# Patient Record
Sex: MALE | Race: WHITE | NOT HISPANIC OR LATINO | Employment: UNEMPLOYED | URBAN - METROPOLITAN AREA
[De-identification: names, ages, dates, MRNs, and addresses within clinical notes are randomized per-mention and may not be internally consistent; named-entity substitution may affect disease eponyms.]

---

## 2017-07-17 ENCOUNTER — HOME CARE VISIT (OUTPATIENT)
Dept: HOME HEALTH SERVICES | Facility: HOME HEALTHCARE | Age: 52
End: 2017-07-17

## 2017-12-19 ENCOUNTER — APPOINTMENT (OUTPATIENT)
Dept: RADIOLOGY | Facility: MEDICAL CENTER | Age: 52
DRG: 064 | End: 2017-12-19
Attending: EMERGENCY MEDICINE
Payer: MEDICAID

## 2017-12-19 ENCOUNTER — HOSPITAL ENCOUNTER (INPATIENT)
Facility: MEDICAL CENTER | Age: 52
LOS: 29 days | DRG: 064 | End: 2018-01-17
Attending: EMERGENCY MEDICINE | Admitting: HOSPITALIST
Payer: MEDICAID

## 2017-12-19 ENCOUNTER — APPOINTMENT (OUTPATIENT)
Dept: RADIOLOGY | Facility: MEDICAL CENTER | Age: 52
DRG: 064 | End: 2017-12-19
Attending: HOSPITALIST
Payer: MEDICAID

## 2017-12-19 ENCOUNTER — RESOLUTE PROFESSIONAL BILLING HOSPITAL PROF FEE (OUTPATIENT)
Dept: HOSPITALIST | Facility: MEDICAL CENTER | Age: 52
End: 2017-12-19
Payer: COMMERCIAL

## 2017-12-19 DIAGNOSIS — I63.9 CEREBROVASCULAR ACCIDENT (CVA), UNSPECIFIED MECHANISM (HCC): ICD-10-CM

## 2017-12-19 DIAGNOSIS — G45.9 TRANSIENT CEREBRAL ISCHEMIA, UNSPECIFIED TYPE: ICD-10-CM

## 2017-12-19 DIAGNOSIS — I63.50 CEREBROVASCULAR ACCIDENT (CVA) DUE TO OCCLUSION OF CEREBRAL ARTERY (HCC): ICD-10-CM

## 2017-12-19 DIAGNOSIS — R41.0 CONFUSION: ICD-10-CM

## 2017-12-19 LAB
ALBUMIN SERPL BCP-MCNC: 4.4 G/DL (ref 3.2–4.9)
ALBUMIN/GLOB SERPL: 1.4 G/DL
ALP SERPL-CCNC: 51 U/L (ref 30–99)
ALT SERPL-CCNC: 44 U/L (ref 2–50)
AMPHET UR QL SCN: NEGATIVE
ANION GAP SERPL CALC-SCNC: 15 MMOL/L (ref 0–11.9)
AST SERPL-CCNC: 25 U/L (ref 12–45)
BARBITURATES UR QL SCN: NEGATIVE
BASOPHILS # BLD AUTO: 0.7 % (ref 0–1.8)
BASOPHILS # BLD: 0.09 K/UL (ref 0–0.12)
BENZODIAZ UR QL SCN: NEGATIVE
BILIRUB SERPL-MCNC: 0.8 MG/DL (ref 0.1–1.5)
BUN SERPL-MCNC: 11 MG/DL (ref 8–22)
BZE UR QL SCN: NEGATIVE
CALCIUM SERPL-MCNC: 9.5 MG/DL (ref 8.5–10.5)
CANNABINOIDS UR QL SCN: NEGATIVE
CHLORIDE SERPL-SCNC: 95 MMOL/L (ref 96–112)
CO2 SERPL-SCNC: 20 MMOL/L (ref 20–33)
CREAT SERPL-MCNC: 0.82 MG/DL (ref 0.5–1.4)
EOSINOPHIL # BLD AUTO: 0.11 K/UL (ref 0–0.51)
EOSINOPHIL NFR BLD: 0.9 % (ref 0–6.9)
ERYTHROCYTE [DISTWIDTH] IN BLOOD BY AUTOMATED COUNT: 39.9 FL (ref 35.9–50)
EST. AVERAGE GLUCOSE BLD GHB EST-MCNC: 123 MG/DL
GFR SERPL CREATININE-BSD FRML MDRD: >60 ML/MIN/1.73 M 2
GLOBULIN SER CALC-MCNC: 3.2 G/DL (ref 1.9–3.5)
GLUCOSE BLD-MCNC: 112 MG/DL (ref 65–99)
GLUCOSE SERPL-MCNC: 115 MG/DL (ref 65–99)
HBA1C MFR BLD: 5.9 % (ref 0–5.6)
HCT VFR BLD AUTO: 48.1 % (ref 42–52)
HGB BLD-MCNC: 17.4 G/DL (ref 14–18)
IMM GRANULOCYTES # BLD AUTO: 0.06 K/UL (ref 0–0.11)
IMM GRANULOCYTES NFR BLD AUTO: 0.5 % (ref 0–0.9)
LYMPHOCYTES # BLD AUTO: 1.96 K/UL (ref 1–4.8)
LYMPHOCYTES NFR BLD: 16 % (ref 22–41)
MCH RBC QN AUTO: 34 PG (ref 27–33)
MCHC RBC AUTO-ENTMCNC: 36.2 G/DL (ref 33.7–35.3)
MCV RBC AUTO: 93.9 FL (ref 81.4–97.8)
METHADONE UR QL SCN: NEGATIVE
MONOCYTES # BLD AUTO: 1.76 K/UL (ref 0–0.85)
MONOCYTES NFR BLD AUTO: 14.3 % (ref 0–13.4)
NEUTROPHILS # BLD AUTO: 8.3 K/UL (ref 1.82–7.42)
NEUTROPHILS NFR BLD: 67.6 % (ref 44–72)
NRBC # BLD AUTO: 0 K/UL
NRBC BLD-RTO: 0 /100 WBC
OPIATES UR QL SCN: NEGATIVE
OXYCODONE UR QL SCN: NEGATIVE
PCP UR QL SCN: NEGATIVE
PLATELET # BLD AUTO: 284 K/UL (ref 164–446)
PMV BLD AUTO: 11.9 FL (ref 9–12.9)
POTASSIUM SERPL-SCNC: 3.7 MMOL/L (ref 3.6–5.5)
PROPOXYPH UR QL SCN: NEGATIVE
PROT SERPL-MCNC: 7.6 G/DL (ref 6–8.2)
RBC # BLD AUTO: 5.12 M/UL (ref 4.7–6.1)
SODIUM SERPL-SCNC: 130 MMOL/L (ref 135–145)
WBC # BLD AUTO: 12.3 K/UL (ref 4.8–10.8)

## 2017-12-19 PROCEDURE — 96374 THER/PROPH/DIAG INJ IV PUSH: CPT

## 2017-12-19 PROCEDURE — 99285 EMERGENCY DEPT VISIT HI MDM: CPT

## 2017-12-19 PROCEDURE — 87040 BLOOD CULTURE FOR BACTERIA: CPT

## 2017-12-19 PROCEDURE — 770020 HCHG ROOM/CARE - TELE (206)

## 2017-12-19 PROCEDURE — 93005 ELECTROCARDIOGRAM TRACING: CPT | Performed by: EMERGENCY MEDICINE

## 2017-12-19 PROCEDURE — 70551 MRI BRAIN STEM W/O DYE: CPT

## 2017-12-19 PROCEDURE — 71010 DX-CHEST-LIMITED (1 VIEW): CPT

## 2017-12-19 PROCEDURE — 99223 1ST HOSP IP/OBS HIGH 75: CPT | Performed by: HOSPITALIST

## 2017-12-19 PROCEDURE — 83036 HEMOGLOBIN GLYCOSYLATED A1C: CPT

## 2017-12-19 PROCEDURE — 82962 GLUCOSE BLOOD TEST: CPT

## 2017-12-19 PROCEDURE — 304561 HCHG STAT O2

## 2017-12-19 PROCEDURE — 80307 DRUG TEST PRSMV CHEM ANLYZR: CPT

## 2017-12-19 PROCEDURE — 700101 HCHG RX REV CODE 250: Performed by: EMERGENCY MEDICINE

## 2017-12-19 PROCEDURE — 80053 COMPREHEN METABOLIC PANEL: CPT

## 2017-12-19 PROCEDURE — 85025 COMPLETE CBC W/AUTO DIFF WBC: CPT

## 2017-12-19 PROCEDURE — 70450 CT HEAD/BRAIN W/O DYE: CPT

## 2017-12-19 RX ORDER — HEPARIN SODIUM 5000 [USP'U]/ML
5000 INJECTION, SOLUTION INTRAVENOUS; SUBCUTANEOUS EVERY 8 HOURS
Status: DISCONTINUED | OUTPATIENT
Start: 2017-12-19 | End: 2017-12-25

## 2017-12-19 RX ORDER — ONDANSETRON 4 MG/1
4 TABLET, ORALLY DISINTEGRATING ORAL EVERY 4 HOURS PRN
Status: DISCONTINUED | OUTPATIENT
Start: 2017-12-19 | End: 2018-01-17 | Stop reason: HOSPADM

## 2017-12-19 RX ORDER — PROMETHAZINE HYDROCHLORIDE 25 MG/1
12.5-25 SUPPOSITORY RECTAL EVERY 4 HOURS PRN
Status: DISCONTINUED | OUTPATIENT
Start: 2017-12-19 | End: 2018-01-17 | Stop reason: HOSPADM

## 2017-12-19 RX ORDER — ONDANSETRON 2 MG/ML
4 INJECTION INTRAMUSCULAR; INTRAVENOUS EVERY 4 HOURS PRN
Status: DISCONTINUED | OUTPATIENT
Start: 2017-12-19 | End: 2018-01-17 | Stop reason: HOSPADM

## 2017-12-19 RX ORDER — ASPIRIN 300 MG/1
300 SUPPOSITORY RECTAL DAILY
Status: DISCONTINUED | OUTPATIENT
Start: 2017-12-20 | End: 2017-12-23

## 2017-12-19 RX ORDER — PROMETHAZINE HYDROCHLORIDE 25 MG/1
12.5-25 TABLET ORAL EVERY 4 HOURS PRN
Status: DISCONTINUED | OUTPATIENT
Start: 2017-12-19 | End: 2018-01-17 | Stop reason: HOSPADM

## 2017-12-19 RX ORDER — SODIUM CHLORIDE 9 MG/ML
INJECTION, SOLUTION INTRAVENOUS CONTINUOUS
Status: DISCONTINUED | OUTPATIENT
Start: 2017-12-19 | End: 2017-12-20

## 2017-12-19 RX ORDER — LABETALOL HYDROCHLORIDE 5 MG/ML
10 INJECTION, SOLUTION INTRAVENOUS ONCE
Status: COMPLETED | OUTPATIENT
Start: 2017-12-19 | End: 2017-12-19

## 2017-12-19 RX ORDER — NICOTINE 21 MG/24HR
14 PATCH, TRANSDERMAL 24 HOURS TRANSDERMAL
Status: DISCONTINUED | OUTPATIENT
Start: 2017-12-20 | End: 2018-01-17 | Stop reason: HOSPADM

## 2017-12-19 RX ORDER — ATORVASTATIN CALCIUM 80 MG/1
80 TABLET, FILM COATED ORAL EVERY EVENING
Status: DISCONTINUED | OUTPATIENT
Start: 2017-12-19 | End: 2017-12-23

## 2017-12-19 RX ORDER — ACETAMINOPHEN 325 MG/1
650 TABLET ORAL EVERY 6 HOURS PRN
Status: DISCONTINUED | OUTPATIENT
Start: 2017-12-19 | End: 2018-01-17 | Stop reason: HOSPADM

## 2017-12-19 RX ORDER — ASPIRIN 325 MG
325 TABLET ORAL DAILY
Status: DISCONTINUED | OUTPATIENT
Start: 2017-12-20 | End: 2017-12-23

## 2017-12-19 RX ORDER — ASPIRIN 81 MG/1
324 TABLET, CHEWABLE ORAL DAILY
Status: DISCONTINUED | OUTPATIENT
Start: 2017-12-20 | End: 2017-12-23

## 2017-12-19 RX ADMIN — LABETALOL HYDROCHLORIDE 10 MG: 5 INJECTION, SOLUTION INTRAVENOUS at 17:41

## 2017-12-19 ASSESSMENT — COGNITIVE AND FUNCTIONAL STATUS - GENERAL
MOBILITY SCORE: 24
SUGGESTED CMS G CODE MODIFIER DAILY ACTIVITY: CH
DAILY ACTIVITIY SCORE: 24
SUGGESTED CMS G CODE MODIFIER MOBILITY: CH

## 2017-12-19 ASSESSMENT — PATIENT HEALTH QUESTIONNAIRE - PHQ9
SUM OF ALL RESPONSES TO PHQ QUESTIONS 1-9: 0
2. FEELING DOWN, DEPRESSED, IRRITABLE, OR HOPELESS: NOT AT ALL
1. LITTLE INTEREST OR PLEASURE IN DOING THINGS: NOT AT ALL
SUM OF ALL RESPONSES TO PHQ9 QUESTIONS 1 AND 2: 0

## 2017-12-19 ASSESSMENT — LIFESTYLE VARIABLES
EVER HAD A DRINK FIRST THING IN THE MORNING TO STEADY YOUR NERVES TO GET RID OF A HANGOVER: YES
CONSUMPTION TOTAL: POSITIVE
TOTAL SCORE: 2
HAVE YOU EVER FELT YOU SHOULD CUT DOWN ON YOUR DRINKING: YES
HOW MANY TIMES IN THE PAST YEAR HAVE YOU HAD 5 OR MORE DRINKS IN A DAY: 5
ON A TYPICAL DAY WHEN YOU DRINK ALCOHOL HOW MANY DRINKS DO YOU HAVE: 3
TOTAL SCORE: 2
TOTAL SCORE: 2
ALCOHOL_USE: YES
EVER FELT BAD OR GUILTY ABOUT YOUR DRINKING: NO
AVERAGE NUMBER OF DAYS PER WEEK YOU HAVE A DRINK CONTAINING ALCOHOL: 21
HAVE PEOPLE ANNOYED YOU BY CRITICIZING YOUR DRINKING: NO
EVER_SMOKED: YES
DOES PATIENT WANT TO STOP DRINKING: NO

## 2017-12-19 ASSESSMENT — PAIN SCALES - GENERAL
PAINLEVEL_OUTOF10: 5
PAINLEVEL_OUTOF10: 0

## 2017-12-19 ASSESSMENT — PAIN SCALES - WONG BAKER: WONGBAKER_NUMERICALRESPONSE: DOESN'T HURT AT ALL

## 2017-12-20 PROBLEM — I10 HTN (HYPERTENSION): Status: ACTIVE | Noted: 2017-12-20

## 2017-12-20 LAB
ANION GAP SERPL CALC-SCNC: 12 MMOL/L (ref 0–11.9)
BUN SERPL-MCNC: 13 MG/DL (ref 8–22)
CALCIUM SERPL-MCNC: 8.8 MG/DL (ref 8.5–10.5)
CHLORIDE SERPL-SCNC: 99 MMOL/L (ref 96–112)
CHOLEST SERPL-MCNC: 188 MG/DL (ref 100–199)
CO2 SERPL-SCNC: 20 MMOL/L (ref 20–33)
CREAT SERPL-MCNC: 0.82 MG/DL (ref 0.5–1.4)
ERYTHROCYTE [DISTWIDTH] IN BLOOD BY AUTOMATED COUNT: 39.8 FL (ref 35.9–50)
GFR SERPL CREATININE-BSD FRML MDRD: >60 ML/MIN/1.73 M 2
GLUCOSE SERPL-MCNC: 100 MG/DL (ref 65–99)
HCT VFR BLD AUTO: 46.7 % (ref 42–52)
HDLC SERPL-MCNC: 53 MG/DL
HGB BLD-MCNC: 16.3 G/DL (ref 14–18)
LDLC SERPL CALC-MCNC: 116 MG/DL
MCH RBC QN AUTO: 32.5 PG (ref 27–33)
MCHC RBC AUTO-ENTMCNC: 34.9 G/DL (ref 33.7–35.3)
MCV RBC AUTO: 93.2 FL (ref 81.4–97.8)
PLATELET # BLD AUTO: 269 K/UL (ref 164–446)
PMV BLD AUTO: 12.2 FL (ref 9–12.9)
POTASSIUM SERPL-SCNC: 3.7 MMOL/L (ref 3.6–5.5)
RBC # BLD AUTO: 5.01 M/UL (ref 4.7–6.1)
SODIUM SERPL-SCNC: 131 MMOL/L (ref 135–145)
TRIGL SERPL-MCNC: 95 MG/DL (ref 0–149)
WBC # BLD AUTO: 14.1 K/UL (ref 4.8–10.8)

## 2017-12-20 PROCEDURE — 90471 IMMUNIZATION ADMIN: CPT

## 2017-12-20 PROCEDURE — 97163 PT EVAL HIGH COMPLEX 45 MIN: CPT

## 2017-12-20 PROCEDURE — 3E0234Z INTRODUCTION OF SERUM, TOXOID AND VACCINE INTO MUSCLE, PERCUTANEOUS APPROACH: ICD-10-PCS | Performed by: HOSPITALIST

## 2017-12-20 PROCEDURE — 97167 OT EVAL HIGH COMPLEX 60 MIN: CPT

## 2017-12-20 PROCEDURE — 700111 HCHG RX REV CODE 636 W/ 250 OVERRIDE (IP): Performed by: HOSPITALIST

## 2017-12-20 PROCEDURE — 80048 BASIC METABOLIC PNL TOTAL CA: CPT

## 2017-12-20 PROCEDURE — 80061 LIPID PANEL: CPT

## 2017-12-20 PROCEDURE — 36415 COLL VENOUS BLD VENIPUNCTURE: CPT

## 2017-12-20 PROCEDURE — 770020 HCHG ROOM/CARE - TELE (206)

## 2017-12-20 PROCEDURE — 99233 SBSQ HOSP IP/OBS HIGH 50: CPT | Performed by: INTERNAL MEDICINE

## 2017-12-20 PROCEDURE — G8987 SELF CARE CURRENT STATUS: HCPCS | Mod: CL

## 2017-12-20 PROCEDURE — A9270 NON-COVERED ITEM OR SERVICE: HCPCS | Performed by: HOSPITALIST

## 2017-12-20 PROCEDURE — G8979 MOBILITY GOAL STATUS: HCPCS | Mod: CJ

## 2017-12-20 PROCEDURE — G8978 MOBILITY CURRENT STATUS: HCPCS | Mod: CL

## 2017-12-20 PROCEDURE — 90732 PPSV23 VACC 2 YRS+ SUBQ/IM: CPT | Performed by: INTERNAL MEDICINE

## 2017-12-20 PROCEDURE — 92610 EVALUATE SWALLOWING FUNCTION: CPT

## 2017-12-20 PROCEDURE — G8988 SELF CARE GOAL STATUS: HCPCS | Mod: CJ

## 2017-12-20 PROCEDURE — G8996 SWALLOW CURRENT STATUS: HCPCS | Mod: CJ

## 2017-12-20 PROCEDURE — G8997 SWALLOW GOAL STATUS: HCPCS | Mod: CH

## 2017-12-20 PROCEDURE — 700102 HCHG RX REV CODE 250 W/ 637 OVERRIDE(OP): Performed by: HOSPITALIST

## 2017-12-20 PROCEDURE — 700111 HCHG RX REV CODE 636 W/ 250 OVERRIDE (IP): Performed by: INTERNAL MEDICINE

## 2017-12-20 PROCEDURE — 700105 HCHG RX REV CODE 258: Performed by: HOSPITALIST

## 2017-12-20 PROCEDURE — 85027 COMPLETE CBC AUTOMATED: CPT

## 2017-12-20 RX ORDER — AMLODIPINE BESYLATE 10 MG/1
10 TABLET ORAL
Status: DISCONTINUED | OUTPATIENT
Start: 2017-12-21 | End: 2017-12-23

## 2017-12-20 RX ADMIN — ATORVASTATIN CALCIUM 80 MG: 80 TABLET, FILM COATED ORAL at 20:49

## 2017-12-20 RX ADMIN — HEPARIN SODIUM 5000 UNITS: 5000 INJECTION, SOLUTION INTRAVENOUS; SUBCUTANEOUS at 00:59

## 2017-12-20 RX ADMIN — ASPIRIN 325 MG: 325 TABLET, COATED ORAL at 12:11

## 2017-12-20 RX ADMIN — ACETAMINOPHEN 650 MG: 325 TABLET, FILM COATED ORAL at 20:49

## 2017-12-20 RX ADMIN — HEPARIN SODIUM 5000 UNITS: 5000 INJECTION, SOLUTION INTRAVENOUS; SUBCUTANEOUS at 06:24

## 2017-12-20 RX ADMIN — SODIUM CHLORIDE: 9 INJECTION, SOLUTION INTRAVENOUS at 01:00

## 2017-12-20 RX ADMIN — HEPARIN SODIUM 5000 UNITS: 5000 INJECTION, SOLUTION INTRAVENOUS; SUBCUTANEOUS at 14:31

## 2017-12-20 RX ADMIN — PNEUMOCOCCAL VACCINE POLYVALENT 25 MCG
25; 25; 25; 25; 25; 25; 25; 25; 25; 25; 25; 25; 25; 25; 25; 25; 25; 25; 25; 25; 25; 25; 25 INJECTION, SOLUTION INTRAMUSCULAR; SUBCUTANEOUS at 17:16

## 2017-12-20 RX ADMIN — SODIUM CHLORIDE: 9 INJECTION, SOLUTION INTRAVENOUS at 17:52

## 2017-12-20 RX ADMIN — HEPARIN SODIUM 5000 UNITS: 5000 INJECTION, SOLUTION INTRAVENOUS; SUBCUTANEOUS at 20:49

## 2017-12-20 ASSESSMENT — COGNITIVE AND FUNCTIONAL STATUS - GENERAL
SUGGESTED CMS G CODE MODIFIER MOBILITY: CM
STANDING UP FROM CHAIR USING ARMS: A LOT
SUGGESTED CMS G CODE MODIFIER DAILY ACTIVITY: CH
TURNING FROM BACK TO SIDE WHILE IN FLAT BAD: UNABLE
MOVING FROM LYING ON BACK TO SITTING ON SIDE OF FLAT BED: UNABLE
WALKING IN HOSPITAL ROOM: TOTAL
MOBILITY SCORE: 7
DAILY ACTIVITIY SCORE: 24
MOVING TO AND FROM BED TO CHAIR: UNABLE
CLIMB 3 TO 5 STEPS WITH RAILING: TOTAL

## 2017-12-20 ASSESSMENT — PAIN SCALES - GENERAL
PAINLEVEL_OUTOF10: 0
PAINLEVEL_OUTOF10: 7
PAINLEVEL_OUTOF10: 2

## 2017-12-20 ASSESSMENT — ENCOUNTER SYMPTOMS
FOCAL WEAKNESS: 1
FEVER: 0
ABDOMINAL PAIN: 0
BACK PAIN: 0
DOUBLE VISION: 0
CHILLS: 0
BLURRED VISION: 0
SPEECH CHANGE: 1
SHORTNESS OF BREATH: 0
HEADACHES: 0

## 2017-12-20 ASSESSMENT — GAIT ASSESSMENTS: GAIT LEVEL OF ASSIST: UNABLE TO PARTICIPATE

## 2017-12-20 ASSESSMENT — ACTIVITIES OF DAILY LIVING (ADL): TOILETING: INDEPENDENT

## 2017-12-20 NOTE — ED NOTES
PT HAS A BED, REPORT WAS CALLED TO Rn. RN WAS TOLD MRI WILL BRING THE PT TO THE FLOOR. PT IS AAOX4 WITH TIME OF CONFUSION. PT HAS FAMILY AT BEDSIDE.

## 2017-12-20 NOTE — ED PROVIDER NOTES
"ED Provider Note    CHIEF COMPLAINT  Chief Complaint   Patient presents with   • ALOC   • Head Ache     x 1 month        HPI  Gilles Case is a 52 y.o. male who presents with bring altered and having a headache. Wife and mother are at bedside giving history. The mother says that apparently 3-4 days ago he had an episode where he became acutely confused. She was driving he was in the car and became very quiet he was unable to put his seat belt on his right hand seemed to be contracted and not really working properly. This lasted for maybe an hour or slightly more. He got home his wife brought him to the emergency department but by the time he got emergency department his symptoms had resolved and he would not come into the hospital to be evaluated. Last night he was not really acting right. His wife reports that his right foot was turned in and he was having some issues walking on it. Yesterday the patient and his mother were at Health system and he fell a couple of times he seemed to have a problem with his right foot not quite working properly. She brought him home and he was caught in the seatbelts. He seemed to be okay per his wife when he went to sleep this morning he has just been \"not right\". His wife says he is just not acting like himself. He typically watches jeopardy and knows all the answers and has been unable to provide any answers for Jeopardy recently. He has been complaining of headache for the last 3 months but it has been worse over the last 3 days. He does have a history of hypertension but is not taking any medications. He does have a history of smoking but has not been smoking for the last 3 days.    History is provided by the wife and mother. History is limited from the patient.    REVIEW OF SYSTEMS  positive for confusion and some personality changes, headaches, negative for vomiting. All other systems are negative.     PAST MEDICAL HISTORY       SOCIAL HISTORY  Social History     Social History " Main Topics   • Smoking status: Current Every Day Smoker     Types: Cigarettes   • Smokeless tobacco: Never Used   • Alcohol use Yes   • Drug use: No   • Sexual activity: Not on file       SURGICAL HISTORY  patient denies any surgical history    CURRENT MEDICATIONS  Home Medications     Reviewed by Elisha Oneill (Pharmacy Tech) on 12/19/17 at 2130  Med List Status: Complete   Medication Last Dose Status        Patient Tarun Taking any Medications                       ALLERGIES  Allergies   Allergen Reactions   • Poison Oak Extract [Extract Of Poison [place]]        PHYSICAL EXAM  VITAL SIGNS: /79   Pulse 87   Temp 37 °C (98.6 °F)   Resp 20   Ht 1.829 m (6')   Wt 108.9 kg (240 lb)   SpO2 99%   BMI 32.55 kg/m² .  Constitutional: Alert in no apparent distress.  HENT: No signs of trauma, Bilateral external ears normal, Nose normal.   Eyes: Pupils are equal and reactive, Conjunctiva normal, Non-icteric.   Neck: Normal range of motion, No tenderness, Supple, No stridor.   Cardiovascular: Regular rate and rhythm, no murmurs.   Thorax & Lungs: Normal breath sounds, No respiratory distress, No wheezing, No chest tenderness.   Abdomen: Bowel sounds normal, Soft, No tenderness, No masses, No peritoneal signs.  Skin: Warm, Dry, No erythema, No rash.   Musculoskeletal:  no major deformities noted.   Neurologic: Alert, CN intact. Patient knows his wife's name and his mother's name. He cannot relate the date properly. He has a hard time following commands he will not cooperate with finger-to-nose test as he says he does not understand what I am asking him. He has some slight drift of his right foot no drift of his right hand  Psychiatric: Affect normal, Judgment normal, Mood normal.       DIAGNOSTIC STUDIES / PROCEDURES      EKG  12 Lead EKG interpreted by me to show:  Normal sinus rhythm  Rate 69  Axis: Normal  Intervals: Normal  TWI III  Normal ST segments  My impression of this EKG: non specific TWI, does not  indicate ischemia or arrythmia at this time.      LABS  Labs Reviewed   CBC WITH DIFFERENTIAL - Abnormal; Notable for the following:        Result Value    WBC 12.3 (*)     MCH 34.0 (*)     MCHC 36.2 (*)     Lymphocytes 16.00 (*)     Monocytes 14.30 (*)     Neutrophils (Absolute) 8.30 (*)     Monos (Absolute) 1.76 (*)     All other components within normal limits   COMP METABOLIC PANEL - Abnormal; Notable for the following:     Sodium 130 (*)     Chloride 95 (*)     Anion Gap 15.0 (*)     Glucose 115 (*)     All other components within normal limits   ACCU-CHEK GLUCOSE - Abnormal; Notable for the following:     Glucose - Accu-Ck 112 (*)     All other components within normal limits   URINE DRUG SCREEN   BLOOD CULTURE    Narrative:     1 of 2 for Blood Culture x 2 sites order   BLOOD CULTURE    Narrative:     2 of 2 blood culture x2  Sites order   ESTIMATED GFR   HEMOGLOBIN A1C   URINE DRUG SCREEN   URINALYSIS,CULTURE IF INDICATED       RADIOLOGY  CT-HEAD W/O   Final Result      No acute intracranial abnormality.      DX-CHEST-LIMITED (1 VIEW)   Final Result      1.  Hypoinflation, moderate cardiac enlargement, and pulmonary vascular congestion.   2.  No pneumonia or overt pulmonary edema.      Echocardiogram Comp W/O cont    (Results Pending)   MR-BRAIN-W/O    (Results Pending)   Carotid Duplex ([facility] Only) - Do not order if CTA - Neck done in ER    (Results Pending)           COURSE & MEDICAL DECISION MAKING  Pertinent Labs & Imaging studies reviewed. (See chart for details)  This is a 52-year-old gentleman who presents with confusion. He is alert however he does have some difficulty understanding instructions and he seems to be off. He has some slight weakness of his right lower extremity. Differential includes but is not limited to TIA, CVA, partial seizure.    He was made nothing by mouth. He does not meet criteria for alteplase given his Prolonged symptoms that been present since last  night.    Results show slightly elevated white blood cell count CMP shows slightly low sodium. CT of the head does not show any evidence of mass or bleed. His blood pressure was significantly elevated and he required some labetalol for this.    Patient will be admitted to the hospitalist service for CVA workup.    727PM: I spoke with Dr. Castelan, hospitalist who will admit the patient.    He will be admitted to the hospitalist service in guarded condition.      FINAL IMPRESSION  1. Confusion    2. Transient cerebral ischemia, unspecified type        2.   3.    This dictation has been creating using voice recognition software. The accuracy of the dictation is limited the abilities of the software.  I expect there may be some errors of grammar and possibly content. I made every attempt to manually correct the errors within my dictation. However errors related to this voice recognition software may still exist and should be interpreted within the appropriate context.      The note accurately reflects work and decisions made by me.  Gely Alvarado  12/19/2017  10:02 PM

## 2017-12-20 NOTE — H&P
DATE OF ADMISSION:  12/19/2017    PRIMARY CARE:  None.    CHIEF COMPLAINT:  Altered level of consciousness.    HISTORY OF PRESENT ILLNESS:  The patient is a 52-year-old gentleman with   history of obesity, hypertension and insomnia.  The patient now presents with   3-4 day history of altered level of consciousness that was transient.    Apparently 3 days ago, he was driving and he had difficulty communicating and   his right upper extremity went into contracture and he was not able to utilize   it.  This lasted for approximately 1 to 1-1/2 hours, but resolved without   residual effect.  Then, yesterday he had an episode where he fell twice where   his right lower extremity turned inwards.  Patient slept significant amount of   time thereafter.  Patient has significant somnolence.  Patient was not able   to follow directions thereafter.  Patient's only complaint at that time was   headache.  Patient is not able to provide much information and the history is   obtained through patient's family members.  Per the family, no history of   prior episodes like this.  Patient is not able to communicate or follow   commands.    PAST MEDICAL HISTORY:  Obesity, hypertension, insomnia.    PAST SURGICAL HISTORY:  Herniorrhaphy.    FAMILY HISTORY:  Significant for ALS in father, acute MI in brother at the age   of 54 and stroke in grandmother.    SOCIAL HISTORY:  Has approximately 2-3 cigarettes per day and drinks 1-2   alcoholic beverages per evening.  No IV drug use or polysubstance abuse.    ALLERGIES:  NKDA.    MEDICATIONS:  None.    REVIEW OF SYSTEMS:  Not obtainable.    PHYSICAL EXAMINATION:  GENERAL:  Medically overweight white male in moderate distress.  VITAL SIGNS:  Temp 37.2 C, heart rate 89, respiration 18, blood pressure   130/67, saturating 99% on room air.  HEENT:  Normocephalic, atraumatic, pupils are equal, round and reactive to   light, anicteric sclerae.  Extraocular muscles are intact.  Oropharynx is   small  with Mallampati score of 4.  Mucosa is moist, clear, without ulcerations   or exudates.  NECK:  No cervical lymphadenopathy appreciated.  PULMONARY:  Clear to auscultation bilaterally.  CARDIOVASCULAR:  Regular rate and rhythm without murmurs, rubs or gallops.  ABDOMEN:  Positive bowel sounds, soft, nontender, nondistended.  EXTREMITIES:  No clubbing, cyanosis or edema.  NEUROLOGIC:  Cranial nerves II-XII intact with the evidence of aphasia,   receptive and expressive.  There is 3/5 right lower extremity weakness and   also slight right lower extremity drop with a pronator drift.  Remainder of   the evaluation is not doable due to patient's not understanding commands.  SKIN:  No erythema or ulcerations.    LABORATORY:  WBC of 12.3, hemoglobin 17.4, platelets 284.  Sodium 130,   potassium 3.7, chloride 95, bicarbonate 20, BUN 11, creatinine 0.82, glucose   of 115, calcium 9.5, alkaline phos 51, AST 25, ALT 44, total bili 0.8.  HbA1c   is 5.9.  Urine tox screen is negative.    EKG shows sinus rhythm.  Chest x-ray shows hypoinflation with cardiomegaly.    Head CT shows no acute process.    ASSESSMENT AND PLAN:  A 52-year-old gentleman with history of obesity and   insomnia, now with findings concerning for CVA.  1.  Acute cerebrovascular accident:  We will allow permissive hypertension.    We will initiate aspirin and atorvastatin.  We will monitor patient on   telemetry and obtain echocardiogram, head MRI and carotid ultrasounds.  2.  Aphasia:  We will obtain speech therapy for evaluation and management.  3.  Right lower extremity weakness.  We will obtain PT, OT evaluation.  4.  Hypertension:  As above, we will allow permissive hypertension.  5.  Hyponatremia:  Follow with normal saline infusion.  6.  Obesity:  Encourage dietary kilocalorie restrictions.  7.  Insomnia:  Recommend outpatient followup.  8.  Preventives:  Provide incentive spirometry, Pneumovax and Fluvax as   appropriate, stool softener and DVT  prophylaxis.    DISPOSITION:  Complex and guarded.    This will require more than 2 nights of hospitalization for evaluation and   management.       ____________________________________     MD SARA DOUGLASS / KATYA    DD:  12/20/2017 01:43:16  DT:  12/20/2017 02:33:07    D#:  [id number]  Job#:  [id number]

## 2017-12-20 NOTE — PROGRESS NOTES
Received report from previous nurse regarding previous 12 hours. POC reviewed with patient, white board updated, patient verbalized understanding, call light within reach. Patient encouraged to call before getting up. Bed in lowest position, treaded socks on. Patient currently in sinus rhythm at a rate of 72.

## 2017-12-20 NOTE — PROGRESS NOTES
Saumya Rios Fall Risk Assessment:     Last Known Fall: Within the last month  Mobility: Use of assistive device/requires assist of two people  Medications: Cardiovascular or central nervous system meds  Mental Status/LOC/Awareness: Oriented to person and place  Toileting Needs: Use of assistive device (Bedside commode, bedpan, urinal)  Volume/Electrolyte Status: No problems  Communication/Sensory: Visual (Glasses)/hearing deficit  Behavior: Appropriate behavior  Saumya Rios Fall Risk Total: 13  Fall Risk Level: MODERATE RISK    Universal Fall Precautions:  call light/belongings in reach, bed in low position and locked, wheelchairs and assistive devices out of sight, siderails up x 2, use non-slip footwear, adequate lighting, educate on level of risk, clutter free and spill free environment, educate to call for assistance    Fall Risk Level Interventions:    TRIAL (TELE 8, NEURO, MED MICHAEL 5) Moderate Fall Risk Interventions  Provide patient/family education based on risk assessment : completed  Educate patient/family to call staff for assistance when getting out of bed: completed  Place fall precaution signage outside patient door: verified  Utilize bed/chair fall alarm: verified     Patient Specific Interventions:     Medication: review medications with patient and family, assess for medications that can be discontinued or dosage decreased and limit combination of prn medications  Mental Status/LOC/Awareness: reorient patient, reinforce falls education, check on patient hourly, utilize bed/chair fall alarm and reinforce the use of call light  Toileting: provide frquent toileting, instruct patient/family on the use of grab bars, instruct patient/family on the need to call for assistance when toileting and do not leave patient unattended in bathroom/refer to toileting scripting  Volume/Electrolyte Status: ensure patient remains hydrated, advance diet as tolerated and monitor abnormal lab  values  Communication/Sensory: update plan of care on whiteboard, ensure proper positioning when transferrng/ambulating and ensure patient has glasses/contacts and hearing aids/dentures  Behavioral: encourage patient to voice feelings, administer medication as ordered and instruct/reinforce fall program rationale  Mobility: utilize bed/chair fall alarm, ensure bed is locked and in lowest position and provide appropriate assistive device

## 2017-12-20 NOTE — THERAPY
"Speech Language Therapy Clinical Swallow Evaluation completed.  Functional Status: Clinical swallow evaluation completed on this date.  Patient with expressive and receptive-type deficits.  He demonstrated inconsistent accuracy with personally relevant yes/no responses. Patient did not follow any directives to the oral [facility] exam (?oral apraxia).  Presentation of PO included ice chips, nectars, purees, soft solids, and thin liquids.  The patient presented with prolonged mastication of fibrous soft solids (pineapple) but functional with peaches and pears, initiation of swallow trigger was timely and laryngeal elevation was palpated as weak.  The patient demonstrated wet vocal quality and reflexive throat clearing with 25% of ice chips and ~50% of thin liquid trials.  He had no overt s/sx of aspiration with any other consistency consumed.  Patient appeared easily frustrated and made multiple unsuccessful attempts at feeding himself independently.  At this time, recommend Dysphagia 2/nectars diet with DIRECT supervision and feeding assistance as needed. The patient would benefit from an aphasia evaluation to further assess expressive and receptive deficits.  SLP following   Recommendations - Diet: Diet / Liquid Recommendation: Nectar Thick Liquid, Dysphagia II                          Strategies: Direct supervision during meals, Assistance needed for meal tray set-up, No Straws and Head of Bed at 90 Degrees                          Medication Administration: Medication Administration : Float Whole with Puree  Plan of Care: Will benefit from Speech Therapy 5 times per week  Post-Acute Therapy: Discharge to a transitional care facility for continued skilled therapy services.    See \"Rehab Therapy-Acute\" Patient Summary Report for complete documentation.   "

## 2017-12-20 NOTE — DISCHARGE PLANNING
Transitional Care Navigator:    Referred to  for TORRI request. Pt is uninsured, per patient's spouse; pt requires skilled therapies for stroke recovery.

## 2017-12-20 NOTE — THERAPY
"Physical Therapy Evaluation completed.   Bed Mobility:  Supine to Sit: Maximal Assist  Transfers: Sit to Stand: Maximal Assist  Gait: Level Of Assist: Unable to Participate with No Equipment Needed       Plan of Care: Will benefit from Physical Therapy 5 times per week  Discharge Recommendations: Equipment: Will Continue to Assess for Equipment Needs. Post-acute therapy Discharge to a transitional care facility for continued skilled therapy services.    Pt presents with decreased functional mobility most attributable to impaired R UE/LE proprioception and fine touch, hypotonic R LE, impaired seated trunk control, and symptoms of receptive and expressive aphasia. In sitting, pt required min to mod A to find and sustain upright, neutral posture. R UE and LE were found to be in awkward positions indicating impaired proprioception. In standing at max A, no contraction was noted to R LE and pt tended to have immediate buckle followed by knee hyperextension for compensation. Pt displayed minor hypertonicity to R hamstrings with a catch followed by gradual release at end range. He will benefit from further acute skilled PT services to improve functional mobility and will most likely benefit from intensive multidisciplinary skilled rehab upon DC.     See \"Rehab Therapy-Acute\" Patient Summary Report for complete documentation.     "

## 2017-12-20 NOTE — ED NOTES
Pt states his blood pressure is always high, and doesn't take meds.  Pt states he hasn't seen the doctor in 15 years

## 2017-12-20 NOTE — PROGRESS NOTES
[facility] Hospitalist Progress Note    Date of Service: 2017    Chief Complaint  52 y.o. male admitted 2017 with HTN and obesity who presented with several days of AMS with brief RUE and RLE contracture. Admitted for possible stroke.    Interval Problem Update  CVA - aspirin, lipitor, initial CTH normal  NPO, NS 75ml/h. PT/OT/ST  TTE, carotid duplex  Leukocytosis - afebrile, UA ordered, CXR without PNA  Mild hyponatremia - on NS  MRI brain pending  Wife updated at bedside.    Consultants/Specialty  None    Disposition  SNF - order placed        Review of Systems   Reason unable to perform ROS: espressive aphasia.   Constitutional: Negative for chills and fever.   Eyes: Negative for blurred vision and double vision.   Respiratory: Negative for shortness of breath.    Cardiovascular: Negative for chest pain.   Gastrointestinal: Negative for abdominal pain.   Musculoskeletal: Negative for back pain.   Neurological: Positive for speech change and focal weakness. Negative for headaches.      Physical Exam  Laboratory/Imaging   Hemodynamics  Temp (24hrs), Av.7 °C (98 °F), Min:36.2 °C (97.1 °F), Max:37 °C (98.6 °F)   Temperature: 36.6 °C (97.9 °F)  Pulse  Av.9  Min: 67  Max: 94    Blood Pressure: (!) 179/95 (rn notified)      Respiratory      Respiration: 18, Pulse Oximetry: 94 %        RUL Breath Sounds: Clear, RML Breath Sounds: Clear, RLL Breath Sounds: Diminished, MIRTA Breath Sounds: Clear, LLL Breath Sounds: Diminished    Fluids    Intake/Output Summary (Last 24 hours) at 17  Last data filed at 17 1754   Gross per 24 hour   Intake              250 ml   Output                0 ml   Net              250 ml       Nutrition  Orders Placed This Encounter   Procedures   • DIET ORDER     Standing Status:   Standing     Number of Occurrences:   1     Order Specific Question:   Diet:     Answer:   Regular [1]     Order Specific Question:   Texture/Fiber modifications:     Answer:   Dysphagia  2(Pureed/Chopped)specify fluid consistency(question 6) [2]     Order Specific Question:   Consistency/Fluid modifications:     Answer:   Nectar Thick [2]     Order Specific Question:   Miscellaneous modifications:     Answer:   SLP - 1:1 Supervision by Nursing [21]     Order Specific Question:   Miscellaneous modifications:     Answer:   SLP - Deliver to Nursing Station [22]     Physical Exam   Constitutional: He appears well-developed and well-nourished.   Follows some commands   HENT:   Head: Normocephalic and atraumatic.   Eyes: Conjunctivae and EOM are normal. Pupils are equal, round, and reactive to light.   Cardiovascular: Normal rate and regular rhythm.  Exam reveals no gallop and no friction rub.    No murmur heard.  Pulmonary/Chest: Effort normal and breath sounds normal. He has no wheezes. He has no rales.   Abdominal: Soft. There is no tenderness. There is no rebound and no guarding.   Musculoskeletal: He exhibits no edema.   Neurological: He is alert.   No facial droop, able to answer yes/no questions, says some words and some mumbling, 3/5 strength with RUE and RLE with positive drift, hyperreflexia to RLE   Skin: Skin is warm and dry.   Nursing note and vitals reviewed.      Recent Labs      12/19/17   1643  12/20/17   0300   WBC  12.3*  14.1*   RBC  5.12  5.01   HEMOGLOBIN  17.4  16.3   HEMATOCRIT  48.1  46.7   MCV  93.9  93.2   MCH  34.0*  32.5   MCHC  36.2*  34.9   RDW  39.9  39.8   PLATELETCT  284  269   MPV  11.9  12.2     Recent Labs      12/19/17   1643  12/20/17   0300   SODIUM  130*  131*   POTASSIUM  3.7  3.7   CHLORIDE  95*  99   CO2  20  20   GLUCOSE  115*  100*   BUN  11  13   CREATININE  0.82  0.82   CALCIUM  9.5  8.8             Recent Labs      12/20/17   0300   TRIGLYCERIDE  95   HDL  53   LDL  116*          Assessment/Plan     * CVA (cerebral vascular accident) (CMS-HCC)   Assessment & Plan    Expressive aphasia with right sided weakness. MRI showed left frontal/parietal/temporal  infarct.  and A1c 5.9%.  - ST/PT/OT  - continue permissive HTN until tonight  - continue monitoring on telemetry  - TTE and CTA head/neck ordered  - on asa and statin  - heparin dvt ppx  - dicussed tobacco cessation with wife        HTN (hypertension)   Assessment & Plan    Patient not on any medications as outpatient  - permissive HTN until tonight  - start on norvasc tomorrow            Reviewed items::  EKG reviewed, Labs reviewed, Medications reviewed and Radiology images reviewed  Alexander catheter::  No Alexander  DVT prophylaxis pharmacological::  Heparin

## 2017-12-20 NOTE — ED NOTES
RECEIVED REPORT FROM Rn, PT IS AAOX3, PT IS HERE FOR CONFUSION AND IS BEING ADMITTED, PT WAS TOLD OF POC.

## 2017-12-20 NOTE — ED NOTES
53 yo male BIB [facility] from his apt  Chief Complaint   Patient presents with   • ALOC   • Head Ache     x 1 month      Pt is A&Ox3, states 2012 for year

## 2017-12-20 NOTE — CARE PLAN
Problem: Knowledge Deficit  Goal: Knowledge of disease process/condition, treatment plan, diagnostic tests, and medications will improve  Outcome: PROGRESSING AS EXPECTED  Patient educated about disease process and plan of care for the day. Patient expressed understanding.

## 2017-12-20 NOTE — ED NOTES
Med rec updated and complete.  Allergies reviewed.  Pt denies taking medications at this time.  No preference for pharmacy.

## 2017-12-21 ENCOUNTER — APPOINTMENT (OUTPATIENT)
Dept: RADIOLOGY | Facility: MEDICAL CENTER | Age: 52
DRG: 064 | End: 2017-12-21
Attending: INTERNAL MEDICINE
Payer: MEDICAID

## 2017-12-21 PROBLEM — E87.1 HYPONATREMIA: Status: ACTIVE | Noted: 2017-12-21

## 2017-12-21 PROBLEM — Z71.89 ADVANCE CARE PLANNING: Status: ACTIVE | Noted: 2017-12-21

## 2017-12-21 LAB
ANION GAP SERPL CALC-SCNC: 10 MMOL/L (ref 0–11.9)
BASOPHILS # BLD AUTO: 0.6 % (ref 0–1.8)
BASOPHILS # BLD: 0.07 K/UL (ref 0–0.12)
BUN SERPL-MCNC: 16 MG/DL (ref 8–22)
CALCIUM SERPL-MCNC: 9.3 MG/DL (ref 8.5–10.5)
CHLORIDE SERPL-SCNC: 100 MMOL/L (ref 96–112)
CO2 SERPL-SCNC: 21 MMOL/L (ref 20–33)
CREAT SERPL-MCNC: 0.73 MG/DL (ref 0.5–1.4)
EOSINOPHIL # BLD AUTO: 0.1 K/UL (ref 0–0.51)
EOSINOPHIL NFR BLD: 0.9 % (ref 0–6.9)
ERYTHROCYTE [DISTWIDTH] IN BLOOD BY AUTOMATED COUNT: 40.1 FL (ref 35.9–50)
GFR SERPL CREATININE-BSD FRML MDRD: >60 ML/MIN/1.73 M 2
GLUCOSE SERPL-MCNC: 96 MG/DL (ref 65–99)
HCT VFR BLD AUTO: 46.9 % (ref 42–52)
HGB BLD-MCNC: 16.4 G/DL (ref 14–18)
IMM GRANULOCYTES # BLD AUTO: 0.08 K/UL (ref 0–0.11)
IMM GRANULOCYTES NFR BLD AUTO: 0.7 % (ref 0–0.9)
LV EJECT FRACT  99904: 70
LV EJECT FRACT MOD 2C 99903: 58.67
LV EJECT FRACT MOD 4C 99902: 66.89
LV EJECT FRACT MOD BP 99901: 62.1
LYMPHOCYTES # BLD AUTO: 1.67 K/UL (ref 1–4.8)
LYMPHOCYTES NFR BLD: 14.3 % (ref 22–41)
MCH RBC QN AUTO: 32.7 PG (ref 27–33)
MCHC RBC AUTO-ENTMCNC: 35 G/DL (ref 33.7–35.3)
MCV RBC AUTO: 93.4 FL (ref 81.4–97.8)
MONOCYTES # BLD AUTO: 1.81 K/UL (ref 0–0.85)
MONOCYTES NFR BLD AUTO: 15.5 % (ref 0–13.4)
NEUTROPHILS # BLD AUTO: 7.96 K/UL (ref 1.82–7.42)
NEUTROPHILS NFR BLD: 68 % (ref 44–72)
NRBC # BLD AUTO: 0 K/UL
NRBC BLD-RTO: 0 /100 WBC
PLATELET # BLD AUTO: 261 K/UL (ref 164–446)
PMV BLD AUTO: 12 FL (ref 9–12.9)
POTASSIUM SERPL-SCNC: 3.6 MMOL/L (ref 3.6–5.5)
RBC # BLD AUTO: 5.02 M/UL (ref 4.7–6.1)
SODIUM SERPL-SCNC: 131 MMOL/L (ref 135–145)
WBC # BLD AUTO: 11.7 K/UL (ref 4.8–10.8)

## 2017-12-21 PROCEDURE — 97112 NEUROMUSCULAR REEDUCATION: CPT

## 2017-12-21 PROCEDURE — 770020 HCHG ROOM/CARE - TELE (206)

## 2017-12-21 PROCEDURE — 700102 HCHG RX REV CODE 250 W/ 637 OVERRIDE(OP): Performed by: INTERNAL MEDICINE

## 2017-12-21 PROCEDURE — 700111 HCHG RX REV CODE 636 W/ 250 OVERRIDE (IP): Performed by: INTERNAL MEDICINE

## 2017-12-21 PROCEDURE — 700117 HCHG RX CONTRAST REV CODE 255: Performed by: INTERNAL MEDICINE

## 2017-12-21 PROCEDURE — 36415 COLL VENOUS BLD VENIPUNCTURE: CPT

## 2017-12-21 PROCEDURE — 92526 ORAL FUNCTION THERAPY: CPT

## 2017-12-21 PROCEDURE — 700105 HCHG RX REV CODE 258: Performed by: INTERNAL MEDICINE

## 2017-12-21 PROCEDURE — A9270 NON-COVERED ITEM OR SERVICE: HCPCS | Performed by: INTERNAL MEDICINE

## 2017-12-21 PROCEDURE — 85025 COMPLETE CBC W/AUTO DIFF WBC: CPT

## 2017-12-21 PROCEDURE — A9270 NON-COVERED ITEM OR SERVICE: HCPCS | Performed by: HOSPITALIST

## 2017-12-21 PROCEDURE — 70496 CT ANGIOGRAPHY HEAD: CPT

## 2017-12-21 PROCEDURE — 93306 TTE W/DOPPLER COMPLETE: CPT

## 2017-12-21 PROCEDURE — 70547 MR ANGIOGRAPHY NECK W/O DYE: CPT

## 2017-12-21 PROCEDURE — 99233 SBSQ HOSP IP/OBS HIGH 50: CPT | Performed by: INTERNAL MEDICINE

## 2017-12-21 PROCEDURE — 93306 TTE W/DOPPLER COMPLETE: CPT | Mod: 26 | Performed by: INTERNAL MEDICINE

## 2017-12-21 PROCEDURE — 80048 BASIC METABOLIC PNL TOTAL CA: CPT

## 2017-12-21 PROCEDURE — 70498 CT ANGIOGRAPHY NECK: CPT

## 2017-12-21 PROCEDURE — 700102 HCHG RX REV CODE 250 W/ 637 OVERRIDE(OP): Performed by: HOSPITALIST

## 2017-12-21 PROCEDURE — 70544 MR ANGIOGRAPHY HEAD W/O DYE: CPT

## 2017-12-21 PROCEDURE — 700111 HCHG RX REV CODE 636 W/ 250 OVERRIDE (IP): Performed by: HOSPITALIST

## 2017-12-21 RX ORDER — LABETALOL HYDROCHLORIDE 5 MG/ML
10 INJECTION, SOLUTION INTRAVENOUS EVERY 4 HOURS PRN
Status: CANCELLED | OUTPATIENT
Start: 2017-12-21

## 2017-12-21 RX ORDER — ENALAPRILAT 1.25 MG/ML
1.25 INJECTION INTRAVENOUS EVERY 6 HOURS PRN
Status: DISCONTINUED | OUTPATIENT
Start: 2017-12-21 | End: 2017-12-23

## 2017-12-21 RX ORDER — HYDRALAZINE HYDROCHLORIDE 20 MG/ML
10 INJECTION INTRAMUSCULAR; INTRAVENOUS
Status: CANCELLED | OUTPATIENT
Start: 2017-12-21

## 2017-12-21 RX ORDER — LABETALOL HYDROCHLORIDE 5 MG/ML
10 INJECTION, SOLUTION INTRAVENOUS EVERY 4 HOURS PRN
Status: DISCONTINUED | OUTPATIENT
Start: 2017-12-21 | End: 2017-12-23

## 2017-12-21 RX ORDER — SODIUM CHLORIDE 9 MG/ML
INJECTION, SOLUTION INTRAVENOUS CONTINUOUS
Status: DISCONTINUED | OUTPATIENT
Start: 2017-12-21 | End: 2017-12-22

## 2017-12-21 RX ORDER — HYDRALAZINE HYDROCHLORIDE 20 MG/ML
10 INJECTION INTRAMUSCULAR; INTRAVENOUS EVERY 6 HOURS PRN
Status: DISCONTINUED | OUTPATIENT
Start: 2017-12-21 | End: 2017-12-23

## 2017-12-21 RX ADMIN — IOHEXOL 80 ML: 350 INJECTION, SOLUTION INTRAVENOUS at 13:46

## 2017-12-21 RX ADMIN — ASPIRIN 324 MG: 81 TABLET, CHEWABLE ORAL at 09:45

## 2017-12-21 RX ADMIN — SODIUM CHLORIDE: 9 INJECTION, SOLUTION INTRAVENOUS at 18:10

## 2017-12-21 RX ADMIN — AMLODIPINE BESYLATE 10 MG: 10 TABLET ORAL at 09:44

## 2017-12-21 RX ADMIN — HYDRALAZINE HYDROCHLORIDE 10 MG: 20 INJECTION INTRAMUSCULAR; INTRAVENOUS at 14:05

## 2017-12-21 RX ADMIN — HEPARIN SODIUM 5000 UNITS: 5000 INJECTION, SOLUTION INTRAVENOUS; SUBCUTANEOUS at 20:53

## 2017-12-21 RX ADMIN — HEPARIN SODIUM 5000 UNITS: 5000 INJECTION, SOLUTION INTRAVENOUS; SUBCUTANEOUS at 14:05

## 2017-12-21 RX ADMIN — NICOTINE 14 MG: 14 PATCH, EXTENDED RELEASE TRANSDERMAL at 05:57

## 2017-12-21 RX ADMIN — HEPARIN SODIUM 5000 UNITS: 5000 INJECTION, SOLUTION INTRAVENOUS; SUBCUTANEOUS at 05:57

## 2017-12-21 RX ADMIN — ENALAPRILAT 1.25 MG: 1.25 INJECTION INTRAVENOUS at 15:41

## 2017-12-21 ASSESSMENT — COGNITIVE AND FUNCTIONAL STATUS - GENERAL
CLIMB 3 TO 5 STEPS WITH RAILING: TOTAL
WALKING IN HOSPITAL ROOM: TOTAL
MOVING TO AND FROM BED TO CHAIR: UNABLE
STANDING UP FROM CHAIR USING ARMS: TOTAL
MOVING FROM LYING ON BACK TO SITTING ON SIDE OF FLAT BED: UNABLE
SUGGESTED CMS G CODE MODIFIER MOBILITY: CN
TURNING FROM BACK TO SIDE WHILE IN FLAT BAD: UNABLE
MOBILITY SCORE: 6

## 2017-12-21 ASSESSMENT — GAIT ASSESSMENTS: GAIT LEVEL OF ASSIST: UNABLE TO PARTICIPATE

## 2017-12-21 ASSESSMENT — PAIN SCALES - GENERAL: PAINLEVEL_OUTOF10: 0

## 2017-12-21 NOTE — DISCHARGE PLANNING
Transitional Care Navigator:    Spoke with the patient's son, Tobi, regarding updates and plan of care post acute. Tobi is aware that information is needed in order to proceed with a medicaid application. He requested that he be the primary contact for his father, and his phone number has been added to his contact list. Tobi will contact financial regarding medicaid aleena.  Message left for patient's wife, Kamille, with the contact information for medicaid application as well.  SARIKA is aware.

## 2017-12-21 NOTE — THERAPY
"Physical Therapy Treatment completed.   Bed Mobility:  Supine to Sit: Maximal Assist  Transfers: Sit to Stand:  (deferred today to work on seated stability)  Gait: Level Of Assist: Unable to Participate with No Equipment Needed       Plan of Care: Will benefit from Physical Therapy 5 times per week  Discharge Recommendations: Equipment: Will Continue to Assess for Equipment Needs. Post-acute therapy Discharge to a transitional care facility for continued skilled therapy services.    Pt appears to have in increase in stroke-like symptoms today as compared to yesterday's eval. He was nonverbal throughout session with a large contraversive push to his R. With manual and verbal facilitation, pt was able to achieve neutral sitting posture but could not sustain for more than 1-2mins before requiring repeated cueing. Again, these symptoms are worse than yesterday and the pushing behavior is much worse. RN notified/is aware. Pt will benefit from further acute skilled PT services to improve functional mobiltiy and will benefit from inpatient rehab upon DC.      See \"Rehab Therapy-Acute\" Patient Summary Report for complete documentation.       "

## 2017-12-21 NOTE — PROGRESS NOTES
[facility] Hospitalist Progress Note    Date of Service: 2017    Chief Complaint  52 y.o. male admitted 2017 with HTN and obesity who presented with expressive aphasia and right sided weakness due to CVA.    Interval Problem Update  : Sinus on tele  Worsening aphasia and right hemiparesis today. Repeat CTH and CTA showed left ICA occlusion and possible dissection with worsening infarct.  Spoke with radiology, ordered MRA for further assessment and possible IR thrombolysis  Consulted Dr. Cooper with [facility] surgery who will see patient  Spoke with Dr. Simeon, says to continue aspirin and heparin, agrees with consults, did not recommend specific BP parameters  Spoke with son, wife, mother at bedside. They understand the severe situation. We discussed code status and they want to keep him full code, they are ok with tube feeding.    Consultants/Specialty  None    Disposition  SNF - order placed, needs TORRI        Review of Systems   Unable to perform ROS: Acuity of condition (espressive aphasia)      Physical Exam  Laboratory/Imaging   Hemodynamics  Temp (24hrs), Av.7 °C (98 °F), Min:36.3 °C (97.3 °F), Max:36.9 °C (98.4 °F)   Temperature: 36.9 °C (98.4 °F)  Pulse  Av.9  Min: 67  Max: 94    Blood Pressure: (!) 169/91, NIBP: (!) 188/85      Respiratory      Respiration: 18, Pulse Oximetry: 95 %        RUL Breath Sounds: Clear, RML Breath Sounds: Diminished, RLL Breath Sounds: Diminished, MIRTA Breath Sounds: Clear, LLL Breath Sounds: Diminished    Fluids  No intake or output data in the 24 hours ending 17 1802    Nutrition  Orders Placed This Encounter   Procedures   • DIET NPO     Standing Status:   Standing     Number of Occurrences:   1     Order Specific Question:   Restrict to:     Answer:   Strict [1]     Physical Exam   Constitutional: He appears well-developed and well-nourished.   Appears comfortable   HENT:   Head: Normocephalic and atraumatic.   Eyes: Conjunctivae and EOM are normal.  Pupils are equal, round, and reactive to light.   Cardiovascular: Normal rate and regular rhythm.  Exam reveals no gallop and no friction rub.    No murmur heard.  Pulmonary/Chest: Effort normal and breath sounds normal. He has no wheezes. He has no rales.   Abdominal: Soft. There is no tenderness. There is no rebound and no guarding.   Musculoskeletal: He exhibits no edema.   Neurological: He is alert.   Increased facial droop, minimal movement of RUE and RLE, unable to speak or follow commands   Skin: Skin is warm and dry.   Nursing note and vitals reviewed.      Recent Labs      12/19/17   1643  12/20/17   0300  12/21/17   0314   WBC  12.3*  14.1*  11.7*   RBC  5.12  5.01  5.02   HEMOGLOBIN  17.4  16.3  16.4   HEMATOCRIT  48.1  46.7  46.9   MCV  93.9  93.2  93.4   MCH  34.0*  32.5  32.7   MCHC  36.2*  34.9  35.0   RDW  39.9  39.8  40.1   PLATELETCT  284  269  261   MPV  11.9  12.2  12.0     Recent Labs      12/19/17   1643  12/20/17   0300  12/21/17   0314   SODIUM  130*  131*  131*   POTASSIUM  3.7  3.7  3.6   CHLORIDE  95*  99  100   CO2  20  20  21   GLUCOSE  115*  100*  96   BUN  11  13  16   CREATININE  0.82  0.82  0.73   CALCIUM  9.5  8.8  9.3             Recent Labs      12/20/17   0300   TRIGLYCERIDE  95   HDL  53   LDL  116*          Assessment/Plan     * CVA (cerebral vascular accident) (CMS-[facility])   Assessment & Plan    Expressive aphasia with right sided weakness. MRI showed left frontal/parietal/temporal infarct.  and A1c 5.9%.  Had progression of aphasia and right hemiparesis. CTA showed L ICA occlusion with possible dissection  - ST/PT/OT following  - continue monitoring on telemetry  - on asa and statin  - heparin dvt ppx  - discussed with Dr. Simeon, says to continue aspirin and heparin, agrees with consults, did not recommend specific BP parameters  - Spoke with radiology, ordered MRA for further assessment and possible IR thrombolysis  - Consulted Dr. Cooper with [facility] surgery who will see  patient          HTN (hypertension)   Assessment & Plan    Patient not on any medications as outpatient  SBP >180, PRNs ordered  Will clarify BP goals with vascular surgery        Hyponatremia   Assessment & Plan    Started on IVF        Advance care planning   Assessment & Plan    Spoke with son, wife, mother at bedside. They understand the severe situation. They want son to be primary contact. We discussed code status and they want to keep him full code, they are ok with tube feeding. I discussed advance care planning with the patient's family for at least 10 minutes.              Reviewed items::  EKG reviewed, Labs reviewed, Medications reviewed and Radiology images reviewed  Alexander catheter::  No Alexander  DVT prophylaxis pharmacological::  Heparin

## 2017-12-21 NOTE — PROGRESS NOTES
Saumya Rios Fall Risk Assessment:     Last Known Fall: Within the last month  Mobility: Use of assistive device/requires assist of two people  Medications: Cardiovascular or central nervous system meds  Mental Status/LOC/Awareness: Lethargic/oriented to person only  Toileting Needs: Use of assistive device (Bedside commode, bedpan, urinal)  Volume/Electrolyte Status: No problems  Communication/Sensory: Visual (Glasses)/hearing deficit  Behavior: Appropriate behavior  Saumya Rios Fall Risk Total: 14  Fall Risk Level: MODERATE RISK    Universal Fall Precautions:  call light/belongings in reach, bed in low position and locked, wheelchairs and assistive devices out of sight, siderails up x 2, use non-slip footwear, adequate lighting, clutter free and spill free environment, educate on level of risk, educate to call for assistance    Fall Risk Level Interventions:    TRIAL (TELE 8, NEURO, MED MICHAEL 5) Moderate Fall Risk Interventions  Place yellow fall risk ID band on patient: verified  Provide patient/family education based on risk assessment : completed  Educate patient/family to call staff for assistance when getting out of bed: completed  Place fall precaution signage outside patient door: verified  Utilize bed/chair fall alarm: verified     Patient Specific Interventions:     Medication: review medications with patient and family, assess for medications that can be discontinued or dosage decreased and limit combination of prn medications  Mental Status/LOC/Awareness: reorient patient, reinforce falls education, check on patient hourly, utilize bed/chair fall alarm and reinforce the use of call light  Toileting: provide frquent toileting and instruct patient/family on the need to call for assistance when toileting  Volume/Electrolyte Status: ensure patient remains hydrated, advance diet as tolerated and monitor abnormal lab values  Communication/Sensory: update plan of care on whiteboard, ensure proper positioning  when transferrng/ambulating and ensure patient has glasses/contacts and hearing aids/dentures  Behavioral: encourage patient to voice feelings, administer medication as ordered and instruct/reinforce fall program rationale  Mobility: utilize bed/chair fall alarm, ensure bed is locked and in lowest position and provide appropriate assistive device

## 2017-12-21 NOTE — THERAPY
"Speech Language Therapy dysphagia treatment completed.   Functional Status:  Attempted to see the patient for an aphasia evaluation.  However, RN asked for swallow reassessment as the patient has had a change in status and pocketing was noted to the right buccal cavity during dinner last night.  Upon entering the room, worsening right-sided facial droop was noted. Patient not responding to yes/no questions or following simple one step directives, given MAX cues.  Inspection of the oral cavity revealed dried orange colored substance (?curshed pill, food) on the posterior pharyngeal wall.  Presentation of PO included 1/2 teaspoons of nectars and 1/4 teaspoons of pudding.  The patient presented with prolonged oral phase and 4-6 second delayed initiation of swallow trigger.  Unable to assess vocal quality as the patient did not follow directives to phonation tasks or make spontaneous vocalizations but respirations were noted to become wet sounding which may be concerning for penetration/aspiration.    Recommendations: Given change in status, recommend the patient become NPO with an alternative source of nutrition via Cortrak.  SLP following and will complete the aphasia evaluation as able.    Plan of Care: Will benefit from Speech Therapy 5 times per week  Post-Acute Therapy: Discharge to a transitional care facility for continued skilled therapy services.    See \"Rehab Therapy-Acute\" Patient Summary Report for complete documentation.     "

## 2017-12-21 NOTE — PROGRESS NOTES
Received report from previous nurse regarding previous 12 hours. POC reviewed with patient, white board updated, patient verbalized understanding, call light within reach. Patient encouraged to call before getting up. Bed in lowest position, treaded socks on. Patient currently in sinus rhythm at a rate of 70.

## 2017-12-21 NOTE — CARE PLAN
Problem: Knowledge Deficit  Goal: Knowledge of disease process/condition, treatment plan, diagnostic tests, and medications will improve  Outcome: PROGRESSING SLOWER THAN EXPECTED  Patient educated about disease process and plan of care for the day.

## 2017-12-21 NOTE — ASSESSMENT & PLAN NOTE
Expressive/receptive aphasia/dysphagia/ right sided weakness. MRI showed left frontal/parietal/temporal infarct.   Had progression of aphasia and right hemiparesis 12/21. Stat CTA showed L ICA occlusion with possible dissection, which was confirmed on MRA.  Neurology and vascular surgery and recommended against anticoagulation or surgery.    - tolerating tube feeds, s/p PEG 12/26  - ST/PT/OT following, son wants long term placement near him in [place], [place]  - continue asa and statin  - heparin dvt ppx  Stable, no change of neuro deficits; await snf transfer

## 2017-12-22 ENCOUNTER — APPOINTMENT (OUTPATIENT)
Dept: RADIOLOGY | Facility: MEDICAL CENTER | Age: 52
DRG: 064 | End: 2017-12-22
Attending: INTERNAL MEDICINE
Payer: MEDICAID

## 2017-12-22 ENCOUNTER — APPOINTMENT (OUTPATIENT)
Dept: RADIOLOGY | Facility: MEDICAL CENTER | Age: 52
DRG: 064 | End: 2017-12-22
Attending: HOSPITALIST
Payer: MEDICAID

## 2017-12-22 LAB
ANION GAP SERPL CALC-SCNC: 15 MMOL/L (ref 0–11.9)
BASOPHILS # BLD AUTO: 0.8 % (ref 0–1.8)
BASOPHILS # BLD: 0.1 K/UL (ref 0–0.12)
BUN SERPL-MCNC: 18 MG/DL (ref 8–22)
CALCIUM SERPL-MCNC: 8.6 MG/DL (ref 8.5–10.5)
CHLORIDE SERPL-SCNC: 105 MMOL/L (ref 96–112)
CO2 SERPL-SCNC: 15 MMOL/L (ref 20–33)
CREAT SERPL-MCNC: 0.62 MG/DL (ref 0.5–1.4)
EOSINOPHIL # BLD AUTO: 0.11 K/UL (ref 0–0.51)
EOSINOPHIL NFR BLD: 0.9 % (ref 0–6.9)
ERYTHROCYTE [DISTWIDTH] IN BLOOD BY AUTOMATED COUNT: 41.7 FL (ref 35.9–50)
GFR SERPL CREATININE-BSD FRML MDRD: >60 ML/MIN/1.73 M 2
GLUCOSE SERPL-MCNC: 119 MG/DL (ref 65–99)
HCT VFR BLD AUTO: 47.9 % (ref 42–52)
HGB BLD-MCNC: 16.9 G/DL (ref 14–18)
IMM GRANULOCYTES # BLD AUTO: 0.11 K/UL (ref 0–0.11)
IMM GRANULOCYTES NFR BLD AUTO: 0.9 % (ref 0–0.9)
LACTATE BLD-SCNC: 1.4 MMOL/L (ref 0.5–2)
LYMPHOCYTES # BLD AUTO: 1.63 K/UL (ref 1–4.8)
LYMPHOCYTES NFR BLD: 12.9 % (ref 22–41)
MCH RBC QN AUTO: 33.7 PG (ref 27–33)
MCHC RBC AUTO-ENTMCNC: 35.3 G/DL (ref 33.7–35.3)
MCV RBC AUTO: 95.4 FL (ref 81.4–97.8)
MONOCYTES # BLD AUTO: 1.82 K/UL (ref 0–0.85)
MONOCYTES NFR BLD AUTO: 14.4 % (ref 0–13.4)
NEUTROPHILS # BLD AUTO: 8.84 K/UL (ref 1.82–7.42)
NEUTROPHILS NFR BLD: 70.1 % (ref 44–72)
NRBC # BLD AUTO: 0 K/UL
NRBC BLD-RTO: 0 /100 WBC
PLATELET # BLD AUTO: 252 K/UL (ref 164–446)
PMV BLD AUTO: 11.9 FL (ref 9–12.9)
POTASSIUM SERPL-SCNC: 3.6 MMOL/L (ref 3.6–5.5)
RBC # BLD AUTO: 5.02 M/UL (ref 4.7–6.1)
SODIUM SERPL-SCNC: 135 MMOL/L (ref 135–145)
WBC # BLD AUTO: 12.6 K/UL (ref 4.8–10.8)

## 2017-12-22 PROCEDURE — 96105 ASSESSMENT OF APHASIA: CPT

## 2017-12-22 PROCEDURE — G8997 SWALLOW GOAL STATUS: HCPCS | Mod: CH

## 2017-12-22 PROCEDURE — 80048 BASIC METABOLIC PNL TOTAL CA: CPT

## 2017-12-22 PROCEDURE — A9270 NON-COVERED ITEM OR SERVICE: HCPCS | Performed by: HOSPITALIST

## 2017-12-22 PROCEDURE — 36415 COLL VENOUS BLD VENIPUNCTURE: CPT

## 2017-12-22 PROCEDURE — 70450 CT HEAD/BRAIN W/O DYE: CPT

## 2017-12-22 PROCEDURE — 700111 HCHG RX REV CODE 636 W/ 250 OVERRIDE (IP): Performed by: INTERNAL MEDICINE

## 2017-12-22 PROCEDURE — 81003 URINALYSIS AUTO W/O SCOPE: CPT

## 2017-12-22 PROCEDURE — 85025 COMPLETE CBC W/AUTO DIFF WBC: CPT

## 2017-12-22 PROCEDURE — 700101 HCHG RX REV CODE 250: Performed by: INTERNAL MEDICINE

## 2017-12-22 PROCEDURE — 97112 NEUROMUSCULAR REEDUCATION: CPT

## 2017-12-22 PROCEDURE — 700117 HCHG RX CONTRAST REV CODE 255: Performed by: INTERNAL MEDICINE

## 2017-12-22 PROCEDURE — 700102 HCHG RX REV CODE 250 W/ 637 OVERRIDE(OP): Performed by: HOSPITALIST

## 2017-12-22 PROCEDURE — G8996 SWALLOW CURRENT STATUS: HCPCS | Mod: CJ

## 2017-12-22 PROCEDURE — 70498 CT ANGIOGRAPHY NECK: CPT

## 2017-12-22 PROCEDURE — 0042T CT-CEREBRAL PERFUSION ANALYSIS: CPT

## 2017-12-22 PROCEDURE — 700105 HCHG RX REV CODE 258: Performed by: INTERNAL MEDICINE

## 2017-12-22 PROCEDURE — 302136 NUTRITION PUMP: Performed by: INTERNAL MEDICINE

## 2017-12-22 PROCEDURE — 83605 ASSAY OF LACTIC ACID: CPT

## 2017-12-22 PROCEDURE — 770020 HCHG ROOM/CARE - TELE (206)

## 2017-12-22 PROCEDURE — 99233 SBSQ HOSP IP/OBS HIGH 50: CPT | Performed by: INTERNAL MEDICINE

## 2017-12-22 PROCEDURE — 700111 HCHG RX REV CODE 636 W/ 250 OVERRIDE (IP): Performed by: HOSPITALIST

## 2017-12-22 PROCEDURE — 80307 DRUG TEST PRSMV CHEM ANLYZR: CPT

## 2017-12-22 PROCEDURE — 70496 CT ANGIOGRAPHY HEAD: CPT

## 2017-12-22 PROCEDURE — B4081 ENTERAL NG TUBING W/ STYLET: HCPCS | Performed by: INTERNAL MEDICINE

## 2017-12-22 RX ORDER — DEXTROSE AND SODIUM CHLORIDE 5; .9 G/100ML; G/100ML
INJECTION, SOLUTION INTRAVENOUS CONTINUOUS
Status: DISCONTINUED | OUTPATIENT
Start: 2017-12-22 | End: 2017-12-25

## 2017-12-22 RX ADMIN — IOHEXOL: 350 INJECTION, SOLUTION INTRAVENOUS at 15:15

## 2017-12-22 RX ADMIN — HEPARIN SODIUM 5000 UNITS: 5000 INJECTION, SOLUTION INTRAVENOUS; SUBCUTANEOUS at 23:16

## 2017-12-22 RX ADMIN — HEPARIN SODIUM 5000 UNITS: 5000 INJECTION, SOLUTION INTRAVENOUS; SUBCUTANEOUS at 05:57

## 2017-12-22 RX ADMIN — HYDRALAZINE HYDROCHLORIDE 10 MG: 20 INJECTION INTRAMUSCULAR; INTRAVENOUS at 20:36

## 2017-12-22 RX ADMIN — SODIUM CHLORIDE: 9 INJECTION, SOLUTION INTRAVENOUS at 09:29

## 2017-12-22 RX ADMIN — ASPIRIN 300 MG: 300 SUPPOSITORY RECTAL at 09:47

## 2017-12-22 RX ADMIN — HYDRALAZINE HYDROCHLORIDE 10 MG: 20 INJECTION INTRAMUSCULAR; INTRAVENOUS at 13:02

## 2017-12-22 RX ADMIN — IOHEXOL 60 ML: 350 INJECTION, SOLUTION INTRAVENOUS at 15:19

## 2017-12-22 RX ADMIN — DEXTROSE AND SODIUM CHLORIDE: 5; 900 INJECTION, SOLUTION INTRAVENOUS at 20:36

## 2017-12-22 RX ADMIN — SODIUM CHLORIDE: 9 INJECTION, SOLUTION INTRAVENOUS at 01:19

## 2017-12-22 RX ADMIN — HYDRALAZINE HYDROCHLORIDE 10 MG: 20 INJECTION INTRAMUSCULAR; INTRAVENOUS at 01:16

## 2017-12-22 RX ADMIN — HEPARIN SODIUM 5000 UNITS: 5000 INJECTION, SOLUTION INTRAVENOUS; SUBCUTANEOUS at 15:44

## 2017-12-22 RX ADMIN — ACETAMINOPHEN 650 MG: 325 TABLET, FILM COATED ORAL at 23:38

## 2017-12-22 RX ADMIN — ATORVASTATIN CALCIUM 80 MG: 80 TABLET, FILM COATED ORAL at 23:16

## 2017-12-22 RX ADMIN — NICOTINE 14 MG: 14 PATCH, EXTENDED RELEASE TRANSDERMAL at 05:57

## 2017-12-22 RX ADMIN — LABETALOL HYDROCHLORIDE 10 MG: 5 INJECTION, SOLUTION INTRAVENOUS at 23:21

## 2017-12-22 ASSESSMENT — COGNITIVE AND FUNCTIONAL STATUS - GENERAL
SUGGESTED CMS G CODE MODIFIER MOBILITY: CN
WALKING IN HOSPITAL ROOM: TOTAL
MOBILITY SCORE: 6
MOVING FROM LYING ON BACK TO SITTING ON SIDE OF FLAT BED: UNABLE
MOVING TO AND FROM BED TO CHAIR: UNABLE
CLIMB 3 TO 5 STEPS WITH RAILING: TOTAL
TURNING FROM BACK TO SIDE WHILE IN FLAT BAD: UNABLE
STANDING UP FROM CHAIR USING ARMS: TOTAL

## 2017-12-22 ASSESSMENT — GAIT ASSESSMENTS: GAIT LEVEL OF ASSIST: UNABLE TO PARTICIPATE

## 2017-12-22 NOTE — THERAPY
"Speech Language Therapy Evaluation completed to address speech  Functional Status:  Patient seen this date for aphasia evaluation in the setting of CVA of the left frontal, parietal, and posterior temporal lobe. He was in bed for the evaluation. Attempted to administer the Bedside Western Aphasia Battery, but patient was unable to complete any portion of the assessment, resulting in a score of 0 for receptive and expressive language, indicating a Global Aphasia. Informal measures indicated that patient was able to follow simple 1-step instructions with a visual and tactile cue with about 50% accuracy. Able to identify written \"yes\" and \"no\" from an array of 2 and 3 with 100% accuracy. Also able to identify his own written name from an array of 2, but not 3. However, when asked a yes or no question, he was unable to point to written words \"yes\" or \"no\" he was also unable to indicate gesturally. When presented with an array of 2 line drawing, accuracy for ID was about 75%. In an array of 3, accuracy dropped to between 25% and 50%. Patient was handed a pen and paper. He independently began to try and form letters \"C\" \"H\" and \"S\". Letter forms were shaky and patient showed visible frustration. Patient appears to be understanding single words occasionally but not longer simple directives. His expressive output is profoundly impaired and he is unable to produce a reliable yes/no or use line drawing picture support at this time. He may benefit from further assessment in the areas of receptive language with real pictures/objects and treatment to develop function communication strategies.     Recommendations:  Currently, patient benefits from visual and tactile cues to follow simple directives. He was receptive to reassuring pats on his arm when he appeared to be frustrated by tasks during evaluation. Use simple single word instructions and visual cues to help him with ADLs. SLP will continue to follow.    Plan of Care: Will " "benefit from Speech Therapy 5 times per week    Post-Acute Therapy: Discharge to a transitional care facility for continued skilled therapy services.    See \"Rehab Therapy-Acute\" Patient Summary Report for complete documentation.     "

## 2017-12-22 NOTE — CONSULTS
DATE OF CONSULTATION: 12/21/2017     REFERRING PHYSICIAN: hospitalist, MD.     CONSULTING PHYSICIAN: Paulie Cooper M.D.      REASON FOR CONSULTATION: carotid occlusion and CVA.     HISTORY OF PRESENT ILLNESS: The patient is a 52 year old man admitted with left hemispheric stroke. Complete right sided paralysis and expressive aphasia. CTA demonstrates complete occlusion of the distal left ICA, suspected dissection.     PAST MEDICAL HISTORY:       PAST SURGICAL HISTORY: patient denies any surgical history     ALLERGIES:   Allergies   Allergen Reactions   • Poison Oak Extract [Extract Of Poison [place]]         CURRENT MEDICATIONS:   Home Medications     Reviewed by Elisha Oneill (Pharmacy Tech) on 12/19/17 at 2130  Med List Status: Complete   Medication Last Dose Status        Patient Tarun Taking any Medications                       FAMILY HISTORY: History reviewed. No pertinent family history.     SOCIAL HISTORY:   Social History     Social History Main Topics   • Smoking status: Current Every Day Smoker     Types: Cigarettes   • Smokeless tobacco: Never Used   • Alcohol use Yes   • Drug use: No   • Sexual activity: Not on file       Review of Systems:      Expressive aphasia     PHYSICAL EXAMINATION:       Constitutional:   Well developed, Well nourished, No acute distress  HENMT:  Normocephalic, Atraumatic, Oropharynx moist mucous membranes, No oral exudates, Nose normal.  No thyromegaly.  Eyes:  EOMI, Conjunctiva normal, No discharge.  Neck:  Normal range of motion, No cervical tenderness,  no JVD.  Cardiovascular:  Normal heart rate, Normal rhythm, No murmurs, No rubs, No gallops.   Extremitites with intact distal pulses, no cyanosis, or edema.  Lungs:  Normal breath sounds, breath sounds clear to auscultation bilaterally,  no crackles, no wheezing.   Abdomen: Bowel sounds normal, Soft, No tenderness, No guarding, No rebound, No masses, No hepatosplenomegaly.  Skin: Warm, Dry, No erythema, No rash, no  induration.  Neurologic: complete left sided paralysis, expressive aphasia            LABORATORY VALUES:   Recent Labs      12/19/17   1643  12/20/17   0300  12/21/17   0314   WBC  12.3*  14.1*  11.7*   RBC  5.12  5.01  5.02   HEMOGLOBIN  17.4  16.3  16.4   HEMATOCRIT  48.1  46.7  46.9   MCV  93.9  93.2  93.4   MCH  34.0*  32.5  32.7   MCHC  36.2*  34.9  35.0   RDW  39.9  39.8  40.1   PLATELETCT  284  269  261   MPV  11.9  12.2  12.0     Recent Labs      12/19/17   1643  12/20/17   0300  12/21/17   0314   SODIUM  130*  131*  131*   POTASSIUM  3.7  3.7  3.6   CHLORIDE  95*  99  100   CO2  20  20  21   GLUCOSE  115*  100*  96   BUN  11  13  16   CREATININE  0.82  0.82  0.73   CALCIUM  9.5  8.8  9.3     Recent Labs      12/19/17   1643   ASTSGOT  25   ALTSGPT  44   TBILIRUBIN  0.8   ALKPHOSPHAT  51   GLOBULIN  3.2            IMAGING:   CT-CTA HEAD WITH & W/O-POST PROCESS   Final Result         1. There is complete occlusion of the distal cervical and proximal petrous portions of left internal carotid artery. There is reconstitution of flow more cranially. This is likely secondary to dissection.      2. Extensive low attenuation area in the temporal parietal and occipital aspect of the left brain, more extensive than seen on MRI, concerning for worsening ischemia/ infarct.      CRITICAL RESULT READ BACK: Preliminary findings discussed with and critical read back performed by Dr. ZARA HENSLEY via telephone on 12/21/2017 1:54 PM      CT-CTA NECK WITH & W/O-POST PROCESSING   Final Result         1. Likely dissection starting in the left cervical internal carotid artery with mild diffuse narrowing and complete occlusion at the distal cervical and proximal petrous portions.         Echocardiogram Comp W/O cont   Final Result      MR-BRAIN-W/O   Final Result      1.  Multifocal areas of small infarcts in the left frontal, parietal and posterior temporal lobe in the watershed distribution. MR or CT angiogram of the head and neck  is recommended for further evaluation.   2.  Mild cerebral atrophy.      CT-HEAD W/O   Final Result      No acute intracranial abnormality.      DX-CHEST-LIMITED (1 VIEW)   Final Result      1.  Hypoinflation, moderate cardiac enlargement, and pulmonary vascular congestion.   2.  No pneumonia or overt pulmonary edema.      MR-MRA HEAD-W/O    (Results Pending)   MR-MRA NECK-W/O    (Results Pending)       IMPRESSION AND PLAN:     Active Hospital Problems    Diagnosis   • HTN (hypertension) [I10]     Priority: High   • CVA (cerebral vascular accident) (CMS-HCC) [I63.9]     Priority: High   • Advance care planning [Z71.89]   • Hyponatremia [E87.1]     Distal ICA complete occlusion, possibly from focal carotid dissection. No evidence of aortic dissection on CTA.   No indication for surgical intervention.  Discussed with family    ____________________________________   Paulie Cooper M.D.          DD: 12/21/2017   DT: 6:33 PM

## 2017-12-22 NOTE — ASSESSMENT & PLAN NOTE
son to be primary contact per family  Full code wanted per family  S/p Peg placement on 12/26

## 2017-12-22 NOTE — THERAPY
"Physical Therapy Treatment completed.   Bed Mobility:  Supine to Sit: Maximal Assist  Transfers: Sit to Stand: Total Assist  Gait: Level Of Assist: Unable to Participate with No Equipment Needed       Plan of Care: Will benefit from Physical Therapy 5 times per week  Discharge Recommendations: Equipment: Will Continue to Assess for Equipment Needs. Post-acute therapy Discharge to a transitional care facility for continued skilled therapy services.     Today, pt able to locate neutral seated posture through reaching anterolaterally with L UE and was also able to sustain at SBA to min A. After x5 repetitions to achieve neutral, pt was able to progress to neutral, unsupported sitting with hands in lap. He appeared to fatigue resulting in R lateral lean, but was then able to correct with visual and verbal cues for L lateral lean with combined reach. To facilitate R LE approximation, pt cued for anterior trunk lean which he was able to perform at CGA and a target, but he was unable to return to upright, neutral posture without max A. Pushing behavior appeared too strong for successful sit to stand today despite total A. Pt is able to initiate bed mobility with L UE/LE, but cannot complete task without max A for trunk and R LE/UE. Pt is certainly also limited by symptoms of global aphasia, but is occasionally receptive to combined simple visual/verbal/manual cues. He will benefit from continued acute skilled PT services to progress mobility and could benefit from physiatry consult for inpatient rehab.         See \"Rehab Therapy-Acute\" Patient Summary Report for complete documentation.       "

## 2017-12-22 NOTE — DIETARY
"Nutrition Support Cortrak Assessment - Male    Gilles Case is a 52 y.o. male with admitting DX of CVA (cerebral vascular accident)    Pertinent History:   Allergies:  Poison oak extract [extract of poison oak]  Height: 182.9 cm (6' 0.01\")  Weight: 107.9 kg (237 lb 14 oz)  Weight to Use in Calculations: 103.6 kg (228 lb 6.3 oz)  Ideal Body Weight: 80.7 kg (178 lb)  Percent Ideal Body Weight: 128.3  Body mass index is 32.25 kg/m².     Pertinent Labs: Glucose 119  Last BM: 17  Pertinent Medications: Aspirin, Heparin  Pertinent Fluids:  mL/hr  Surgery / Procedures: none this admit  Skin:  No skin breakdown     Estimated Needs:  MSJ x 1.1 = 2017 kcals/day  Total Calories / day: 1761 -  2040 (Calories / k - 19)  Total Grams Protein / day: 125 - 145 (Grams Protein / k.2 - 1.4)  Total Fluids ml / day: 2595 mL         Assessment / Evaluation:   · Pt with CVA.  · Swallow evaluation , recommended NPO with nutrition via Cortrak.  · Tube feed consult received.      Plan / Recommendation:   · Once cortrak placed and verified, start Replete with Fiber @ 25 mL/hr and advance per protocol to goal rate of 85 mL/hr which provides 2040 kcals, 131 grams of protein and 1693 mL of free water per day.  · Fluids per MD.   · RD to monitor wt and lab trends while on tube feeds.  · PO diet when safe/appropriate per SLP/MD and pt can adequately consume.    RD following.    "

## 2017-12-22 NOTE — PROGRESS NOTES
[facility] Hospitalist Progress Note    Date of Service: 2017    Chief Complaint  52 y.o. male admitted 2017 with HTN and obesity who presented with expressive aphasia and right sided weakness due to CVA.    Interval Problem Update  Patient continues to have dense right hemiparesis and aphasia.   Cortrack and tube feeds ordered  MRA confirmed left ICA occlusion, spoke with IR and ordered CT cerebral perfusion test. Given high risk of hemorrhage, stenting is not recommended.  I discussed the case with Dr. Simeon again who says the benefits of anticoagulation compared to antiplt are minimal when compared to the risk of hemorrhage. Will continue on aspirin and statin. Can consider anticoagulation if further worsening stroke.  Remains sinus on tele  Consulted PMR for inpt rehab  Updated son    Consultants/Specialty  None    Disposition  SNF - order placed, needs TORRI        Review of Systems   Unable to perform ROS: Acuity of condition (espressive aphasia)      Physical Exam  Laboratory/Imaging   Hemodynamics  Temp (24hrs), Av.7 °C (98.1 °F), Min:36.5 °C (97.7 °F), Max:37.1 °C (98.7 °F)   Temperature: 36.5 °C (97.7 °F)  Pulse  Av.8  Min: 67  Max: 118    Blood Pressure: (!) 166/107 (MD notified)      Respiratory      Respiration: (!) 22, Pulse Oximetry: 92 %        RUL Breath Sounds: Diminished, RML Breath Sounds: Diminished, RLL Breath Sounds: Diminished, MIRTA Breath Sounds: Diminished, LLL Breath Sounds: Diminished    Fluids    Intake/Output Summary (Last 24 hours) at 17 1827  Last data filed at 17 0600   Gross per 24 hour   Intake                0 ml   Output              400 ml   Net             -400 ml       Nutrition  Orders Placed This Encounter   Procedures   • DIET NPO     Standing Status:   Standing     Number of Occurrences:   1     Order Specific Question:   Restrict to:     Answer:   Strict [1]     Physical Exam   Constitutional: He appears well-developed and well-nourished.    Tracking me in the room, not following commands   HENT:   Head: Normocephalic and atraumatic.   Eyes: Conjunctivae and EOM are normal. Pupils are equal, round, and reactive to light.   Cardiovascular: Normal rate and regular rhythm.  Exam reveals no gallop and no friction rub.    No murmur heard.  Pulmonary/Chest: Effort normal and breath sounds normal. He has no wheezes. He has no rales.   Abdominal: Soft. There is no tenderness. There is no rebound and no guarding.   Musculoskeletal: He exhibits no edema.   Neurological: He is alert.   Increased facial droop, minimal movement of RUE and RLE, unable to speak or follow commands   Skin: Skin is warm and dry.   Nursing note and vitals reviewed.      Recent Labs      12/20/17   0300  12/21/17   0314  12/22/17   0302   WBC  14.1*  11.7*  12.6*   RBC  5.01  5.02  5.02   HEMOGLOBIN  16.3  16.4  16.9   HEMATOCRIT  46.7  46.9  47.9   MCV  93.2  93.4  95.4   MCH  32.5  32.7  33.7*   MCHC  34.9  35.0  35.3   RDW  39.8  40.1  41.7   PLATELETCT  269  261  252   MPV  12.2  12.0  11.9     Recent Labs      12/20/17   0300  12/21/17   0314  12/22/17   0302   SODIUM  131*  131*  135   POTASSIUM  3.7  3.6  3.6   CHLORIDE  99  100  105   CO2  20  21  15*   GLUCOSE  100*  96  119*   BUN  13  16  18   CREATININE  0.82  0.73  0.62   CALCIUM  8.8  9.3  8.6             Recent Labs      12/20/17   0300   TRIGLYCERIDE  95   HDL  53   LDL  116*          Assessment/Plan     * CVA (cerebral vascular accident) (CMS-[facility])   Assessment & Plan    Expressive aphasia with right sided weakness. MRI showed left frontal/parietal/temporal infarct.  and A1c 5.9%.  Had progression of aphasia and right hemiparesis. CTA showed L ICA occlusion with possible dissection, which was confirmed on MRA. I discussed with Dr. Simeon 12/21 and 12/22, says the benefits of anticoagulation compared to antiplt are minimal when compared to the risk of hemorrhage. Consulted Dr. Cooper who says no surgical  intervention recommended. Spoke with IR and patient has high risk for hemorrhage with stenting.  - ordered for cortrack and tube feeds  - ST/PT/OT following, PMR consulted  - continue monitoring on telemetry  - continue asa and statin  - heparin dvt ppx  - q4h neuro checks          HTN (hypertension)   Assessment & Plan    Patient not on any medications as outpatient  SBP >180, PRNs ordered  Start oral meds when cortrack in place.        Hyponatremia   Assessment & Plan    Improved on IVF.        Advance care planning   Assessment & Plan    Spoke with son, wife, mother at bedside. They understand the severe situation. They want son to be primary contact. We discussed code status and they want to keep him full code, they are ok with tube feeding. I discussed advance care planning with the patient's family for at least 10 minutes.              Reviewed items::  EKG reviewed, Labs reviewed, Medications reviewed and Radiology images reviewed  Alexander catheter::  No Alexander  DVT prophylaxis pharmacological::  Heparin

## 2017-12-22 NOTE — PROGRESS NOTES
Bedside report and 12-hour chart check completed per policy and procedure. Assumed care of pt at this time.

## 2017-12-23 ENCOUNTER — HOSPITAL ENCOUNTER (INPATIENT)
Facility: REHABILITATION | Age: 52
End: 2017-12-23
Attending: PHYSICAL MEDICINE & REHABILITATION | Admitting: PHYSICAL MEDICINE & REHABILITATION
Payer: MEDICAID

## 2017-12-23 ENCOUNTER — APPOINTMENT (OUTPATIENT)
Dept: RADIOLOGY | Facility: MEDICAL CENTER | Age: 52
DRG: 064 | End: 2017-12-23
Attending: INTERNAL MEDICINE
Payer: MEDICAID

## 2017-12-23 PROBLEM — E87.6 HYPOKALEMIA: Status: ACTIVE | Noted: 2017-12-23

## 2017-12-23 LAB
AMPHET UR QL SCN: NEGATIVE
ANION GAP SERPL CALC-SCNC: 11 MMOL/L (ref 0–11.9)
APPEARANCE UR: CLEAR
BARBITURATES UR QL SCN: NEGATIVE
BASOPHILS # BLD AUTO: 0.8 % (ref 0–1.8)
BASOPHILS # BLD: 0.09 K/UL (ref 0–0.12)
BENZODIAZ UR QL SCN: NEGATIVE
BILIRUB UR QL STRIP.AUTO: NEGATIVE
BUN SERPL-MCNC: 18 MG/DL (ref 8–22)
BZE UR QL SCN: NEGATIVE
CALCIUM SERPL-MCNC: 8.9 MG/DL (ref 8.5–10.5)
CANNABINOIDS UR QL SCN: NEGATIVE
CHLORIDE SERPL-SCNC: 108 MMOL/L (ref 96–112)
CO2 SERPL-SCNC: 17 MMOL/L (ref 20–33)
COLOR UR: YELLOW
CREAT SERPL-MCNC: 0.58 MG/DL (ref 0.5–1.4)
CULTURE IF INDICATED INDCX: NO UA CULTURE
EOSINOPHIL # BLD AUTO: 0.18 K/UL (ref 0–0.51)
EOSINOPHIL NFR BLD: 1.5 % (ref 0–6.9)
ERYTHROCYTE [DISTWIDTH] IN BLOOD BY AUTOMATED COUNT: 43.2 FL (ref 35.9–50)
GFR SERPL CREATININE-BSD FRML MDRD: >60 ML/MIN/1.73 M 2
GLUCOSE SERPL-MCNC: 115 MG/DL (ref 65–99)
GLUCOSE UR STRIP.AUTO-MCNC: NEGATIVE MG/DL
HCT VFR BLD AUTO: 46.1 % (ref 42–52)
HGB BLD-MCNC: 16 G/DL (ref 14–18)
IMM GRANULOCYTES # BLD AUTO: 0.08 K/UL (ref 0–0.11)
IMM GRANULOCYTES NFR BLD AUTO: 0.7 % (ref 0–0.9)
KETONES UR STRIP.AUTO-MCNC: 80 MG/DL
LEUKOCYTE ESTERASE UR QL STRIP.AUTO: NEGATIVE
LYMPHOCYTES # BLD AUTO: 1.52 K/UL (ref 1–4.8)
LYMPHOCYTES NFR BLD: 13 % (ref 22–41)
MCH RBC QN AUTO: 33.3 PG (ref 27–33)
MCHC RBC AUTO-ENTMCNC: 34.7 G/DL (ref 33.7–35.3)
MCV RBC AUTO: 96 FL (ref 81.4–97.8)
METHADONE UR QL SCN: NEGATIVE
MICRO URNS: ABNORMAL
MONOCYTES # BLD AUTO: 1.79 K/UL (ref 0–0.85)
MONOCYTES NFR BLD AUTO: 15.4 % (ref 0–13.4)
NEUTROPHILS # BLD AUTO: 7.99 K/UL (ref 1.82–7.42)
NEUTROPHILS NFR BLD: 68.6 % (ref 44–72)
NITRITE UR QL STRIP.AUTO: NEGATIVE
NRBC # BLD AUTO: 0 K/UL
NRBC BLD-RTO: 0 /100 WBC
OPIATES UR QL SCN: NEGATIVE
OXYCODONE UR QL SCN: NEGATIVE
PCP UR QL SCN: NEGATIVE
PH UR STRIP.AUTO: 5.5 [PH]
PLATELET # BLD AUTO: 251 K/UL (ref 164–446)
PMV BLD AUTO: 11.9 FL (ref 9–12.9)
POTASSIUM SERPL-SCNC: 3.5 MMOL/L (ref 3.6–5.5)
PROPOXYPH UR QL SCN: NEGATIVE
PROT UR QL STRIP: NEGATIVE MG/DL
RBC # BLD AUTO: 4.8 M/UL (ref 4.7–6.1)
RBC UR QL AUTO: NEGATIVE
SODIUM SERPL-SCNC: 136 MMOL/L (ref 135–145)
SP GR UR REFRACTOMETRY: >1.045
UROBILINOGEN UR STRIP.AUTO-MCNC: 1 MG/DL
WBC # BLD AUTO: 11.7 K/UL (ref 4.8–10.8)

## 2017-12-23 PROCEDURE — 700101 HCHG RX REV CODE 250: Performed by: INTERNAL MEDICINE

## 2017-12-23 PROCEDURE — 770020 HCHG ROOM/CARE - TELE (206)

## 2017-12-23 PROCEDURE — 85025 COMPLETE CBC W/AUTO DIFF WBC: CPT

## 2017-12-23 PROCEDURE — A9270 NON-COVERED ITEM OR SERVICE: HCPCS | Performed by: HOSPITALIST

## 2017-12-23 PROCEDURE — 700111 HCHG RX REV CODE 636 W/ 250 OVERRIDE (IP): Performed by: HOSPITALIST

## 2017-12-23 PROCEDURE — 700102 HCHG RX REV CODE 250 W/ 637 OVERRIDE(OP): Performed by: HOSPITALIST

## 2017-12-23 PROCEDURE — 700105 HCHG RX REV CODE 258: Performed by: INTERNAL MEDICINE

## 2017-12-23 PROCEDURE — 80048 BASIC METABOLIC PNL TOTAL CA: CPT

## 2017-12-23 PROCEDURE — 71010 DX-CHEST-PORTABLE (1 VIEW): CPT

## 2017-12-23 PROCEDURE — 700111 HCHG RX REV CODE 636 W/ 250 OVERRIDE (IP): Performed by: INTERNAL MEDICINE

## 2017-12-23 PROCEDURE — 36415 COLL VENOUS BLD VENIPUNCTURE: CPT

## 2017-12-23 PROCEDURE — 99233 SBSQ HOSP IP/OBS HIGH 50: CPT | Performed by: INTERNAL MEDICINE

## 2017-12-23 RX ORDER — ASPIRIN 81 MG/1
81 TABLET, CHEWABLE ORAL DAILY
Status: DISCONTINUED | OUTPATIENT
Start: 2017-12-23 | End: 2018-01-17 | Stop reason: HOSPADM

## 2017-12-23 RX ORDER — HYDRALAZINE HYDROCHLORIDE 20 MG/ML
10 INJECTION INTRAMUSCULAR; INTRAVENOUS EVERY 6 HOURS PRN
Status: DISCONTINUED | OUTPATIENT
Start: 2017-12-23 | End: 2018-01-17 | Stop reason: HOSPADM

## 2017-12-23 RX ORDER — ATORVASTATIN CALCIUM 80 MG/1
80 TABLET, FILM COATED ORAL EVERY EVENING
Status: DISCONTINUED | OUTPATIENT
Start: 2017-12-23 | End: 2018-01-17 | Stop reason: HOSPADM

## 2017-12-23 RX ORDER — LABETALOL HYDROCHLORIDE 5 MG/ML
10 INJECTION, SOLUTION INTRAVENOUS EVERY 4 HOURS PRN
Status: DISCONTINUED | OUTPATIENT
Start: 2017-12-23 | End: 2018-01-17 | Stop reason: HOSPADM

## 2017-12-23 RX ORDER — POTASSIUM CHLORIDE 20 MEQ/1
60 TABLET, EXTENDED RELEASE ORAL ONCE
Status: DISCONTINUED | OUTPATIENT
Start: 2017-12-23 | End: 2017-12-23

## 2017-12-23 RX ORDER — ENALAPRILAT 1.25 MG/ML
1.25 INJECTION INTRAVENOUS EVERY 6 HOURS PRN
Status: DISCONTINUED | OUTPATIENT
Start: 2017-12-23 | End: 2018-01-17 | Stop reason: HOSPADM

## 2017-12-23 RX ORDER — AMLODIPINE BESYLATE 5 MG/1
10 TABLET ORAL
Status: DISCONTINUED | OUTPATIENT
Start: 2017-12-23 | End: 2018-01-17 | Stop reason: HOSPADM

## 2017-12-23 RX ADMIN — POTASSIUM CHLORIDE 10 MEQ: 2 INJECTION, SOLUTION, CONCENTRATE INTRAVENOUS at 15:24

## 2017-12-23 RX ADMIN — NICOTINE 14 MG: 14 PATCH, EXTENDED RELEASE TRANSDERMAL at 05:15

## 2017-12-23 RX ADMIN — HEPARIN SODIUM 5000 UNITS: 5000 INJECTION, SOLUTION INTRAVENOUS; SUBCUTANEOUS at 05:15

## 2017-12-23 RX ADMIN — ENALAPRILAT 1.25 MG: 1.25 INJECTION INTRAVENOUS at 17:58

## 2017-12-23 RX ADMIN — DEXTROSE AND SODIUM CHLORIDE: 5; 900 INJECTION, SOLUTION INTRAVENOUS at 17:58

## 2017-12-23 RX ADMIN — POTASSIUM CHLORIDE 10 MEQ: 2 INJECTION, SOLUTION, CONCENTRATE INTRAVENOUS at 13:18

## 2017-12-23 RX ADMIN — LABETALOL HYDROCHLORIDE 10 MG: 5 INJECTION, SOLUTION INTRAVENOUS at 20:45

## 2017-12-23 RX ADMIN — HYDRALAZINE HYDROCHLORIDE 10 MG: 20 INJECTION INTRAMUSCULAR; INTRAVENOUS at 16:42

## 2017-12-23 RX ADMIN — POTASSIUM CHLORIDE 10 MEQ: 2 INJECTION, SOLUTION, CONCENTRATE INTRAVENOUS at 14:17

## 2017-12-23 RX ADMIN — LABETALOL HYDROCHLORIDE 10 MG: 5 INJECTION, SOLUTION INTRAVENOUS at 15:35

## 2017-12-23 RX ADMIN — POTASSIUM CHLORIDE 10 MEQ: 2 INJECTION, SOLUTION, CONCENTRATE INTRAVENOUS at 11:49

## 2017-12-23 RX ADMIN — DEXTROSE AND SODIUM CHLORIDE: 5; 900 INJECTION, SOLUTION INTRAVENOUS at 05:18

## 2017-12-23 RX ADMIN — HEPARIN SODIUM 5000 UNITS: 5000 INJECTION, SOLUTION INTRAVENOUS; SUBCUTANEOUS at 20:45

## 2017-12-23 RX ADMIN — HEPARIN SODIUM 5000 UNITS: 5000 INJECTION, SOLUTION INTRAVENOUS; SUBCUTANEOUS at 14:15

## 2017-12-23 NOTE — PROGRESS NOTES
Saumya Rios Fall Risk Assessment:     Last Known Fall: Within the last month  Mobility: Use of assistive device/requires assist of two people  Medications: No meds  Mental Status/LOC/Awareness: Unresponsive/noncompliance with instruction  Toileting Needs: Use of catheters or diversion devices  Volume/Electrolyte Status: Use of IV fluids/tube feeds  Communication/Sensory: Non-English patient/unable to speak/slurred speech  Behavior: Depression/anxiety  Saumya Rios Fall Risk Total: 18  Fall Risk Level: HIGH RISK    Universal Fall Precautions:  call light/belongings in reach, bed in low position and locked, wheelchairs and assistive devices out of sight, siderails up x 2, use non-slip footwear, adequate lighting, clutter free and spill free environment, educate on level of risk, educate to call for assistance    Fall Risk Level Interventions:    TRIAL (BluPanda 8, NEURO, MED MICHAEL 5) Moderate Fall Risk Interventions  Place yellow fall risk ID band on patient: verified  Provide patient/family education based on risk assessment : completed  Educate patient/family to call staff for assistance when getting out of bed: completed  Place fall precaution signage outside patient door: verified  Utilize bed/chair fall alarm: verifiedTRIAL (BluPanda 8, NEURO, [place]illster MICHAEL 5) High Fall Risk Interventions  Place yellow fall risk ID band on patient: verified  Provide patient/family education based on risk assessment: completed  Educate patient/family to call staff for assistance when getting out of bed: completed  Place fall precaution signage outside patient door: verified  Place patient in room close to nursing station: completed  Utilize bed/chair fall alarm: completed  Notify charge of high risk for huddle: completed    Patient Specific Interventions:     Medication: assess for medications that can be discontinued or dosage decreased  Mental Status/LOC/Awareness: reinforce falls education, check on patient hourly, utilize bed/chair  fall alarm and reinforce the use of call light  Toileting: provide frquent toileting and monitor intake and output/use of appropriate interventions  Volume/Electrolyte Status: ensure patient remains hydrated, monitor abnormal lab values and ensure IV fluids are appropriate  Communication/Sensory: update plan of care on whiteboard, provide communication alternatives/, ensure proper positioning when transferrng/ambulating and ensure patient has glasses/contacts and hearing aids/dentures  Behavioral: administer medication as ordered and instruct/reinforce fall program rationale  Mobility: utilize bed/chair fall alarm and ensure bed is locked and in lowest position

## 2017-12-23 NOTE — PROGRESS NOTES
CNA alerted this RN that pt pulled out NG tube. MD notified, wants it to be reinserted with bridle. AM medications held until new NG placed.

## 2017-12-23 NOTE — CARE PLAN
Problem: Safety  Goal: Will remain free from injury  Outcome: PROGRESSING AS EXPECTED  Saumya Rios Fall Risk Assessment:     Last Known Fall: Within the last month  Mobility: Use of assistive device/requires assist of two people  Medications: No meds  Mental Status/LOC/Awareness: Unresponsive/noncompliance with instruction  Toileting Needs: Use of catheters or diversion devices  Volume/Electrolyte Status: NPO greater than 24 hours, Use of IV fluids/tube feeds  Communication/Sensory: Non-English patient/unable to speak/slurred speech  Behavior: Depression/anxiety  Saumya Rios Fall Risk Total: 19  Fall Risk Level: HIGH RISK    Universal Fall Precautions:  call light/belongings in reach, bed in low position and locked, wheelchairs and assistive devices out of sight, siderails up x 2, use non-slip footwear, adequate lighting, clutter free and spill free environment, educate on level of risk, educate to call for assistance    Fall Risk Level Interventions:    TRIAL (TELE 8, NEURO, MED MICHAEL 5) Moderate Fall Risk Interventions  Place yellow fall risk ID band on patient: verified  Provide patient/family education based on risk assessment : completed  Educate patient/family to call staff for assistance when getting out of bed: completed  Place fall precaution signage outside patient door: verified  Utilize bed/chair fall alarm: verifiedTRIAL (TELE 8, NEURO, MED MICHAEL 5) High Fall Risk Interventions  Place yellow fall risk ID band on patient: verified  Provide patient/family education based on risk assessment: completed  Educate patient/family to call staff for assistance when getting out of bed: completed  Place fall precaution signage outside patient door: verified  Place patient in room close to nursing station: completed  Utilize bed/chair fall alarm: completed  Notify charge of high risk for huddle: completed    Patient Specific Interventions:     Medication: review medications with patient and family  Mental  Status/LOC/Awareness: check on patient hourly and utilize bed/chair fall alarm  Toileting: provide frquent toileting and monitor intake and output/use of appropriate interventions  Volume/Electrolyte Status: ensure patient remains hydrated and monitor abnormal lab values  Communication/Sensory: update plan of care on whiteboard  Behavioral: engage patient in daily activities  Mobility: utilize bed/chair fall alarm, ensure bed is locked and in lowest position and provide appropriate assistive device    Problem: Skin Integrity  Goal: Risk for impaired skin integrity will decrease  Outcome: PROGRESSING AS EXPECTED  Frequent turns, waffle mattress in place

## 2017-12-23 NOTE — PROGRESS NOTES
Cortrak Placement    Tube Team verified patient name and medical record number prior to tube placement.  Cortrak tube (55 inches, 10 Turkish) placed at 75 cm in left nare.  Per Cortrak picture, tube appears to be in the stomach.  Nursing Instructions: Awaiting KUB to confirm placement before use for medications or feeding. Once placement confirmed, flush tube with 30 ml of water, and then remove and save stylet, in patient medication drawer.

## 2017-12-23 NOTE — CONSULTS
Medical chart review completed.     Patient is a 52 y.o. year-old male with a past medical history significant for hypertension, obesity, and insomnia admitted to [facility] on 12/19/2017  4:36 PM with a reported three-day history of altered mental status, difficulty communicating, and right upper extremity contractures    MRI revealed left frontal parietal temporal infarct    He has subsequently been diagnosed with a stroke, aphasia, and right hemiparesis. MRA indicated left ICA occlusion. Chart review indicates that he has an NG tube for nutrition and hydration. The internal medicine exam today indicates that he is not following commands, has minimal right-sided movement, and dense aphasia.    He is still in the permissive hypertension. With a blood pressure today of 169/98 mmHg     He has been seen by physical therapy on several occasions, most recently on December 22. During the most recent session, he is unable to participate in gait due to the significant right hemiparesis. He is max assist for bed mobility with high degree of cueing. He is total assist for sit to stand. He was last seen by occupational therapy on December 20 and was total assist for lower body dressing and max assist for bed mobility. He was last evaluated by speech and language pathology on December 21 recommended ongoing nothing by mouth status.    PMH:  Hypertension, insomnia, obesity    FAMILY HISTORY:  ALS in the patient's father, coronary artery disease in the patient's brother, and stroke in the patient's grandmother    MEDICATIONS:  Current Facility-Administered Medications   Medication Dose   • amlodipine (NORVASC) tablet 10 mg  10 mg   • atorvastatin (LIPITOR) tablet 80 mg  80 mg   • aspirin (ASA) chewable tab 81 mg  81 mg   • potassium chloride 10 mEq in D5W 250 mL IVPB  10 mEq   • D5 NS infusion     • labetalol (NORMODYNE,TRANDATE) injection 10 mg  10 mg   • hydrALAZINE (APRESOLINE) injection 10 mg  10 mg   •  enalaprilat (VASOTEC) injection 1.25 mg  1.25 mg   • heparin injection 5,000 Units  5,000 Units   • acetaminophen (TYLENOL) tablet 650 mg  650 mg   • ondansetron (ZOFRAN) syringe/vial injection 4 mg  4 mg   • ondansetron (ZOFRAN ODT) dispertab 4 mg  4 mg   • promethazine (PHENERGAN) tablet 12.5-25 mg  12.5-25 mg   • promethazine (PHENERGAN) suppository 12.5-25 mg  12.5-25 mg   • prochlorperazine (COMPAZINE) injection 5-10 mg  5-10 mg   • nicotine (NICODERM) 14 MG/24HR 14 mg  14 mg    And   • nicotine polacrilex (NICORETTE) 2 MG piece 2 mg  2 mg       ALLERGIES:  Poison oak extract [extract of poison oak]    PSYCHOSOCIAL HISTORY:  Living Site:  Home  Living With:  spouse  Substance use history:Chart review indicates he is a current smoker and drinks one to 2 alcoholic beverages per day.      The patient presents  with cognitive deficits and functional deficits in mobility/self-cares/swallowing/speech, and Severe  de-conditioning. Pre-morbidly, this patient lived in a two level home.  The patient was evaluated by acute care Physical Therapy, Occupational Therapy and Speech Language Pathology; currently requiring maximal to total assistance for mobility and maximal to total assistance for ADLs, also with ongoing cognitive, speech and swallowing deficits. The patient's current diet is NPO     With the level of documented impairment in physical therapy, occupational therapy, and speech and language pathology notes, I would recommend discharging the patient to a skilled nursing facility for a long duration of postacute rehabilitation services. Given his significant functional limitations, at this time, he would not be able to make sufficient progress during a brief stay in an acute rehabilitation setting.    Thank you for the consultation. Please call with any questions regarding this recommendation.    Cristian Esparza M.D.  Spinal Cord Injury Medicine

## 2017-12-23 NOTE — CARE PLAN
Problem: Safety  Goal: Will remain free from injury  Outcome: PROGRESSING AS EXPECTED  Saumya Rios Fall Risk Assessment:     Last Known Fall: Within the last month  Mobility: Use of assistive device/requires assist of two people, Immobilized/requires assist of one person  Medications: Cardiovascular or central nervous system meds  Mental Status/LOC/Awareness: Unresponsive/noncompliance with instruction  Toileting Needs: Use of catheters or diversion devices  Volume/Electrolyte Status: NPO greater than 24 hours, Use of IV fluids/tube feeds  Communication/Sensory: Visual (Glasses)/hearing deficit  Behavior: Appropriate behavior  Saumya Rios Fall Risk Total: 20  Fall Risk Level: HIGH RISK    Universal Fall Precautions:  call light/belongings in reach, bed in low position and locked, wheelchairs and assistive devices out of sight, siderails up x 2, use non-slip footwear, clutter free and spill free environment, adequate lighting, educate on level of risk, educate to call for assistance    Fall Risk Level Interventions:    TRIAL (TELE 8, NEURO, MED MICHAEL 5) Moderate Fall Risk Interventions  Place yellow fall risk ID band on patient: verified  Provide patient/family education based on risk assessment : completed  Educate patient/family to call staff for assistance when getting out of bed: completed  Place fall precaution signage outside patient door: verified  Utilize bed/chair fall alarm: verifiedTRIAL (TELE 8, NEURO, MED MICHAEL 5) High Fall Risk Interventions  Place yellow fall risk ID band on patient: verified  Provide patient/family education based on risk assessment: completed  Educate patient/family to call staff for assistance when getting out of bed: completed  Place fall precaution signage outside patient door: verified  Place patient in room close to nursing station: verified  Utilize bed/chair fall alarm: completed  Notify charge of high risk for huddle: verified    Patient Specific Interventions:      Medication: review medications with patient and family  Mental Status/LOC/Awareness: check on patient hourly, utilize bed/chair fall alarm, reinforce the use of call light and provide activity  Toileting: provide frquent toileting  Volume/Electrolyte Status: ensure patient remains hydrated and monitor abnormal lab values  Communication/Sensory: update plan of care on whiteboard  Behavioral: engage patient in daily activities and administer medication as ordered  Mobility: utilize bed/chair fall alarm, ensure bed is locked and in lowest position and provide appropriate assistive device    Problem: Bowel/Gastric:  Goal: Normal bowel function is maintained or improved  Outcome: PROGRESSING SLOWER THAN EXPECTED  No BM this shift    Problem: Mobility  Goal: Risk for activity intolerance will decrease  Outcome: PROGRESSING SLOWER THAN EXPECTED

## 2017-12-23 NOTE — PROGRESS NOTES
[facility] Hospitalist Progress Note    Date of Service: 2017    Chief Complaint  52 y.o. male admitted 2017 with HTN and obesity who presented with expressive aphasia and right sided weakness due to CVA.    Interval Problem Update  Cortrac placed, on low rate of tube feeds. Patient later pulled cortrac, replaced.  Kdur given  CXR ordered for decreasing O2  Son updated, we discussed PEG and he will discuss with family, he wants pt to be transferred near to him in [place], [place]    Consultants/Specialty  None    Disposition  SNF - order placed, needs TORRI, likely long-term care        Review of Systems   Unable to perform ROS: Acuity of condition (espressive aphasia)      Physical Exam  Laboratory/Imaging   Hemodynamics  Temp (24hrs), Av.8 °C (98.2 °F), Min:36.4 °C (97.6 °F), Max:37.1 °C (98.8 °F)   Temperature: 36.8 °C (98.2 °F)  Pulse  Av.6  Min: 67  Max: 118    Blood Pressure: (!) 162/97      Respiratory      Respiration: 20, Pulse Oximetry: 94 %        RUL Breath Sounds: Diminished, RML Breath Sounds: Diminished, RLL Breath Sounds: Diminished, MIRTA Breath Sounds: Diminished, LLL Breath Sounds: Diminished    Fluids    Intake/Output Summary (Last 24 hours) at 17 1516  Last data filed at 17 0700   Gross per 24 hour   Intake             1490 ml   Output                0 ml   Net             1490 ml       Nutrition  Orders Placed This Encounter   Procedures   • DIET NPO     Standing Status:   Standing     Number of Occurrences:   1     Order Specific Question:   Restrict to:     Answer:   Strict [1]     Physical Exam   Constitutional: He appears well-developed and well-nourished.   Tracking me in the room, only moves left arm and leg on command, does not follow other commands   HENT:   Head: Normocephalic and atraumatic.   Eyes: Conjunctivae and EOM are normal. Pupils are equal, round, and reactive to light.   Cardiovascular: Normal rate and regular rhythm.  Exam reveals no gallop and no  friction rub.    No murmur heard.  Pulmonary/Chest: He has no wheezes. He has no rales.   Poor inspiratory effort, no rales or wheezing appreciated   Abdominal: Soft. There is no tenderness. There is no rebound and no guarding.   Musculoskeletal: He exhibits no edema.   Neurological: He is alert.    right facial droop, minimal movement of RUE and RLE, unable to speak, equal DTRs b/l   Skin: Skin is warm and dry.   Nursing note and vitals reviewed.      Recent Labs      12/21/17   0314  12/22/17   0302  12/23/17   0325   WBC  11.7*  12.6*  11.7*   RBC  5.02  5.02  4.80   HEMOGLOBIN  16.4  16.9  16.0   HEMATOCRIT  46.9  47.9  46.1   MCV  93.4  95.4  96.0   MCH  32.7  33.7*  33.3*   MCHC  35.0  35.3  34.7   RDW  40.1  41.7  43.2   PLATELETCT  261  252  251   MPV  12.0  11.9  11.9     Recent Labs      12/21/17   0314  12/22/17   0302  12/23/17   0325   SODIUM  131*  135  136   POTASSIUM  3.6  3.6  3.5*   CHLORIDE  100  105  108   CO2  21  15*  17*   GLUCOSE  96  119*  115*   BUN  16  18  18   CREATININE  0.73  0.62  0.58   CALCIUM  9.3  8.6  8.9                      Assessment/Plan     * CVA (cerebral vascular accident) (CMS-[facility])   Assessment & Plan    Expressive aphasia with right sided weakness. MRI showed left frontal/parietal/temporal infarct.  and A1c 5.9%.  Had progression of aphasia and right hemiparesis. CTA showed L ICA occlusion with possible dissection, which was confirmed on MRA. I discussed with Dr. Simeon 12/21 and 12/22, says the benefits of anticoagulation compared to antiplt are minimal when compared to the risk of hemorrhage. Consulted Dr. Cooper who says no surgical intervention recommended. Spoke with IR and patient has high risk for hemorrhage with stenting.  - ordered for cortrack and tube feeds  - ST/PT/OT following, son wants long term placement near him in [place], [place]  - continue monitoring on telemetry  - continue asa and statin  - heparin dvt ppx  - q4h neuro checks          HTN  (hypertension)   Assessment & Plan    Patient not on any medications as outpatient  IV PRN If SBP >160  Start norvasc when cortrack in place.        Hypokalemia   Assessment & Plan    Repleted        Hyponatremia   Assessment & Plan    Improved on IVF.        Advance care planning   Assessment & Plan    Spoke with son, wife, mother at bedside. They understand the severe situation. They want son to be primary contact. We discussed code status and they want to keep him full code, they are ok with tube feeding. They will discuss PEG placement and let me know what they decide. I discussed advance care planning with the patient's family for at least 10 minutes.              Reviewed items::  EKG reviewed, Labs reviewed, Medications reviewed and Radiology images reviewed  Alexander catheter::  No Alexander  DVT prophylaxis pharmacological::  Heparin

## 2017-12-23 NOTE — CARE PLAN
Problem: Safety  Goal: Will remain free from injury  Outcome: PROGRESSING AS EXPECTED  Pt educated regarding fall risk and safety, pt unable to verbalize understanding. Bed is locked and in lowest position with bed alarm on, safety maintained throughout shift.     Problem: Venous Thromboembolism (VTW)/Deep Vein Thrombosis (DVT) Prevention:  Goal: Patient will participate in Venous Thrombosis (VTE)/Deep Vein Thrombosis (DVT)Prevention Measures  Outcome: PROGRESSING AS EXPECTED   12/23/17 0419   OTHER   Pharmacologic Prophylaxis Used Unfractionated Heparin       Problem: Bowel/Gastric:  Goal: Will not experience complications related to bowel motility  Outcome: PROGRESSING AS EXPECTED  Cortrak successfully placed and verified, tube feedings initiated at 23:00 on 12/22. Pt is tolerating Replete Fiber started at beginning rate of 25 ml/hr.

## 2017-12-23 NOTE — DISCHARGE PLANNING
Aware of PMR referral from Dr. Sinclair. Current chart documentation indicates medical potential for inpatient rehab. Therapy notes reflect questionable tolerance/pariticipation for IRF level care at this time. Will forward to Physiatry for consult per protocol. Dr. Esparza to review. Will follow for Physiatry recommendation. There is no insurance provider for post acute services at this time.

## 2017-12-24 ENCOUNTER — APPOINTMENT (OUTPATIENT)
Dept: RADIOLOGY | Facility: MEDICAL CENTER | Age: 52
DRG: 064 | End: 2017-12-24
Attending: INTERNAL MEDICINE
Payer: MEDICAID

## 2017-12-24 LAB
ANION GAP SERPL CALC-SCNC: 8 MMOL/L (ref 0–11.9)
BACTERIA BLD CULT: NORMAL
BACTERIA BLD CULT: NORMAL
BUN SERPL-MCNC: 13 MG/DL (ref 8–22)
CALCIUM SERPL-MCNC: 8.9 MG/DL (ref 8.5–10.5)
CHLORIDE SERPL-SCNC: 106 MMOL/L (ref 96–112)
CO2 SERPL-SCNC: 19 MMOL/L (ref 20–33)
CREAT SERPL-MCNC: 0.64 MG/DL (ref 0.5–1.4)
GFR SERPL CREATININE-BSD FRML MDRD: >60 ML/MIN/1.73 M 2
GLUCOSE SERPL-MCNC: 112 MG/DL (ref 65–99)
POTASSIUM SERPL-SCNC: 3.6 MMOL/L (ref 3.6–5.5)
SIGNIFICANT IND 70042: NORMAL
SIGNIFICANT IND 70042: NORMAL
SITE SITE: NORMAL
SITE SITE: NORMAL
SODIUM SERPL-SCNC: 133 MMOL/L (ref 135–145)
SOURCE SOURCE: NORMAL
SOURCE SOURCE: NORMAL

## 2017-12-24 PROCEDURE — 302112 WASHCLOTH,PERINEAL CARE: Performed by: INTERNAL MEDICINE

## 2017-12-24 PROCEDURE — 770020 HCHG ROOM/CARE - TELE (206)

## 2017-12-24 PROCEDURE — 700111 HCHG RX REV CODE 636 W/ 250 OVERRIDE (IP): Performed by: HOSPITALIST

## 2017-12-24 PROCEDURE — A9270 NON-COVERED ITEM OR SERVICE: HCPCS | Performed by: INTERNAL MEDICINE

## 2017-12-24 PROCEDURE — 700102 HCHG RX REV CODE 250 W/ 637 OVERRIDE(OP): Performed by: HOSPITALIST

## 2017-12-24 PROCEDURE — 700105 HCHG RX REV CODE 258: Performed by: INTERNAL MEDICINE

## 2017-12-24 PROCEDURE — 80048 BASIC METABOLIC PNL TOTAL CA: CPT

## 2017-12-24 PROCEDURE — 36415 COLL VENOUS BLD VENIPUNCTURE: CPT

## 2017-12-24 PROCEDURE — A9270 NON-COVERED ITEM OR SERVICE: HCPCS | Performed by: HOSPITALIST

## 2017-12-24 PROCEDURE — 99232 SBSQ HOSP IP/OBS MODERATE 35: CPT | Performed by: INTERNAL MEDICINE

## 2017-12-24 PROCEDURE — 700111 HCHG RX REV CODE 636 W/ 250 OVERRIDE (IP): Performed by: INTERNAL MEDICINE

## 2017-12-24 PROCEDURE — 302255 BARRIER CREAM MOISTURE BAZA PROTECT (ZINC) 5OZ: Performed by: INTERNAL MEDICINE

## 2017-12-24 PROCEDURE — 700102 HCHG RX REV CODE 250 W/ 637 OVERRIDE(OP): Performed by: INTERNAL MEDICINE

## 2017-12-24 RX ORDER — HYDROCHLOROTHIAZIDE 25 MG/1
12.5 TABLET ORAL
Status: DISCONTINUED | OUTPATIENT
Start: 2017-12-25 | End: 2017-12-26

## 2017-12-24 RX ORDER — NYSTATIN 100000 [USP'U]/G
POWDER TOPICAL 2 TIMES DAILY
Status: COMPLETED | OUTPATIENT
Start: 2017-12-24 | End: 2018-01-07

## 2017-12-24 RX ADMIN — DEXTROSE AND SODIUM CHLORIDE: 5; 900 INJECTION, SOLUTION INTRAVENOUS at 01:09

## 2017-12-24 RX ADMIN — HEPARIN SODIUM 5000 UNITS: 5000 INJECTION, SOLUTION INTRAVENOUS; SUBCUTANEOUS at 05:22

## 2017-12-24 RX ADMIN — HEPARIN SODIUM 5000 UNITS: 5000 INJECTION, SOLUTION INTRAVENOUS; SUBCUTANEOUS at 21:22

## 2017-12-24 RX ADMIN — ENALAPRILAT 1.25 MG: 1.25 INJECTION INTRAVENOUS at 13:04

## 2017-12-24 RX ADMIN — HEPARIN SODIUM 5000 UNITS: 5000 INJECTION, SOLUTION INTRAVENOUS; SUBCUTANEOUS at 13:01

## 2017-12-24 RX ADMIN — NYSTATIN 1500000 UNITS: 100000 POWDER TOPICAL at 21:21

## 2017-12-24 RX ADMIN — ATORVASTATIN CALCIUM 80 MG: 80 TABLET, FILM COATED ORAL at 21:21

## 2017-12-24 RX ADMIN — AMLODIPINE BESYLATE 10 MG: 5 TABLET ORAL at 09:49

## 2017-12-24 RX ADMIN — DEXTROSE AND SODIUM CHLORIDE: 5; 900 INJECTION, SOLUTION INTRAVENOUS at 22:49

## 2017-12-24 RX ADMIN — POTASSIUM BICARBONATE 25 MEQ: 25 TABLET, EFFERVESCENT ORAL at 21:21

## 2017-12-24 RX ADMIN — ASPIRIN 81 MG: 81 TABLET, CHEWABLE ORAL at 09:49

## 2017-12-24 RX ADMIN — ATORVASTATIN CALCIUM 80 MG: 80 TABLET, FILM COATED ORAL at 05:20

## 2017-12-24 RX ADMIN — NICOTINE 14 MG: 14 PATCH, EXTENDED RELEASE TRANSDERMAL at 05:21

## 2017-12-24 RX ADMIN — DEXTROSE AND SODIUM CHLORIDE: 5; 900 INJECTION, SOLUTION INTRAVENOUS at 13:01

## 2017-12-24 RX ADMIN — POTASSIUM BICARBONATE 25 MEQ: 25 TABLET, EFFERVESCENT ORAL at 09:49

## 2017-12-24 NOTE — CARE PLAN
Problem: Safety  Goal: Will remain free from falls  Outcome: PROGRESSING AS EXPECTED  Fall precautions in place. Bed in lowest position. Non-skid socks in place. Personal possessions within reach. Mobility sign on door. Bed-alarm on. Call light within reach. Pt educated regarding fall prevention and states understanding.

## 2017-12-24 NOTE — PROGRESS NOTES
Saumya Rios Fall Risk Assessment:     Last Known Fall: Within the last month  Mobility: Hemiplegic, paraplegia, or quadriplegia  Medications: Cardiovascular or central nervous system meds  Mental Status/LOC/Awareness: Unresponsive/noncompliance with instruction  Toileting Needs: Incontinence, Use of catheters or diversion devices  Volume/Electrolyte Status: Use of IV fluids/tube feeds, NPO greater than 24 hours  Communication/Sensory: Non-English patient/unable to speak/slurred speech  Behavior: Behavioral noncompliance with instruction  Saumya Rios Fall Risk Total: 25  Fall Risk Level: HIGH RISK    Universal Fall Precautions:  call light/belongings in reach, bed in low position and locked, wheelchairs and assistive devices out of sight, use non-slip footwear, siderails up x 2, adequate lighting, clutter free and spill free environment, educate on level of risk, educate to call for assistance    Fall Risk Level Interventions:    TRIAL (TELE 8, NEURO, MED MICHAEL 5) Moderate Fall Risk Interventions  Place yellow fall risk ID band on patient: verified  Provide patient/family education based on risk assessment : completed  Educate patient/family to call staff for assistance when getting out of bed: completed  Place fall precaution signage outside patient door: verified  Utilize bed/chair fall alarm: verifiedTRIAL (TELE 8, NEURO, MED MICHAEL 5) High Fall Risk Interventions  Place yellow fall risk ID band on patient: verified  Provide patient/family education based on risk assessment: completed  Educate patient/family to call staff for assistance when getting out of bed: completed  Place fall precaution signage outside patient door: verified  Place patient in room close to nursing station: completed  Utilize bed/chair fall alarm: completed  Notify charge of high risk for huddle: completed    Patient Specific Interventions:     Medication: assess for medications that can be discontinued or dosage decreased  Mental  Status/LOC/Awareness: check on patient hourly and utilize bed/chair fall alarm  Toileting: provide frquent toileting and monitor intake and output/use of appropriate interventions  Volume/Electrolyte Status: ensure patient remains hydrated, monitor abnormal lab values and ensure IV fluids are appropriate  Communication/Sensory: update plan of care on whiteboard  Behavioral: not applicable  Mobility: utilize bed/chair fall alarm, ensure bed is locked and in lowest position, provide appropriate assistive device, instruct patient to exit bed on their strongest side and collaborate with doctor for possible PT/OT consult

## 2017-12-24 NOTE — PROGRESS NOTES
Report received. Care assumed. Patient is alert, unable to follow commands, and non-verbal. Karely pulled out of placement from day shift. Per day RN, patient didn't tolerated bridle placement.

## 2017-12-24 NOTE — CARE PLAN
Problem: Skin Integrity  Goal: Risk for impaired skin integrity will decrease    Intervention: Assess risk factors for impaired skin integrity and/or pressure ulcers   Fabio score completed every shift, pt turned every 2 hours, skin assessment complete, adequate nutrition encouraged with tube feed.

## 2017-12-24 NOTE — PROGRESS NOTES
Assumed care at 0700. Bedside report received from Gely. Patient's chart and MAR reviewed. Pt denies pain at this time. Pt is non-verbal, follows some commands. Patient was updated on plan of care for the day. White board updated. Call light, phone and personal belongings within reach.

## 2017-12-24 NOTE — DISCHARGE PLANNING
Rehab Dr. Esparza Physiatry consult recommending skilled nursing for post acute care due to significant functional limitations. Pt may be appropriate for SNF to IRF transition at future time. Voice message update to d/c planner ext [phone].

## 2017-12-24 NOTE — DISCHARGE PLANNING
SW informed in rounds that this pt will need long term placement. Rehab has denied pt and is currently reporting pt appropriate for SNF. Currently no SNF order in. SW to f/u on order.

## 2017-12-24 NOTE — PROGRESS NOTES
[facility] Hospitalist Progress Note    Date of Service: 2017    Chief Complaint  52 y.o. male admitted 2017 with HTN and obesity who presented with expressive aphasia and right sided weakness due to CVA.    Interval Problem Update  Neuro exam unchanged. K repleted.  Son updated, wants to proceed to PEG.  Discussed with SW about SNF transfer near [place], [place]  Transfer to neuro - tele    Consultants/Specialty  None    Disposition  SNF - order placed, needs TORRI, likely long-term care in [facility]        Review of Systems   Unable to perform ROS: Acuity of condition (espressive aphasia)      Physical Exam  Laboratory/Imaging   Hemodynamics  Temp (24hrs), Av.6 °C (97.9 °F), Min:36.2 °C (97.1 °F), Max:36.8 °C (98.3 °F)   Temperature: 36.2 °C (97.1 °F)  Pulse  Av.9  Min: 67  Max: 118    Blood Pressure: 150/98      Respiratory      Respiration: 17, Pulse Oximetry: 93 %        RUL Breath Sounds: Diminished, RML Breath Sounds: Diminished, RLL Breath Sounds: Diminished, MIRTA Breath Sounds: Diminished, LLL Breath Sounds: Diminished    Fluids    Intake/Output Summary (Last 24 hours) at 17 1722  Last data filed at 17 0600   Gross per 24 hour   Intake             1200 ml   Output              400 ml   Net              800 ml       Nutrition  Orders Placed This Encounter   Procedures   • DIET NPO     Standing Status:   Standing     Number of Occurrences:   1     Order Specific Question:   Restrict to:     Answer:   Strict [1]     Physical Exam   Constitutional: He appears well-developed and well-nourished.   Tracking me in the room, does not follow commands   HENT:   Head: Normocephalic and atraumatic.   Eyes: Conjunctivae and EOM are normal. Pupils are equal, round, and reactive to light.   Cardiovascular: Normal rate and regular rhythm.  Exam reveals no gallop and no friction rub.    No murmur heard.  Pulmonary/Chest: He has no wheezes. He has no rales.   Poor inspiratory effort, no rales or wheezing  appreciated   Abdominal: Soft. There is no tenderness. There is no rebound and no guarding.   Musculoskeletal: He exhibits no edema.   Neurological: He is alert.    right facial droop, no movement of RUE and RLE, does not speak, equal DTRs b/l, strong spontaneous movement of LUE and LLE   Skin: Skin is warm and dry.   Nursing note and vitals reviewed.      Recent Labs      12/22/17   0302  12/23/17   0325   WBC  12.6*  11.7*   RBC  5.02  4.80   HEMOGLOBIN  16.9  16.0   HEMATOCRIT  47.9  46.1   MCV  95.4  96.0   MCH  33.7*  33.3*   MCHC  35.3  34.7   RDW  41.7  43.2   PLATELETCT  252  251   MPV  11.9  11.9     Recent Labs      12/22/17   0302  12/23/17 0325  12/24/17   0409   SODIUM  135  136  133*   POTASSIUM  3.6  3.5*  3.6   CHLORIDE  105  108  106   CO2  15*  17*  19*   GLUCOSE  119*  115*  112*   BUN  18  18  13   CREATININE  0.62  0.58  0.64   CALCIUM  8.6  8.9  8.9                      Assessment/Plan     * CVA (cerebral vascular accident) (CMS-HCC)   Assessment & Plan    Expressive aphasia with right sided weakness. MRI showed left frontal/parietal/temporal infarct.  and A1c 5.9%.  Had progression of aphasia and right hemiparesis 12/21. CTA showed L ICA occlusion with possible dissection, which was confirmed on MRA. I discussed with Dr. Simeon 12/21 and 12/22, says the benefits of anticoagulation compared to antiplt are minimal when compared to the risk of hemorrhage. Consulted Dr. Cooper who says no surgical intervention recommended. Spoke with IR and patient has high risk for hemorrhage with stenting.  - ordered for cortrack and tube feeds, consult surgery for PEG  - ST/PT/OT following, son wants long term placement near him in [place], [place]  - continue monitoring on telemetry  - continue asa and statin  - heparin dvt ppx  - q4h neuro checks          HTN (hypertension)   Assessment & Plan    Patient not on any medications as outpatient  IV PRN If SBP >160  On norvasc, added HCTZ        Hypokalemia    Assessment & Plan    Repleted        Hyponatremia   Assessment & Plan    Improved on IVF.        Advance care planning   Assessment & Plan    Spoke with son, wife, mother at bedside. They understand the severe situation. They want son to be primary contact. We discussed code status and they want to keep him full code, they are ok with tube feeding.               Reviewed items::  EKG reviewed, Labs reviewed, Medications reviewed and Radiology images reviewed  Alexander catheter::  No Alexander  DVT prophylaxis pharmacological::  Heparin

## 2017-12-25 LAB
ANION GAP SERPL CALC-SCNC: 10 MMOL/L (ref 0–11.9)
BASOPHILS # BLD AUTO: 0.9 % (ref 0–1.8)
BASOPHILS # BLD: 0.1 K/UL (ref 0–0.12)
BUN SERPL-MCNC: 12 MG/DL (ref 8–22)
CALCIUM SERPL-MCNC: 8.8 MG/DL (ref 8.5–10.5)
CHLORIDE SERPL-SCNC: 102 MMOL/L (ref 96–112)
CO2 SERPL-SCNC: 18 MMOL/L (ref 20–33)
CREAT SERPL-MCNC: 0.54 MG/DL (ref 0.5–1.4)
CRP SERPL HS-MCNC: 3.78 MG/DL (ref 0–0.75)
EOSINOPHIL # BLD AUTO: 0.19 K/UL (ref 0–0.51)
EOSINOPHIL NFR BLD: 1.7 % (ref 0–6.9)
ERYTHROCYTE [DISTWIDTH] IN BLOOD BY AUTOMATED COUNT: 40.7 FL (ref 35.9–50)
GFR SERPL CREATININE-BSD FRML MDRD: >60 ML/MIN/1.73 M 2
GLUCOSE SERPL-MCNC: 117 MG/DL (ref 65–99)
HCT VFR BLD AUTO: 46.4 % (ref 42–52)
HGB BLD-MCNC: 16.2 G/DL (ref 14–18)
IMM GRANULOCYTES # BLD AUTO: 0.04 K/UL (ref 0–0.11)
IMM GRANULOCYTES NFR BLD AUTO: 0.4 % (ref 0–0.9)
LYMPHOCYTES # BLD AUTO: 1.14 K/UL (ref 1–4.8)
LYMPHOCYTES NFR BLD: 10.2 % (ref 22–41)
MCH RBC QN AUTO: 33.1 PG (ref 27–33)
MCHC RBC AUTO-ENTMCNC: 34.9 G/DL (ref 33.7–35.3)
MCV RBC AUTO: 94.9 FL (ref 81.4–97.8)
MONOCYTES # BLD AUTO: 1.62 K/UL (ref 0–0.85)
MONOCYTES NFR BLD AUTO: 14.5 % (ref 0–13.4)
NEUTROPHILS # BLD AUTO: 8.09 K/UL (ref 1.82–7.42)
NEUTROPHILS NFR BLD: 72.3 % (ref 44–72)
NRBC # BLD AUTO: 0 K/UL
NRBC BLD-RTO: 0 /100 WBC
PLATELET # BLD AUTO: 231 K/UL (ref 164–446)
PMV BLD AUTO: 12 FL (ref 9–12.9)
POTASSIUM SERPL-SCNC: 3.5 MMOL/L (ref 3.6–5.5)
PREALB SERPL-MCNC: 12 MG/DL (ref 18–38)
RBC # BLD AUTO: 4.89 M/UL (ref 4.7–6.1)
SODIUM SERPL-SCNC: 130 MMOL/L (ref 135–145)
WBC # BLD AUTO: 11.2 K/UL (ref 4.8–10.8)

## 2017-12-25 PROCEDURE — 700105 HCHG RX REV CODE 258: Performed by: INTERNAL MEDICINE

## 2017-12-25 PROCEDURE — 84134 ASSAY OF PREALBUMIN: CPT

## 2017-12-25 PROCEDURE — 700102 HCHG RX REV CODE 250 W/ 637 OVERRIDE(OP): Performed by: INTERNAL MEDICINE

## 2017-12-25 PROCEDURE — 99233 SBSQ HOSP IP/OBS HIGH 50: CPT | Performed by: INTERNAL MEDICINE

## 2017-12-25 PROCEDURE — A9270 NON-COVERED ITEM OR SERVICE: HCPCS | Performed by: INTERNAL MEDICINE

## 2017-12-25 PROCEDURE — 86140 C-REACTIVE PROTEIN: CPT

## 2017-12-25 PROCEDURE — 85025 COMPLETE CBC W/AUTO DIFF WBC: CPT

## 2017-12-25 PROCEDURE — 700111 HCHG RX REV CODE 636 W/ 250 OVERRIDE (IP): Performed by: INTERNAL MEDICINE

## 2017-12-25 PROCEDURE — 36415 COLL VENOUS BLD VENIPUNCTURE: CPT

## 2017-12-25 PROCEDURE — A9270 NON-COVERED ITEM OR SERVICE: HCPCS | Performed by: HOSPITALIST

## 2017-12-25 PROCEDURE — 700102 HCHG RX REV CODE 250 W/ 637 OVERRIDE(OP): Performed by: HOSPITALIST

## 2017-12-25 PROCEDURE — 700111 HCHG RX REV CODE 636 W/ 250 OVERRIDE (IP): Performed by: HOSPITALIST

## 2017-12-25 PROCEDURE — 770020 HCHG ROOM/CARE - TELE (206)

## 2017-12-25 PROCEDURE — 80048 BASIC METABOLIC PNL TOTAL CA: CPT

## 2017-12-25 RX ORDER — HEPARIN SODIUM 5000 [USP'U]/ML
5000 INJECTION, SOLUTION INTRAVENOUS; SUBCUTANEOUS EVERY 8 HOURS
Status: DISCONTINUED | OUTPATIENT
Start: 2017-12-26 | End: 2018-01-17 | Stop reason: HOSPADM

## 2017-12-25 RX ADMIN — NICOTINE 14 MG: 14 PATCH, EXTENDED RELEASE TRANSDERMAL at 05:54

## 2017-12-25 RX ADMIN — DEXTROSE AND SODIUM CHLORIDE: 5; 900 INJECTION, SOLUTION INTRAVENOUS at 09:06

## 2017-12-25 RX ADMIN — NYSTATIN 1500000 UNITS: 100000 POWDER TOPICAL at 09:06

## 2017-12-25 RX ADMIN — HEPARIN SODIUM 5000 UNITS: 5000 INJECTION, SOLUTION INTRAVENOUS; SUBCUTANEOUS at 14:44

## 2017-12-25 RX ADMIN — ATORVASTATIN CALCIUM 80 MG: 80 TABLET, FILM COATED ORAL at 20:51

## 2017-12-25 RX ADMIN — HYDRALAZINE HYDROCHLORIDE 10 MG: 20 INJECTION INTRAMUSCULAR; INTRAVENOUS at 00:59

## 2017-12-25 RX ADMIN — ENALAPRILAT 1.25 MG: 1.25 INJECTION INTRAVENOUS at 04:27

## 2017-12-25 RX ADMIN — POTASSIUM BICARBONATE 25 MEQ: 25 TABLET, EFFERVESCENT ORAL at 20:51

## 2017-12-25 RX ADMIN — HEPARIN SODIUM 5000 UNITS: 5000 INJECTION, SOLUTION INTRAVENOUS; SUBCUTANEOUS at 05:54

## 2017-12-25 RX ADMIN — HYDROCHLOROTHIAZIDE 12.5 MG: 25 TABLET ORAL at 09:05

## 2017-12-25 RX ADMIN — AMLODIPINE BESYLATE 10 MG: 5 TABLET ORAL at 09:05

## 2017-12-25 RX ADMIN — NYSTATIN 1500000 UNITS: 100000 POWDER TOPICAL at 20:52

## 2017-12-25 RX ADMIN — ASPIRIN 81 MG: 81 TABLET, CHEWABLE ORAL at 09:05

## 2017-12-25 RX ADMIN — POTASSIUM BICARBONATE 25 MEQ: 25 TABLET, EFFERVESCENT ORAL at 09:04

## 2017-12-25 ASSESSMENT — PATIENT HEALTH QUESTIONNAIRE - PHQ9
SUM OF ALL RESPONSES TO PHQ9 QUESTIONS 1 AND 2: 0
SUM OF ALL RESPONSES TO PHQ QUESTIONS 1-9: 0
2. FEELING DOWN, DEPRESSED, IRRITABLE, OR HOPELESS: NOT AT ALL
1. LITTLE INTEREST OR PLEASURE IN DOING THINGS: NOT AT ALL

## 2017-12-25 ASSESSMENT — PAIN SCALES - GENERAL
PAINLEVEL_OUTOF10: 0
PAINLEVEL_OUTOF10: 0

## 2017-12-25 ASSESSMENT — PAIN SCALES - WONG BAKER
WONGBAKER_NUMERICALRESPONSE: DOESN'T HURT AT ALL
WONGBAKER_NUMERICALRESPONSE: DOESN'T HURT AT ALL

## 2017-12-25 NOTE — PROGRESS NOTES
Pt to be transferred to Neuro floot on [facility] floor.  Report called to Gely Santillan, pt escorted by RN w/ , Pt no longer on [place] [address].

## 2017-12-25 NOTE — CARE PLAN
Problem: Safety  Goal: Will remain free from falls    Intervention: Assess risk factors for falls  Fall precautions in place. Bed in lowest position. Non-skid socks in place. Personal possessions within reach. Mobility sign on door. Bed-alarm on. Call light within reach. Pt educated regarding fall prevention and states understanding.       Problem: Knowledge Deficit  Goal: Knowledge of disease process/condition, treatment plan, diagnostic tests, and medications will improve  Pt educated regarding plan of care and medications.

## 2017-12-25 NOTE — PROGRESS NOTES
Assumed care at 1900, bedside report received from day shift RN. Pt. Is SR on the monitor. Initial assessment completed, orders reviewed, call light within reach, bed alarm in use, and hourly rounding in place. POC addressed with patient, no additional questions at this time.

## 2017-12-25 NOTE — PROGRESS NOTES
Saumya Rios Fall Risk Assessment:     Last Known Fall: Within the last month  Mobility: Hemiplegic, paraplegia, or quadriplegia  Medications: Cardiovascular or central nervous system meds  Mental Status/LOC/Awareness: Unresponsive/noncompliance with instruction  Toileting Needs: Use of catheters or diversion devices, Incontinence  Volume/Electrolyte Status: NPO greater than 24 hours, Use of IV fluids/tube feeds  Communication/Sensory: Visual (Glasses)/hearing deficit, Non-English patient/unable to speak/slurred speech  Behavior: Appropriate behavior  Saumya Rios Fall Risk Total: 24  Fall Risk Level: HIGH RISK    Universal Fall Precautions:  call light/belongings in reach, bed in low position and locked, wheelchairs and assistive devices out of sight, siderails up x 2, use non-slip footwear, adequate lighting, clutter free and spill free environment, educate on level of risk, educate to call for assistance    Fall Risk Level Interventions:    TRIAL (TELE 8, NEURO, MED MICHAEL 5) Moderate Fall Risk Interventions  Place yellow fall risk ID band on patient: verified  Provide patient/family education based on risk assessment : completed  Educate patient/family to call staff for assistance when getting out of bed: completed  Place fall precaution signage outside patient door: verified  Utilize bed/chair fall alarm: verifiedTRIAL (TELE 8, NEURO, MED MICHAEL 5) High Fall Risk Interventions  Place yellow fall risk ID band on patient: verified  Provide patient/family education based on risk assessment: completed  Educate patient/family to call staff for assistance when getting out of bed: completed  Place fall precaution signage outside patient door: verified  Place patient in room close to nursing station: verified  Utilize bed/chair fall alarm: verified  Notify charge of high risk for huddle: completed    Patient Specific Interventions:     Medication: review medications with patient and family and limit combination of prn  medications  Mental Status/LOC/Awareness: reorient patient, reinforce falls education, check on patient hourly, utilize bed/chair fall alarm and reinforce the use of call light  Toileting: monitor intake and output/use of appropriate interventions  Volume/Electrolyte Status: ensure patient remains hydrated, monitor abnormal lab values and ensure IV fluids are appropriate  Communication/Sensory: update plan of care on whiteboard  Behavioral: administer medication as ordered and instruct/reinforce fall program rationale  Mobility: utilize bed/chair fall alarm and ensure bed is locked and in lowest position

## 2017-12-25 NOTE — CARE PLAN
Problem: Bowel/Gastric:  Goal: Normal bowel function is maintained or improved    Intervention: Educate patient and significant other/support system about diet, fluid intake, medications and activity to promote bowel function  Pt edu provided, will continue to monitor.

## 2017-12-25 NOTE — CARE PLAN
Problem: Pain Management  Goal: Pain level will decrease to patient's comfort goal    Intervention: Educate and implement non-pharmacologic comfort measures. Examples: relaxation, distration, play therapy, activity therapy, massage, etc.  Pt edu provided, will continue to monitor.

## 2017-12-25 NOTE — PROGRESS NOTES
[facility] Hospitalist Progress Note    Date of Service: 2017    Chief Complaint  52 y.o. male admitted 2017 with HTN and obesity who presented with expressive aphasia and right sided weakness due to CVA.    Interval Problem Update  Neuro exam unchanged.  Cortrac replaced and patient tolerating tube feeds.  Hypertensive on norvasc, added HCTZ.  Consulted GI for PEG placement.  Son updated, will be here tomorrow.    Consultants/Specialty  None    Disposition  SNF - order placed, son wants long-term care near him in [facility]        Review of Systems   Unable to perform ROS: Acuity of condition (espressive aphasia)      Physical Exam  Laboratory/Imaging   Hemodynamics  Temp (24hrs), Av.6 °C (97.9 °F), Min:36.4 °C (97.5 °F), Max:36.9 °C (98.4 °F)   Temperature: 36.7 °C (98 °F)  Pulse  Av.1  Min: 67  Max: 118    Blood Pressure: 151/103      Respiratory      Respiration: 18, Pulse Oximetry: 93 %        RUL Breath Sounds: Diminished, RML Breath Sounds: Diminished, RLL Breath Sounds: Diminished, MIRTA Breath Sounds: Diminished, LLL Breath Sounds: Diminished    Fluids    Intake/Output Summary (Last 24 hours) at 17 1225  Last data filed at 17 0400   Gross per 24 hour   Intake             1200 ml   Output             2025 ml   Net             -825 ml       Nutrition  Orders Placed This Encounter   Procedures   • DIET NPO     Standing Status:   Standing     Number of Occurrences:   1     Order Specific Question:   Restrict to:     Answer:   Strict [1]     Physical Exam   Constitutional: He appears well-developed and well-nourished.   Tracking me in the room, does not follow commands   HENT:   Head: Normocephalic and atraumatic.   Eyes: Conjunctivae and EOM are normal. Pupils are equal, round, and reactive to light.   Cardiovascular: Normal rate and regular rhythm.  Exam reveals no gallop and no friction rub.    No murmur heard.  Pulmonary/Chest:   Central rhonchi otherwise clear   Abdominal: Soft.  There is no tenderness. There is no rebound and no guarding.   Musculoskeletal: He exhibits no edema.   Neurological: He is alert.   right facial droop, no movement of RUE and RLE, does not speak, equal DTRs b/l, strong spontaneous movement of LUE and LLE   Skin: Skin is warm and dry.   Nursing note and vitals reviewed.      Recent Labs      12/23/17   0325  12/25/17   0240   WBC  11.7*  11.2*   RBC  4.80  4.89   HEMOGLOBIN  16.0  16.2   HEMATOCRIT  46.1  46.4   MCV  96.0  94.9   MCH  33.3*  33.1*   MCHC  34.7  34.9   RDW  43.2  40.7   PLATELETCT  251  231   MPV  11.9  12.0     Recent Labs      12/23/17   0325  12/24/17   0409  12/25/17   0240   SODIUM  136  133*  130*   POTASSIUM  3.5*  3.6  3.5*   CHLORIDE  108  106  102   CO2  17*  19*  18*   GLUCOSE  115*  112*  117*   BUN  18  13  12   CREATININE  0.58  0.64  0.54   CALCIUM  8.9  8.9  8.8                      Assessment/Plan     * CVA (cerebral vascular accident) (CMS-HCC)   Assessment & Plan    Expressive aphasia with right sided weakness. MRI showed left frontal/parietal/temporal infarct.  and A1c 5.9%.  Had progression of aphasia and right hemiparesis 12/21. Stat CTA showed L ICA occlusion with possible dissection, which was confirmed on MRA. I discussed with Dr. Simeon 12/21 and 12/22, says the benefits of anticoagulation compared to antiplt are minimal when compared to the risk of hemorrhage. Consulted Dr. Cooper who says no surgical intervention recommended. Spoke with IR and patient has high risk for hemorrhage with stenting.  - tolerating tube feeds, consulted GI for PEG   - ST/PT/OT following, son wants long term placement near him in [place], [place]  - continue asa and statin  - heparin dvt ppx  - q4h neuro checks          HTN (hypertension)   Assessment & Plan    Patient not on any medications as outpatient  IV PRN If SBP >160  On norvasc, added HCTZ        Hypokalemia   Assessment & Plan    Daily Klyte repletion.        Hyponatremia   Assessment  & Plan    Mild. Hold IVF given tolerating full rate of tube feeds. Monitor daily.        Advance care planning   Assessment & Plan    Spoke with son, wife, mother at bedside. They understand the severe situation. They want son to be primary contact. We discussed code status and they want to keep him full code, they are ok with artificial nutrition and PEG placement.              Reviewed items::  EKG reviewed, Labs reviewed, Medications reviewed and Radiology images reviewed  Alexander catheter::  No Alexander  DVT prophylaxis pharmacological::  Heparin

## 2017-12-25 NOTE — CONSULTS
DATE OF SERVICE:  12/25/2017    REFERRING PHYSICIAN:  Dr. Sinclair.    CHIEF COMPLAINT:  We were asked to evaluate the patient by Dr. Sinclair for further   evaluation of oropharyngeal dysphagia in the setting of CVA and possible PEG   tube placement.    HISTORY OF PRESENT ILLNESS:  The patient is a 52-year-old gentleman with   history of hypertension that presented for evaluation of acute CVA, which is   complicated by right-sided hemiparesis, aphasia, and oropharyngeal dysphagia.    Again, he presented with weakness on the right side.  He was found to have a   large CVA secondary to occlusion and possible dissection of the carotid   artery.  CVA was involving temporal, parietal, and occipital lobes.  He is   felt to be too high risk for anticoagulation and is on aspirin alone.  He does   have significant right hemiparesis, aphasia, and oropharyngeal dysphagia.  He   has been evaluated by speech therapy and unsafe to swallow.  He currently has   Cortrak and is tolerating tube feeds without difficulties.  No signs of   infection.  He is on aspirin alone, but no other anticoagulants.  No prior   abdominal surgeries.    PAST MEDICAL HISTORY:  1.  Hypertension.  2.  Insomnia.    PAST SURGICAL HISTORY:  Hernia repair.    CURRENT MEDICATIONS:  1.  Norvasc.  2.  Aspirin.  3.  Lipitor.  4.  SubQ heparin.  5.  Hydrochlorothiazide.  6.  Nystatin powder.    ALLERGIES:  POISON OAK.    FAMILY HISTORY:  No GI malignancies.    SOCIAL HISTORY:  Per the chart, smokes 2-3 cigarettes per day, occasional   alcohol.  Denies other drugs.  Son is acting as durable power of .    REVIEW OF SYSTEMS:  Unobtainable due to aphasia.    PHYSICAL EXAMINATION:  VITAL SIGNS:  Afebrile, heart rate 90s-100, blood pressure is 160s/90s,   satting 94% on room air.  GENERAL:  He is a well-appearing gentleman, in no acute distress.  HEENT:  Eyes show anicteric sclerae.  Mouth shows normal oropharynx with   slight droop on the right side.  NECK:  Shows no  lymphadenopathy.  Thyroid shows no thyromegaly.  LUNGS:  Clear to auscultation bilaterally.  CARDIOVASCULAR:  Regular rate and rhythm.  ABDOMEN:  Soft, nontender, nondistended with good bowel sounds.  No   appreciable hepatosplenomegaly or other masses.  No abdominal scars.  EXTREMITIES:  Revealed no edema.  NEUROLOGIC:  Shows right-sided hemiparesis and aphasia.  SKIN:  No rashes.    LABORATORY DATA:  Showed a CBC with white blood cell count of 11.2, hemoglobin   16.2, and a platelet count of 231.  Chemistry shows sodium 130, potassium   3.5, BUN 12, creatinine 0.5.    IMPRESSION AND PLAN:  The patient is a 52-year-old gentleman, who has suffered   a large acute ischemic cerebrovascular accident with resultant right   hemiparesis, aphasia, and oropharyngeal dysphagia.  He does seem to be an   appropriate candidate for PEG tube placement for long-term enteral nutrition.    No obvious contraindications at this time.  I did discuss the risks and   benefits of the procedure with his son who is acting as his medical decision   maker at this time, who has agreed for us to proceed with this procedure.  We   will arrange for placement of PEG tube placement tomorrow.    PROBLEMS:  1.  Oropharyngeal dysphagia.  2.  Large acute ischemic CVA with resultant right hemiparesis, aphasia, and   oropharyngeal dysphagia.  3.  Hypertension.  4.  Tobacco use.    PLAN:  1.  Hold tube feeds at midnight.  2.  Hold subQ heparin tomorrow morning.  3.  Plan for PEG tube placement at 10:00 a.m. tomorrow morning.  The risks and   benefits were discussed with the patient's son who has agreed to let us   proceed with this procedure.    Thank you for allowing me to take part in the care of your patient.  Please   feel free to call with any questions.       ____________________________________     MD DENILSON KERR / KATYA    DD:  12/25/2017 14:00:56  DT:  12/25/2017 14:27:53    D#:  [id number]  Job#:  [id number]

## 2017-12-26 LAB
ANION GAP SERPL CALC-SCNC: 10 MMOL/L (ref 0–11.9)
BASOPHILS # BLD AUTO: 0.8 % (ref 0–1.8)
BASOPHILS # BLD: 0.09 K/UL (ref 0–0.12)
BUN SERPL-MCNC: 15 MG/DL (ref 8–22)
CALCIUM SERPL-MCNC: 9.9 MG/DL (ref 8.5–10.5)
CHLORIDE SERPL-SCNC: 99 MMOL/L (ref 96–112)
CO2 SERPL-SCNC: 21 MMOL/L (ref 20–33)
CREAT SERPL-MCNC: 0.69 MG/DL (ref 0.5–1.4)
EOSINOPHIL # BLD AUTO: 0.27 K/UL (ref 0–0.51)
EOSINOPHIL NFR BLD: 2.3 % (ref 0–6.9)
ERYTHROCYTE [DISTWIDTH] IN BLOOD BY AUTOMATED COUNT: 39.3 FL (ref 35.9–50)
GFR SERPL CREATININE-BSD FRML MDRD: >60 ML/MIN/1.73 M 2
GLUCOSE SERPL-MCNC: 108 MG/DL (ref 65–99)
HCT VFR BLD AUTO: 48.3 % (ref 42–52)
HGB BLD-MCNC: 17.5 G/DL (ref 14–18)
IMM GRANULOCYTES # BLD AUTO: 0.05 K/UL (ref 0–0.11)
IMM GRANULOCYTES NFR BLD AUTO: 0.4 % (ref 0–0.9)
INR PPP: 1 (ref 0.87–1.13)
LYMPHOCYTES # BLD AUTO: 1.5 K/UL (ref 1–4.8)
LYMPHOCYTES NFR BLD: 13 % (ref 22–41)
MAGNESIUM SERPL-MCNC: 2.3 MG/DL (ref 1.5–2.5)
MCH RBC QN AUTO: 33.7 PG (ref 27–33)
MCHC RBC AUTO-ENTMCNC: 36.2 G/DL (ref 33.7–35.3)
MCV RBC AUTO: 93.1 FL (ref 81.4–97.8)
MONOCYTES # BLD AUTO: 2.13 K/UL (ref 0–0.85)
MONOCYTES NFR BLD AUTO: 18.5 % (ref 0–13.4)
NEUTROPHILS # BLD AUTO: 7.49 K/UL (ref 1.82–7.42)
NEUTROPHILS NFR BLD: 65 % (ref 44–72)
NRBC # BLD AUTO: 0 K/UL
NRBC BLD-RTO: 0 /100 WBC
PLATELET # BLD AUTO: 251 K/UL (ref 164–446)
PMV BLD AUTO: 12.1 FL (ref 9–12.9)
POTASSIUM SERPL-SCNC: 4 MMOL/L (ref 3.6–5.5)
PROTHROMBIN TIME: 12.9 SEC (ref 12–14.6)
RBC # BLD AUTO: 5.19 M/UL (ref 4.7–6.1)
SODIUM SERPL-SCNC: 130 MMOL/L (ref 135–145)
WBC # BLD AUTO: 11.5 K/UL (ref 4.8–10.8)

## 2017-12-26 PROCEDURE — 99232 SBSQ HOSP IP/OBS MODERATE 35: CPT | Performed by: HOSPITALIST

## 2017-12-26 PROCEDURE — 36415 COLL VENOUS BLD VENIPUNCTURE: CPT

## 2017-12-26 PROCEDURE — 770006 HCHG ROOM/CARE - MED/SURG/GYN SEMI*

## 2017-12-26 PROCEDURE — 700102 HCHG RX REV CODE 250 W/ 637 OVERRIDE(OP): Performed by: HOSPITALIST

## 2017-12-26 PROCEDURE — 88342 IMHCHEM/IMCYTCHM 1ST ANTB: CPT

## 2017-12-26 PROCEDURE — 85025 COMPLETE CBC W/AUTO DIFF WBC: CPT

## 2017-12-26 PROCEDURE — 94760 N-INVAS EAR/PLS OXIMETRY 1: CPT

## 2017-12-26 PROCEDURE — 83735 ASSAY OF MAGNESIUM: CPT

## 2017-12-26 PROCEDURE — A9270 NON-COVERED ITEM OR SERVICE: HCPCS | Performed by: HOSPITALIST

## 2017-12-26 PROCEDURE — 500066 HCHG BITE BLOCK, ECT: Performed by: INTERNAL MEDICINE

## 2017-12-26 PROCEDURE — 700111 HCHG RX REV CODE 636 W/ 250 OVERRIDE (IP)

## 2017-12-26 PROCEDURE — 700111 HCHG RX REV CODE 636 W/ 250 OVERRIDE (IP): Performed by: INTERNAL MEDICINE

## 2017-12-26 PROCEDURE — A9270 NON-COVERED ITEM OR SERVICE: HCPCS | Performed by: INTERNAL MEDICINE

## 2017-12-26 PROCEDURE — 0DB68ZX EXCISION OF STOMACH, VIA NATURAL OR ARTIFICIAL OPENING ENDOSCOPIC, DIAGNOSTIC: ICD-10-PCS | Performed by: INTERNAL MEDICINE

## 2017-12-26 PROCEDURE — 700102 HCHG RX REV CODE 250 W/ 637 OVERRIDE(OP): Performed by: INTERNAL MEDICINE

## 2017-12-26 PROCEDURE — C9113 INJ PANTOPRAZOLE SODIUM, VIA: HCPCS | Performed by: INTERNAL MEDICINE

## 2017-12-26 PROCEDURE — 0DH68UZ INSERTION OF FEEDING DEVICE INTO STOMACH, VIA NATURAL OR ARTIFICIAL OPENING ENDOSCOPIC: ICD-10-PCS | Performed by: INTERNAL MEDICINE

## 2017-12-26 PROCEDURE — 160203 HCHG ENDO MINUTES - 1ST 30 MINS LEVEL 4: Performed by: INTERNAL MEDICINE

## 2017-12-26 PROCEDURE — 88305 TISSUE EXAM BY PATHOLOGIST: CPT

## 2017-12-26 PROCEDURE — 160002 HCHG RECOVERY MINUTES (STAT): Performed by: INTERNAL MEDICINE

## 2017-12-26 PROCEDURE — 88312 SPECIAL STAINS GROUP 1: CPT

## 2017-12-26 PROCEDURE — 80048 BASIC METABOLIC PNL TOTAL CA: CPT

## 2017-12-26 PROCEDURE — 160208 HCHG ENDO MINUTES - EA ADDL 1 MIN LEVEL 4: Performed by: INTERNAL MEDICINE

## 2017-12-26 PROCEDURE — 88341 IMHCHEM/IMCYTCHM EA ADD ANTB: CPT

## 2017-12-26 PROCEDURE — 99152 MOD SED SAME PHYS/QHP 5/>YRS: CPT | Performed by: INTERNAL MEDICINE

## 2017-12-26 PROCEDURE — 160048 HCHG OR STATISTICAL LEVEL 1-5: Performed by: INTERNAL MEDICINE

## 2017-12-26 PROCEDURE — 85610 PROTHROMBIN TIME: CPT

## 2017-12-26 PROCEDURE — 160035 HCHG PACU - 1ST 60 MINS PHASE I: Performed by: INTERNAL MEDICINE

## 2017-12-26 PROCEDURE — 110372 HCHG SHELL REV 278: Performed by: INTERNAL MEDICINE

## 2017-12-26 PROCEDURE — 99153 MOD SED SAME PHYS/QHP EA: CPT | Performed by: INTERNAL MEDICINE

## 2017-12-26 DEVICE — KIT 20 FRENCH PULL SAFETY PEG: Type: IMPLANTABLE DEVICE | Site: STOMACH | Status: FUNCTIONAL

## 2017-12-26 RX ORDER — SODIUM CHLORIDE 9 MG/ML
500 INJECTION, SOLUTION INTRAVENOUS
Status: ACTIVE | OUTPATIENT
Start: 2017-12-26 | End: 2017-12-26

## 2017-12-26 RX ORDER — CEFAZOLIN SODIUM 1 G/3ML
1 INJECTION, POWDER, FOR SOLUTION INTRAMUSCULAR; INTRAVENOUS ONCE
Status: COMPLETED | OUTPATIENT
Start: 2017-12-26 | End: 2017-12-26

## 2017-12-26 RX ORDER — PANTOPRAZOLE SODIUM 40 MG/10ML
40 INJECTION, POWDER, LYOPHILIZED, FOR SOLUTION INTRAVENOUS DAILY
Status: DISCONTINUED | OUTPATIENT
Start: 2017-12-26 | End: 2017-12-27

## 2017-12-26 RX ORDER — MIDAZOLAM HYDROCHLORIDE 1 MG/ML
INJECTION INTRAMUSCULAR; INTRAVENOUS
Status: DISCONTINUED | OUTPATIENT
Start: 2017-12-26 | End: 2017-12-26 | Stop reason: HOSPADM

## 2017-12-26 RX ORDER — MIDAZOLAM HYDROCHLORIDE 1 MG/ML
.5-2 INJECTION INTRAMUSCULAR; INTRAVENOUS PRN
Status: ACTIVE | OUTPATIENT
Start: 2017-12-26 | End: 2017-12-26

## 2017-12-26 RX ADMIN — HEPARIN SODIUM 5000 UNITS: 5000 INJECTION, SOLUTION INTRAVENOUS; SUBCUTANEOUS at 12:14

## 2017-12-26 RX ADMIN — POTASSIUM BICARBONATE 25 MEQ: 25 TABLET, EFFERVESCENT ORAL at 20:02

## 2017-12-26 RX ADMIN — NYSTATIN: 100000 POWDER TOPICAL at 21:00

## 2017-12-26 RX ADMIN — HEPARIN SODIUM 5000 UNITS: 5000 INJECTION, SOLUTION INTRAVENOUS; SUBCUTANEOUS at 20:02

## 2017-12-26 RX ADMIN — ATORVASTATIN CALCIUM 80 MG: 80 TABLET, FILM COATED ORAL at 20:02

## 2017-12-26 RX ADMIN — PANTOPRAZOLE SODIUM 40 MG: 40 INJECTION, POWDER, FOR SOLUTION INTRAVENOUS at 12:49

## 2017-12-26 RX ADMIN — NICOTINE 14 MG: 14 PATCH, EXTENDED RELEASE TRANSDERMAL at 05:39

## 2017-12-26 RX ADMIN — CEFAZOLIN SODIUM 1 G: 1 INJECTION, POWDER, FOR SOLUTION INTRAMUSCULAR; INTRAVENOUS at 10:20

## 2017-12-26 RX ADMIN — NYSTATIN 1500000 UNITS: 100000 POWDER TOPICAL at 08:13

## 2017-12-26 ASSESSMENT — COGNITIVE AND FUNCTIONAL STATUS - GENERAL
HELP NEEDED FOR BATHING: TOTAL
MOBILITY SCORE: 6
EATING MEALS: TOTAL
TOILETING: TOTAL
DRESSING REGULAR LOWER BODY CLOTHING: TOTAL
DAILY ACTIVITIY SCORE: 6
MOVING TO AND FROM BED TO CHAIR: UNABLE
PERSONAL GROOMING: TOTAL
WALKING IN HOSPITAL ROOM: TOTAL
STANDING UP FROM CHAIR USING ARMS: TOTAL
SUGGESTED CMS G CODE MODIFIER DAILY ACTIVITY: CN
CLIMB 3 TO 5 STEPS WITH RAILING: TOTAL
DRESSING REGULAR UPPER BODY CLOTHING: TOTAL
TURNING FROM BACK TO SIDE WHILE IN FLAT BAD: UNABLE
MOVING FROM LYING ON BACK TO SITTING ON SIDE OF FLAT BED: UNABLE
SUGGESTED CMS G CODE MODIFIER MOBILITY: CN

## 2017-12-26 ASSESSMENT — PAIN SCALES - GENERAL: PAINLEVEL_OUTOF10: 0

## 2017-12-26 ASSESSMENT — PAIN SCALES - WONG BAKER: WONGBAKER_NUMERICALRESPONSE: DOESN'T HURT AT ALL

## 2017-12-26 NOTE — DISCHARGE PLANNING
TCN spoke with son regarding the care teams recommendation for SNF. Son is currently driving up from [place] ([place] and is looking to transfers patient to [place] to be closer to family. TCN and son discussed placement challenges related to insurance and location. Following discussion Son would like choice faxed to all local facilities as well as all facilities in [facility] and [place]. Choice faxed to [facility]. TCN to follow as needed.

## 2017-12-26 NOTE — DISCHARGE PLANNING
RN came to  office, apparently there is some conflict about decision-making at this time. There is not an advanced directive on file, if pt is legally  - spouse is next of kin for [place] stat.

## 2017-12-26 NOTE — PROCEDURES
DATE OF SERVICE:  12/26/2017    PREPROCEDURE DIAGNOSIS:  Oropharyngeal dysphagia secondary to stroke.    POSTPROCEDURE DIAGNOSES:  1.  Reflux esophagitis, [place] grade C.  2.  Antral erosive gastritis.  3.  Duodenitis in the duodenal bulb.  4.  Successful placement of 20-Macanese traction removable PEG with skin at 4.5   cm.    PROCEDURE TIMES:  1.  Entered room at 1018 hours.  2.  Started sedation at 1029 hours.  3.  Finished procedure at 1048 hours.  4.  Left room at 1051 hours.    DESCRIPTION OF PROCEDURE:  The risks, benefits, and alternatives were   explained to the patient's son who is the guardian.  This conversation was   held by Dr. Matos, my associate yesterday.  The patient was brought to the   endoscopy suite.  Ancef 1 g IV was administered as prophylactic antibiotics.    A timeout was performed and the patient was sedated with fentanyl and Versed.    During the procedure, a total of fentanyl 100 mcg and Versed 5 mg IV were   utilized.  With the patient supine and head of bed slightly elevated, the   Olympus flexible video endoscope was introduced in the mouth and gently   advanced into the esophagus under direct visualization.  Initially, I examined   the esophagus, stomach and duodenum to the second portion and then obtained   retroflexed views of the proximal stomach.  This was notable for linear   erosions in the esophagus from 30-40 cm which became more confluent, 40% of   the circumference, distally.  The stomach had scattered diminutive erosions   and erythema mostly in the antrum.  Biopsies were taken of this to rule out H.   pylori infection.  The duodenal bulb had edema, erythema and erosions.  The   second portion of duodenum appeared normal.    The stomach was fully insufflated with air and external palpation was   performed to find the best site for G-tube placement.  This was located about   45 cm below the xiphoid in the midline.  This area was prepped and draped in   sterile  fashion.  The skin and subcutaneous tissues were infiltrated with 5 mL   of 1% lidocaine.  An 8 mm transverse skin incision was created with #11   scalpel.  The introducer needle was passed through the wound and visualized to   enter the gastric lumen where it was captured with a polypectomy snare.  The   stylette was removed and a pull wire was passed through the cannula, grasped   with a snare and brought out the patient's mouth by withdrawing the endoscope.    It was attached to a 20-Arabic traction removable PEG, which was then pulled   into position.  The external bolster was applied to gently oppose the gastric   and abdominal wall.  The skin was at the 4.5 cm carlos.  Repeat gastroscopy is   performed to obtain pictures of the internal bolster of the G-tube.  There was   no bleeding internally or externally.  Air was evacuated and scope withdrawn.    The patient tolerated the procedure well.    PLAN:  1.  He needs a proton pump inhibitor therapy.  I will start him on   pantoprazole 40 mg IV daily, but switch him to omeprazole suspension 20 mg   daily after we start using the G-tube:  2.  Gastrostomy tube can be used for medications 6 hours post procedure.  3.  Standard post-PEG orders entered.       ____________________________________     ROMEO RUGGIERO MD CMS / KATYA    DD:  12/26/2017 11:01:49  DT:  12/26/2017 15:22:05    D#:  [id number]  Job#:  [id number]

## 2017-12-26 NOTE — PROGRESS NOTES
Two RN skin check completed.  Patient has rash under bilateral axilla, nystatin powdered ordered.  No other skin issues noted.

## 2017-12-26 NOTE — CARE PLAN
Problem: Venous Thromboembolism (VTW)/Deep Vein Thrombosis (DVT) Prevention:  Goal: Patient will participate in Venous Thrombosis (VTE)/Deep Vein Thrombosis (DVT)Prevention Measures  Outcome: PROGRESSING AS EXPECTED  No sign of DVT, on heparin as scheduled, SCD's on in bed.     Problem: Skin Integrity  Goal: Risk for impaired skin integrity will decrease  Outcome: PROGRESSING AS EXPECTED  Pt skin CDI, repositioned in bed q2h.

## 2017-12-26 NOTE — PROGRESS NOTES
Neuroscience Shift Summary: [id number]  Orientation-  Unable to obtain, patient nonverbal, does not follow commands, however tracks.  Diet- Strict NPO, TF  Incision- NA  Lines/Drains- Cortrac in place, TF at goal rate of 85ml/hour, patient is due to have PEG placement tomorrow, NPO at midnight.  Codom catheter in place.  Telemetry- NSR/ST  Pain- Does not appear to be in pain, is calm.  Discharge Plan- SNF when ready    Concerns-  No acute concerns.  VSS.  Skin assessment complete.

## 2017-12-26 NOTE — CARE PLAN
Problem: Nutritional:  Goal: Nutrition support tolerated and meeting greater than 85% of estimated needs  Outcome: MET Date Met: 12/26/17  TF @ goal.

## 2017-12-26 NOTE — PROGRESS NOTES
Saumya Rios Fall Risk Assessment:     Last Known Fall: Within the last month  Mobility: Hemiplegic, paraplegia, or quadriplegia  Medications: Cardiovascular or central nervous system meds  Mental Status/LOC/Awareness: Oriented to person and place  Toileting Needs: Use of catheters or diversion devices, Incontinence  Volume/Electrolyte Status: Use of IV fluids/tube feeds  Communication/Sensory: Visual (Glasses)/hearing deficit  Behavior: Appropriate behavior  Sauyma Rios Fall Risk Total: 18  Fall Risk Level: HIGH RISK    Universal Fall Precautions:  call light/belongings in reach, bed in low position and locked, wheelchairs and assistive devices out of sight, siderails up x 2, use non-slip footwear, adequate lighting, clutter free and spill free environment, educate on level of risk    Fall Risk Level Interventions:    TRIAL (Markafoni, NEURO, MED MICHAEL 5) Moderate Fall Risk Interventions  Place yellow fall risk ID band on patient: verified  Provide patient/family education based on risk assessment : completed  Educate patient/family to call staff for assistance when getting out of bed: completed  Place fall precaution signage outside patient door: verified  Utilize bed/chair fall alarm: verifiedTRIAL ([employer], NEURO, [id number] MICHAEL 5) High Fall Risk Interventions  Place yellow fall risk ID band on patient: verified  Provide patient/family education based on risk assessment: completed  Educate patient/family to call staff for assistance when getting out of bed: completed  Place fall precaution signage outside patient door: verified  Place patient in room close to nursing station: verified  Utilize bed/chair fall alarm: verified  Notify charge of high risk for huddle: completed    Patient Specific Interventions:     Medication: review medications with patient and family  Mental Status/LOC/Awareness: check on patient hourly, utilize bed/chair fall alarm and reinforce the use of call light  Toileting: not  applicable  Volume/Electrolyte Status: ensure patient remains hydrated and monitor abnormal lab values  Communication/Sensory: ensure proper positioning when transferrng/ambulating  Behavioral: administer medication as ordered  Mobility: utilize bed/chair fall alarm, ensure bed is locked and in lowest position and collaborate with doctor for possible PT/OT consult

## 2017-12-26 NOTE — DISCHARGE PLANNING
TC to pts mother ([phone]), LM for call back regarding pts SS #    TC to pts son Tobi ([phone]), he will call SW back in 30 mins with SS #.

## 2017-12-26 NOTE — CARE PLAN
Problem: Skin Integrity  Goal: Risk for impaired skin integrity will decrease  Outcome: PROGRESSING SLOWER THAN EXPECTED  Reposition patient every 2 hours to help prevent skin break down.

## 2017-12-26 NOTE — PROGRESS NOTES
[facility] Hospitalist Progress Note    Date of Service: 2017    Chief Complaint  52 y.o. male admitted 2017 with HTN and obesity who presented with expressive aphasia and right sided weakness due to CVA.    Interval Problem Update  No new events overnight, getting PEG tube today, is not able to talk, follows me with his eyes. No fever.   No new neurologic changes.     Consultants/Specialty  GI    Disposition  SNF. Long term plan is to be placed near his son.         Review of Systems   Unable to perform ROS: Acuity of condition (espressive aphasia)      Physical Exam  Laboratory/Imaging   Hemodynamics  Temp (24hrs), Av.5 °C (97.7 °F), Min:36.1 °C (97 °F), Max:37 °C (98.6 °F)   Temperature: 36.1 °C (97 °F)  Pulse  Av  Min: 67  Max: 118 Heart Rate (Monitored): 94  Blood Pressure: 107/74, NIBP: (!) 85/66      Respiratory      Respiration: 16, Pulse Oximetry: 93 %, O2 Daily Delivery Respiratory : Nasal Cannula        RUL Breath Sounds: Diminished, RML Breath Sounds: Diminished, RLL Breath Sounds: Diminished, MIRTA Breath Sounds: Diminished, LLL Breath Sounds: Diminished    Fluids    Intake/Output Summary (Last 24 hours) at 17 1440  Last data filed at 17 1000   Gross per 24 hour   Intake             2299 ml   Output             1675 ml   Net              624 ml       Nutrition  Orders Placed This Encounter   Procedures   • DIET NPO     Standing Status:   Standing     Number of Occurrences:   8     Order Specific Question:   Restrict to:     Answer:   Strict [1]     Comments:   Hold tube feeds     Physical Exam   Constitutional: No distress.   HENT:   Head: Normocephalic.   Eyes: Conjunctivae and EOM are normal. No scleral icterus.   Neck: No JVD present.   Cardiovascular: Normal rate and regular rhythm.    No murmur heard.  Pulmonary/Chest: Effort normal and breath sounds normal. No respiratory distress. He has no wheezes.   Abdominal: Soft. Bowel sounds are normal. There is no tenderness.  There is no rebound.   Musculoskeletal: He exhibits no edema.   Lymphadenopathy:     He has no cervical adenopathy.   Neurological: He is alert. He exhibits abnormal muscle tone (no movement of RUE and RLE).   right facial droop, aphasia   Skin: Skin is warm and dry.   Nursing note and vitals reviewed.      Recent Labs      12/25/17   0240  12/26/17   0338   WBC  11.2*  11.5*   RBC  4.89  5.19   HEMOGLOBIN  16.2  17.5   HEMATOCRIT  46.4  48.3   MCV  94.9  93.1   MCH  33.1*  33.7*   MCHC  34.9  36.2*   RDW  40.7  39.3   PLATELETCT  231  251   MPV  12.0  12.1     Recent Labs      12/24/17   0409  12/25/17   0240  12/26/17   0338   SODIUM  133*  130*  130*   POTASSIUM  3.6  3.5*  4.0   CHLORIDE  106  102  99   CO2  19*  18*  21   GLUCOSE  112*  117*  108*   BUN  13  12  15   CREATININE  0.64  0.54  0.69   CALCIUM  8.9  8.8  9.9     Recent Labs      12/26/17   0337   INR  1.00                  Assessment/Plan     * CVA (cerebral vascular accident) (CMS-HCC)   Assessment & Plan    Expressive aphasia with right sided weakness. MRI showed left frontal/parietal/temporal infarct.  and A1c 5.9%.  Had progression of aphasia and right hemiparesis 12/21. Stat CTA showed L ICA occlusion with possible dissection, which was confirmed on MRA. I discussed with Dr. Simeon 12/21 and 12/22, says the benefits of anticoagulation compared to antiplt are minimal when compared to the risk of hemorrhage. Consulted Dr. Cooper who says no surgical intervention recommended. Spoke with IR and patient has high risk for hemorrhage with stenting.  - tolerating tube feeds, consulted GI for PEG   - ST/PT/OT following, son wants long term placement near him in [place], [place]  - continue asa and statin  - heparin dvt ppx  - q4h neuro checks  12/26 will need SNF placement, rehab evaluated and pt is not good candidate due to acute deficit.           HTN (hypertension)   Assessment & Plan    Patient not on any medications as outpatient  IV PRN If SBP  >160  On norvasc, added HCTZ  12/26 d/c hctz due to hyponatremia.   Will keep monitoring if bp still elevated will probably add lisinopril.         Hypokalemia   Assessment & Plan    Daily Klyte repletion.  Resolved.         Hyponatremia   Assessment & Plan    Mild. Hold IVF given tolerating full rate of tube feeds. Monitor daily.  D/c'ed his hctz. Will f/u.         Advance care planning   Assessment & Plan    Spoke with son, wife, mother at bedside. They understand the severe situation. They want son to be primary contact. We discussed code status and they want to keep him full code, they are ok with artificial nutrition and PEG placement.  Peg today.               Reviewed items::  EKG reviewed, Labs reviewed, Medications reviewed and Radiology images reviewed  Alexander catheter::  No Alexander  DVT prophylaxis pharmacological::  Heparin

## 2017-12-26 NOTE — OR SURGEON
Immediate Post OP Note    PreOp Diagnosis: Oropharyngeal dysphagia secondary to stroke    PostOp Diagnosis:   1. Reflux esophagitis, [place] Grade C.  2. Antral erosive gastritis.  3. Duodenitis in bulb.  4. Successful placement of 20 Fr traction removable PEG, skin at 4.5 cm.    Procedure(s):  GASTROSCOPY-ENDO - Wound Class: Clean Contaminated  PEG PLACEMENT - Wound Class: Clean Contaminated    Surgeon(s):  Adam Aguirre M.D.    Anesthesiologist/Type of Anesthesia:  No anesthesia staff entered./Sedation    Surgical Staff:  Circulator: Torrie Rebollar R.N.  Endoscopy Technician: Jacqueline Troy  Sedation/Monitoring Nurse: Chantel Bobo R.N.    Entered room: 10:18  Started sedation: 10:29  Finished procedure: 10:48  Left room: 10:51  Specimens:  Gastric biopsies (rule out H pylori)    Estimated Blood Loss: None    Findings:   Esophagus: Linear erosions 30-40 cm which involves 40% of circumference distally.  Stomach: scattered diminutive erosions and erythema mostly in the antrum (bx'd).  Duodenum: edema, erythema and erosions in bulb; normal second portion.    Complications: None    Recommend:  1. Standard post PEG orders.  2. Pantoprazole 40 mg IV daily; switch to omeprazole 20 mg suspension daily once we start using tube.        12/26/2017 10:49 AM Adam Aguirre

## 2017-12-27 LAB
ANION GAP SERPL CALC-SCNC: 7 MMOL/L (ref 0–11.9)
BUN SERPL-MCNC: 23 MG/DL (ref 8–22)
CALCIUM SERPL-MCNC: 9.5 MG/DL (ref 8.5–10.5)
CHLORIDE SERPL-SCNC: 101 MMOL/L (ref 96–112)
CO2 SERPL-SCNC: 23 MMOL/L (ref 20–33)
CREAT SERPL-MCNC: 0.8 MG/DL (ref 0.5–1.4)
GFR SERPL CREATININE-BSD FRML MDRD: >60 ML/MIN/1.73 M 2
GLUCOSE SERPL-MCNC: 111 MG/DL (ref 65–99)
POTASSIUM SERPL-SCNC: 3.8 MMOL/L (ref 3.6–5.5)
SODIUM SERPL-SCNC: 131 MMOL/L (ref 135–145)

## 2017-12-27 PROCEDURE — 97530 THERAPEUTIC ACTIVITIES: CPT

## 2017-12-27 PROCEDURE — 700102 HCHG RX REV CODE 250 W/ 637 OVERRIDE(OP): Performed by: HOSPITALIST

## 2017-12-27 PROCEDURE — 36415 COLL VENOUS BLD VENIPUNCTURE: CPT

## 2017-12-27 PROCEDURE — 80048 BASIC METABOLIC PNL TOTAL CA: CPT

## 2017-12-27 PROCEDURE — 700102 HCHG RX REV CODE 250 W/ 637 OVERRIDE(OP): Performed by: INTERNAL MEDICINE

## 2017-12-27 PROCEDURE — 97535 SELF CARE MNGMENT TRAINING: CPT

## 2017-12-27 PROCEDURE — 700111 HCHG RX REV CODE 636 W/ 250 OVERRIDE (IP): Performed by: INTERNAL MEDICINE

## 2017-12-27 PROCEDURE — 770006 HCHG ROOM/CARE - MED/SURG/GYN SEMI*

## 2017-12-27 PROCEDURE — A9270 NON-COVERED ITEM OR SERVICE: HCPCS | Performed by: HOSPITALIST

## 2017-12-27 PROCEDURE — 99232 SBSQ HOSP IP/OBS MODERATE 35: CPT | Performed by: HOSPITALIST

## 2017-12-27 PROCEDURE — A9270 NON-COVERED ITEM OR SERVICE: HCPCS | Performed by: INTERNAL MEDICINE

## 2017-12-27 PROCEDURE — C9113 INJ PANTOPRAZOLE SODIUM, VIA: HCPCS | Performed by: INTERNAL MEDICINE

## 2017-12-27 RX ADMIN — ATORVASTATIN CALCIUM 80 MG: 80 TABLET, FILM COATED ORAL at 21:27

## 2017-12-27 RX ADMIN — HEPARIN SODIUM 5000 UNITS: 5000 INJECTION, SOLUTION INTRAVENOUS; SUBCUTANEOUS at 21:27

## 2017-12-27 RX ADMIN — NICOTINE 14 MG: 14 PATCH, EXTENDED RELEASE TRANSDERMAL at 05:14

## 2017-12-27 RX ADMIN — AMLODIPINE BESYLATE 10 MG: 5 TABLET ORAL at 08:03

## 2017-12-27 RX ADMIN — POTASSIUM BICARBONATE 25 MEQ: 25 TABLET, EFFERVESCENT ORAL at 21:27

## 2017-12-27 RX ADMIN — NYSTATIN 1500000 UNITS: 100000 POWDER TOPICAL at 21:27

## 2017-12-27 RX ADMIN — POTASSIUM BICARBONATE 25 MEQ: 25 TABLET, EFFERVESCENT ORAL at 08:03

## 2017-12-27 RX ADMIN — ASPIRIN 81 MG: 81 TABLET, CHEWABLE ORAL at 08:03

## 2017-12-27 RX ADMIN — PANTOPRAZOLE SODIUM 40 MG: 40 INJECTION, POWDER, FOR SOLUTION INTRAVENOUS at 08:04

## 2017-12-27 RX ADMIN — HEPARIN SODIUM 5000 UNITS: 5000 INJECTION, SOLUTION INTRAVENOUS; SUBCUTANEOUS at 05:14

## 2017-12-27 RX ADMIN — HEPARIN SODIUM 5000 UNITS: 5000 INJECTION, SOLUTION INTRAVENOUS; SUBCUTANEOUS at 13:11

## 2017-12-27 RX ADMIN — NYSTATIN 1500000 UNITS: 100000 POWDER TOPICAL at 08:03

## 2017-12-27 ASSESSMENT — GAIT ASSESSMENTS: GAIT LEVEL OF ASSIST: UNABLE TO PARTICIPATE

## 2017-12-27 ASSESSMENT — COGNITIVE AND FUNCTIONAL STATUS - GENERAL
STANDING UP FROM CHAIR USING ARMS: TOTAL
CLIMB 3 TO 5 STEPS WITH RAILING: TOTAL
EATING MEALS: TOTAL
DAILY ACTIVITIY SCORE: 8
TOILETING: TOTAL
PERSONAL GROOMING: A LOT
HELP NEEDED FOR BATHING: TOTAL
SUGGESTED CMS G CODE MODIFIER MOBILITY: CN
DRESSING REGULAR UPPER BODY CLOTHING: A LOT
MOBILITY SCORE: 6
MOVING TO AND FROM BED TO CHAIR: UNABLE
WALKING IN HOSPITAL ROOM: TOTAL
SUGGESTED CMS G CODE MODIFIER DAILY ACTIVITY: CM
TURNING FROM BACK TO SIDE WHILE IN FLAT BAD: UNABLE
DRESSING REGULAR LOWER BODY CLOTHING: TOTAL
MOVING FROM LYING ON BACK TO SITTING ON SIDE OF FLAT BED: UNABLE

## 2017-12-27 ASSESSMENT — PAIN SCALES - GENERAL: PAINLEVEL_OUTOF10: 0

## 2017-12-27 NOTE — PROGRESS NOTES
Saumya Rios Fall Risk Assessment:     Last Known Fall: Within the last month  Mobility: Hemiplegic, paraplegia, or quadriplegia  Medications: Cardiovascular or central nervous system meds  Mental Status/LOC/Awareness: Oriented to person and place  Toileting Needs: Incontinence  Volume/Electrolyte Status: Use of IV fluids/tube feeds  Communication/Sensory: Visual (Glasses)/hearing deficit  Behavior: Behavioral noncompliance with instruction  Saumya Rios Fall Risk Total: 19  Fall Risk Level: HIGH RISK    Universal Fall Precautions:  call light/belongings in reach, bed in low position and locked, siderails up x 2    Fall Risk Level Interventions:    TRIAL ([employer] 8, NEURO, MED MICHAEL 5) Moderate Fall Risk Interventions  Place yellow fall risk ID band on patient: verified  Provide patient/family education based on risk assessment : verified  Educate patient/family to call staff for assistance when getting out of bed: verified  Place fall precaution signage outside patient door: verified  Utilize bed/chair fall alarm: verifiedTRIAL ([employer] 8, NEURO, [employer] MICHAEL 5) High Fall Risk Interventions  Place yellow fall risk ID band on patient: verified  Provide patient/family education based on risk assessment: verified  Educate patient/family to call staff for assistance when getting out of bed: verified  Place fall precaution signage outside patient door: verified  Place patient in room close to nursing station: verified  Utilize bed/chair fall alarm: verified  Notify charge of high risk for huddle: verified    Patient Specific Interventions:     Medication: review medications with patient and family  Mental Status/LOC/Awareness: check on patient hourly, bed alert  Toileting: provide frquent toileting  Volume/Electrolyte Status: ensure patient remains hydrated  Communication/Sensory: update plan of care on whiteboard  Behavioral: encourage patient to voice feelings and engage patient in daily activities  Mobility: schedule  physical activity throughout the day

## 2017-12-27 NOTE — DISCHARGE PLANNING
Care Transition Team Assessment    IHD attempted to complete screening but unable to complete; not oriented. Currently uncertainty between spouse and son regarding decision-making due to lack of advanced directive. IHD will attempt revisit after discussion with SARIKA.      Attention to colostomy  10/26/2017    Active  Annel Boss

## 2017-12-27 NOTE — PROGRESS NOTES
Gastroenterology Progress Note     Author: Adam Aguirre   Date & Time Created: 2017 10:49 AM    Problem:  Feeding difficulties due to stroke-related oropharyngeal dysphagia.    Interval History:  2017: EGD with PEG placement performed.  Patient had evidence of esophagitis, gastritis and duodenitis.  Gastric biopsies taken to rule out H pylori.  PPI therapy started.  2017: Patient up in bed getting physical therapy.  Aphasic.  G-tube site examined and bolster loosened so that skin is at 5 cm.    Review of Systems:  ROS    Physical Exam:  Physical Exam   Constitutional: No distress.   Abdominal: Soft. Bowel sounds are normal. He exhibits no distension. There is no tenderness.   G-tube site looks good.       Labs:        Invalid input(s): [id number]      Recent Labs      17   SODIUM  130*  130*  131*   POTASSIUM  3.5*  4.0  3.8   CHLORIDE  102  99  101   CO2  18*  21  23   BUN  12  15  23*   CREATININE  0.54  0.69  0.80   MAGNESIUM   --   2.3   --    CALCIUM  8.8  9.9  9.5     Recent Labs      17   0444   PREALBUMIN  12.0*   --    --    GLUCOSE  117*  108*  111*     Recent Labs      17   RBC  4.89   --   5.19   HEMOGLOBIN  16.2   --   17.5   HEMATOCRIT  46.4   --   48.3   PLATELETCT  231   --   251   PROTHROMBTM   --   12.9   --    INR   --   1.00   --      Recent Labs      17   WBC  11.2*  11.5*   NEUTSPOLYS  72.30*  65.00   LYMPHOCYTES  10.20*  13.00*   MONOCYTES  14.50*  18.50*   EOSINOPHILS  1.70  2.30   BASOPHILS  0.90  0.80     Hemodynamics:  Temp (24hrs), Av.2 °C (97.2 °F), Min:36.1 °C (97 °F), Max:36.3 °C (97.4 °F)  Temperature: 36.2 °C (97.2 °F)  Pulse  Av.4  Min: 67  Max: 118Heart Rate (Monitored): 94  Blood Pressure: 146/92, NIBP: (!) 85/66     Respiratory:    Respiration: 16, Pulse Oximetry: 91 %, O2 Daily  Delivery Respiratory : Nasal Cannula        RUL Breath Sounds: Clear, RML Breath Sounds: Diminished, RLL Breath Sounds: Diminished, MIRTA Breath Sounds: Clear, LLL Breath Sounds: Diminished  Fluids:    Intake/Output Summary (Last 24 hours) at 12/27/17 1049  Last data filed at 12/27/17 0500   Gross per 24 hour   Intake             1539 ml   Output              775 ml   Net              764 ml        GI/Nutrition:  Orders Placed This Encounter   Procedures   • DIET NPO     Standing Status:   Standing     Number of Occurrences:   8     Order Specific Question:   Restrict to:     Answer:   Strict [1]     Comments:   Hold tube feeds     Medical Decision Making, by Problem:  Active Hospital Problems    Diagnosis   • *CVA (cerebral vascular accident) (CMS-HCC) [I63.9]   • HTN (hypertension) [I10]   • Hypokalemia [E87.6]   • Advance care planning [Z71.89]   • Hyponatremia [E87.1]     IMPRESSION:  1. Feeding difficulties due to stroke-induced oropharyngeal dysphagia.  2. S/P PEG - stable.  3. Reflux esophagitis, gastritis and duodenitis - on PPI therapy now.  Gastric bx pending.  4. CVA.    Plan:  - Staff will follow the post-PEG orders.  - Continue PPI therapy.  - If gastric biopsy shows H. Pylori, I recommend triple therapy.  If negative, only PPI therapy is necessary.  - He can follow-up with me in 2-3 months to assess tube site and make future plans.    Signing off.    Quality-Core Measures

## 2017-12-27 NOTE — DISCHARGE PLANNING
Medical Social Worker    SARIKA left message for pt's son following up on pt's SSN information as previous SW was attempting to obtain it from pt's son yesterday as well as confirm whether or not Kamille is pt's legally wed wife or a s/o. SARIKA will continue to f/u.

## 2017-12-27 NOTE — PROGRESS NOTES
Pt alert but unable to speak and seems to have difficulty understanding as well. Intermittently will follow simple commands. Right side flaccid, left side strong. PEG tube placed today, tube feeds restarted at 1700 per order. Replete Fiber 85cc/hr. Dressing over PEG clean, dry and intact.     Spoke with wife, Kamille, today who says that she is primary decision maker for patient, which is contradicting to some notes and what the son says. Social work and charge nurse are aware. Working on discharge plan to long term care. Oscar Mitchell

## 2017-12-27 NOTE — CARE PLAN
Problem: Knowledge Deficit  Goal: Knowledge of disease process/condition, treatment plan, diagnostic tests, and medications will improve    Intervention: Assess knowledge level of disease process/condition, treatment plan, diagnostic tests, and medications  Pt is unable to determine.       Problem: Psychosocial Needs:  Goal: Level of anxiety will decrease    Intervention: Identify and develop with patient strategies to cope with anxiety triggers  Pt has a flat affect.

## 2017-12-27 NOTE — PROGRESS NOTES
Assumed care of patient at 0700.  Received report from night RN.  Pt alert and aphasic, has no complaints of pain. Pt resting comfortably in bed. Peg intact with feeding infusing at goal rate with no issues.

## 2017-12-27 NOTE — DISCHARGE PLANNING
[facility] have declined patient due to no Medicaid bed availability. [facility] and [facility] has declined patient due to non-contracted insurance provider.

## 2017-12-27 NOTE — CARE PLAN
Problem: Infection  Goal: Will remain free from infection  Outcome: PROGRESSING AS EXPECTED      Problem: Venous Thromboembolism (VTW)/Deep Vein Thrombosis (DVT) Prevention:  Goal: Patient will participate in Venous Thrombosis (VTE)/Deep Vein Thrombosis (DVT)Prevention Measures  Outcome: PROGRESSING AS EXPECTED

## 2017-12-27 NOTE — PROGRESS NOTES
[facility] Hospitalist Progress Note    Date of Service: 2017    Chief Complaint  52 y.o. male admitted 2017 with HTN and obesity who presented with expressive aphasia and right sided weakness due to CVA.    Interval Problem Update  Resting in bed no new complains, no new events. Continue having right sided weakness expressive aphasia he follows some commands.     Consultants/Specialty  GI    Disposition  SNF. Long term plan is to be placed near his son.         Review of Systems   Unable to perform ROS: Acuity of condition (espressive aphasia)      Physical Exam  Laboratory/Imaging   Hemodynamics  Temp (24hrs), Av.3 °C (97.3 °F), Min:36.2 °C (97.1 °F), Max:36.3 °C (97.4 °F)   Temperature: 36.3 °C (97.4 °F)  Pulse  Av.3  Min: 67  Max: 118    Blood Pressure: 107/77      Respiratory      Respiration: 16, Pulse Oximetry: 94 %        RUL Breath Sounds: Clear, RML Breath Sounds: Diminished, RLL Breath Sounds: Diminished, MIRTA Breath Sounds: Clear, LLL Breath Sounds: Diminished    Fluids    Intake/Output Summary (Last 24 hours) at 17 1541  Last data filed at 17 0500   Gross per 24 hour   Intake             1539 ml   Output              575 ml   Net              964 ml       Nutrition  Orders Placed This Encounter   Procedures   • DIET NPO     Standing Status:   Standing     Number of Occurrences:   8     Order Specific Question:   Restrict to:     Answer:   Strict [1]     Comments:   Hold tube feeds     Physical Exam   Constitutional: No distress.   Neck: Normal range of motion. No JVD present.   Cardiovascular: Normal rate, regular rhythm and normal heart sounds.    No murmur heard.  Pulmonary/Chest: Effort normal and breath sounds normal. No respiratory distress. He has no wheezes.   Abdominal: Soft. Bowel sounds are normal. There is no tenderness. There is no rebound.   Musculoskeletal: He exhibits no edema.   Neurological: He is alert. He exhibits abnormal muscle tone (no movement of RUE  and RLE motor is 0/5).   right facial droop, aphasia   Skin: Skin is warm and dry.   Nursing note and vitals reviewed.      Recent Labs      12/25/17   0240  12/26/17   0338   WBC  11.2*  11.5*   RBC  4.89  5.19   HEMOGLOBIN  16.2  17.5   HEMATOCRIT  46.4  48.3   MCV  94.9  93.1   MCH  33.1*  33.7*   MCHC  34.9  36.2*   RDW  40.7  39.3   PLATELETCT  231  251   MPV  12.0  12.1     Recent Labs      12/25/17   0240  12/26/17   0338  12/27/17   0444   SODIUM  130*  130*  131*   POTASSIUM  3.5*  4.0  3.8   CHLORIDE  102  99  101   CO2  18*  21  23   GLUCOSE  117*  108*  111*   BUN  12  15  23*   CREATININE  0.54  0.69  0.80   CALCIUM  8.8  9.9  9.5     Recent Labs      12/26/17   0337   INR  1.00                  Assessment/Plan     * CVA (cerebral vascular accident) (CMS-HCC)   Assessment & Plan    Expressive aphasia with right sided weakness. MRI showed left frontal/parietal/temporal infarct.  and A1c 5.9%.  Had progression of aphasia and right hemiparesis 12/21. Stat CTA showed L ICA occlusion with possible dissection, which was confirmed on MRA. I discussed with Dr. Simeon 12/21 and 12/22, says the benefits of anticoagulation compared to antiplt are minimal when compared to the risk of hemorrhage. Consulted Dr. Cooper who says no surgical intervention recommended. Spoke with IR and patient has high risk for hemorrhage with stenting.  - tolerating tube feeds, consulted GI for PEG   - ST/PT/OT following, son wants long term placement near him in [place], [place]  - continue asa and statin  - heparin dvt ppx  - q4h neuro checks  Needs snf not candidate for rehab as per rehab team.           HTN (hypertension)   Assessment & Plan    Patient not on any medications as outpatient  IV PRN If SBP >160  On norvasc, added HCTZ  12/26 d/c hctz due to hyponatremia.   bp stable will keep monitoring.         Hypokalemia   Assessment & Plan    Daily Klyte repletion.  Resolved.         Hyponatremia   Assessment & Plan    Mild.  Hold IVF given tolerating full rate of tube feeds. Monitor daily.  D/c'ed his hctz. Will f/u.   Trending up. Will f/u in am.         Advance care planning   Assessment & Plan    Spoke with son, wife, mother at bedside. They understand the severe situation. They want son to be primary contact. We discussed code status and they want to keep him full code, they are ok with artificial nutrition and PEG placement.  S/p Peg placement 12/26              Reviewed items::  Labs reviewed and Medications reviewed  Alexander catheter::  No Alexander  DVT prophylaxis pharmacological::  Heparin

## 2017-12-27 NOTE — THERAPY
"Occupational Therapy Treatment completed with focus on ADLs and ADL transfers.  Functional Status:  Pt continues to be limited due to global aphasia. Tx attempted to wrist simple commands for pt to follow. Pt appears to be unable to focus eyes in order to read commands however responsive to tactile cues for grooming. Supine>sit withi mod A. Sit>stand with max Ax2. Pt demonstrated good sitting tolerance with intermittent CGA and use of L UE on bedrail. Pt mod A to initiate rolling for clean linens, however min A to complete rolling. Will continue to follow for acute OT services while in this setting.  Plan of Care: Will benefit from Occupational Therapy 4 times per week  Discharge Recommendations:  Equipment Will Continue to Assess for Equipment Needs. Post-acute therapy Discharge to a transitional care facility for continued skilled therapy services.    See \"Rehab Therapy-Acute\" Patient Summary Report for complete documentation.   "

## 2017-12-27 NOTE — DISCHARGE PLANNING
"Care Transition Team Assessment    IHD spoke with pt's mother over the phone. Pt currently lives at home with his mother and SO, who will be able to provide transportation upon d/c. Per mother, pt does not have DME, home O2, or HHC at this time.     Information Source  Orientation : Unable to Assess  Information Given By: Parent  Informant's Name: Arina rodriguez         Elopement Risk  Legal Hold: No  Ambulatory or Self Mobile in Wheelchair: No-Not an Elopement Risk  Elopement Risk: Not at Risk for Elopement    Interdisciplinary Discharge Planning  Does Admitting Nurse Feel This Could be a Complex Discharge?: Yes  Primary Care Physician: \"no\"  Lives with - Patient's Self Care Capacity: Spouse  Patient or legal guardian wants to designate a caregiver (see row info): No  Support Systems: Family Member(s), Spouse / Significant Other  Housing / Facility: 2 Story Apartment / Condo  Do You Take your Prescribed Medications Regularly:  (\"no home meds\")  Able to Return to Previous ADL's: No  Mobility Issues: Yes  Prior Services: None  Patient Expects to be Discharged to:: Home  Assistance Needed: Yes  Durable Medical Equipment: Not Applicable    Discharge Preparedness  What is your plan after discharge?: Uncertain - pending medical team collaboration  What are your discharge supports?: Parent, Child, Spouse  Prior Functional Level: Ambulatory, Independent with Activities of Daily Living, Independent with Medication Management  Difficulity with ADLs: None  Difficulity with IADLs: Driving  Difficulity with IADL Comments: Assisted by family    Functional Assesment  Prior Functional Level: Ambulatory, Independent with Activities of Daily Living, Independent with Medication Management    Finances  Financial Barriers to Discharge: No  Prescription Coverage: Yes ([facility] [address])    Vision / Hearing Impairment  Vision Impairment : Yes  Right Eye Vision: Impaired, Wears Glasses  Left Eye Vision: Impaired, Wears Glasses  Hearing " Impairment : No    Values / Beliefs / Concerns  Values / Beliefs Concerns : No         Domestic Abuse  Have you ever been the victim of abuse or violence?: No  Physical Abuse or Sexual Abuse: No  Verbal Abuse or Emotional Abuse: No  Possible Abuse Reported to:: Not Applicable    Psychological Assessment  History of Substance Abuse: None  History of Psychiatric Problems: No  Non-compliant with Treatment: No    Discharge Risks or Barriers  Discharge risks or barriers?: No PCP  Patient risk factors: No PCP    Anticipated Discharge Information  Anticipated discharge disposition: Discharge needs currently unknown  Discharge Address: unknown at this time  Discharge Contact Phone Number: [phone]

## 2017-12-27 NOTE — PROGRESS NOTES
Pt has a flat affect. NORIS if patient is oriented but is alert. Pt is able to move LUE & LLE spontaneously. Pt has R side facial drooping as well as withdraws to pain of the RUE. Pt is currently at goal on TF of replete fiber running at 85cc/hr. No residual. PEG dressing is dry and intact. Encourages to turn patient in bed. Bed alarm on.

## 2017-12-27 NOTE — THERAPY
"Physical Therapy Treatment completed.   Bed Mobility:  Supine to Sit: Moderate Assist (for LE management; assisting with left UE to the left)  Transfers: Sit to Stand: Maximal Assist (of 2 facilitated right hip extension and knee stability)  Gait: Level Of Assist: Unable to Participate   Plan of Care: Will benefit from Physical Therapy 5 times per week  Discharge Recommendations: Equipment: Will Continue to Assess for Equipment Needs.     Pt with improved trunk balance today, great activity tolerance. Remains unable to communicate with this therapist via nonverbal communication, writing or gesturing but did very well with tactile cueing for command following. Continues to require SNF at this time, will follow.     See \"Rehab Therapy-Acute\" Patient Summary Report for complete documentation.       "

## 2017-12-27 NOTE — DISCHARGE PLANNING
"Care Transition Team Assessment    SW spoke with pt's wife, Luis Alfredo, over the phone. At this time, pt has full home supports through his wife, mother, and son. Wife stated some uncertainty in regards to a home as they seem to be in between houses at this time, but did not want to discuss it in front of pt's mother. She stated she would call SW back later to elaborate on this information. SW also requested that she call back with pt's SSN as it will be needed for a PASRR for SNF. She stated that pt was completely independent and ambulatory before his admission to the hospital. SW will f/u with pt's wife as needed in regards to DC Planning. Pt is still pending bed availability at [facility] who have accepted him.     Information Source  Orientation : Unable to Assess  Information Given By: Spouse  Informant's Name: Kamille  Who is responsible for making decisions for patient? : Spouse  Name(s) of Primary Decision Maker: Kamille Case    Readmission Evaluation  Is this a readmission?: No    Elopement Risk  Legal Hold: No  Ambulatory or Self Mobile in Wheelchair: No-Not an Elopement Risk  Elopement Risk: Not at Risk for Elopement    Interdisciplinary Discharge Planning  Does Admitting Nurse Feel This Could be a Complex Discharge?: Yes  Primary Care Physician: \"no\"  Lives with - Patient's Self Care Capacity: Spouse  Patient or legal guardian wants to designate a caregiver (see row info): No  Support Systems: Family Member(s), Spouse / Significant Other  Housing / Facility: 2 Story Apartment / Condo  Do You Take your Prescribed Medications Regularly:  (\"no home meds\")  Able to Return to Previous ADL's: No  Mobility Issues: Yes  Prior Services: None  Patient Expects to be Discharged to:: Home  Assistance Needed: Yes  Durable Medical Equipment: Not Applicable    Discharge Preparedness  What is your plan after discharge?: Skilled nursing facility  What are your discharge supports?: Child, Parent, Spouse  Prior Functional " Level: Ambulatory, Independent with Activities of Daily Living, Independent with Medication Management, Drives Self  Difficulity with ADLs: Bathing, Brushing teeth, Dressing, Eating, Toileting, Walking  Difficulity with IADLs: Cooking, Driving, Keeping track of finances, Laundry, Managing medication, Shopping, Using the telephone or computer  Difficulity with IADL Comments: Assisted by family    Functional Assesment  Prior Functional Level: Ambulatory, Independent with Activities of Daily Living, Independent with Medication Management, Drives Self    Finances  Financial Barriers to Discharge: No  Prescription Coverage: Yes    Vision / Hearing Impairment  Vision Impairment : Yes  Right Eye Vision: Impaired, Wears Glasses  Left Eye Vision: Impaired, Wears Glasses  Hearing Impairment : No    Values / Beliefs / Concerns  Values / Beliefs Concerns : No    Advance Directive  Advance Directive?: None  Advance Directive offered?: AD Booklet refused (Pt not oriented)    Domestic Abuse  Have you ever been the victim of abuse or violence?: No  Physical Abuse or Sexual Abuse: No  Verbal Abuse or Emotional Abuse: No  Possible Abuse Reported to:: Not Applicable    Psychological Assessment  History of Substance Abuse: None  History of Psychiatric Problems: No  Non-compliant with Treatment: No  Newly Diagnosed Illness: No    Discharge Risks or Barriers  Discharge risks or barriers?: No PCP  Patient risk factors: No PCP    Anticipated Discharge Information  Anticipated discharge disposition: SNF  Discharge Address: [address] [place] [id number]  Discharge Contact Phone Number: [phone]

## 2017-12-28 LAB — SODIUM SERPL-SCNC: 135 MMOL/L (ref 135–145)

## 2017-12-28 PROCEDURE — 36415 COLL VENOUS BLD VENIPUNCTURE: CPT

## 2017-12-28 PROCEDURE — A9270 NON-COVERED ITEM OR SERVICE: HCPCS | Performed by: INTERNAL MEDICINE

## 2017-12-28 PROCEDURE — 770006 HCHG ROOM/CARE - MED/SURG/GYN SEMI*

## 2017-12-28 PROCEDURE — A9270 NON-COVERED ITEM OR SERVICE: HCPCS | Performed by: HOSPITALIST

## 2017-12-28 PROCEDURE — 700102 HCHG RX REV CODE 250 W/ 637 OVERRIDE(OP): Performed by: INTERNAL MEDICINE

## 2017-12-28 PROCEDURE — 700111 HCHG RX REV CODE 636 W/ 250 OVERRIDE (IP): Performed by: INTERNAL MEDICINE

## 2017-12-28 PROCEDURE — 700102 HCHG RX REV CODE 250 W/ 637 OVERRIDE(OP): Performed by: HOSPITALIST

## 2017-12-28 PROCEDURE — 99232 SBSQ HOSP IP/OBS MODERATE 35: CPT | Performed by: HOSPITALIST

## 2017-12-28 PROCEDURE — 84295 ASSAY OF SERUM SODIUM: CPT

## 2017-12-28 RX ADMIN — ASPIRIN 81 MG: 81 TABLET, CHEWABLE ORAL at 08:30

## 2017-12-28 RX ADMIN — ATORVASTATIN CALCIUM 80 MG: 80 TABLET, FILM COATED ORAL at 20:48

## 2017-12-28 RX ADMIN — NYSTATIN 1500000 UNITS: 100000 POWDER TOPICAL at 08:30

## 2017-12-28 RX ADMIN — HEPARIN SODIUM 5000 UNITS: 5000 INJECTION, SOLUTION INTRAVENOUS; SUBCUTANEOUS at 14:16

## 2017-12-28 RX ADMIN — OMEPRAZOLE 40 MG: 20 CAPSULE, DELAYED RELEASE ORAL at 08:33

## 2017-12-28 RX ADMIN — HEPARIN SODIUM 5000 UNITS: 5000 INJECTION, SOLUTION INTRAVENOUS; SUBCUTANEOUS at 20:48

## 2017-12-28 RX ADMIN — POTASSIUM BICARBONATE 25 MEQ: 25 TABLET, EFFERVESCENT ORAL at 08:30

## 2017-12-28 RX ADMIN — NYSTATIN 1500000 UNITS: 100000 POWDER TOPICAL at 20:52

## 2017-12-28 RX ADMIN — AMLODIPINE BESYLATE 10 MG: 5 TABLET ORAL at 08:30

## 2017-12-28 RX ADMIN — HEPARIN SODIUM 5000 UNITS: 5000 INJECTION, SOLUTION INTRAVENOUS; SUBCUTANEOUS at 05:54

## 2017-12-28 RX ADMIN — NICOTINE 14 MG: 14 PATCH, EXTENDED RELEASE TRANSDERMAL at 05:54

## 2017-12-28 RX ADMIN — POTASSIUM BICARBONATE 25 MEQ: 25 TABLET, EFFERVESCENT ORAL at 20:48

## 2017-12-28 ASSESSMENT — PAIN SCALES - GENERAL
PAINLEVEL_OUTOF10: 0
PAINLEVEL_OUTOF10: 0

## 2017-12-28 NOTE — CARE PLAN
Problem: Respiratory:  Goal: Respiratory status will improve    Intervention: Educate and encourage coughing and deep breathing  Encourage CDB

## 2017-12-28 NOTE — PROGRESS NOTES
Received report from night RN.  Assumed pt care.  Aphasic, A&O x 1-2.  Right sided deficits/weakness.  Pt denies pain, n/v and n/t  NPO PEG tube Replete-Fiber (Goal is 85 ml/hr) presently 10 ml/hr  Meds given per Mar.    No obvious s/sx of infection noted upon assessment  Safety precuations in place.  Plan of care discussed as possible  Denies questions at this time

## 2017-12-28 NOTE — CARE PLAN
Problem: Communication  Goal: The ability to communicate needs accurately and effectively will improve    Intervention: Reorient patient to environment as needed  Pt is aphasic continue to reinforce POC

## 2017-12-28 NOTE — PROGRESS NOTES
Saumya Rios Fall Risk Assessment:     Last Known Fall: Within the last month  Mobility: Hemiplegic, paraplegia, or quadriplegia  Medications: Cardiovascular or central nervous system meds  Mental Status/LOC/Awareness: Oriented to person and place  Toileting Needs: Incontinence  Volume/Electrolyte Status: Use of IV fluids/tube feeds  Communication/Sensory: Visual (Glasses)/hearing deficit  Behavior: Appropriate behavior  Saumya Rios Fall Risk Total: 17  Fall Risk Level: HIGH RISK    Universal Fall Precautions:  call light/belongings in reach, bed in low position and locked, wheelchairs and assistive devices out of sight, siderails up x 2, adequate lighting, clutter free and spill free environment, use non-slip footwear, educate on level of risk, educate to call for assistance    Fall Risk Level Interventions:    TRIAL ([employer], NEURO, MED MICHAEL 5) Moderate Fall Risk Interventions  Place yellow fall risk ID band on patient: verified  Provide patient/family education based on risk assessment : verified  Educate patient/family to call staff for assistance when getting out of bed: verified  Place fall precaution signage outside patient door: verified  Utilize bed/chair fall alarm: verifiedTRIAL ([employer] [facility], NEURO, MED MICHAEL 5) High Fall Risk Interventions  Place yellow fall risk ID band on patient: verified  Provide patient/family education based on risk assessment: verified  Educate patient/family to call staff for assistance when getting out of bed: verified  Place fall precaution signage outside patient door: verified  Place patient in room close to nursing station: verified  Utilize bed/chair fall alarm: refused  Notify charge of high risk for huddle: verified    Patient Specific Interventions:     Medication: review medications with patient and family, assess for medications that can be discontinued or dosage decreased and limit combination of prn medications  Mental Status/LOC/Awareness: reorient patient,  reinforce falls education, encourage family to stay with patient, check on patient hourly, utilize bed/chair fall alarm and reinforce the use of call light  Toileting: instruct male patients prone to dizziness to void while sitting, instruct patient/family on the use of grab bars, instruct patient/family on the need to call for assistance when toileting and do not leave patient unattended in bathroom/refer to toileting scripting  Volume/Electrolyte Status: ensure patient remains hydrated, advance diet as tolerated, administer medications as ordered for nausea and vomiting, monitor abnormal lab values and ensure IV fluids are appropriate  Communication/Sensory: update plan of care on whiteboard, ensure proper positioning when transferrng/ambulating, ensure patient has glasses/contacts and hearing aids/dentures and for visually impaired patients orient to their room surrounding and do not change their surroundings  Behavioral: encourage patient to voice feelings, administer medication as ordered, instruct/reinforce fall program rationale and encourage family to stay with impulsive patients  Mobility: schedule physical activity throughout the day, provide comfort measures during transport, dangle prior to standing, utilize bed/chair fall alarm and ensure bed is locked and in lowest position

## 2017-12-28 NOTE — PROGRESS NOTES
Saumya Rios Fall Risk Assessment:     Last Known Fall: Within the last month  Mobility: Hemiplegic, paraplegia, or quadriplegia  Medications: Cardiovascular or central nervous system meds  Mental Status/LOC/Awareness: Oriented to person and place  Toileting Needs: Incontinence  Volume/Electrolyte Status: Use of IV fluids/tube feeds  Communication/Sensory: Visual (Glasses)/hearing deficit  Behavior: Behavioral noncompliance with instruction  Saumya Rios Fall Risk Total: 19  Fall Risk Level: HIGH RISK    Universal Fall Precautions:  call light/belongings in reach, bed in low position and locked, wheelchairs and assistive devices out of sight, siderails up x 2, use non-slip footwear, adequate lighting, clutter free and spill free environment, educate on level of risk, educate to call for assistance    Fall Risk Level Interventions:    TRIAL ([employer], NEURO, MED MICHAEL 5) Moderate Fall Risk Interventions  Place yellow fall risk ID band on patient: verified  Provide patient/family education based on risk assessment : verified  Educate patient/family to call staff for assistance when getting out of bed: verified  Place fall precaution signage outside patient door: verified  Utilize bed/chair fall alarm: verifiedTRIAL ([employer], NEURO, MED MICHAEL 5) High Fall Risk Interventions  Place yellow fall risk ID band on patient: verified  Provide patient/family education based on risk assessment: completed  Educate patient/family to call staff for assistance when getting out of bed: completed  Place fall precaution signage outside patient door: verified  Place patient in room close to nursing station: verified  Utilize bed/chair fall alarm: verified  Notify charge of high risk for huddle: verified    Patient Specific Interventions:     Medication: review medications with patient and family  Mental Status/LOC/Awareness: reinforce the use of call light  Toileting: provide frquent toileting  Volume/Electrolyte Status: monitor abnormal  lab values  Communication/Sensory: update plan of care on whiteboard  Behavioral: administer medication as ordered  Mobility: ensure bed is locked and in lowest position

## 2017-12-29 PROCEDURE — 700102 HCHG RX REV CODE 250 W/ 637 OVERRIDE(OP): Performed by: HOSPITALIST

## 2017-12-29 PROCEDURE — 99232 SBSQ HOSP IP/OBS MODERATE 35: CPT | Performed by: HOSPITALIST

## 2017-12-29 PROCEDURE — A9270 NON-COVERED ITEM OR SERVICE: HCPCS | Performed by: HOSPITALIST

## 2017-12-29 PROCEDURE — 700111 HCHG RX REV CODE 636 W/ 250 OVERRIDE (IP): Performed by: INTERNAL MEDICINE

## 2017-12-29 PROCEDURE — 700102 HCHG RX REV CODE 250 W/ 637 OVERRIDE(OP): Performed by: INTERNAL MEDICINE

## 2017-12-29 PROCEDURE — A9270 NON-COVERED ITEM OR SERVICE: HCPCS | Performed by: INTERNAL MEDICINE

## 2017-12-29 PROCEDURE — 770006 HCHG ROOM/CARE - MED/SURG/GYN SEMI*

## 2017-12-29 PROCEDURE — 97530 THERAPEUTIC ACTIVITIES: CPT

## 2017-12-29 PROCEDURE — 97535 SELF CARE MNGMENT TRAINING: CPT

## 2017-12-29 RX ORDER — ATORVASTATIN CALCIUM 80 MG/1
80 TABLET, FILM COATED ORAL ONCE
Status: COMPLETED | OUTPATIENT
Start: 2017-12-29 | End: 2017-12-29

## 2017-12-29 RX ORDER — ATORVASTATIN CALCIUM 80 MG/1
80 TABLET, FILM COATED ORAL ONCE
Status: DISCONTINUED | OUTPATIENT
Start: 2017-12-29 | End: 2017-12-29

## 2017-12-29 RX ORDER — LISINOPRIL 5 MG/1
5 TABLET ORAL
Status: DISCONTINUED | OUTPATIENT
Start: 2017-12-29 | End: 2018-01-17 | Stop reason: HOSPADM

## 2017-12-29 RX ADMIN — NYSTATIN 1500000 UNITS: 100000 POWDER TOPICAL at 20:48

## 2017-12-29 RX ADMIN — AMLODIPINE BESYLATE 10 MG: 5 TABLET ORAL at 08:02

## 2017-12-29 RX ADMIN — HEPARIN SODIUM 5000 UNITS: 5000 INJECTION, SOLUTION INTRAVENOUS; SUBCUTANEOUS at 20:47

## 2017-12-29 RX ADMIN — ASPIRIN 81 MG: 81 TABLET, CHEWABLE ORAL at 08:02

## 2017-12-29 RX ADMIN — NICOTINE 14 MG: 14 PATCH, EXTENDED RELEASE TRANSDERMAL at 05:30

## 2017-12-29 RX ADMIN — NYSTATIN 1500000 UNITS: 100000 POWDER TOPICAL at 08:02

## 2017-12-29 RX ADMIN — OMEPRAZOLE 40 MG: 20 CAPSULE, DELAYED RELEASE ORAL at 08:02

## 2017-12-29 RX ADMIN — LISINOPRIL 5 MG: 5 TABLET ORAL at 20:47

## 2017-12-29 RX ADMIN — ATORVASTATIN CALCIUM 80 MG: 80 TABLET, FILM COATED ORAL at 22:59

## 2017-12-29 RX ADMIN — POTASSIUM BICARBONATE 25 MEQ: 25 TABLET, EFFERVESCENT ORAL at 09:00

## 2017-12-29 RX ADMIN — HEPARIN SODIUM 5000 UNITS: 5000 INJECTION, SOLUTION INTRAVENOUS; SUBCUTANEOUS at 05:29

## 2017-12-29 RX ADMIN — POTASSIUM BICARBONATE 25 MEQ: 25 TABLET, EFFERVESCENT ORAL at 21:00

## 2017-12-29 RX ADMIN — HEPARIN SODIUM 5000 UNITS: 5000 INJECTION, SOLUTION INTRAVENOUS; SUBCUTANEOUS at 15:02

## 2017-12-29 ASSESSMENT — COGNITIVE AND FUNCTIONAL STATUS - GENERAL
EATING MEALS: TOTAL
SUGGESTED CMS G CODE MODIFIER DAILY ACTIVITY: CM
DAILY ACTIVITIY SCORE: 8
DRESSING REGULAR UPPER BODY CLOTHING: A LOT
HELP NEEDED FOR BATHING: TOTAL
PERSONAL GROOMING: A LOT
DRESSING REGULAR LOWER BODY CLOTHING: TOTAL
TOILETING: TOTAL

## 2017-12-29 ASSESSMENT — PAIN SCALES - GENERAL
PAINLEVEL_OUTOF10: 0
PAINLEVEL_OUTOF10: 0

## 2017-12-29 NOTE — CARE PLAN
Problem: Mobility  Goal: Risk for activity intolerance will decrease  Outcome: PROGRESSING AS EXPECTED  ROM

## 2017-12-29 NOTE — PROGRESS NOTES
Received report from Night RN  Assumed pt care  Pt is aphasic.  Some ability to follow commands.  No obvious pain or N/V  Bilateral Right sided weakness  Meds given per MAR via PEG  Replete fiber at goal of 85 ml/hr  Condom cath  No obvious s/sx of infection noted  Safety precautions in place  Plan of care discussed/nodded

## 2017-12-29 NOTE — PROGRESS NOTES
Saumya Rios Fall Risk Assessment:     Last Known Fall: Within the last month  Mobility: Hemiplegic, paraplegia, or quadriplegia  Medications: Cardiovascular or central nervous system meds  Mental Status/LOC/Awareness: Oriented to person and place  Toileting Needs: Incontinence  Volume/Electrolyte Status: Use of IV fluids/tube feeds  Communication/Sensory: Visual (Glasses)/hearing deficit  Behavior: Appropriate behavior  Saumya Rios Fall Risk Total: 17  Fall Risk Level: HIGH RISK    Universal Fall Precautions:  call light/belongings in reach, bed in low position and locked, wheelchairs and assistive devices out of sight, siderails up x 2, use non-slip footwear, adequate lighting, clutter free and spill free environment, educate on level of risk, educate to call for assistance    Fall Risk Level Interventions:    TRIAL ([employer], NEURO, MED MICHAEL 5) Moderate Fall Risk Interventions  Place yellow fall risk ID band on patient: verified  Provide patient/family education based on risk assessment : verified  Educate patient/family to call staff for assistance when getting out of bed: verified  Place fall precaution signage outside patient door: verified  Utilize bed/chair fall alarm: verifiedTRIAL ([employer], NEURO, MED MICHAEL 5) High Fall Risk Interventions  Place yellow fall risk ID band on patient: verified  Provide patient/family education based on risk assessment: verified  Educate patient/family to call staff for assistance when getting out of bed: verified  Place fall precaution signage outside patient door: verified  Place patient in room close to nursing station: verified  Utilize bed/chair fall alarm: verified  Notify charge of high risk for huddle: verified    Patient Specific Interventions:     Medication: review medications with patient and family, assess for medications that can be discontinued or dosage decreased and limit combination of prn medications  Mental Status/LOC/Awareness: reorient patient, reinforce  falls education, check on patient hourly, utilize bed/chair fall alarm and reinforce the use of call light  Toileting: consider obtaining elevated toilet seat or bedside commode (BSC)  Volume/Electrolyte Status: ensure patient remains hydrated, monitor abnormal lab values and ensure IV fluids are appropriate  Communication/Sensory: update plan of care on whiteboard, provide communication alternatives/, ensure proper positioning when transferrng/ambulating, ensure patient has glasses/contacts and hearing aids/dentures and for visually impaired patients orient to their room surrounding and do not change their surroundings  Behavioral: administer medication as ordered and instruct/reinforce fall program rationale  Mobility: provide comfort measures during transport, utilize bed/chair fall alarm and ensure bed is locked and in lowest position

## 2017-12-29 NOTE — PROGRESS NOTES
Saumya Rios Fall Risk Assessment:     Last Known Fall: Within the last month  Mobility: Hemiplegic, paraplegia, or quadriplegia  Medications: Cardiovascular or central nervous system meds  Mental Status/LOC/Awareness: Oriented to person and place  Toileting Needs: Incontinence  Volume/Electrolyte Status: Use of IV fluids/tube feeds  Communication/Sensory: Visual (Glasses)/hearing deficit  Behavior: Appropriate behavior  Saumya Rios Fall Risk Total: 17  Fall Risk Level: HIGH RISK    Universal Fall Precautions:  call light/belongings in reach, bed in low position and locked, wheelchairs and assistive devices out of sight, siderails up x 2, use non-slip footwear, adequate lighting, clutter free and spill free environment, educate on level of risk, educate to call for assistance    Fall Risk Level Interventions:    TRIAL ([facility], NEURO, MED MICHAEL 5) Moderate Fall Risk Interventions  Place yellow fall risk ID band on patient: verified  Provide patient/family education based on risk assessment : verified  Educate patient/family to call staff for assistance when getting out of bed: verified  Place fall precaution signage outside patient door: verified  Utilize bed/chair fall alarm: verifiedTRIAL ([facility], NEURO, MED MICHAEL 5) High Fall Risk Interventions  Place yellow fall risk ID band on patient: verified  Provide patient/family education based on risk assessment: completed  Educate patient/family to call staff for assistance when getting out of bed: completed  Place fall precaution signage outside patient door: verified  Place patient in room close to nursing station: verified  Utilize bed/chair fall alarm: verified  Notify charge of high risk for huddle: verified    Patient Specific Interventions:     Medication: review medications with patient and family  Mental Status/LOC/Awareness: reinforce the use of call light  Toileting: provide frquent toileting  Volume/Electrolyte Status: monitor abnormal lab  values  Communication/Sensory: update plan of care on whiteboard  Behavioral: administer medication as ordered  Mobility: ensure bed is locked and in lowest position

## 2017-12-29 NOTE — CARE PLAN
Problem: Infection  Goal: Will remain free from infection    Intervention: Implement standard precautions and perform hand washing before and after patient contact  Standard precautions continued

## 2017-12-29 NOTE — DIETARY
Nutrition Services: Weekly TF update - TF via small bowel Cortrak: Replete Fiber running @ 85 mL/hr providing 2040 kcal, 131 grams of protein, and 1693 mL of free water per day.     Assessment:  · Day 10 of admit.   · Patient with expressive aphasia and right sided weakness due to CVA  · SLP saw patient 12/21 - recommended NPO with Cortrak for nutrition support   · Weight stable since admit; current TF remains appropriate and is meeting pt's estimated nutrient needs    Pertinent Labs: 12/27 BUN 23, creat WNL  Pertinent Meds: prilosec, potassium bicarbonate   Fluids: no IV fluids noted at this time   GI: last BM 12/28  WT: 107.9 kg via bed scale 12/24 - same as admit wt 12/21  SKIN: surgical incision abdomen; no pressure ulcers noted     PLAN / RECOMMEND -   · Continue same TF and rate  · PO diet when safe and appropriate per SLP    RD following.

## 2017-12-29 NOTE — PROGRESS NOTES
[facility] Hospitalist Progress Note    Date of Service: 2017    Chief Complaint  52 y.o. male admitted 2017 with HTN and obesity who presented with expressive aphasia and right sided weakness due to CVA.    Interval Problem Update  In bed, continue with expressive aphasia, follows some commands, right sided motor continue to be 0/5, tolerating PEG tube feeding.     Consultants/Specialty  GI    Disposition  SNF. Long term plan is to be placed near his son.         Review of Systems   Unable to perform ROS: Acuity of condition (espressive aphasia)      Physical Exam  Laboratory/Imaging   Hemodynamics  Temp (24hrs), Av.8 °C (98.3 °F), Min:36.4 °C (97.6 °F), Max:37.4 °C (99.3 °F)   Temperature: 36.4 °C (97.6 °F)  Pulse  Av.2  Min: 67  Max: 118    Blood Pressure: 146/90      Respiratory      Respiration: 16, Pulse Oximetry: 96 %        RUL Breath Sounds: Diminished, RML Breath Sounds: Diminished, RLL Breath Sounds: Diminished, MIRTA Breath Sounds: Diminished, LLL Breath Sounds: Diminished    Fluids    Intake/Output Summary (Last 24 hours) at 17 1849  Last data filed at 17 1700   Gross per 24 hour   Intake                0 ml   Output             2100 ml   Net            -2100 ml       Nutrition  Orders Placed This Encounter   Procedures   • DIET NPO     Standing Status:   Standing     Number of Occurrences:   8     Order Specific Question:   Restrict to:     Answer:   Strict [1]     Comments:   Hold tube feeds     Physical Exam   Neck: Normal range of motion.   Cardiovascular: Normal rate, regular rhythm and normal heart sounds.    No murmur heard.  Pulmonary/Chest: Effort normal and breath sounds normal. No respiratory distress. He has no wheezes.   Abdominal: Soft. Bowel sounds are normal. There is no tenderness. There is no rebound.   Musculoskeletal: He exhibits no edema.   Neurological: He is alert. He exhibits abnormal muscle tone (no movement of RUE and RLE motor is 0/5).   right facial  droop, aphasia   Skin: Skin is warm and dry.   Nursing note and vitals reviewed.      Recent Labs      12/26/17   0338   WBC  11.5*   RBC  5.19   HEMOGLOBIN  17.5   HEMATOCRIT  48.3   MCV  93.1   MCH  33.7*   MCHC  36.2*   RDW  39.3   PLATELETCT  251   MPV  12.1     Recent Labs      12/26/17   0338  12/27/17   0444  12/28/17   0213   SODIUM  130*  131*  135   POTASSIUM  4.0  3.8   --    CHLORIDE  99  101   --    CO2  21 23   --    GLUCOSE  108*  111*   --    BUN  15  23*   --    CREATININE  0.69  0.80   --    CALCIUM  9.9  9.5   --      Recent Labs      12/26/17   0337   INR  1.00                  Assessment/Plan     * CVA (cerebral vascular accident) (CMS-HCC)   Assessment & Plan    Expressive aphasia with right sided weakness. MRI showed left frontal/parietal/temporal infarct.  and A1c 5.9%.  Had progression of aphasia and right hemiparesis 12/21. Stat CTA showed L ICA occlusion with possible dissection, which was confirmed on MRA. I discussed with Dr. Simeon 12/21 and 12/22, says the benefits of anticoagulation compared to antiplt are minimal when compared to the risk of hemorrhage. Consulted Dr. Cooper who says no surgical intervention recommended. Spoke with IR and patient has high risk for hemorrhage with stenting.  - tolerating tube feeds, consulted GI for PEG   - ST/PT/OT following, son wants long term placement near him in [place], [place]  - continue asa and statin  - heparin dvt ppx  - q4h neuro checks  Needs snf not candidate for rehab as per rehab team. SW consulted.           HTN (hypertension)   Assessment & Plan    Patient not on any medications as outpatient  IV PRN If SBP >160  On norvasc, added HCTZ  12/26 d/c hctz due to hyponatremia.   bp stable will keep monitoring.          Hypokalemia   Assessment & Plan    Daily Klyte repletion.  Resolved.         Hyponatremia   Assessment & Plan    Mild. Hold IVF given tolerating full rate of tube feeds. Monitor daily.  D/c'ed his hctz. Will f/u.    Resolved.         Advance care planning   Assessment & Plan    Spoke with son, wife, mother at bedside. They understand the severe situation. They want son to be primary contact. We discussed code status and they want to keep him full code, they are ok with artificial nutrition and PEG placement.  S/p Peg placement on 12/26              Reviewed items::  Labs reviewed and Medications reviewed  Alexander catheter::  No Alexander  DVT prophylaxis pharmacological::  Heparin

## 2017-12-29 NOTE — DISCHARGE PLANNING
Medical Social Worker    SARIKA was informed that family was at bedside. SW went to meet with them. They were no longer there. SW will f/u with them via phone call and discuss current SNF placement.     Add:  Pt is pending a bed at [facility]. Pt requires a medicaid bed which is holding up discharge.

## 2017-12-29 NOTE — CARE PLAN
Problem: Communication  Goal: The ability to communicate needs accurately and effectively will improve    Intervention: Reorient patient to environment as needed  Reorient/reinforce pt

## 2017-12-29 NOTE — THERAPY
"Occupational Therapy Treatment completed with focus on ADLs and upper extremity function.  Functional Status:  Attempted to use written commands however pt unable to follow and appeared frustrated. Supine>sit with elevated HOB and use of bedrail with mod A for RLE management. Pt using L UE on bedrail to stabilize while sitting upight. Slight lean to R however improved with L elbow weightbearing. Pt able to complete self-ranging for RUE with tactile and [place] cues. Pt completed grooming while EOB with min A. Pt able to don UB (hospital gown) with mod A and tactile cues for olivier technique. Pt continues to be limited due to global aphasia however is progressing with UB dressing, grooming and bed mobility.   Plan of Care: Will benefit from Occupational Therapy 4 times per week  Discharge Recommendations:  Equipment Will Continue to Assess for Equipment Needs. Post-acute therapy Discharge to a transitional care facility for continued skilled therapy services.    See \"Rehab Therapy-Acute\" Patient Summary Report for complete documentation.   "

## 2017-12-29 NOTE — DISCHARGE PLANNING
Medical Social Worker    SARIKA received pt's SSN from SARIKA Nuñez as pt's wife contacted her with this information. SARIKA called [facility] Admitting and had the pt's facesheet updated.

## 2017-12-30 PROCEDURE — 700102 HCHG RX REV CODE 250 W/ 637 OVERRIDE(OP): Performed by: HOSPITALIST

## 2017-12-30 PROCEDURE — A9270 NON-COVERED ITEM OR SERVICE: HCPCS | Performed by: INTERNAL MEDICINE

## 2017-12-30 PROCEDURE — A9270 NON-COVERED ITEM OR SERVICE: HCPCS | Performed by: HOSPITALIST

## 2017-12-30 PROCEDURE — 99232 SBSQ HOSP IP/OBS MODERATE 35: CPT | Performed by: HOSPITALIST

## 2017-12-30 PROCEDURE — 700111 HCHG RX REV CODE 636 W/ 250 OVERRIDE (IP): Performed by: INTERNAL MEDICINE

## 2017-12-30 PROCEDURE — 770006 HCHG ROOM/CARE - MED/SURG/GYN SEMI*

## 2017-12-30 PROCEDURE — 700102 HCHG RX REV CODE 250 W/ 637 OVERRIDE(OP): Performed by: INTERNAL MEDICINE

## 2017-12-30 RX ADMIN — NYSTATIN 1500000 UNITS: 100000 POWDER TOPICAL at 07:44

## 2017-12-30 RX ADMIN — NYSTATIN 1500000 UNITS: 100000 POWDER TOPICAL at 22:11

## 2017-12-30 RX ADMIN — HEPARIN SODIUM 5000 UNITS: 5000 INJECTION, SOLUTION INTRAVENOUS; SUBCUTANEOUS at 14:52

## 2017-12-30 RX ADMIN — OMEPRAZOLE 40 MG: 20 CAPSULE, DELAYED RELEASE ORAL at 07:44

## 2017-12-30 RX ADMIN — ASPIRIN 81 MG: 81 TABLET, CHEWABLE ORAL at 07:43

## 2017-12-30 RX ADMIN — AMLODIPINE BESYLATE 10 MG: 5 TABLET ORAL at 07:43

## 2017-12-30 RX ADMIN — HEPARIN SODIUM 5000 UNITS: 5000 INJECTION, SOLUTION INTRAVENOUS; SUBCUTANEOUS at 05:32

## 2017-12-30 RX ADMIN — POTASSIUM BICARBONATE 25 MEQ: 25 TABLET, EFFERVESCENT ORAL at 07:44

## 2017-12-30 RX ADMIN — LISINOPRIL 5 MG: 5 TABLET ORAL at 07:43

## 2017-12-30 RX ADMIN — POTASSIUM BICARBONATE 25 MEQ: 25 TABLET, EFFERVESCENT ORAL at 22:11

## 2017-12-30 RX ADMIN — NICOTINE 14 MG: 14 PATCH, EXTENDED RELEASE TRANSDERMAL at 05:32

## 2017-12-30 RX ADMIN — HEPARIN SODIUM 5000 UNITS: 5000 INJECTION, SOLUTION INTRAVENOUS; SUBCUTANEOUS at 22:12

## 2017-12-30 RX ADMIN — ATORVASTATIN CALCIUM 80 MG: 80 TABLET, FILM COATED ORAL at 22:11

## 2017-12-30 ASSESSMENT — PAIN SCALES - GENERAL
PAINLEVEL_OUTOF10: 0

## 2017-12-30 NOTE — PROGRESS NOTES
Saumya Rios Fall Risk Assessment:     Last Known Fall: Within the last month  Mobility: Hemiplegic, paraplegia, or quadriplegia  Medications: Cardiovascular or central nervous system meds  Mental Status/LOC/Awareness: Oriented to person and place  Toileting Needs: Incontinence  Volume/Electrolyte Status: Use of IV fluids/tube feeds  Communication/Sensory: Visual (Glasses)/hearing deficit  Behavior: Appropriate behavior  aSumya Rios Fall Risk Total: 17  Fall Risk Level: HIGH RISK    Universal Fall Precautions:  call light/belongings in reach, bed in low position and locked, siderails up x 2, use non-slip footwear, adequate lighting, clutter free and spill free environment, educate on level of risk    Fall Risk Level Interventions:    TRIAL ([facility], NEURO, MED MICHAEL 5) Moderate Fall Risk Interventions  Place yellow fall risk ID band on patient: verified  Provide patient/family education based on risk assessment : verified  Educate patient/family to call staff for assistance when getting out of bed: verified  Place fall precaution signage outside patient door: verified  Utilize bed/chair fall alarm: verifiedTRIAL ([facility], NEURO, Jajah MICHAEL 5) High Fall Risk Interventions  Place yellow fall risk ID band on patient: verified  Provide patient/family education based on risk assessment: verified  Educate patient/family to call staff for assistance when getting out of bed: verified  Place fall precaution signage outside patient door: verified  Place patient in room close to nursing station: verified  Utilize bed/chair fall alarm: verified  Notify charge of high risk for huddle: verified    Patient Specific Interventions:     Medication: review medications with patient and family, assess for medications that can be discontinued or dosage decreased, limit combination of prn medications and provide patients who received diuretics/laxatives frequent toileting  Mental Status/LOC/Awareness: reorient patient, reinforce falls  education, encourage family to stay with patient, check on patient hourly, utilize bed/chair fall alarm and reinforce the use of call light  Toileting: provide frquent toileting  Volume/Electrolyte Status: administer medications as ordered for nausea and vomiting, monitor abnormal lab values and ensure IV fluids are appropriate  Communication/Sensory: update plan of care on whiteboard, ensure patient has glasses/contacts and hearing aids/dentures and for visually impaired patients orient to their room surrounding and do not change their surroundings  Behavioral: instruct/reinforce fall program rationale  Mobility: utilize bed/chair fall alarm, ensure bed is locked and in lowest position and instruct patient to exit bed on their strongest side

## 2017-12-30 NOTE — CARE PLAN
Problem: Communication  Goal: The ability to communicate needs accurately and effectively will improve    Intervention: Use communication aids and/or /Language Line as appropriate  Global aphasic

## 2017-12-30 NOTE — PROGRESS NOTES
Assumed pt care at 1900. Pt alert to self and follows some commands. Pt easily frustrated and unable to voice needs. Pt nodding head yes and no to some simple questions. Pt denies pain. NORIS if numbness and tingling present. Pt performs own oral care. Waffle overlay in place. Tube feeding at goal and pt tolerating.  Q2 turns, Q4 hour neuro checks, and hourly rounding in place.

## 2017-12-30 NOTE — PROGRESS NOTES
Received report from Night Rn.  Assumed pt care  Globally aphasic.  Denies pain, n/v and n/t with head nodding.  Fatigues/fructrated with prolonged interaction  Right sided weakness  PEG Tube Replete Fiber at goal of 85 ml/hr  No obvious s/sx of infection noted upon assessment  Safety Precaution are in place  Plan of Care discussed  No Questions at this time

## 2017-12-30 NOTE — PROGRESS NOTES
Saumya Rios Fall Risk Assessment:     Last Known Fall: Within the last month  Mobility: Hemiplegic, paraplegia, or quadriplegia  Medications: Cardiovascular or central nervous system meds  Mental Status/LOC/Awareness: Oriented to person and place  Toileting Needs: Incontinence  Volume/Electrolyte Status: Use of IV fluids/tube feeds  Communication/Sensory: Visual (Glasses)/hearing deficit  Behavior: Appropriate behavior  Saumya Rios Fall Risk Total: 17  Fall Risk Level: HIGH RISK    Universal Fall Precautions:  bed in low position and locked, call light/belongings in reach, wheelchairs and assistive devices out of sight, siderails up x 2, use non-slip footwear, adequate lighting, clutter free and spill free environment, educate on level of risk, educate to call for assistance    Fall Risk Level Interventions:    TRIAL ("[facility] [facility]", NEURO, MED MICHAEL 5) Moderate Fall Risk Interventions  Place yellow fall risk ID band on patient: verified  Provide patient/family education based on risk assessment : verified  Educate patient/family to call staff for assistance when getting out of bed: verified  Place fall precaution signage outside patient door: verified  Utilize bed/chair fall alarm: verifiedTRIAL ([facility], NEURO, MED MICHAEL 5) High Fall Risk Interventions  Place yellow fall risk ID band on patient: verified  Provide patient/family education based on risk assessment: completed  Educate patient/family to call staff for assistance when getting out of bed: completed  Place fall precaution signage outside patient door: completed  Place patient in room close to nursing station: completed  Utilize bed/chair fall alarm: completed  Notify charge of high risk for huddle: completed    Patient Specific Interventions:     Medication: review medications with patient and family  Mental Status/LOC/Awareness: reinforce falls education, check on patient hourly, utilize bed/chair fall alarm and reinforce the use of call light  Toileting:  monitor intake and output/use of appropriate interventions  Volume/Electrolyte Status: monitor abnormal lab values  Communication/Sensory: update plan of care on whiteboard  Behavioral: instruct/reinforce fall program rationale  Mobility: utilize bed/chair fall alarm and ensure bed is locked and in lowest position

## 2017-12-30 NOTE — PROGRESS NOTES
[facility] Hospitalist Progress Note    Date of Service: 2017    Chief Complaint  52 y.o. male admitted 2017 with HTN and obesity who presented with expressive aphasia and right sided weakness due to CVA.    Interval Problem Update  No new events, in bed does not look in distress, right sided weakness unchanged.      Consultants/Specialty  GI    Disposition  SNF. Long term plan is to be placed near his son.         Review of Systems   Unable to perform ROS: Acuity of condition (espressive aphasia)      Physical Exam  Laboratory/Imaging   Hemodynamics  Temp (24hrs), Av.5 °C (97.7 °F), Min:36.3 °C (97.4 °F), Max:36.6 °C (97.9 °F)   Temperature: 36.4 °C (97.5 °F)  Pulse  Av  Min: 67  Max: 118    Blood Pressure: 148/75      Respiratory      Respiration: 14, Pulse Oximetry: 95 %        RUL Breath Sounds: Diminished, RML Breath Sounds: Diminished, RLL Breath Sounds: Diminished, MIRTA Breath Sounds: Diminished, LLL Breath Sounds: Diminished    Fluids    Intake/Output Summary (Last 24 hours) at 17 1826  Last data filed at 17 0534   Gross per 24 hour   Intake                0 ml   Output             1100 ml   Net            -1100 ml       Nutrition  Orders Placed This Encounter   Procedures   • DIET NPO     Standing Status:   Standing     Number of Occurrences:   8     Order Specific Question:   Restrict to:     Answer:   Strict [1]     Comments:   Hold tube feeds     Physical Exam   Neck: Normal range of motion.   Cardiovascular: Normal rate, regular rhythm and normal heart sounds.    No murmur heard.  Pulmonary/Chest: Effort normal and breath sounds normal. No respiratory distress. He has no wheezes.   Abdominal: Soft. Bowel sounds are normal. There is no tenderness. There is no rebound.   Musculoskeletal: He exhibits no edema.   Neurological: He is alert. He exhibits abnormal muscle tone (no movement of RUE and RLE motor is 0/5).   right facial droop, aphasia   Skin: Skin is warm and dry.    Nursing note and vitals reviewed.          Recent Labs      12/27/17   0444  12/28/17   0213   SODIUM  131*  135   POTASSIUM  3.8   --    CHLORIDE  101   --    CO2  23   --    GLUCOSE  111*   --    BUN  23*   --    CREATININE  0.80   --    CALCIUM  9.5   --                       Assessment/Plan     * CVA (cerebral vascular accident) (CMS-[facility])   Assessment & Plan    Expressive aphasia with right sided weakness. MRI showed left frontal/parietal/temporal infarct.  and A1c 5.9%.  Had progression of aphasia and right hemiparesis 12/21. Stat CTA showed L ICA occlusion with possible dissection, which was confirmed on MRA. I discussed with Dr. Simeon 12/21 and 12/22, says the benefits of anticoagulation compared to antiplt are minimal when compared to the risk of hemorrhage. Consulted Dr. Cooper who says no surgical intervention recommended. Spoke with IR and patient has high risk for hemorrhage with stenting.  - tolerating tube feeds, consulted GI for PEG   - ST/PT/OT following, son wants long term placement near him in [place], [place]  - continue asa and statin  - heparin dvt ppx  - q4h neuro checks  Needs snf not candidate for rehab as per rehab team. SW consulted.           HTN (hypertension)   Assessment & Plan    Patient not on any medications as outpatient  IV PRN If SBP >160  On norvasc, added HCTZ  12/26 d/c hctz due to hyponatremia.   bp stable will keep monitoring.    Will add low dose lisinopril.         Hypokalemia   Assessment & Plan    Daily Klyte repletion.  Resolved.         Hyponatremia   Assessment & Plan    Mild. Hold IVF given tolerating full rate of tube feeds. Monitor daily.  D/c'ed his hctz. Will f/u.   Resolved.         Advance care planning   Assessment & Plan    Spoke with son, wife, mother at bedside. They understand the severe situation. They want son to be primary contact. We discussed code status and they want to keep him full code, they are ok with artificial nutrition and PEG  placement.  S/p Peg placement on 12/26  Tolerating diet.               Reviewed items::  Labs reviewed and Medications reviewed  Alexander catheter::  No Alexander  DVT prophylaxis pharmacological::  Heparin

## 2017-12-30 NOTE — CARE PLAN
Problem: Communication  Goal: The ability to communicate needs accurately and effectively will improve    Intervention: Educate patient and significant other/support system about the plan of care, procedures, treatments, medications and allow for questions  Reinforce plan of care

## 2017-12-31 PROCEDURE — 700102 HCHG RX REV CODE 250 W/ 637 OVERRIDE(OP): Performed by: HOSPITALIST

## 2017-12-31 PROCEDURE — A9270 NON-COVERED ITEM OR SERVICE: HCPCS | Performed by: HOSPITALIST

## 2017-12-31 PROCEDURE — 700111 HCHG RX REV CODE 636 W/ 250 OVERRIDE (IP): Performed by: INTERNAL MEDICINE

## 2017-12-31 PROCEDURE — A9270 NON-COVERED ITEM OR SERVICE: HCPCS | Performed by: INTERNAL MEDICINE

## 2017-12-31 PROCEDURE — 770006 HCHG ROOM/CARE - MED/SURG/GYN SEMI*

## 2017-12-31 PROCEDURE — 700102 HCHG RX REV CODE 250 W/ 637 OVERRIDE(OP): Performed by: INTERNAL MEDICINE

## 2017-12-31 PROCEDURE — 99232 SBSQ HOSP IP/OBS MODERATE 35: CPT | Performed by: HOSPITALIST

## 2017-12-31 RX ADMIN — NICOTINE 14 MG: 14 PATCH, EXTENDED RELEASE TRANSDERMAL at 05:49

## 2017-12-31 RX ADMIN — HEPARIN SODIUM 5000 UNITS: 5000 INJECTION, SOLUTION INTRAVENOUS; SUBCUTANEOUS at 21:00

## 2017-12-31 RX ADMIN — HEPARIN SODIUM 5000 UNITS: 5000 INJECTION, SOLUTION INTRAVENOUS; SUBCUTANEOUS at 05:48

## 2017-12-31 RX ADMIN — POTASSIUM BICARBONATE 25 MEQ: 25 TABLET, EFFERVESCENT ORAL at 10:38

## 2017-12-31 RX ADMIN — AMLODIPINE BESYLATE 10 MG: 5 TABLET ORAL at 10:38

## 2017-12-31 RX ADMIN — NYSTATIN: 100000 POWDER TOPICAL at 09:00

## 2017-12-31 RX ADMIN — ASPIRIN 81 MG: 81 TABLET, CHEWABLE ORAL at 10:38

## 2017-12-31 RX ADMIN — ATORVASTATIN CALCIUM 80 MG: 80 TABLET, FILM COATED ORAL at 20:59

## 2017-12-31 RX ADMIN — POTASSIUM BICARBONATE 25 MEQ: 25 TABLET, EFFERVESCENT ORAL at 20:59

## 2017-12-31 RX ADMIN — LISINOPRIL 5 MG: 5 TABLET ORAL at 10:39

## 2017-12-31 RX ADMIN — OMEPRAZOLE 40 MG: 20 CAPSULE, DELAYED RELEASE ORAL at 10:38

## 2017-12-31 RX ADMIN — HEPARIN SODIUM 5000 UNITS: 5000 INJECTION, SOLUTION INTRAVENOUS; SUBCUTANEOUS at 14:00

## 2017-12-31 RX ADMIN — NYSTATIN 1500000 UNITS: 100000 POWDER TOPICAL at 21:07

## 2017-12-31 ASSESSMENT — PAIN SCALES - GENERAL
PAINLEVEL_OUTOF10: 0

## 2017-12-31 NOTE — CARE PLAN
Problem: Communication  Goal: The ability to communicate needs accurately and effectively will improve  Outcome: PROGRESSING SLOWER THAN EXPECTED      Problem: Discharge Barriers/Planning  Goal: Patient's continuum of care needs will be met  Outcome: PROGRESSING AS EXPECTED

## 2017-12-31 NOTE — PROGRESS NOTES
[facility] Hospitalist Progress Note    Date of Service: 2017    Chief Complaint  52 y.o. male admitted 2017 with HTN and obesity who presented with expressive aphasia and right sided weakness due to CVA.    Interval Problem Update  Resting in bed, no new events, no fever.   Waiting for bed at SNF.       Consultants/Specialty  GI    Disposition  SNF. Long term plan is to be placed near his son.         Review of Systems   Unable to perform ROS: Acuity of condition (espressive aphasia)      Physical Exam  Laboratory/Imaging   Hemodynamics  Temp (24hrs), Av.9 °C (98.5 °F), Min:36.8 °C (98.2 °F), Max:37.1 °C (98.7 °F)   Temperature: 36.9 °C (98.5 °F)  Pulse  Av.9  Min: 67  Max: 118    Blood Pressure: 143/90      Respiratory      Respiration: 17, Pulse Oximetry: 94 %        RUL Breath Sounds: Diminished;Clear, RML Breath Sounds: Diminished;Clear, RLL Breath Sounds: Diminished;Clear, MIRTA Breath Sounds: Diminished;Clear, LLL Breath Sounds: Diminished;Clear    Fluids    Intake/Output Summary (Last 24 hours) at 17 1549  Last data filed at 17 0600   Gross per 24 hour   Intake             1170 ml   Output             1200 ml   Net              -30 ml       Nutrition  Orders Placed This Encounter   Procedures   • DIET NPO     Standing Status:   Standing     Number of Occurrences:   15     Order Specific Question:   Restrict to:     Answer:   Strict [1]     Comments:   Hold tube feeds     Physical Exam   Constitutional: No distress.   Neck: Normal range of motion.   Cardiovascular: Normal rate, regular rhythm and normal heart sounds.    No murmur heard.  Pulmonary/Chest: Effort normal and breath sounds normal. No respiratory distress. He has no wheezes.   Abdominal: Soft. Bowel sounds are normal. He exhibits no distension. There is no rebound.   Musculoskeletal: He exhibits no edema.   Neurological: He is alert. He exhibits abnormal muscle tone (no movement of RUE and RLE motor is 0/5).   right  facial droop, aphasia   Skin: Skin is warm and dry.   Nursing note and vitals reviewed.                               Assessment/Plan     * CVA (cerebral vascular accident) (CMS-HCC)   Assessment & Plan    Expressive aphasia with right sided weakness. MRI showed left frontal/parietal/temporal infarct.  and A1c 5.9%.  Had progression of aphasia and right hemiparesis 12/21. Stat CTA showed L ICA occlusion with possible dissection, which was confirmed on MRA. I discussed with Dr. Simeon 12/21 and 12/22, says the benefits of anticoagulation compared to antiplt are minimal when compared to the risk of hemorrhage. Consulted Dr. Cooper who says no surgical intervention recommended. Spoke with IR and patient has high risk for hemorrhage with stenting.  - tolerating tube feeds, consulted GI for PEG   - ST/PT/OT following, son wants long term placement near him in [place], [place]  - continue asa and statin  - heparin dvt ppx  - q4h neuro checks  Needs snf not candidate for rehab as per rehab team. SW consulted.   To [facility] waiting for bed.           HTN (hypertension)   Assessment & Plan    Patient not on any medications as outpatient  IV PRN If SBP >160  On norvasc, added HCTZ  12/26 d/c hctz due to hyponatremia.   bp stable will keep monitoring.    Low dose lisinopril, stable.         Hypokalemia   Assessment & Plan    Daily Klyte repletion.  Resolved.         Hyponatremia   Assessment & Plan    Mild. Hold IVF given tolerating full rate of tube feeds. Monitor daily.  D/c'ed his hctz. Will f/u.   Resolved.         Advance care planning   Assessment & Plan    Spoke with son, wife, mother at bedside. They understand the severe situation. They want son to be primary contact. We discussed code status and they want to keep him full code, they are ok with artificial nutrition and PEG placement.  S/p Peg placement on 12/26  Tolerating peg feeding.              Reviewed items::  Medications reviewed  Alexander catheter::  No  Alexander  DVT prophylaxis pharmacological::  Heparin

## 2017-12-31 NOTE — PROGRESS NOTES
[facility] Hospitalist Progress Note    Date of Service: 2017    Chief Complaint  52 y.o. male admitted 2017 with HTN and obesity who presented with expressive aphasia and right sided weakness due to CVA.    Interval Problem Update  In bed he gets frustrated because he is not able to communicate. No fever.       Consultants/Specialty  GI    Disposition  SNF. Long term plan is to be placed near his son.         Review of Systems   Unable to perform ROS: Acuity of condition (espressive aphasia)      Physical Exam  Laboratory/Imaging   Hemodynamics  Temp (24hrs), Av.3 °C (97.4 °F), Min:35.9 °C (96.7 °F), Max:36.7 °C (98.1 °F)   Temperature: 35.9 °C (96.7 °F)  Pulse  Av.4  Min: 67  Max: 118    Blood Pressure: 118/85      Respiratory      Respiration: 14, Pulse Oximetry: 93 %        RUL Breath Sounds: Diminished;Clear, RML Breath Sounds: Diminished;Clear, RLL Breath Sounds: Diminished;Clear, MIRTA Breath Sounds: Diminished;Clear, LLL Breath Sounds: Diminished;Clear    Fluids    Intake/Output Summary (Last 24 hours) at 17 1813  Last data filed at 17 0543   Gross per 24 hour   Intake             1140 ml   Output              750 ml   Net              390 ml       Nutrition  Orders Placed This Encounter   Procedures   • DIET NPO     Standing Status:   Standing     Number of Occurrences:   15     Order Specific Question:   Restrict to:     Answer:   Strict [1]     Comments:   Hold tube feeds     Physical Exam   Constitutional: No distress.   Neck: Normal range of motion.   Cardiovascular: Normal rate, regular rhythm and normal heart sounds.    No murmur heard.  Pulmonary/Chest: Effort normal and breath sounds normal. No respiratory distress. He has no wheezes.   Abdominal: Soft. Bowel sounds are normal. There is no tenderness. There is no rebound.   Musculoskeletal: He exhibits no edema.   Neurological: He is alert. He exhibits abnormal muscle tone (no movement of RUE and RLE motor is 0/5).   right  facial droop, aphasia   Skin: Skin is warm and dry.   Nursing note and vitals reviewed.          Recent Labs      12/28/17   0213   SODIUM  135                      Assessment/Plan     * CVA (cerebral vascular accident) (CMS-HCC)   Assessment & Plan    Expressive aphasia with right sided weakness. MRI showed left frontal/parietal/temporal infarct.  and A1c 5.9%.  Had progression of aphasia and right hemiparesis 12/21. Stat CTA showed L ICA occlusion with possible dissection, which was confirmed on MRA. I discussed with Dr. Simeon 12/21 and 12/22, says the benefits of anticoagulation compared to antiplt are minimal when compared to the risk of hemorrhage. Consulted Dr. Cooper who says no surgical intervention recommended. Spoke with IR and patient has high risk for hemorrhage with stenting.  - tolerating tube feeds, consulted GI for PEG   - ST/PT/OT following, son wants long term placement near him in [place], [place]  - continue asa and statin  - heparin dvt ppx  - q4h neuro checks  Needs snf not candidate for rehab as per rehab team. SW consulted.   To [facility] waiting for bed.           HTN (hypertension)   Assessment & Plan    Patient not on any medications as outpatient  IV PRN If SBP >160  On norvasc, added HCTZ  12/26 d/c hctz due to hyponatremia.   bp stable will keep monitoring.    Low dose lisinopril, better controlled now.         Hypokalemia   Assessment & Plan    Daily Klyte repletion.  Resolved.         Hyponatremia   Assessment & Plan    Mild. Hold IVF given tolerating full rate of tube feeds. Monitor daily.  D/c'ed his hctz. Will f/u.   Resolved.         Advance care planning   Assessment & Plan    Spoke with son, wife, mother at bedside. They understand the severe situation. They want son to be primary contact. We discussed code status and they want to keep him full code, they are ok with artificial nutrition and PEG placement.  S/p Peg placement on 12/26  Tolerating feeding.               Reviewed items::  Labs reviewed and Medications reviewed  Alexander catheter::  No Alexander  DVT prophylaxis pharmacological::  Heparin

## 2017-12-31 NOTE — CARE PLAN
Problem: Discharge Barriers/Planning  Goal: Patient's continuum of care needs will be met  Outcome: PROGRESSING AS EXPECTED  Per SW notes, awaiting bed at [facility] skilled    Problem: Mobility  Goal: Risk for activity intolerance will decrease  Outcome: PROGRESSING AS EXPECTED  Mobilized as tolerated

## 2017-12-31 NOTE — PROGRESS NOTES
Saumya Rios Fall Risk Assessment:     Last Known Fall: Within the last month  Mobility: Hemiplegic, paraplegia, or quadriplegia  Medications: Cardiovascular or central nervous system meds  Mental Status/LOC/Awareness: Oriented to person and place  Toileting Needs: Incontinence  Volume/Electrolyte Status: Use of IV fluids/tube feeds  Communication/Sensory: Visual (Glasses)/hearing deficit  Behavior: Appropriate behavior  Saumya Rios Fall Risk Total: 17  Fall Risk Level: HIGH RISK    Universal Fall Precautions:  call light/belongings in reach, bed in low position and locked, wheelchairs and assistive devices out of sight, siderails up x 2, use non-slip footwear, adequate lighting, clutter free and spill free environment, educate on level of risk, educate to call for assistance    Fall Risk Level Interventions:    TRIAL ([employer], NEURO, MED MICHAEL 5) Moderate Fall Risk Interventions  Place yellow fall risk ID band on patient: verified  Provide patient/family education based on risk assessment : verified  Educate patient/family to call staff for assistance when getting out of bed: verified  Place fall precaution signage outside patient door: verified  Utilize bed/chair fall alarm: verifiedTRIAL ([employer] 8, NEURO, MED MICHAEL 5) High Fall Risk Interventions  Place yellow fall risk ID band on patient: verified  Provide patient/family education based on risk assessment: completed  Educate patient/family to call staff for assistance when getting out of bed: completed  Place fall precaution signage outside patient door: verified  Place patient in room close to nursing station: verified  Utilize bed/chair fall alarm: verified  Notify charge of high risk for huddle: completed    Patient Specific Interventions:     Medication: review medications with patient and family  Mental Status/LOC/Awareness: reinforce falls education, check on patient hourly, utilize bed/chair fall alarm and reinforce the use of call light  Toileting:  provide frquent toileting  Volume/Electrolyte Status: ensure patient remains hydrated and advance diet as tolerated  Communication/Sensory: update plan of care on whiteboard  Behavioral: instruct/reinforce fall program rationale  Mobility: utilize bed/chair fall alarm and ensure bed is locked and in lowest position

## 2018-01-01 LAB
CRP SERPL HS-MCNC: 2.85 MG/DL (ref 0–0.75)
PREALB SERPL-MCNC: 19 MG/DL (ref 18–38)

## 2018-01-01 PROCEDURE — 84134 ASSAY OF PREALBUMIN: CPT

## 2018-01-01 PROCEDURE — 86140 C-REACTIVE PROTEIN: CPT

## 2018-01-01 PROCEDURE — A9270 NON-COVERED ITEM OR SERVICE: HCPCS | Performed by: HOSPITALIST

## 2018-01-01 PROCEDURE — 700102 HCHG RX REV CODE 250 W/ 637 OVERRIDE(OP): Performed by: HOSPITALIST

## 2018-01-01 PROCEDURE — A9270 NON-COVERED ITEM OR SERVICE: HCPCS | Performed by: INTERNAL MEDICINE

## 2018-01-01 PROCEDURE — 92526 ORAL FUNCTION THERAPY: CPT

## 2018-01-01 PROCEDURE — 700102 HCHG RX REV CODE 250 W/ 637 OVERRIDE(OP): Performed by: INTERNAL MEDICINE

## 2018-01-01 PROCEDURE — 36415 COLL VENOUS BLD VENIPUNCTURE: CPT

## 2018-01-01 PROCEDURE — 99232 SBSQ HOSP IP/OBS MODERATE 35: CPT | Performed by: HOSPITALIST

## 2018-01-01 PROCEDURE — 97530 THERAPEUTIC ACTIVITIES: CPT

## 2018-01-01 PROCEDURE — 700111 HCHG RX REV CODE 636 W/ 250 OVERRIDE (IP): Performed by: INTERNAL MEDICINE

## 2018-01-01 PROCEDURE — 770006 HCHG ROOM/CARE - MED/SURG/GYN SEMI*

## 2018-01-01 RX ADMIN — NICOTINE 14 MG: 14 PATCH, EXTENDED RELEASE TRANSDERMAL at 05:27

## 2018-01-01 RX ADMIN — NYSTATIN 1500000 UNITS: 100000 POWDER TOPICAL at 21:54

## 2018-01-01 RX ADMIN — AMLODIPINE BESYLATE 10 MG: 5 TABLET ORAL at 09:27

## 2018-01-01 RX ADMIN — OMEPRAZOLE 40 MG: 20 CAPSULE, DELAYED RELEASE ORAL at 09:27

## 2018-01-01 RX ADMIN — POTASSIUM BICARBONATE 25 MEQ: 25 TABLET, EFFERVESCENT ORAL at 09:27

## 2018-01-01 RX ADMIN — NYSTATIN 1500000 UNITS: 100000 POWDER TOPICAL at 09:27

## 2018-01-01 RX ADMIN — HEPARIN SODIUM 5000 UNITS: 5000 INJECTION, SOLUTION INTRAVENOUS; SUBCUTANEOUS at 05:27

## 2018-01-01 RX ADMIN — ASPIRIN 81 MG: 81 TABLET, CHEWABLE ORAL at 09:27

## 2018-01-01 RX ADMIN — ATORVASTATIN CALCIUM 80 MG: 80 TABLET, FILM COATED ORAL at 21:53

## 2018-01-01 RX ADMIN — HEPARIN SODIUM 5000 UNITS: 5000 INJECTION, SOLUTION INTRAVENOUS; SUBCUTANEOUS at 21:54

## 2018-01-01 RX ADMIN — POTASSIUM BICARBONATE 25 MEQ: 25 TABLET, EFFERVESCENT ORAL at 21:54

## 2018-01-01 RX ADMIN — HEPARIN SODIUM 5000 UNITS: 5000 INJECTION, SOLUTION INTRAVENOUS; SUBCUTANEOUS at 14:35

## 2018-01-01 RX ADMIN — LISINOPRIL 5 MG: 5 TABLET ORAL at 09:27

## 2018-01-01 ASSESSMENT — COGNITIVE AND FUNCTIONAL STATUS - GENERAL
MOVING FROM LYING ON BACK TO SITTING ON SIDE OF FLAT BED: UNABLE
WALKING IN HOSPITAL ROOM: TOTAL
MOVING TO AND FROM BED TO CHAIR: A LOT
MOBILITY SCORE: 8
TURNING FROM BACK TO SIDE WHILE IN FLAT BAD: A LOT
SUGGESTED CMS G CODE MODIFIER MOBILITY: CM
CLIMB 3 TO 5 STEPS WITH RAILING: TOTAL
STANDING UP FROM CHAIR USING ARMS: TOTAL

## 2018-01-01 ASSESSMENT — PAIN SCALES - GENERAL
PAINLEVEL_OUTOF10: 0

## 2018-01-01 ASSESSMENT — GAIT ASSESSMENTS: GAIT LEVEL OF ASSIST: UNABLE TO PARTICIPATE

## 2018-01-01 NOTE — PROGRESS NOTES
Saumya Rios Fall Risk Assessment:     Last Known Fall: Within the last month  Mobility: Hemiplegic, paraplegia, or quadriplegia  Medications: Cardiovascular or central nervous system meds  Mental Status/LOC/Awareness: Oriented to person and place  Toileting Needs: Use of catheters or diversion devices  Volume/Electrolyte Status: Use of IV fluids/tube feeds  Communication/Sensory: Non-English patient/unable to speak/slurred speech  Behavior: Appropriate behavior  Saumya Rios Fall Risk Total: 16  Fall Risk Level: HIGH RISK    Universal Fall Precautions:  call light/belongings in reach, bed in low position and locked, wheelchairs and assistive devices out of sight, use non-slip footwear, siderails up x 2, adequate lighting, clutter free and spill free environment, educate on level of risk, educate to call for assistance    Fall Risk Level Interventions:    TRIAL ([employer] 8, NEURO, MED MICHAEL 5) Moderate Fall Risk Interventions  Place yellow fall risk ID band on patient: verified  Provide patient/family education based on risk assessment : verified  Educate patient/family to call staff for assistance when getting out of bed: verified  Place fall precaution signage outside patient door: verified  Utilize bed/chair fall alarm: verifiedTRIAL ([employer] 8, NEURO, [employer] MICHAEL 5) High Fall Risk Interventions  Place yellow fall risk ID band on patient: verified  Provide patient/family education based on risk assessment: completed  Educate patient/family to call staff for assistance when getting out of bed: completed  Place fall precaution signage outside patient door: verified  Place patient in room close to nursing station: verified  Utilize bed/chair fall alarm: verified  Notify charge of high risk for huddle: verified    Patient Specific Interventions:     Medication: review medications with patient and family  Mental Status/LOC/Awareness: reinforce falls education, check on patient hourly, utilize bed/chair fall alarm and  reinforce the use of call light  Toileting: provide frquent toileting  Volume/Electrolyte Status: monitor abnormal lab values  Communication/Sensory: update plan of care on whiteboard  Behavioral: instruct/reinforce fall program rationale  Mobility: utilize bed/chair fall alarm and ensure bed is locked and in lowest position

## 2018-01-01 NOTE — DISCHARGE PLANNING
Medical SW    Referral: Sw attended PM IDT Rounds.    Intervention: Pt is waiting for Medicaid bed in facility.    Plan: Sw to assist w/ d/c planning as needed.

## 2018-01-01 NOTE — PROGRESS NOTES
Assumed pt care at 1900. Received bedside report. Tube feed running at goal with HOB > 30 degrees. PEG tube care performed and gauze changed. Pt nods yes and no to some questions and follows commands. Unable to assess numbness and tingling. Q2 hour truns, hourly rounding, and Q 4 hour neuro checks in place.

## 2018-01-01 NOTE — CARE PLAN
Problem: Communication  Goal: The ability to communicate needs accurately and effectively will improve  Outcome: PROGRESSING AS EXPECTED      Problem: Bowel/Gastric:  Goal: Normal bowel function is maintained or improved  Outcome: PROGRESSING AS EXPECTED

## 2018-01-01 NOTE — PROGRESS NOTES
Saumya Rios Fall Risk Assessment:     Last Known Fall: Within the last month  Mobility: Hemiplegic, paraplegia, or quadriplegia  Medications: Cardiovascular or central nervous system meds  Mental Status/LOC/Awareness: Oriented to person and place  Toileting Needs: Use of catheters or diversion devices  Volume/Electrolyte Status: Use of IV fluids/tube feeds  Communication/Sensory: Non-English patient/unable to speak/slurred speech  Behavior: Appropriate behavior  Saumya Rios Fall Risk Total: 16  Fall Risk Level: HIGH RISK    Universal Fall Precautions:  call light/belongings in reach, bed in low position and locked, siderails up x 2, use non-slip footwear, adequate lighting, clutter free and spill free environment, educate on level of risk, educate to call for assistance, wheelchairs and assistive devices out of sight    Fall Risk Level Interventions:    TRIAL ([employer] 8, NEURO, MED MICHAEL 5) Moderate Fall Risk Interventions  Place yellow fall risk ID band on patient: verified  Provide patient/family education based on risk assessment : verified  Educate patient/family to call staff for assistance when getting out of bed: verified  Place fall precaution signage outside patient door: verified  Utilize bed/chair fall alarm: verifiedTRIAL ([employer] 8, NEURO, [employer] MICHAEL 5) High Fall Risk Interventions  Place yellow fall risk ID band on patient: verified  Provide patient/family education based on risk assessment: completed  Educate patient/family to call staff for assistance when getting out of bed: completed  Place fall precaution signage outside patient door: verified  Place patient in room close to nursing station: verified  Utilize bed/chair fall alarm: verified  Notify charge of high risk for huddle: verified    Patient Specific Interventions:     Medication: review medications with patient and family  Mental Status/LOC/Awareness: Reinforce falls education   Toileting: provide frquent toileting  Volume/Electrolyte Status:  ensure patient remains hydrated  Communication/Sensory: update plan of care on whiteboard  Behavioral: administer medication as ordered and instruct/reinforce fall program rationale  Mobility: utilize bed/chair fall alarm and ensure bed is locked and in lowest position

## 2018-01-02 PROCEDURE — A9270 NON-COVERED ITEM OR SERVICE: HCPCS | Performed by: INTERNAL MEDICINE

## 2018-01-02 PROCEDURE — A9270 NON-COVERED ITEM OR SERVICE: HCPCS | Performed by: HOSPITALIST

## 2018-01-02 PROCEDURE — 700102 HCHG RX REV CODE 250 W/ 637 OVERRIDE(OP): Performed by: HOSPITALIST

## 2018-01-02 PROCEDURE — 700102 HCHG RX REV CODE 250 W/ 637 OVERRIDE(OP): Performed by: INTERNAL MEDICINE

## 2018-01-02 PROCEDURE — 700111 HCHG RX REV CODE 636 W/ 250 OVERRIDE (IP): Performed by: INTERNAL MEDICINE

## 2018-01-02 PROCEDURE — 770006 HCHG ROOM/CARE - MED/SURG/GYN SEMI*

## 2018-01-02 PROCEDURE — 97535 SELF CARE MNGMENT TRAINING: CPT

## 2018-01-02 PROCEDURE — 97530 THERAPEUTIC ACTIVITIES: CPT

## 2018-01-02 PROCEDURE — 700111 HCHG RX REV CODE 636 W/ 250 OVERRIDE (IP): Performed by: HOSPITALIST

## 2018-01-02 PROCEDURE — 99232 SBSQ HOSP IP/OBS MODERATE 35: CPT | Performed by: INTERNAL MEDICINE

## 2018-01-02 RX ADMIN — LISINOPRIL 5 MG: 5 TABLET ORAL at 10:36

## 2018-01-02 RX ADMIN — NYSTATIN: 100000 POWDER TOPICAL at 21:00

## 2018-01-02 RX ADMIN — ASPIRIN 81 MG: 81 TABLET, CHEWABLE ORAL at 10:36

## 2018-01-02 RX ADMIN — HEPARIN SODIUM 5000 UNITS: 5000 INJECTION, SOLUTION INTRAVENOUS; SUBCUTANEOUS at 06:12

## 2018-01-02 RX ADMIN — ATORVASTATIN CALCIUM 80 MG: 80 TABLET, FILM COATED ORAL at 21:34

## 2018-01-02 RX ADMIN — HEPARIN SODIUM 5000 UNITS: 5000 INJECTION, SOLUTION INTRAVENOUS; SUBCUTANEOUS at 21:34

## 2018-01-02 RX ADMIN — POTASSIUM BICARBONATE 25 MEQ: 25 TABLET, EFFERVESCENT ORAL at 21:34

## 2018-01-02 RX ADMIN — ONDANSETRON 4 MG: 2 INJECTION INTRAMUSCULAR; INTRAVENOUS at 11:33

## 2018-01-02 RX ADMIN — NICOTINE 14 MG: 14 PATCH, EXTENDED RELEASE TRANSDERMAL at 06:12

## 2018-01-02 RX ADMIN — NYSTATIN 1500000 UNITS: 100000 POWDER TOPICAL at 16:41

## 2018-01-02 RX ADMIN — AMLODIPINE BESYLATE 10 MG: 5 TABLET ORAL at 10:36

## 2018-01-02 RX ADMIN — POTASSIUM BICARBONATE 25 MEQ: 25 TABLET, EFFERVESCENT ORAL at 10:36

## 2018-01-02 RX ADMIN — HEPARIN SODIUM 5000 UNITS: 5000 INJECTION, SOLUTION INTRAVENOUS; SUBCUTANEOUS at 16:24

## 2018-01-02 RX ADMIN — OMEPRAZOLE 40 MG: 20 CAPSULE, DELAYED RELEASE ORAL at 10:41

## 2018-01-02 ASSESSMENT — COGNITIVE AND FUNCTIONAL STATUS - GENERAL
PERSONAL GROOMING: A LOT
DRESSING REGULAR UPPER BODY CLOTHING: A LOT
DAILY ACTIVITIY SCORE: 8
TOILETING: TOTAL
HELP NEEDED FOR BATHING: TOTAL
DRESSING REGULAR LOWER BODY CLOTHING: TOTAL
EATING MEALS: TOTAL
SUGGESTED CMS G CODE MODIFIER DAILY ACTIVITY: CM

## 2018-01-02 ASSESSMENT — PAIN SCALES - GENERAL
PAINLEVEL_OUTOF10: 0
PAINLEVEL_OUTOF10: 0

## 2018-01-02 ASSESSMENT — PATIENT HEALTH QUESTIONNAIRE - PHQ9
1. LITTLE INTEREST OR PLEASURE IN DOING THINGS: NOT AT ALL
2. FEELING DOWN, DEPRESSED, IRRITABLE, OR HOPELESS: NOT AT ALL
SUM OF ALL RESPONSES TO PHQ9 QUESTIONS 1 AND 2: 0
SUM OF ALL RESPONSES TO PHQ QUESTIONS 1-9: 0

## 2018-01-02 ASSESSMENT — GAIT ASSESSMENTS: GAIT LEVEL OF ASSIST: UNABLE TO PARTICIPATE

## 2018-01-02 NOTE — PROGRESS NOTES
Received bedside report from [employer] RN night shift nurse. Pt alert to self. Pt does not appear to be in pain, is calm. Q2 hour turns and and positioning in place. ROM performed. Tube feeding running at goal rate with HOB at 30 degrees. Pt brushes teeth and suctions mouth when provided with supplies.  Q 4 hour oral care in place. Hourly rounding and Q2 hour turns in place. Q4 hour neuro checks.

## 2018-01-02 NOTE — DISCHARGE PLANNING
[facility] does not have a bed available for patient today. Update notes faxed to [facility]. [facility] to call this [facility] back.

## 2018-01-02 NOTE — PROGRESS NOTES
Saumya Rios Fall Risk Assessment:     Last Known Fall: Within the last month  Mobility: Hemiplegic, paraplegia, or quadriplegia  Medications: Cardiovascular or central nervous system meds  Mental Status/LOC/Awareness: Memory loss/confusion and requires reorienting  Toileting Needs: Incontinence  Volume/Electrolyte Status: Use of IV fluids/tube feeds  Communication/Sensory: Non-English patient/unable to speak/slurred speech  Behavior: Appropriate behavior  Saumya Rios Fall Risk Total: 20  Fall Risk Level: HIGH RISK    Universal Fall Precautions:  call light/belongings in reach, educate on level of risk, bed in low position and locked, wheelchairs and assistive devices out of sight, siderails up x 2, use non-slip footwear, adequate lighting, clutter free and spill free environment, educate to call for assistance    Fall Risk Level Interventions:    TRIAL ([employer] 8, NEURO, MED MICHAEL 5) Moderate Fall Risk Interventions  Place yellow fall risk ID band on patient: verified  Provide patient/family education based on risk assessment : verified  Educate patient/family to call staff for assistance when getting out of bed: verified  Place fall precaution signage outside patient door: verified  Utilize bed/chair fall alarm: verifiedTRIAL ([employer] 8, NEURO, [employer] MICHAEL 5) High Fall Risk Interventions  Place yellow fall risk ID band on patient: verified  Provide patient/family education based on risk assessment: completed  Educate patient/family to call staff for assistance when getting out of bed: completed  Place fall precaution signage outside patient door: verified  Place patient in room close to nursing station: verified  Utilize bed/chair fall alarm: verified  Notify charge of high risk for huddle: verified    Patient Specific Interventions:     Medication: review medications with patient and family  Mental Status/LOC/Awareness: reorient patient, reinforce falls education, check on patient hourly, utilize bed/chair fall alarm,  reinforce the use of call light and provide activity  Toileting: monitor intake and output/use of appropriate interventions  Volume/Electrolyte Status: ensure patient remains hydrated  Communication/Sensory: update plan of care on whiteboard  Behavioral: instruct/reinforce fall program rationale  Mobility: utilize bed/chair fall alarm and ensure bed is locked and in lowest position

## 2018-01-02 NOTE — THERAPY
"Occupational Therapy Treatment completed with focus on ADLs and ADL transfers.  Functional Status:  Mod A supine>sit with elevated HOB and use of bedrail. Pt improving with upright sitting position while EOB. Pt mod A grooming with washcloth requiring tactile cues to clean face and no put washcloth in mouth. Pt provided with mouth swab for oral care. Pt completed 3 sit>stands with max A and tactile cues. Pt flexed position at hips with standing. Continues to have no movement at RUE. Will continue to follow for acute OT services.  Plan of Care: Will benefit from Occupational Therapy 4 times per week  Discharge Recommendations:  Equipment Will Continue to Assess for Equipment Needs. Post-acute therapy Discharge to a transitional care facility for continued skilled therapy services.    See \"Rehab Therapy-Acute\" Patient Summary Report for complete documentation.   "

## 2018-01-02 NOTE — PROGRESS NOTES
[facility]ist Progress Note    Date of Service: 2018    Chief Complaint  52 y.o. male admitted 2017 with HTN and obesity who presented with expressive aphasia and right sided weakness due to CVA.    Interval Problem Update  No new events, does not look in distress, moves his left arm and leg, no changes on right side motor. No fever.   Waiting for bed at SNF.       Consultants/Specialty  GI    Disposition  SNF. Long term plan is to be placed near his son.         Review of Systems   Unable to perform ROS: Acuity of condition (espressive aphasia)      Physical Exam  Laboratory/Imaging   Hemodynamics  Temp (24hrs), Av.4 °C (97.6 °F), Min:36 °C (96.8 °F), Max:36.9 °C (98.4 °F)   Temperature: 36.7 °C (98.1 °F)  Pulse  Av.8  Min: 67  Max: 118    Blood Pressure: 141/81      Respiratory      Respiration: 17, Pulse Oximetry: 96 %        RUL Breath Sounds: Diminished, RML Breath Sounds: Diminished, RLL Breath Sounds: Diminished, MIRTA Breath Sounds: Diminished, LLL Breath Sounds: Diminished    Fluids    Intake/Output Summary (Last 24 hours) at 18 1721  Last data filed at 18 0400   Gross per 24 hour   Intake             1080 ml   Output              500 ml   Net              580 ml       Nutrition  Orders Placed This Encounter   Procedures   • DIET NPO     Standing Status:   Standing     Number of Occurrences:   15     Order Specific Question:   Restrict to:     Answer:   Strict [1]     Comments:   Hold tube feeds     Physical Exam   Constitutional: No distress.   Neck: Normal range of motion.   Cardiovascular: Normal rate, regular rhythm and normal heart sounds.    No murmur heard.  Pulmonary/Chest: Effort normal and breath sounds normal. No respiratory distress. He has no wheezes.   Abdominal: Soft. Bowel sounds are normal. He exhibits no distension. There is no tenderness. There is no rebound.   Musculoskeletal: He exhibits no edema.   Neurological: He is alert. He exhibits abnormal muscle  tone (no movement of RUE and RLE motor is 0/5).   right facial droop, aphasia   Skin: Skin is warm and dry.   Nursing note and vitals reviewed.                               Assessment/Plan     * CVA (cerebral vascular accident) (CMS-HCC)   Assessment & Plan    Expressive aphasia with right sided weakness. MRI showed left frontal/parietal/temporal infarct.  and A1c 5.9%.  Had progression of aphasia and right hemiparesis 12/21. Stat CTA showed L ICA occlusion with possible dissection, which was confirmed on MRA. I discussed with Dr. Simeon 12/21 and 12/22, says the benefits of anticoagulation compared to antiplt are minimal when compared to the risk of hemorrhage. Consulted Dr. Cooper who says no surgical intervention recommended. Spoke with IR and patient has high risk for hemorrhage with stenting.  - tolerating tube feeds, consulted GI for PEG   - ST/PT/OT following, son wants long term placement near him in [place], [place]  - continue asa and statin  - heparin dvt ppx  - q4h neuro checks  Needs snf not candidate for rehab as per rehab team. SW consulted.   To [facility] waiting [facility] bed.           HTN (hypertension)   Assessment & Plan    Patient not on any medications as outpatient  IV PRN If SBP >160  On norvasc, added HCTZ  12/26 d/c hctz due to hyponatremia.   bp stable will keep monitoring.    Low dose lisinopril, stable. Keep monitoring        Hypokalemia   Assessment & Plan    Daily Klyte repletion.  Resolved.         Hyponatremia   Assessment & Plan    Mild. Hold IVF given tolerating full rate of tube feeds. Monitor daily.  D/c'ed his hctz. Will f/u.   Resolved.         Advance care planning   Assessment & Plan    Spoke with son, wife, mother at bedside. They understand the severe situation. They want son to be primary contact. We discussed code status and they want to keep him full code, they are ok with artificial nutrition and PEG placement.  S/p Peg placement on 12/26  Tolerating peg feeding.                Reviewed items::  Medications reviewed  Alexander catheter::  No Alexander  DVT prophylaxis pharmacological::  Heparin

## 2018-01-02 NOTE — CARE PLAN
Problem: Bowel/Gastric:  Goal: Normal bowel function is maintained or improved  Outcome: PROGRESSING SLOWER THAN EXPECTED      Problem: Urinary Elimination:  Goal: Ability to reestablish a normal urinary elimination pattern will improve  Outcome: PROGRESSING SLOWER THAN EXPECTED

## 2018-01-02 NOTE — PROGRESS NOTES
Assumed pt care at 1900 and received bedside report. Pt alert to self. Pt does not appear to be in pain, is calm. Q2 hour turns and and positioning in place. ROM performed. Tube feeding running at goal rate with HOB at 30 degrees. No signs or symptoms of intolerance. Pt frequently removing condom catheter, and is upset when wet and motions that he wants condom catheter on, education provided with no evidence of learning.  Pt brushes teeth and suctions mouth when provided with supplies. Some blood found on tooth brush. Q 4 hour oral care in place. Hourly rounding and Q2 hour turns in place. Q4 hour neuro checks.

## 2018-01-02 NOTE — PROGRESS NOTES
SCD's on. Zero residual. Tube feeding Replete fiber running 85 ml/hr. Zofran given for upset stomach/nausea. Declines pain. Knew his mother by squeezing her hand twice.

## 2018-01-02 NOTE — THERAPY
"Speech Language Therapy dysphagia treatment completed.   Functional Status:  Patient found trying to spit and picking at his mouth. Multiple pieces of phlegm and dried secretions noted on patient's mattress that he had pulled out and wiped off. He was handed a paper towel and wet rag which he immediately put in his mouth and rubbed along his gums. Patient given toothbrush dipped in mouthwash. He required max cues to use a brushing instead of twisting motion but was not opposed to cleaning his teeth/mouth. Given <1tsp amounts of nectar thick liquid he had inconsistent delayed swallow responses (2 to 8 seconds). Occasional vocalizations heard and they were clear but he was unable to follow any directions for swallow strategies/precautions or exercises. At the end of the session he gave me a thumbs-up.   Recommendations: Patient may benefit from an instrumental evaluation. However, today he did not tolerate tactile stim to his nose and his has not been to a chair. Recommend to continue NPO/PEG. SLP will continue to follow   Plan of Care: Will benefit from Speech Therapy 5 times per week  Post-Acute Therapy: Discharge to a transitional care facility for continued skilled therapy services.    See \"Rehab Therapy-Acute\" Patient Summary Report for complete documentation.     "

## 2018-01-03 LAB
APPEARANCE UR: ABNORMAL
BACTERIA #/AREA URNS HPF: NEGATIVE /HPF
BILIRUB UR QL STRIP.AUTO: NEGATIVE
COLOR UR: YELLOW
EPI CELLS #/AREA URNS HPF: ABNORMAL /HPF
GLUCOSE UR STRIP.AUTO-MCNC: NEGATIVE MG/DL
HYALINE CASTS #/AREA URNS LPF: ABNORMAL /LPF
KETONES UR STRIP.AUTO-MCNC: NEGATIVE MG/DL
LEUKOCYTE ESTERASE UR QL STRIP.AUTO: NEGATIVE
MICRO URNS: ABNORMAL
NITRITE UR QL STRIP.AUTO: NEGATIVE
PH UR STRIP.AUTO: >=9 [PH]
PROT UR QL STRIP: NEGATIVE MG/DL
RBC # URNS HPF: ABNORMAL /HPF
RBC UR QL AUTO: NEGATIVE
SP GR UR STRIP.AUTO: 1.02
UROBILINOGEN UR STRIP.AUTO-MCNC: 1 MG/DL
WBC #/AREA URNS HPF: ABNORMAL /HPF

## 2018-01-03 PROCEDURE — 700102 HCHG RX REV CODE 250 W/ 637 OVERRIDE(OP): Performed by: HOSPITALIST

## 2018-01-03 PROCEDURE — 770006 HCHG ROOM/CARE - MED/SURG/GYN SEMI*

## 2018-01-03 PROCEDURE — A9270 NON-COVERED ITEM OR SERVICE: HCPCS | Performed by: HOSPITALIST

## 2018-01-03 PROCEDURE — 81001 URINALYSIS AUTO W/SCOPE: CPT

## 2018-01-03 PROCEDURE — A9270 NON-COVERED ITEM OR SERVICE: HCPCS | Performed by: INTERNAL MEDICINE

## 2018-01-03 PROCEDURE — 700111 HCHG RX REV CODE 636 W/ 250 OVERRIDE (IP): Performed by: INTERNAL MEDICINE

## 2018-01-03 PROCEDURE — 700102 HCHG RX REV CODE 250 W/ 637 OVERRIDE(OP): Performed by: INTERNAL MEDICINE

## 2018-01-03 PROCEDURE — 99232 SBSQ HOSP IP/OBS MODERATE 35: CPT | Performed by: INTERNAL MEDICINE

## 2018-01-03 RX ADMIN — HEPARIN SODIUM 5000 UNITS: 5000 INJECTION, SOLUTION INTRAVENOUS; SUBCUTANEOUS at 15:05

## 2018-01-03 RX ADMIN — AMLODIPINE BESYLATE 10 MG: 5 TABLET ORAL at 10:04

## 2018-01-03 RX ADMIN — HEPARIN SODIUM 5000 UNITS: 5000 INJECTION, SOLUTION INTRAVENOUS; SUBCUTANEOUS at 20:07

## 2018-01-03 RX ADMIN — NICOTINE 14 MG: 14 PATCH, EXTENDED RELEASE TRANSDERMAL at 05:47

## 2018-01-03 RX ADMIN — POTASSIUM BICARBONATE 25 MEQ: 25 TABLET, EFFERVESCENT ORAL at 10:04

## 2018-01-03 RX ADMIN — NYSTATIN: 100000 POWDER TOPICAL at 09:00

## 2018-01-03 RX ADMIN — ATORVASTATIN CALCIUM 80 MG: 80 TABLET, FILM COATED ORAL at 20:10

## 2018-01-03 RX ADMIN — HEPARIN SODIUM 5000 UNITS: 5000 INJECTION, SOLUTION INTRAVENOUS; SUBCUTANEOUS at 05:50

## 2018-01-03 RX ADMIN — ASPIRIN 81 MG: 81 TABLET, CHEWABLE ORAL at 10:04

## 2018-01-03 RX ADMIN — LISINOPRIL 5 MG: 5 TABLET ORAL at 10:04

## 2018-01-03 RX ADMIN — OMEPRAZOLE 40 MG: 20 CAPSULE, DELAYED RELEASE ORAL at 10:04

## 2018-01-03 RX ADMIN — POTASSIUM BICARBONATE 25 MEQ: 25 TABLET, EFFERVESCENT ORAL at 20:10

## 2018-01-03 RX ADMIN — NYSTATIN: 100000 POWDER TOPICAL at 21:00

## 2018-01-03 ASSESSMENT — PATIENT HEALTH QUESTIONNAIRE - PHQ9
1. LITTLE INTEREST OR PLEASURE IN DOING THINGS: NOT AT ALL
SUM OF ALL RESPONSES TO PHQ QUESTIONS 1-9: 0
2. FEELING DOWN, DEPRESSED, IRRITABLE, OR HOPELESS: NOT AT ALL
SUM OF ALL RESPONSES TO PHQ9 QUESTIONS 1 AND 2: 0

## 2018-01-03 ASSESSMENT — PAIN SCALES - GENERAL
PAINLEVEL_OUTOF10: 0

## 2018-01-03 NOTE — CARE PLAN
Problem: Skin Integrity  Goal: Risk for impaired skin integrity will decrease    Intervention: Implement precautions to protect skin integrity in collaboration with the interdisciplinary team  Condom cath on to direct urine away from pooling under patient.

## 2018-01-03 NOTE — DISCHARGE PLANNING
[facility] does not have bed available for patient today.  [facility] has declined patient due to LTC/no bed availability. Attempted to follow up with Dorota, however, no answer. Voicemail left.

## 2018-01-03 NOTE — PROGRESS NOTES
[facility]ist Progress Note    Date of Service: 1/3/2018    Chief Complaint  52 y.o. male admitted 2017 with HTN and obesity who presented with expressive aphasia and right sided weakness due to CVA.    Interval Problem Update  No overnight events, afebrile , moves his left arm and leg, still flaccid on the  right side .  Waiting for bed at SNF.       Consultants/Specialty  GI    Disposition  SNF. Long term plan is to be placed near his son.         Review of Systems   Unable to perform ROS: Acuity of condition (espressive aphasia)      Physical Exam  Laboratory/Imaging   Hemodynamics  Temp (24hrs), Av.6 °C (97.8 °F), Min:36.2 °C (97.1 °F), Max:37.3 °C (99.1 °F)   Temperature: 36.6 °C (97.9 °F)  Pulse  Av.5  Min: 67  Max: 118    Blood Pressure: 154/85      Respiratory      Respiration: 18, Pulse Oximetry: 94 %        RUL Breath Sounds: Diminished, RML Breath Sounds: Diminished, RLL Breath Sounds: Diminished, MIRTA Breath Sounds: Diminished, LLL Breath Sounds: Diminished    Fluids    Intake/Output Summary (Last 24 hours) at 18 1455  Last data filed at 18 0600   Gross per 24 hour   Intake             2170 ml   Output             1500 ml   Net              670 ml       Nutrition  Orders Placed This Encounter   Procedures   • DIET NPO     Standing Status:   Standing     Number of Occurrences:   15     Order Specific Question:   Restrict to:     Answer:   Strict [1]     Comments:   Hold tube feeds     Physical Exam   Constitutional: No distress.   Neck: Normal range of motion.   Cardiovascular: Normal rate, regular rhythm and normal heart sounds.    No murmur heard.  Pulmonary/Chest: Effort normal and breath sounds normal. No respiratory distress. He has no wheezes.   Abdominal: Soft. Bowel sounds are normal. He exhibits no distension. There is no tenderness.   Musculoskeletal: He exhibits no edema.   Neurological: He is alert. He exhibits abnormal muscle tone (no movement of RUE and RLE  motor is 0/5).   right facial droop, aphasia   Skin: Skin is warm and dry.   Nursing note and vitals reviewed.                               Assessment/Plan     * CVA (cerebral vascular accident) (CMS-HCC)   Assessment & Plan    Expressive aphasia with right sided weakness. MRI showed left frontal/parietal/temporal infarct.  and A1c 5.9%.  Had progression of aphasia and right hemiparesis 12/21. Stat CTA showed L ICA occlusion with possible dissection, which was confirmed on MRA. I discussed with Dr. Simeon 12/21 and 12/22, says the benefits of anticoagulation compared to antiplt are minimal when compared to the risk of hemorrhage. Consulted Dr. Cooper who says no surgical intervention recommended. Spoke with IR and patient has high risk for hemorrhage with stenting.  - tolerating tube feeds, consulted GI for PEG   - ST/PT/OT following, son wants long term placement near him in [place], [place]  - continue asa and statin  - heparin dvt ppx  - q4h neuro checks  Needs snf not candidate for rehab as per rehab team. To [facility] waiting for bed.           HTN (hypertension)   Assessment & Plan    Patient not on any medications as outpatient  IV PRN If SBP >160  On norvasc, added HCTZ  12/26 d/c hctz due to hyponatremia.   bp stable will keep monitoring.    Low dose lisinopril, stable. Keep monitoring        Hypokalemia   Assessment & Plan    Daily Klyte repletion.  Resolved.         Hyponatremia   Assessment & Plan    Mild.   Monitor           Advance care planning   Assessment & Plan    Spoke with son, wife, mother at bedside. They understand the severe situation. They want son to be primary contact. We discussed code status and they want to keep him full code, they are ok with artificial nutrition and PEG placement.  S/p Peg placement on 12/26  Tolerating peg feeding.               Reviewed items::  Medications reviewed, Labs reviewed and Radiology images reviewed  Alexander catheter::  No Alexander  DVT prophylaxis  pharmacological::  Heparin

## 2018-01-03 NOTE — DISCHARGE PLANNING
Care Transition Team Discharge Planning    Anticipated Discharge Information  Anticipated discharge disposition: SNF  Discharge Address: [address], [place] [address]  Discharge Contact Phone Number: [phone]              Discharge Plan:  Discussed pt's case in MDT rounds, pt has been accepted to [facility], pending bed. [facility] has sent updated referrals to SNF's who are pending.

## 2018-01-03 NOTE — PROGRESS NOTES
[facility]ist Progress Note    Date of Service: 2018    Chief Complaint  52 y.o. male admitted 2017 with HTN and obesity who presented with expressive aphasia and right sided weakness due to CVA.    Interval Problem Update  Pt seen and examined, afebrile , moves his left arm and leg, still flaccid on the  right side .  Waiting for bed at SNF.       Consultants/Specialty  GI    Disposition  SNF. Long term plan is to be placed near his son.         Review of Systems   Unable to perform ROS: Acuity of condition (espressive aphasia)      Physical Exam  Laboratory/Imaging   Hemodynamics  Temp (24hrs), Av.6 °C (97.9 °F), Min:36.2 °C (97.2 °F), Max:36.9 °C (98.5 °F)   Temperature: 36.7 °C (98 °F)  Pulse  Av.1  Min: 67  Max: 118    Blood Pressure: 153/95 (RN Notified)      Respiratory      Respiration: 18, Pulse Oximetry: 92 %        RUL Breath Sounds: Diminished, RML Breath Sounds: Diminished, RLL Breath Sounds: Diminished, MIRTA Breath Sounds: Diminished, LLL Breath Sounds: Diminished    Fluids    Intake/Output Summary (Last 24 hours) at 18 1632  Last data filed at 18 1440   Gross per 24 hour   Intake             1540 ml   Output             2400 ml   Net             -860 ml       Nutrition  Orders Placed This Encounter   Procedures   • DIET NPO     Standing Status:   Standing     Number of Occurrences:   15     Order Specific Question:   Restrict to:     Answer:   Strict [1]     Comments:   Hold tube feeds     Physical Exam   Constitutional: No distress.   Neck: Normal range of motion.   Cardiovascular: Normal rate, regular rhythm and normal heart sounds.    No murmur heard.  Pulmonary/Chest: Effort normal and breath sounds normal. No respiratory distress. He has no wheezes.   Abdominal: Soft. Bowel sounds are normal. He exhibits no distension. There is no tenderness. There is no rebound.   Musculoskeletal: He exhibits no edema.   Neurological: He is alert. He exhibits abnormal muscle  tone (no movement of RUE and RLE motor is 0/5).   right facial droop, aphasia   Skin: Skin is warm and dry.   Nursing note and vitals reviewed.                               Assessment/Plan     * CVA (cerebral vascular accident) (CMS-HCC)   Assessment & Plan    Expressive aphasia with right sided weakness. MRI showed left frontal/parietal/temporal infarct.  and A1c 5.9%.  Had progression of aphasia and right hemiparesis 12/21. Stat CTA showed L ICA occlusion with possible dissection, which was confirmed on MRA. I discussed with Dr. Simeon 12/21 and 12/22, says the benefits of anticoagulation compared to antiplt are minimal when compared to the risk of hemorrhage. Consulted Dr. Cooper who says no surgical intervention recommended. Spoke with IR and patient has high risk for hemorrhage with stenting.  - tolerating tube feeds, consulted GI for PEG   - ST/PT/OT following, son wants long term placement near him in [place], [place]  - continue asa and statin  - heparin dvt ppx  - q4h neuro checks  Needs snf not candidate for rehab as per rehab team. SW consulted.   To [facility] waiting [facility] bed.           HTN (hypertension)   Assessment & Plan    Patient not on any medications as outpatient  IV PRN If SBP >160  On norvasc, added HCTZ  12/26 d/c hctz due to hyponatremia.   bp stable will keep monitoring.    Low dose lisinopril, stable. Keep monitoring        Hypokalemia   Assessment & Plan    Daily Klyte repletion.  Resolved.         Hyponatremia   Assessment & Plan    Mild. Hold IVF given tolerating full rate of tube feeds. Monitor daily.  D/c'ed his hctz. Will f/u.   Resolved.         Advance care planning   Assessment & Plan    Spoke with son, wife, mother at bedside. They understand the severe situation. They want son to be primary contact. We discussed code status and they want to keep him full code, they are ok with artificial nutrition and PEG placement.  S/p Peg placement on 12/26  Tolerating peg feeding.                Reviewed items::  Medications reviewed  Alexander catheter::  No Alexander  DVT prophylaxis pharmacological::  Heparin

## 2018-01-03 NOTE — THERAPY
"Physical Therapy Treatment completed.   Bed Mobility:  Supine to Sit: Moderate Assist (x2), tolerated sitting EOB x12 min with assist varying from modA initially to CGA/SBA.   Transfers: Sit to Stand: Maximal Assist (x2), able to stand ~30 sec with mod to maxA  Gait: Level Of Assist: Unable to Participate     Plan of Care: Will benefit from Physical Therapy 5 times per week  Discharge Recommendations: Equipment: Will Continue to Assess for Equipment Needs. Post-acute therapy Discharge to a transitional care facility for continued skilled therapy services.     See \"Rehab Therapy-Acute\" Patient Summary Report for complete documentation.       "

## 2018-01-03 NOTE — PROGRESS NOTES
Recd report, assumed care. Pt alert to self. No s/s pain or distress. Assessment complete. Replete fiber running at goal, HOB >30. No family at bedside. All needs met. Fall precautions in place, call light in reach, white board updated, hourly rounding in place. Will continue to monitor.

## 2018-01-03 NOTE — PROGRESS NOTES
Saumya Rios Fall Risk Assessment:     Last Known Fall: Within the last month  Mobility: Hemiplegic, paraplegia, or quadriplegia  Medications: No meds  Mental Status/LOC/Awareness: Memory loss/confusion and requires reorienting  Toileting Needs: Incontinence  Volume/Electrolyte Status: Use of IV fluids/tube feeds  Communication/Sensory: Non-English patient/unable to speak/slurred speech  Behavior: Appropriate behavior  Saumya Rios Fall Risk Total: 19  Fall Risk Level: HIGH RISK    Universal Fall Precautions:  call light/belongings in reach, educate on level of risk, bed in low position and locked, wheelchairs and assistive devices out of sight, siderails up x 2, use non-slip footwear, adequate lighting, clutter free and spill free environment, educate to call for assistance    Fall Risk Level Interventions:    TRIAL ([employer], NEURO, MED MICHAEL 5) Moderate Fall Risk Interventions  Place yellow fall risk ID band on patient: verified  Provide patient/family education based on risk assessment : verified  Educate patient/family to call staff for assistance when getting out of bed: verified  Place fall precaution signage outside patient door: verified  Utilize bed/chair fall alarm: verifiedTRIAL ([employer], NEURO, MED MICHAEL 5) High Fall Risk Interventions  Place yellow fall risk ID band on patient: completed  Provide patient/family education based on risk assessment: completed  Educate patient/family to call staff for assistance when getting out of bed: completed  Place fall precaution signage outside patient door: completed  Place patient in room close to nursing station: completed  Utilize bed/chair fall alarm: completed  Notify charge of high risk for huddle: completed    Patient Specific Interventions:     Medication: limit combination of prn medications  Mental Status/LOC/Awareness: reorient patient  Toileting: condom cath on  Volume/Electrolyte Status: monitor blood sugars and maintain appropriate blood sugar levels  if diabetic  Communication/Sensory: update plan of care on whiteboard  Behavioral: engage patient in daily activities  Mobility: schedule physical activity throughout the day

## 2018-01-03 NOTE — CARE PLAN
Problem: Safety  Goal: Will remain free from injury  Turn q2h and prn, skin assessed, no new breakdown noted. Will continue to monitor.    Problem: Discharge Barriers/Planning  Goal: Patient's continuum of care needs will be met  Pending bed at [facility].

## 2018-01-03 NOTE — CARE PLAN
Problem: Safety  Goal: Will remain free from falls    Intervention: Implement fall precautions  Fall armband on

## 2018-01-04 LAB
ANION GAP SERPL CALC-SCNC: 8 MMOL/L (ref 0–11.9)
BUN SERPL-MCNC: 40 MG/DL (ref 8–22)
CALCIUM SERPL-MCNC: 10.3 MG/DL (ref 8.5–10.5)
CHLORIDE SERPL-SCNC: 112 MMOL/L (ref 96–112)
CO2 SERPL-SCNC: 26 MMOL/L (ref 20–33)
CREAT SERPL-MCNC: 0.88 MG/DL (ref 0.5–1.4)
ERYTHROCYTE [DISTWIDTH] IN BLOOD BY AUTOMATED COUNT: 44.1 FL (ref 35.9–50)
GFR SERPL CREATININE-BSD FRML MDRD: >60 ML/MIN/1.73 M 2
GLUCOSE SERPL-MCNC: 124 MG/DL (ref 65–99)
HCT VFR BLD AUTO: 54.9 % (ref 42–52)
HGB BLD-MCNC: 18.3 G/DL (ref 14–18)
MCH RBC QN AUTO: 33.6 PG (ref 27–33)
MCHC RBC AUTO-ENTMCNC: 33.3 G/DL (ref 33.7–35.3)
MCV RBC AUTO: 100.7 FL (ref 81.4–97.8)
PLATELET # BLD AUTO: 315 K/UL (ref 164–446)
PMV BLD AUTO: 12.9 FL (ref 9–12.9)
POTASSIUM SERPL-SCNC: 3.6 MMOL/L (ref 3.6–5.5)
RBC # BLD AUTO: 5.45 M/UL (ref 4.7–6.1)
SODIUM SERPL-SCNC: 146 MMOL/L (ref 135–145)
WBC # BLD AUTO: 13.3 K/UL (ref 4.8–10.8)

## 2018-01-04 PROCEDURE — 99232 SBSQ HOSP IP/OBS MODERATE 35: CPT | Performed by: INTERNAL MEDICINE

## 2018-01-04 PROCEDURE — 700102 HCHG RX REV CODE 250 W/ 637 OVERRIDE(OP): Performed by: HOSPITALIST

## 2018-01-04 PROCEDURE — 700102 HCHG RX REV CODE 250 W/ 637 OVERRIDE(OP): Performed by: INTERNAL MEDICINE

## 2018-01-04 PROCEDURE — A9270 NON-COVERED ITEM OR SERVICE: HCPCS | Performed by: HOSPITALIST

## 2018-01-04 PROCEDURE — A9270 NON-COVERED ITEM OR SERVICE: HCPCS | Performed by: INTERNAL MEDICINE

## 2018-01-04 PROCEDURE — 700111 HCHG RX REV CODE 636 W/ 250 OVERRIDE (IP): Performed by: INTERNAL MEDICINE

## 2018-01-04 PROCEDURE — 80048 BASIC METABOLIC PNL TOTAL CA: CPT

## 2018-01-04 PROCEDURE — 36415 COLL VENOUS BLD VENIPUNCTURE: CPT

## 2018-01-04 PROCEDURE — 85027 COMPLETE CBC AUTOMATED: CPT

## 2018-01-04 PROCEDURE — 770006 HCHG ROOM/CARE - MED/SURG/GYN SEMI*

## 2018-01-04 RX ADMIN — ATORVASTATIN CALCIUM 80 MG: 80 TABLET, FILM COATED ORAL at 21:54

## 2018-01-04 RX ADMIN — AMLODIPINE BESYLATE 10 MG: 5 TABLET ORAL at 08:43

## 2018-01-04 RX ADMIN — HEPARIN SODIUM 5000 UNITS: 5000 INJECTION, SOLUTION INTRAVENOUS; SUBCUTANEOUS at 04:54

## 2018-01-04 RX ADMIN — HEPARIN SODIUM 5000 UNITS: 5000 INJECTION, SOLUTION INTRAVENOUS; SUBCUTANEOUS at 21:54

## 2018-01-04 RX ADMIN — ACETAMINOPHEN 650 MG: 325 TABLET, FILM COATED ORAL at 13:17

## 2018-01-04 RX ADMIN — POTASSIUM BICARBONATE 25 MEQ: 25 TABLET, EFFERVESCENT ORAL at 21:54

## 2018-01-04 RX ADMIN — HEPARIN SODIUM 5000 UNITS: 5000 INJECTION, SOLUTION INTRAVENOUS; SUBCUTANEOUS at 13:09

## 2018-01-04 RX ADMIN — ASPIRIN 81 MG: 81 TABLET, CHEWABLE ORAL at 08:43

## 2018-01-04 RX ADMIN — OMEPRAZOLE 40 MG: 20 CAPSULE, DELAYED RELEASE ORAL at 08:43

## 2018-01-04 RX ADMIN — POTASSIUM BICARBONATE 25 MEQ: 25 TABLET, EFFERVESCENT ORAL at 08:43

## 2018-01-04 RX ADMIN — NYSTATIN: 100000 POWDER TOPICAL at 09:00

## 2018-01-04 RX ADMIN — NICOTINE 14 MG: 14 PATCH, EXTENDED RELEASE TRANSDERMAL at 04:52

## 2018-01-04 RX ADMIN — NYSTATIN: 100000 POWDER TOPICAL at 21:58

## 2018-01-04 RX ADMIN — LISINOPRIL 5 MG: 5 TABLET ORAL at 08:43

## 2018-01-04 ASSESSMENT — PAIN SCALES - PAIN ASSESSMENT IN ADVANCED DEMENTIA (PAINAD)
FACIALEXPRESSION: SMILING OR INEXPRESSIVE
NEGVOCALIZATION: OCCASIONAL MOAN/GROAN, LOW SPEECH, NEGATIVE/DISAPPROVING QUALITY
TOTALSCORE: 0
CONSOLABILITY: DISTRACTED OR REASSURED BY VOICE/TOUCH
BREATHING: NORMAL
BODYLANGUAGE: TENSE, DISTRESSED PACING, FIDGETING
BREATHING: NORMAL
CONSOLABILITY: NO NEED TO CONSOLE
FACIALEXPRESSION: SMILING OR INEXPRESSIVE
BODYLANGUAGE: RELAXED
TOTALSCORE: 3

## 2018-01-04 ASSESSMENT — PAIN SCALES - GENERAL: PAINLEVEL_OUTOF10: 0

## 2018-01-04 NOTE — DISCHARGE PLANNING
Medical Social Worker    SARIKA discussed DC Planning with pt's son, Tobi. Tobi discussed wanting to apply pt for [place] Medicaid. SARIKA provided them with appropriate phone numbers in order to do this. At this time, Tobi is still agreeable to placement in a local [facility] facility.

## 2018-01-04 NOTE — PROGRESS NOTES
[facility] Hospitalist Progress Note    Date of Service: 2018    Chief Complaint  52 y.o. male admitted 2017 with HTN and obesity who presented with expressive aphasia and right sided weakness due to CVA.    Interval Problem Update  Pt seen and examined afebrile , moves his left arm and leg, flaccid on the  right side .  Waiting for bed at SNF.       Consultants/Specialty  GI    Disposition  SNF. Long term plan is to be placed near his son.         Review of Systems   Unable to perform ROS: Acuity of condition (espressive aphasia)      Physical Exam  Laboratory/Imaging   Hemodynamics  Temp (24hrs), Av.8 °C (98.2 °F), Min:36.6 °C (97.8 °F), Max:37 °C (98.6 °F)   Temperature: 36.7 °C (98 °F)  Pulse  Av  Min: 67  Max: 118    Blood Pressure: 129/84      Respiratory      Respiration: 16, Pulse Oximetry: 91 %             Fluids    Intake/Output Summary (Last 24 hours) at 18 1433  Last data filed at 18 0800   Gross per 24 hour   Intake             2380 ml   Output              650 ml   Net             1730 ml       Nutrition  Orders Placed This Encounter   Procedures   • DIET NPO     Standing Status:   Standing     Number of Occurrences:   15     Order Specific Question:   Restrict to:     Answer:   Strict [1]     Comments:   Hold tube feeds     Physical Exam   Constitutional: No distress.   Neck: Normal range of motion.   Cardiovascular: Normal rate, regular rhythm and normal heart sounds.    No murmur heard.  Pulmonary/Chest: Effort normal and breath sounds normal. No respiratory distress. He has no wheezes.   Abdominal: Soft. Bowel sounds are normal. He exhibits no distension. There is no tenderness.   Musculoskeletal: He exhibits no edema.   Neurological: He is alert. He exhibits abnormal muscle tone (no movement of RUE and RLE motor is 0/5).   right facial droop, aphasia   Skin: Skin is warm and dry.   Nursing note and vitals reviewed.      Recent Labs      18   0249   WBC  13.3*   RBC   5.45   HEMOGLOBIN  18.3*   HEMATOCRIT  54.9*   MCV  100.7*   MCH  33.6*   MCHC  33.3*   RDW  44.1   PLATELETCT  315   MPV  12.9     Recent Labs      01/04/18   0249   SODIUM  146*   POTASSIUM  3.6   CHLORIDE  112   CO2  26   GLUCOSE  124*   BUN  40*   CREATININE  0.88   CALCIUM  10.3                      Assessment/Plan     * CVA (cerebral vascular accident) (CMS-HCC)   Assessment & Plan    Expressive aphasia with right sided weakness. MRI showed left frontal/parietal/temporal infarct.  and A1c 5.9%.  Had progression of aphasia and right hemiparesis 12/21. Stat CTA showed L ICA occlusion with possible dissection, which was confirmed on MRA. I discussed with Dr. Simeon 12/21 and 12/22, says the benefits of anticoagulation compared to antiplt are minimal when compared to the risk of hemorrhage. Consulted Dr. Cooper who says no surgical intervention recommended. Spoke with IR and patient has high risk for hemorrhage with stenting.  - tolerating tube feeds, consulted GI for PEG   - ST/PT/OT following, son wants long term placement near him in [place], [place]  - continue asa and statin  - heparin dvt ppx  - q4h neuro checks  Needs snf not candidate for rehab as per rehab team. To [facility] waiting for bed.           HTN (hypertension)   Assessment & Plan    Patient not on any medications as outpatient  IV PRN If SBP >160  On norvasc, added HCTZ  12/26 d/c hctz due to hyponatremia.   bp stable will keep monitoring.    Low dose lisinopril, stable. Keep monitoring        Hypokalemia   Assessment & Plan    Daily Klyte repletion.  Resolved.         Hyponatremia   Assessment & Plan    Mild.   Monitor           Advance care planning   Assessment & Plan    Spoke with son, wife, mother at bedside. They understand the severe situation. They want son to be primary contact. We discussed code status and they want to keep him full code, they are ok with artificial nutrition and PEG placement.  S/p Peg placement on  12/26  Tolerating peg feeding.               Reviewed items::  Medications reviewed, Labs reviewed and Radiology images reviewed  Alexander catheter::  No Alexander  DVT prophylaxis pharmacological::  Heparin

## 2018-01-04 NOTE — DISCHARGE PLANNING
Medical Social Worker    SW left message for [facility] Admissions requesting bed availability update.

## 2018-01-04 NOTE — CARE PLAN
Problem: Skin Integrity  Goal: Risk for impaired skin integrity will decrease    Intervention: Assess and monitor skin integrity, appearance and/or temperature  Skin to back and buttocks intact

## 2018-01-04 NOTE — DISCHARGE PLANNING
[facility] Updated clinicals pending review. Giovanny(admissions) to call this CCS with any updates. [facility] does not have bed available for patient today.

## 2018-01-04 NOTE — CARE PLAN
Problem: Skin Integrity  Goal: Risk for impaired skin integrity will decrease    Intervention: Implement precautions to protect skin integrity in collaboration with the interdisciplinary team  Patient turned q2

## 2018-01-04 NOTE — PROGRESS NOTES
Saumya Rios Fall Risk Assessment:     Last Known Fall: Within the last month  Mobility: Hemiplegic, paraplegia, or quadriplegia  Medications: No meds  Mental Status/LOC/Awareness: Lethargic/oriented to person only  Toileting Needs: Incontinence  Volume/Electrolyte Status: Use of IV fluids/tube feeds  Communication/Sensory: Non-English patient/unable to speak/slurred speech  Behavior: Appropriate behavior  Saumya Rios Fall Risk Total: 18  Fall Risk Level: HIGH RISK    Universal Fall Precautions:  call light/belongings in reach, clutter free and spill free environment, educate on level of risk, educate to call for assistance, adequate lighting, use non-slip footwear, wheelchairs and assistive devices out of sight, bed in low position and locked, siderails up x 2    Fall Risk Level Interventions:    TRIAL ([employer], NEURO, MED MICHAEL 5) Moderate Fall Risk Interventions  Place yellow fall risk ID band on patient: verified  Provide patient/family education based on risk assessment : verified  Educate patient/family to call staff for assistance when getting out of bed: verified  Place fall precaution signage outside patient door: verified  Utilize bed/chair fall alarm: verifiedTRIAL ([employer] 8, NEURO, MED MICHAEL 5) High Fall Risk Interventions  Place yellow fall risk ID band on patient: completed  Provide patient/family education based on risk assessment: completed  Educate patient/family to call staff for assistance when getting out of bed: completed  Place fall precaution signage outside patient door: completed  Place patient in room close to nursing station: completed  Utilize bed/chair fall alarm: completed  Notify charge of high risk for huddle: completed    Patient Specific Interventions:     Medication: limit combination of prn medications  Mental Status/LOC/Awareness: reinforce falls education  Toileting: encourage use of condom cath  Volume/Electrolyte Status: ensure patient remains hydrated  Communication/Sensory:  collaborate with doctor for possible speech therapy consult  Behavioral: engage patient in daily activities  Mobility: ensure bed is locked and in lowest position

## 2018-01-05 PROCEDURE — 700102 HCHG RX REV CODE 250 W/ 637 OVERRIDE(OP): Performed by: HOSPITALIST

## 2018-01-05 PROCEDURE — 97530 THERAPEUTIC ACTIVITIES: CPT

## 2018-01-05 PROCEDURE — A9270 NON-COVERED ITEM OR SERVICE: HCPCS | Performed by: INTERNAL MEDICINE

## 2018-01-05 PROCEDURE — 770006 HCHG ROOM/CARE - MED/SURG/GYN SEMI*

## 2018-01-05 PROCEDURE — A9270 NON-COVERED ITEM OR SERVICE: HCPCS | Performed by: HOSPITALIST

## 2018-01-05 PROCEDURE — 700102 HCHG RX REV CODE 250 W/ 637 OVERRIDE(OP): Performed by: INTERNAL MEDICINE

## 2018-01-05 PROCEDURE — 97110 THERAPEUTIC EXERCISES: CPT

## 2018-01-05 PROCEDURE — 99232 SBSQ HOSP IP/OBS MODERATE 35: CPT | Performed by: INTERNAL MEDICINE

## 2018-01-05 PROCEDURE — 97112 NEUROMUSCULAR REEDUCATION: CPT

## 2018-01-05 PROCEDURE — 700111 HCHG RX REV CODE 636 W/ 250 OVERRIDE (IP): Performed by: INTERNAL MEDICINE

## 2018-01-05 RX ADMIN — HEPARIN SODIUM 5000 UNITS: 5000 INJECTION, SOLUTION INTRAVENOUS; SUBCUTANEOUS at 14:18

## 2018-01-05 RX ADMIN — NYSTATIN: 100000 POWDER TOPICAL at 08:22

## 2018-01-05 RX ADMIN — HEPARIN SODIUM 5000 UNITS: 5000 INJECTION, SOLUTION INTRAVENOUS; SUBCUTANEOUS at 05:49

## 2018-01-05 RX ADMIN — AMLODIPINE BESYLATE 10 MG: 5 TABLET ORAL at 08:15

## 2018-01-05 RX ADMIN — NICOTINE 14 MG: 14 PATCH, EXTENDED RELEASE TRANSDERMAL at 05:49

## 2018-01-05 RX ADMIN — ATORVASTATIN CALCIUM 80 MG: 80 TABLET, FILM COATED ORAL at 20:42

## 2018-01-05 RX ADMIN — OMEPRAZOLE 40 MG: 20 CAPSULE, DELAYED RELEASE ORAL at 08:15

## 2018-01-05 RX ADMIN — HEPARIN SODIUM 5000 UNITS: 5000 INJECTION, SOLUTION INTRAVENOUS; SUBCUTANEOUS at 20:42

## 2018-01-05 RX ADMIN — NYSTATIN: 100000 POWDER TOPICAL at 21:00

## 2018-01-05 RX ADMIN — POTASSIUM BICARBONATE 25 MEQ: 25 TABLET, EFFERVESCENT ORAL at 08:15

## 2018-01-05 RX ADMIN — LISINOPRIL 5 MG: 5 TABLET ORAL at 08:15

## 2018-01-05 RX ADMIN — ASPIRIN 81 MG: 81 TABLET, CHEWABLE ORAL at 08:14

## 2018-01-05 RX ADMIN — POTASSIUM BICARBONATE 25 MEQ: 25 TABLET, EFFERVESCENT ORAL at 20:42

## 2018-01-05 ASSESSMENT — GAIT ASSESSMENTS: GAIT LEVEL OF ASSIST: UNABLE TO PARTICIPATE

## 2018-01-05 NOTE — CARE PLAN
Problem: Safety  Goal: Will remain free from falls  Fall precautions in place. Appropriate hourly rounding performed.     Problem: Venous Thromboembolism (VTW)/Deep Vein Thrombosis (DVT) Prevention:  Goal: Patient will participate in Venous Thrombosis (VTE)/Deep Vein Thrombosis (DVT)Prevention Measures    Intervention: Assess and monitor for anticoagulation complications  Heparin given this shift. Assessed and monitored for s/s of complications.

## 2018-01-05 NOTE — PROGRESS NOTES
Assumed care of pt at 0700.   Pt denies pain at this time.   Medication given per MAR.   PEG tube in place running Replete @85ml, which is goal.   Pt R side is flaccid and has expressive aphasia.  Plan of care discussed.   All questions answered at this time.

## 2018-01-05 NOTE — DIETARY
Nutrition Services: Weekly TF update - TF via PEG tube: Replete Fiber running @ 85 mL/hr providing 2040 kcal, 131 grams of protein, and 1693 mL of free water per day.     Assessment:  · Day 17 of admit.   · Patient with expressive aphasia and right sided weakness due to CVA  · SLP saw patient 1/1 - recommended NPO with Cortrak for nutrition support   · Weight trending up since admit; current TF remains appropriate and is meeting pt's estimated nutrient needs  · Pt noted with elevated sodium - would benefit from 200 mL free water flushes every 6 hours if continues to be a problem    Pertinent Labs: 1/4 sodium 146, glucose 124, BUN 40   Pertinent Meds: prilosec, potassium bicarbonate   Fluids: no IV fluids noted at this time - pt receiving free water flushes with meds  GI: last BM 1/5  WT: 107.9 kg via bed scale 12/24 - up 4.3 kg since admit wt 12/19  SKIN: surgical incision abdomen; no pressure ulcers noted     PLAN / RECOMMEND -   · Continue same TF and rate  · PO diet when safe and appropriate per SLP  · Fluids per MD    RD following.

## 2018-01-05 NOTE — DISCHARGE PLANNING
Medical Social Work    SW [facility]

## 2018-01-05 NOTE — CARE PLAN
Problem: Communication  Goal: The ability to communicate needs accurately and effectively will improve    Intervention: Develop alternate methods of communication with patient and significant other/support system  Expressive aphasia noted, can nod yes/no at times      Problem: Venous Thromboembolism (VTW)/Deep Vein Thrombosis (DVT) Prevention:  Goal: Patient will participate in Venous Thrombosis (VTE)/Deep Vein Thrombosis (DVT)Prevention Measures    Intervention: Ensure patient wears graduated elastic stockings (BETSY hose) and/or SCDs, if ordered, when in bed or chair (Remove at least once per shift for skin check)  SCD's in place

## 2018-01-05 NOTE — PROGRESS NOTES
Saumya Rios Fall Risk Assessment:     Last Known Fall: Within the last month  Mobility: Hemiplegic, paraplegia, or quadriplegia  Medications: Cardiovascular or central nervous system meds  Mental Status/LOC/Awareness: Lethargic/oriented to person only  Toileting Needs: Incontinence  Volume/Electrolyte Status: Use of IV fluids/tube feeds  Communication/Sensory: Non-English patient/unable to speak/slurred speech  Behavior: Appropriate behavior  Saumya Rios Fall Risk Total: 19  Fall Risk Level: HIGH RISK    Universal Fall Precautions:  call light/belongings in reach, bed in low position and locked, wheelchairs and assistive devices out of sight, siderails up x 2, use non-slip footwear, adequate lighting, clutter free and spill free environment, educate on level of risk, educate to call for assistance    Fall Risk Level Interventions:    TRIAL ([employer] 8, NEURO, MED MICHAEL 5) Moderate Fall Risk Interventions  Place yellow fall risk ID band on patient: verified  Provide patient/family education based on risk assessment : verified  Educate patient/family to call staff for assistance when getting out of bed: verified  Place fall precaution signage outside patient door: verified  Utilize bed/chair fall alarm: verifiedTRIAL ([employer] 8, NEURO, [employer] MICHAEL 5) High Fall Risk Interventions  Place yellow fall risk ID band on patient: verified  Provide patient/family education based on risk assessment: completed  Educate patient/family to call staff for assistance when getting out of bed: completed  Place fall precaution signage outside patient door: verified  Place patient in room close to nursing station: completed  Utilize bed/chair fall alarm: verified  Notify charge of high risk for huddle: completed    Patient Specific Interventions:     Medication: review medications with patient and family  Mental Status/LOC/Awareness: reinforce falls education, check on patient hourly, utilize bed/chair fall alarm and reinforce the use of call  light  Toileting: provide frquent toileting  Volume/Electrolyte Status: advance diet as tolerated and monitor abnormal lab values  Communication/Sensory: update plan of care on whiteboard and provide communication alternatives/  Behavioral: engage patient in daily activities  Mobility: utilize bed/chair fall alarm, ensure bed is locked and in lowest position, provide appropriate assistive device and instruct patient to exit bed on their strongest side

## 2018-01-05 NOTE — PROGRESS NOTES
[facility] Progress Note    Date of Service: 2018    Chief Complaint  52 y.o. male admitted 2017 with HTN and obesity who presented with expressive aphasia and right sided weakness due to CVA.    Interval Problem Update  Pt seen and examined afebrile , no changes, pending placement       Consultants/Specialty  GI    Disposition  SNF. Long term plan is to be placed near his son.         Review of Systems   Unable to perform ROS: Acuity of condition (espressive aphasia)      Physical Exam  Laboratory/Imaging   Hemodynamics  Temp (24hrs), Av.4 °C (97.6 °F), Min:36.1 °C (97 °F), Max:36.8 °C (98.2 °F)   Temperature: 36.4 °C (97.5 °F)  Pulse  Av.3  Min: 67  Max: 118    Blood Pressure: 151/79      Respiratory      Respiration: 16, Pulse Oximetry: 96 %        RUL Breath Sounds: Diminished, RML Breath Sounds: Diminished, RLL Breath Sounds: Diminished, MIRTA Breath Sounds: Diminished, LLL Breath Sounds: Diminished    Fluids    Intake/Output Summary (Last 24 hours) at 18 1525  Last data filed at 18 0600   Gross per 24 hour   Intake             2944 ml   Output                0 ml   Net             2944 ml       Nutrition  Orders Placed This Encounter   Procedures   • DIET NPO     Standing Status:   Standing     Number of Occurrences:   15     Order Specific Question:   Restrict to:     Answer:   Strict [1]     Comments:   Hold tube feeds     Physical Exam   Constitutional: No distress.   Neck: Normal range of motion.   Cardiovascular: Normal rate, regular rhythm and normal heart sounds.    No murmur heard.  Pulmonary/Chest: Effort normal and breath sounds normal. No respiratory distress. He has no wheezes.   Abdominal: Soft. Bowel sounds are normal. He exhibits no distension. There is no tenderness.   Musculoskeletal: He exhibits no edema.   Neurological: He is alert. He exhibits abnormal muscle tone (no movement of RUE and RLE motor is 0/5).   right facial droop, aphasia   Skin: Skin is  warm and dry.   Nursing note and vitals reviewed.      Recent Labs      01/04/18   0249   WBC  13.3*   RBC  5.45   HEMOGLOBIN  18.3*   HEMATOCRIT  54.9*   MCV  100.7*   MCH  33.6*   MCHC  33.3*   RDW  44.1   PLATELETCT  315   MPV  12.9     Recent Labs      01/04/18   0249   SODIUM  146*   POTASSIUM  3.6   CHLORIDE  112   CO2  26   GLUCOSE  124*   BUN  40*   CREATININE  0.88   CALCIUM  10.3                      Assessment/Plan     * CVA (cerebral vascular accident) (CMS-HCC)   Assessment & Plan    Expressive aphasia with right sided weakness. MRI showed left frontal/parietal/temporal infarct.  and A1c 5.9%.  Had progression of aphasia and right hemiparesis 12/21. Stat CTA showed L ICA occlusion with possible dissection, which was confirmed on MRA. I discussed with Dr. Simeon 12/21 and 12/22, says the benefits of anticoagulation compared to antiplt are minimal when compared to the risk of hemorrhage. Consulted Dr. Cooper who says no surgical intervention recommended. Spoke with IR and patient has high risk for hemorrhage with stenting.  - tolerating tube feeds, consulted GI for PEG   - ST/PT/OT following, son wants long term placement near him in [place], [place]  - continue asa and statin  - heparin dvt ppx  - q4h neuro checks  Needs snf not candidate for rehab as per rehab team. To [facility] waiting for bed.           HTN (hypertension)   Assessment & Plan    Patient not on any medications as outpatient  IV PRN If SBP >160  On norvasc, added HCTZ  12/26 d/c hctz due to hyponatremia.   bp stable will keep monitoring.    Low dose lisinopril, stable. Keep monitoring        Hypokalemia   Assessment & Plan    Daily Klyte repletion.  Resolved.         Hyponatremia   Assessment & Plan    Mild.   Monitor           Advance care planning   Assessment & Plan    Spoke with son, wife, mother at bedside. They understand the severe situation. They want son to be primary contact. We discussed code status and they want to keep  him full code, they are ok with artificial nutrition and PEG placement.  S/p Peg placement on 12/26  Tolerating peg feeding.               Reviewed items::  Medications reviewed, Labs reviewed and Radiology images reviewed  Alexander catheter::  No Alexander  DVT prophylaxis pharmacological::  Heparin

## 2018-01-05 NOTE — DISCHARGE PLANNING
[facility] does not have bed available for patient today. Attempted to follow up with [facility], however, no answer. Voicemail left.

## 2018-01-05 NOTE — PROGRESS NOTES
Saumya Rios Fall Risk Assessment:     Last Known Fall: Within the last month  Mobility: Hemiplegic, paraplegia, or quadriplegia  Medications: Cardiovascular or central nervous system meds  Mental Status/LOC/Awareness: Lethargic/oriented to person only  Toileting Needs: Use of assistive device (Bedside commode, bedpan, urinal)  Volume/Electrolyte Status: Use of IV fluids/tube feeds  Communication/Sensory: Non-English patient/unable to speak/slurred speech  Behavior: Appropriate behavior  Saumya Rios Fall Risk Total: 18  Fall Risk Level: HIGH RISK    Universal Fall Precautions:  call light/belongings in reach, bed in low position and locked, wheelchairs and assistive devices out of sight, siderails up x 2, use non-slip footwear, adequate lighting, clutter free and spill free environment, educate on level of risk, educate to call for assistance    Fall Risk Level Interventions:    TRIAL (TELE 8, NEURO, MED MICHAEL 5) Moderate Fall Risk Interventions  Place yellow fall risk ID band on patient: verified  Provide patient/family education based on risk assessment : verified  Educate patient/family to call staff for assistance when getting out of bed: verified  Place fall precaution signage outside patient door: verified  Utilize bed/chair fall alarm: verifiedTRIAL (TELE 8, NEURO, MED MICHAEL 5) High Fall Risk Interventions  Place yellow fall risk ID band on patient: verified  Provide patient/family education based on risk assessment: completed  Educate patient/family to call staff for assistance when getting out of bed: completed  Place fall precaution signage outside patient door: verified  Place patient in room close to nursing station: completed  Utilize bed/chair fall alarm: completed  Notify charge of high risk for huddle: completed    Patient Specific Interventions:     Medication: review medications with patient and family and limit combination of prn medications  Mental Status/LOC/Awareness: check on patient  hourly, utilize bed/chair fall alarm, reinforce the use of call light and provide activity  Toileting: provide frquent toileting and toilet prior to giving pain medications  Volume/Electrolyte Status: ensure patient remains hydrated  Communication/Sensory: update plan of care on whiteboard  Behavioral: instruct/reinforce fall program rationale  Mobility: utilize bed/chair fall alarm

## 2018-01-06 LAB
ANION GAP SERPL CALC-SCNC: 12 MMOL/L (ref 0–11.9)
BUN SERPL-MCNC: 42 MG/DL (ref 8–22)
CALCIUM SERPL-MCNC: 10.4 MG/DL (ref 8.5–10.5)
CHLORIDE SERPL-SCNC: 114 MMOL/L (ref 96–112)
CO2 SERPL-SCNC: 23 MMOL/L (ref 20–33)
CREAT SERPL-MCNC: 0.96 MG/DL (ref 0.5–1.4)
ERYTHROCYTE [DISTWIDTH] IN BLOOD BY AUTOMATED COUNT: 43.3 FL (ref 35.9–50)
GFR SERPL CREATININE-BSD FRML MDRD: >60 ML/MIN/1.73 M 2
GLUCOSE SERPL-MCNC: 142 MG/DL (ref 65–99)
HCT VFR BLD AUTO: 54.2 % (ref 42–52)
HGB BLD-MCNC: 17.8 G/DL (ref 14–18)
MCH RBC QN AUTO: 32.7 PG (ref 27–33)
MCHC RBC AUTO-ENTMCNC: 32.8 G/DL (ref 33.7–35.3)
MCV RBC AUTO: 99.6 FL (ref 81.4–97.8)
PLATELET # BLD AUTO: 272 K/UL (ref 164–446)
PMV BLD AUTO: 13.8 FL (ref 9–12.9)
POTASSIUM SERPL-SCNC: 3.7 MMOL/L (ref 3.6–5.5)
RBC # BLD AUTO: 5.44 M/UL (ref 4.7–6.1)
SODIUM SERPL-SCNC: 149 MMOL/L (ref 135–145)
WBC # BLD AUTO: 14.3 K/UL (ref 4.8–10.8)

## 2018-01-06 PROCEDURE — A9270 NON-COVERED ITEM OR SERVICE: HCPCS | Performed by: HOSPITALIST

## 2018-01-06 PROCEDURE — A9270 NON-COVERED ITEM OR SERVICE: HCPCS | Performed by: INTERNAL MEDICINE

## 2018-01-06 PROCEDURE — 700102 HCHG RX REV CODE 250 W/ 637 OVERRIDE(OP): Performed by: INTERNAL MEDICINE

## 2018-01-06 PROCEDURE — 700111 HCHG RX REV CODE 636 W/ 250 OVERRIDE (IP): Performed by: INTERNAL MEDICINE

## 2018-01-06 PROCEDURE — 85027 COMPLETE CBC AUTOMATED: CPT

## 2018-01-06 PROCEDURE — 700102 HCHG RX REV CODE 250 W/ 637 OVERRIDE(OP): Performed by: HOSPITALIST

## 2018-01-06 PROCEDURE — 770006 HCHG ROOM/CARE - MED/SURG/GYN SEMI*

## 2018-01-06 PROCEDURE — 99232 SBSQ HOSP IP/OBS MODERATE 35: CPT | Performed by: INTERNAL MEDICINE

## 2018-01-06 PROCEDURE — 80048 BASIC METABOLIC PNL TOTAL CA: CPT

## 2018-01-06 PROCEDURE — 36415 COLL VENOUS BLD VENIPUNCTURE: CPT

## 2018-01-06 RX ADMIN — NYSTATIN: 100000 POWDER TOPICAL at 08:32

## 2018-01-06 RX ADMIN — HEPARIN SODIUM 5000 UNITS: 5000 INJECTION, SOLUTION INTRAVENOUS; SUBCUTANEOUS at 20:58

## 2018-01-06 RX ADMIN — HEPARIN SODIUM 5000 UNITS: 5000 INJECTION, SOLUTION INTRAVENOUS; SUBCUTANEOUS at 05:20

## 2018-01-06 RX ADMIN — AMLODIPINE BESYLATE 10 MG: 5 TABLET ORAL at 08:22

## 2018-01-06 RX ADMIN — POTASSIUM BICARBONATE 25 MEQ: 25 TABLET, EFFERVESCENT ORAL at 20:58

## 2018-01-06 RX ADMIN — ASPIRIN 81 MG: 81 TABLET, CHEWABLE ORAL at 08:22

## 2018-01-06 RX ADMIN — ATORVASTATIN CALCIUM 80 MG: 80 TABLET, FILM COATED ORAL at 20:59

## 2018-01-06 RX ADMIN — HEPARIN SODIUM 5000 UNITS: 5000 INJECTION, SOLUTION INTRAVENOUS; SUBCUTANEOUS at 14:43

## 2018-01-06 RX ADMIN — NICOTINE 14 MG: 14 PATCH, EXTENDED RELEASE TRANSDERMAL at 05:20

## 2018-01-06 RX ADMIN — POTASSIUM BICARBONATE 25 MEQ: 25 TABLET, EFFERVESCENT ORAL at 08:22

## 2018-01-06 RX ADMIN — LISINOPRIL 5 MG: 5 TABLET ORAL at 08:22

## 2018-01-06 RX ADMIN — NYSTATIN: 100000 POWDER TOPICAL at 21:00

## 2018-01-06 RX ADMIN — OMEPRAZOLE 40 MG: 20 CAPSULE, DELAYED RELEASE ORAL at 08:22

## 2018-01-06 ASSESSMENT — PAIN SCALES - GENERAL
PAINLEVEL_OUTOF10: 0
PAINLEVEL_OUTOF10: 0

## 2018-01-06 NOTE — CARE PLAN
Problem: Safety  Goal: Will remain free from injury  Outcome: PROGRESSING AS EXPECTED  Will continue to monitor.     Problem: Pain Management  Goal: Pain level will decrease to patient's comfort goal  Outcome: PROGRESSING AS EXPECTED  No issues with pain at this time.

## 2018-01-06 NOTE — PROGRESS NOTES
Saumya Rios Fall Risk Assessment:     Last Known Fall: Within the last month  Mobility: Hemiplegic, paraplegia, or quadriplegia  Medications: Cardiovascular or central nervous system meds  Mental Status/LOC/Awareness: Lethargic/oriented to person only  Toileting Needs: Incontinence  Volume/Electrolyte Status: Use of IV fluids/tube feeds  Communication/Sensory: Non-English patient/unable to speak/slurred speech  Behavior: Appropriate behavior  Saumya Rios Fall Risk Total: 19  Fall Risk Level: HIGH RISK    Universal Fall Precautions:  call light/belongings in reach, bed in low position and locked, wheelchairs and assistive devices out of sight, siderails up x 2, use non-slip footwear, adequate lighting, clutter free and spill free environment, educate on level of risk, educate to call for assistance    Fall Risk Level Interventions:    TRIAL ([employer] 8, NEURO, MED MICHAEL 5) Moderate Fall Risk Interventions  Place yellow fall risk ID band on patient: verified  Provide patient/family education based on risk assessment : verified  Educate patient/family to call staff for assistance when getting out of bed: verified  Place fall precaution signage outside patient door: verified  Utilize bed/chair fall alarm: verifiedTRIAL ([employer] 8, NEURO, [email or web address] MICHAEL 5) High Fall Risk Interventions  Place yellow fall risk ID band on patient: verified  Provide patient/family education based on risk assessment: completed  Educate patient/family to call staff for assistance when getting out of bed: completed  Place fall precaution signage outside patient door: verified  Place patient in room close to nursing station: completed  Utilize bed/chair fall alarm: verified  Notify charge of high risk for huddle: completed    Patient Specific Interventions:     Medication: review medications with patient and family  Mental Status/LOC/Awareness: check on patient hourly, utilize bed/chair fall alarm and reinforce the use of call light  Toileting: provide  frquent toileting  Volume/Electrolyte Status: ensure patient remains hydrated and advance diet as tolerated  Communication/Sensory: update plan of care on whiteboard  Behavioral: engage patient in daily activities  Mobility: utilize bed/chair fall alarm and ensure bed is locked and in lowest position

## 2018-01-06 NOTE — PROGRESS NOTES
Assumed pt care at 1845.  Received report from day RN.  Assessment completed.  Pt A&O but is unable to vocalize needs .  Pt has no comlaints of pain at this time.  No other s/s of discomfort or distress. Pt bedbound.skin intact, peg tube clean with no issues to note.   Bed in lowest position, locked, and bed alarm is on. will check on pt hourly for comfort. Treaded socks in place, call light within reach and staff numbers provided.  Pt needs met at this time.

## 2018-01-06 NOTE — PROGRESS NOTES
Assumed care of pt at 0700.  No s/s of discomfort or distress noted at this time.   Medication given per MAR.   Pt has expressive aphasia and R side is flaccid.   PEG tube in place running Replete Fiber at 85ml, which is goal.   Plan of care discussed.   All questions answered at this time.

## 2018-01-06 NOTE — PROGRESS NOTES
[facility]ist Progress Note    Date of Service: 2018    Chief Complaint  52 y.o. male admitted 2017 with HTN and obesity who presented with expressive aphasia and right sided weakness due to CVA.    Interval Problem Update  No overnight events afebrile , no changes, pending placement     Consultants/Specialty  GI    Disposition  SNF. Long term plan is to be placed near his son.         Review of Systems   Unable to perform ROS: Acuity of condition (espressive aphasia)      Physical Exam  Laboratory/Imaging   Hemodynamics  Temp (24hrs), Av.7 °C (98 °F), Min:36.3 °C (97.3 °F), Max:37.3 °C (99.2 °F)   Temperature: 37.3 °C (99.2 °F)  Pulse  Av.7  Min: 67  Max: 118    Blood Pressure: 136/80      Respiratory      Respiration: 16, Pulse Oximetry: 93 %        RUL Breath Sounds: Diminished, RML Breath Sounds: Diminished, RLL Breath Sounds: Diminished, MIRTA Breath Sounds: Diminished, LLL Breath Sounds: Diminished    Fluids    Intake/Output Summary (Last 24 hours) at 18 1355  Last data filed at 18 0800   Gross per 24 hour   Intake             1440 ml   Output              400 ml   Net             1040 ml       Nutrition  Orders Placed This Encounter   Procedures   • DIET NPO     Standing Status:   Standing     Number of Occurrences:   22     Order Specific Question:   Restrict to:     Answer:   Strict [1]     Comments:   Hold tube feeds     Physical Exam   Constitutional: No distress.   Neck: Normal range of motion.   Cardiovascular: Normal rate, regular rhythm and normal heart sounds.    No murmur heard.  Pulmonary/Chest: Effort normal and breath sounds normal. No respiratory distress. He has no wheezes.   Abdominal: Soft. Bowel sounds are normal. He exhibits no distension. There is no tenderness.   Musculoskeletal: He exhibits no edema.   Neurological: He is alert. He exhibits abnormal muscle tone (no movement of RUE and RLE motor is 0/5).   right facial droop, aphasia   Skin: Skin is warm  and dry.   Nursing note and vitals reviewed.      Recent Labs      01/04/18 0249 01/06/18   0243   WBC  13.3*  14.3*   RBC  5.45  5.44   HEMOGLOBIN  18.3*  17.8   HEMATOCRIT  54.9*  54.2*   MCV  100.7*  99.6*   MCH  33.6*  32.7   MCHC  33.3*  32.8*   RDW  44.1  43.3   PLATELETCT  315  272   MPV  12.9  13.8*     Recent Labs      01/04/18 0249 01/06/18   0243   SODIUM  146*  149*   POTASSIUM  3.6  3.7   CHLORIDE  112  114*   CO2  26  23   GLUCOSE  124*  142*   BUN  40*  42*   CREATININE  0.88  0.96   CALCIUM  10.3  10.4                      Assessment/Plan     * CVA (cerebral vascular accident) (CMS-HCC)   Assessment & Plan    Expressive aphasia with right sided weakness. MRI showed left frontal/parietal/temporal infarct.  and A1c 5.9%.  Had progression of aphasia and right hemiparesis 12/21. Stat CTA showed L ICA occlusion with possible dissection, which was confirmed on MRA. I discussed with Dr. Simeon 12/21 and 12/22, says the benefits of anticoagulation compared to antiplt are minimal when compared to the risk of hemorrhage. Consulted Dr. Cooper who says no surgical intervention recommended. Spoke with IR and patient has high risk for hemorrhage with stenting.  - tolerating tube feeds, consulted GI for PEG   - ST/PT/OT following, son wants long term placement near him in [place], [place]  - continue asa and statin  - heparin dvt ppx  - q4h neuro checks  Needs snf not candidate for rehab as per rehab team. To [facility] waiting for bed.           HTN (hypertension)   Assessment & Plan    Patient not on any medications as outpatient  IV PRN If SBP >160  On norvasc, added HCTZ  12/26 d/c hctz due to hyponatremia.   bp stable will keep monitoring.    Low dose lisinopril, stable. Keep monitoring        Hypokalemia   Assessment & Plan    Daily Klyte repletion.  Resolved.         Hyponatremia   Assessment & Plan    Mild.   Monitor           Advance care planning   Assessment & Plan    Spoke with son, wife,  mother at bedside. They understand the severe situation. They want son to be primary contact. We discussed code status and they want to keep him full code, they are ok with artificial nutrition and PEG placement.  S/p Peg placement on 12/26  Tolerating peg feeding.               Reviewed items::  Medications reviewed, Labs reviewed and Radiology images reviewed  Alexanedr catheter::  No Alexander  DVT prophylaxis pharmacological::  Heparin

## 2018-01-06 NOTE — CARE PLAN
Problem: Safety  Goal: Will remain free from falls    Intervention: Assess risk factors for falls  Risk to fall due to right side flaccid; bed alarm on, non impulsive      Problem: Urinary Elimination:  Goal: Ability to reestablish a normal urinary elimination pattern will improve    Intervention: Encourage scheduled voiding  Condom cath placed on pt d/t incontinence episodes

## 2018-01-06 NOTE — THERAPY
"Physical Therapy Treatment completed.   Bed Mobility:  Supine to Sit: Moderate Assist (from Rt side of the bed. HOB flat and use of rail)  Transfers: Sit to Stand: Maximal Assist (from EOB)  Gait: Level Of Assist: Unable to Participate with Will Continue to Assess for Equipment Needs       Plan of Care: Will benefit from Physical Therapy 5 times per week  Discharge Recommendations: Equipment: Will Continue to Assess for Equipment Needs. Post-acute therapy Discharge to a transitional care facility for continued skilled therapy services.     See \"Rehab Therapy-Acute\" Patient Summary Report for complete documentation.       "

## 2018-01-07 LAB
ANION GAP SERPL CALC-SCNC: 8 MMOL/L (ref 0–11.9)
BUN SERPL-MCNC: 39 MG/DL (ref 8–22)
CALCIUM SERPL-MCNC: 10.1 MG/DL (ref 8.5–10.5)
CHLORIDE SERPL-SCNC: 114 MMOL/L (ref 96–112)
CO2 SERPL-SCNC: 26 MMOL/L (ref 20–33)
CREAT SERPL-MCNC: 0.92 MG/DL (ref 0.5–1.4)
ERYTHROCYTE [DISTWIDTH] IN BLOOD BY AUTOMATED COUNT: 42.9 FL (ref 35.9–50)
GFR SERPL CREATININE-BSD FRML MDRD: >60 ML/MIN/1.73 M 2
GLUCOSE SERPL-MCNC: 121 MG/DL (ref 65–99)
HCT VFR BLD AUTO: 53.3 % (ref 42–52)
HGB BLD-MCNC: 17.7 G/DL (ref 14–18)
MCH RBC QN AUTO: 33 PG (ref 27–33)
MCHC RBC AUTO-ENTMCNC: 33.2 G/DL (ref 33.7–35.3)
MCV RBC AUTO: 99.3 FL (ref 81.4–97.8)
PLATELET # BLD AUTO: 238 K/UL (ref 164–446)
PMV BLD AUTO: 13.4 FL (ref 9–12.9)
POTASSIUM SERPL-SCNC: 3.7 MMOL/L (ref 3.6–5.5)
RBC # BLD AUTO: 5.37 M/UL (ref 4.7–6.1)
SODIUM SERPL-SCNC: 148 MMOL/L (ref 135–145)
WBC # BLD AUTO: 11.7 K/UL (ref 4.8–10.8)

## 2018-01-07 PROCEDURE — 700111 HCHG RX REV CODE 636 W/ 250 OVERRIDE (IP): Performed by: INTERNAL MEDICINE

## 2018-01-07 PROCEDURE — 85027 COMPLETE CBC AUTOMATED: CPT

## 2018-01-07 PROCEDURE — 99232 SBSQ HOSP IP/OBS MODERATE 35: CPT | Performed by: INTERNAL MEDICINE

## 2018-01-07 PROCEDURE — 80048 BASIC METABOLIC PNL TOTAL CA: CPT

## 2018-01-07 PROCEDURE — 700102 HCHG RX REV CODE 250 W/ 637 OVERRIDE(OP): Performed by: HOSPITALIST

## 2018-01-07 PROCEDURE — A9270 NON-COVERED ITEM OR SERVICE: HCPCS | Performed by: HOSPITALIST

## 2018-01-07 PROCEDURE — 700102 HCHG RX REV CODE 250 W/ 637 OVERRIDE(OP): Performed by: INTERNAL MEDICINE

## 2018-01-07 PROCEDURE — 770006 HCHG ROOM/CARE - MED/SURG/GYN SEMI*

## 2018-01-07 PROCEDURE — A9270 NON-COVERED ITEM OR SERVICE: HCPCS | Performed by: INTERNAL MEDICINE

## 2018-01-07 PROCEDURE — 36415 COLL VENOUS BLD VENIPUNCTURE: CPT

## 2018-01-07 RX ADMIN — POTASSIUM BICARBONATE 25 MEQ: 25 TABLET, EFFERVESCENT ORAL at 08:13

## 2018-01-07 RX ADMIN — AMLODIPINE BESYLATE 10 MG: 5 TABLET ORAL at 08:13

## 2018-01-07 RX ADMIN — NYSTATIN: 100000 POWDER TOPICAL at 08:17

## 2018-01-07 RX ADMIN — HEPARIN SODIUM 5000 UNITS: 5000 INJECTION, SOLUTION INTRAVENOUS; SUBCUTANEOUS at 05:36

## 2018-01-07 RX ADMIN — HEPARIN SODIUM 5000 UNITS: 5000 INJECTION, SOLUTION INTRAVENOUS; SUBCUTANEOUS at 13:37

## 2018-01-07 RX ADMIN — ATORVASTATIN CALCIUM 80 MG: 80 TABLET, FILM COATED ORAL at 20:37

## 2018-01-07 RX ADMIN — ACETAMINOPHEN 650 MG: 325 TABLET, FILM COATED ORAL at 00:11

## 2018-01-07 RX ADMIN — OMEPRAZOLE 40 MG: 20 CAPSULE, DELAYED RELEASE ORAL at 08:13

## 2018-01-07 RX ADMIN — HEPARIN SODIUM 5000 UNITS: 5000 INJECTION, SOLUTION INTRAVENOUS; SUBCUTANEOUS at 20:38

## 2018-01-07 RX ADMIN — LISINOPRIL 5 MG: 5 TABLET ORAL at 08:13

## 2018-01-07 RX ADMIN — POTASSIUM BICARBONATE 25 MEQ: 25 TABLET, EFFERVESCENT ORAL at 20:37

## 2018-01-07 RX ADMIN — ACETAMINOPHEN 650 MG: 325 TABLET, FILM COATED ORAL at 20:37

## 2018-01-07 RX ADMIN — NICOTINE 14 MG: 14 PATCH, EXTENDED RELEASE TRANSDERMAL at 05:36

## 2018-01-07 RX ADMIN — ASPIRIN 81 MG: 81 TABLET, CHEWABLE ORAL at 08:12

## 2018-01-07 ASSESSMENT — PAIN SCALES - GENERAL
PAINLEVEL_OUTOF10: 0

## 2018-01-07 NOTE — PROGRESS NOTES
Assumed pt care at 1900, received bedside report. Safety checks performed. Tube feed running at gaol rate with HOB at 30 degrees. Pt with expressive aphasia. Follows some commands.  Pt unable to shake head yes or no when asked in pain or experiencing numbness and tingling. Waffle overlay and Q2 hour turns in place. Q4 hour oral care in place but requiring more frequent oral care due to mucus accumulation and dry and cracked lips. PEG tube site care performed. Call light and belongings within reach. Hourly rounding in place.

## 2018-01-07 NOTE — PROGRESS NOTES
Saumya Rios Fall Risk Assessment:     Last Known Fall: Within the last month  Mobility: Hemiplegic, paraplegia, or quadriplegia  Medications: Cardiovascular or central nervous system meds  Mental Status/LOC/Awareness: Lethargic/oriented to person only  Toileting Needs: Incontinence  Volume/Electrolyte Status: Use of IV fluids/tube feeds  Communication/Sensory: Non-English patient/unable to speak/slurred speech  Behavior: Appropriate behavior  Saumya Rios Fall Risk Total: 19  Fall Risk Level: HIGH RISK    Universal Fall Precautions:  call light/belongings in reach, bed in low position and locked, wheelchairs and assistive devices out of sight, siderails up x 2, use non-slip footwear, adequate lighting, clutter free and spill free environment, educate on level of risk, educate to call for assistance    Fall Risk Level Interventions:    TRIAL ([employer] 8, NEURO, MED MICHAEL 5) Moderate Fall Risk Interventions  Place yellow fall risk ID band on patient: verified  Provide patient/family education based on risk assessment : verified  Educate patient/family to call staff for assistance when getting out of bed: verified  Place fall precaution signage outside patient door: verified  Utilize bed/chair fall alarm: verifiedTRIAL ([employer] 8, NEURO, [employer] MICHAEL 5) High Fall Risk Interventions  Place yellow fall risk ID band on patient: verified  Provide patient/family education based on risk assessment: completed  Educate patient/family to call staff for assistance when getting out of bed: completed  Place fall precaution signage outside patient door: verified  Place patient in room close to nursing station: completed  Utilize bed/chair fall alarm: verified  Notify charge of high risk for huddle: completed    Patient Specific Interventions:     Medication: review medications with patient and family  Mental Status/LOC/Awareness: reorient patient, check on patient hourly, utilize bed/chair fall alarm and reinforce the use of call  light  Toileting: provide frquent toileting  Volume/Electrolyte Status: ensure patient remains hydrated, advance diet as tolerated and monitor abnormal lab values  Communication/Sensory: update plan of care on whiteboard and provide communication alternatives/  Behavioral: engage patient in daily activities  Mobility: utilize bed/chair fall alarm and ensure bed is locked and in lowest position

## 2018-01-07 NOTE — CARE PLAN
Problem: Safety  Goal: Will remain free from falls    Intervention: Assess risk factors for falls  High risk to fall due to right side flaccid, is not impulsive, does not try to get out of bed, bed alarm on       Problem: Venous Thromboembolism (VTW)/Deep Vein Thrombosis (DVT) Prevention:  Goal: Patient will participate in Venous Thrombosis (VTE)/Deep Vein Thrombosis (DVT)Prevention Measures    Intervention: Ensure patient wears graduated elastic stockings (BETSY hose) and/or SCDs, if ordered, when in bed or chair (Remove at least once per shift for skin check)  SCD's in place

## 2018-01-07 NOTE — PROGRESS NOTES
Assumed care of pt at 0700.   No s/s of discomfort or distress noted at this time.   Medication given per MAR.   Pt has expressive aphasia and his R side is flaccid.  PEG tube in place running Replete Fiber at 85ml, which is goal.   Plan of care discussed.   All questions answered at this time.

## 2018-01-07 NOTE — PROGRESS NOTES
[facility] Hospitalist Progress Note    Date of Service: 2018    Chief Complaint  52 y.o. male admitted 2017 with HTN and obesity who presented with expressive aphasia and right sided weakness due to CVA.    Interval Problem Update  No overnight events afebrile , no changes, pending placement     Consultants/Specialty  GI    Disposition  SNF. Long term plan is to be placed near his son.         Review of Systems   Unable to perform ROS: Acuity of condition (espressive aphasia)      Physical Exam  Laboratory/Imaging   Hemodynamics  Temp (24hrs), Av.7 °C (98.1 °F), Min:36.3 °C (97.3 °F), Max:37.2 °C (99 °F)   Temperature: 36.9 °C (98.4 °F)  Pulse  Av  Min: 67  Max: 118    Blood Pressure: 106/81      Respiratory      Respiration: 18, Pulse Oximetry: 94 %        RUL Breath Sounds: Diminished, RML Breath Sounds: Diminished, RLL Breath Sounds: Diminished, MIRTA Breath Sounds: Diminished, LLL Breath Sounds: Diminished    Fluids    Intake/Output Summary (Last 24 hours) at 18 1426  Last data filed at 18 1300   Gross per 24 hour   Intake             1740 ml   Output             1700 ml   Net               40 ml       Nutrition  Orders Placed This Encounter   Procedures   • DIET NPO     Standing Status:   Standing     Number of Occurrences:   22     Order Specific Question:   Restrict to:     Answer:   Strict [1]     Comments:   Hold tube feeds     Physical Exam   Constitutional: No distress.   Neck: Normal range of motion.   Cardiovascular: Normal rate, regular rhythm and normal heart sounds.    No murmur heard.  Pulmonary/Chest: Effort normal and breath sounds normal. No respiratory distress. He has no wheezes.   Abdominal: Soft. Bowel sounds are normal. He exhibits no distension. There is no tenderness.   Musculoskeletal: He exhibits no edema.   Neurological: He is alert. He exhibits abnormal muscle tone (no movement of RUE and RLE motor is 0/5).   right facial droop, aphasia   Skin: Skin is warm  and dry.   Nursing note and vitals reviewed.      Recent Labs      01/06/18   0243  01/07/18   1111   WBC  14.3*  11.7*   RBC  5.44  5.37   HEMOGLOBIN  17.8  17.7   HEMATOCRIT  54.2*  53.3*   MCV  99.6*  99.3*   MCH  32.7  33.0   MCHC  32.8*  33.2*   RDW  43.3  42.9   PLATELETCT  272  238   MPV  13.8*  13.4*     Recent Labs      01/06/18   0243  01/07/18   1110   SODIUM  149*  148*   POTASSIUM  3.7  3.7   CHLORIDE  114*  114*   CO2  23  26   GLUCOSE  142*  121*   BUN  42*  39*   CREATININE  0.96  0.92   CALCIUM  10.4  10.1                      Assessment/Plan     * CVA (cerebral vascular accident) (CMS-HCC)   Assessment & Plan    Expressive aphasia with right sided weakness. MRI showed left frontal/parietal/temporal infarct.  and A1c 5.9%.  Had progression of aphasia and right hemiparesis 12/21. Stat CTA showed L ICA occlusion with possible dissection, which was confirmed on MRA. I discussed with Dr. Simeon 12/21 and 12/22, says the benefits of anticoagulation compared to antiplt are minimal when compared to the risk of hemorrhage. Consulted Dr. Cooper who says no surgical intervention recommended. Spoke with IR and patient has high risk for hemorrhage with stenting.  - tolerating tube feeds, consulted GI for PEG   - ST/PT/OT following, son wants long term placement near him in [place], [place]  - continue asa and statin  - heparin dvt ppx  - q4h neuro checks  Needs snf not candidate for rehab as per rehab team. To [facility] waiting [facility] bed.           HTN (hypertension)   Assessment & Plan    Patient not on any medications as outpatient  IV PRN If SBP >160  On norvasc, Low dose lisinopril  bp stable will keep monitoring.            Hypokalemia   Assessment & Plan    Replete as needed         Hyponatremia   Assessment & Plan    Actually he has hyper natremia, starting on frre water flushes 250 cc TID   Monitor         Advance care planning   Assessment & Plan    Spoke with son, wife, mother at bedside. They  understand the severe situation. They want son to be primary contact. We discussed code status and they want to keep him full code, they are ok with artificial nutrition and PEG placement.  S/p Peg placement on 12/26  Tolerating peg feeding.               Reviewed items::  Medications reviewed, Labs reviewed and Radiology images reviewed  Alexander catheter::  No Alexander  DVT prophylaxis pharmacological::  Heparin

## 2018-01-07 NOTE — PROGRESS NOTES
Saumya Rios Fall Risk Assessment:     Last Known Fall: Within the last month  Mobility: Hemiplegic, paraplegia, or quadriplegia  Medications: No meds  Mental Status/LOC/Awareness: Lethargic/oriented to person only  Toileting Needs: Incontinence  Volume/Electrolyte Status: Use of IV fluids/tube feeds  Communication/Sensory: Non-English patient/unable to speak/slurred speech  Behavior: Appropriate behavior  Saumya Rios Fall Risk Total: 18  Fall Risk Level: HIGH RISK    Universal Fall Precautions:  call light/belongings in reach, bed in low position and locked, wheelchairs and assistive devices out of sight, siderails up x 2, use non-slip footwear, adequate lighting, clutter free and spill free environment, educate on level of risk, educate to call for assistance    Fall Risk Level Interventions:    TRIAL ([employer], NEURO, MED MICHAEL 5) Moderate Fall Risk Interventions  Place yellow fall risk ID band on patient: verified  Provide patient/family education based on risk assessment : verified  Educate patient/family to call staff for assistance when getting out of bed: verified  Place fall precaution signage outside patient door: verified  Utilize bed/chair fall alarm: verifiedTRIAL ([employer] 8, NEURO, MED MICHAEL 5) High Fall Risk Interventions  Place yellow fall risk ID band on patient: verified  Provide patient/family education based on risk assessment: completed  Educate patient/family to call staff for assistance when getting out of bed: completed  Place fall precaution signage outside patient door: verified  Place patient in room close to nursing station: completed  Utilize bed/chair fall alarm: verified  Notify charge of high risk for huddle: completed    Patient Specific Interventions:     Medication: review medications with patient and family  Mental Status/LOC/Awareness: reorient patient, reinforce falls education, check on patient hourly, utilize bed/chair fall alarm and reinforce the use of call light  Toileting:  monitor intake and output/use of appropriate interventions  Volume/Electrolyte Status: ensure patient remains hydrated  Communication/Sensory: update plan of care on whiteboard  Behavioral: instruct/reinforce fall program rationale  Mobility: utilize bed/chair fall alarm and ensure bed is locked and in lowest position

## 2018-01-08 LAB
CRP SERPL HS-MCNC: 0.48 MG/DL (ref 0–0.75)
PREALB SERPL-MCNC: 23 MG/DL (ref 18–38)

## 2018-01-08 PROCEDURE — 700102 HCHG RX REV CODE 250 W/ 637 OVERRIDE(OP): Performed by: INTERNAL MEDICINE

## 2018-01-08 PROCEDURE — A9270 NON-COVERED ITEM OR SERVICE: HCPCS | Performed by: HOSPITALIST

## 2018-01-08 PROCEDURE — 92526 ORAL FUNCTION THERAPY: CPT

## 2018-01-08 PROCEDURE — 97110 THERAPEUTIC EXERCISES: CPT

## 2018-01-08 PROCEDURE — 36415 COLL VENOUS BLD VENIPUNCTURE: CPT

## 2018-01-08 PROCEDURE — 97535 SELF CARE MNGMENT TRAINING: CPT

## 2018-01-08 PROCEDURE — 770006 HCHG ROOM/CARE - MED/SURG/GYN SEMI*

## 2018-01-08 PROCEDURE — 84134 ASSAY OF PREALBUMIN: CPT

## 2018-01-08 PROCEDURE — 99232 SBSQ HOSP IP/OBS MODERATE 35: CPT | Performed by: INTERNAL MEDICINE

## 2018-01-08 PROCEDURE — A9270 NON-COVERED ITEM OR SERVICE: HCPCS | Performed by: INTERNAL MEDICINE

## 2018-01-08 PROCEDURE — 97112 NEUROMUSCULAR REEDUCATION: CPT

## 2018-01-08 PROCEDURE — 700111 HCHG RX REV CODE 636 W/ 250 OVERRIDE (IP): Performed by: INTERNAL MEDICINE

## 2018-01-08 PROCEDURE — 700102 HCHG RX REV CODE 250 W/ 637 OVERRIDE(OP): Performed by: HOSPITALIST

## 2018-01-08 PROCEDURE — 97530 THERAPEUTIC ACTIVITIES: CPT

## 2018-01-08 PROCEDURE — 86140 C-REACTIVE PROTEIN: CPT

## 2018-01-08 RX ADMIN — NICOTINE 14 MG: 14 PATCH, EXTENDED RELEASE TRANSDERMAL at 05:23

## 2018-01-08 RX ADMIN — AMLODIPINE BESYLATE 10 MG: 5 TABLET ORAL at 09:47

## 2018-01-08 RX ADMIN — HEPARIN SODIUM 5000 UNITS: 5000 INJECTION, SOLUTION INTRAVENOUS; SUBCUTANEOUS at 05:24

## 2018-01-08 RX ADMIN — POTASSIUM BICARBONATE 25 MEQ: 25 TABLET, EFFERVESCENT ORAL at 09:47

## 2018-01-08 RX ADMIN — ACETAMINOPHEN 650 MG: 325 TABLET, FILM COATED ORAL at 04:40

## 2018-01-08 RX ADMIN — ATORVASTATIN CALCIUM 80 MG: 80 TABLET, FILM COATED ORAL at 19:02

## 2018-01-08 RX ADMIN — LISINOPRIL 5 MG: 5 TABLET ORAL at 09:47

## 2018-01-08 RX ADMIN — HEPARIN SODIUM 5000 UNITS: 5000 INJECTION, SOLUTION INTRAVENOUS; SUBCUTANEOUS at 19:03

## 2018-01-08 RX ADMIN — POTASSIUM BICARBONATE 25 MEQ: 25 TABLET, EFFERVESCENT ORAL at 19:02

## 2018-01-08 RX ADMIN — OMEPRAZOLE 40 MG: 20 CAPSULE, DELAYED RELEASE ORAL at 09:00

## 2018-01-08 RX ADMIN — HEPARIN SODIUM 5000 UNITS: 5000 INJECTION, SOLUTION INTRAVENOUS; SUBCUTANEOUS at 13:04

## 2018-01-08 RX ADMIN — ASPIRIN 81 MG: 81 TABLET, CHEWABLE ORAL at 09:47

## 2018-01-08 ASSESSMENT — COGNITIVE AND FUNCTIONAL STATUS - GENERAL
STANDING UP FROM CHAIR USING ARMS: A LOT
SUGGESTED CMS G CODE MODIFIER DAILY ACTIVITY: CL
WALKING IN HOSPITAL ROOM: TOTAL
DRESSING REGULAR LOWER BODY CLOTHING: A LOT
HELP NEEDED FOR BATHING: A LOT
DRESSING REGULAR UPPER BODY CLOTHING: A LOT
TOILETING: A LOT
TURNING FROM BACK TO SIDE WHILE IN FLAT BAD: A LOT
SUGGESTED CMS G CODE MODIFIER MOBILITY: CM
CLIMB 3 TO 5 STEPS WITH RAILING: TOTAL
MOVING TO AND FROM BED TO CHAIR: A LOT
DAILY ACTIVITIY SCORE: 12
PERSONAL GROOMING: A LITTLE
EATING MEALS: TOTAL
MOVING FROM LYING ON BACK TO SITTING ON SIDE OF FLAT BED: UNABLE
MOBILITY SCORE: 9

## 2018-01-08 ASSESSMENT — PAIN SCALES - GENERAL
PAINLEVEL_OUTOF10: 0
PAINLEVEL_OUTOF10: 4

## 2018-01-08 ASSESSMENT — GAIT ASSESSMENTS: GAIT LEVEL OF ASSIST: UNABLE TO PARTICIPATE

## 2018-01-08 NOTE — PROGRESS NOTES
[facility] Hospitalist Progress Note    Date of Service: 2018    Chief Complaint  52 y.o. male admitted 2017 with HTN and obesity who presented with expressive aphasia and right sided weakness due to CVA.    Interval Problem Update  Pt seen and examined no overnight events afebrile , no changes, pending placement     Consultants/Specialty  GI    Disposition  SNF. Long term plan is to be placed near his son.         Review of Systems   Unable to perform ROS: Acuity of condition (espressive aphasia)      Physical Exam  Laboratory/Imaging   Hemodynamics  Temp (24hrs), Av.8 °C (98.3 °F), Min:36.6 °C (97.9 °F), Max:37.1 °C (98.8 °F)   Temperature: 37.1 °C (98.8 °F)  Pulse  Av.2  Min: 67  Max: 118    Blood Pressure: 130/86      Respiratory      Respiration: 18, Pulse Oximetry: 91 %        RUL Breath Sounds: Diminished, RML Breath Sounds: Diminished, RLL Breath Sounds: Diminished, MIRTA Breath Sounds: Diminished, LLL Breath Sounds: Diminished    Fluids    Intake/Output Summary (Last 24 hours) at 18 1352  Last data filed at 18 1200   Gross per 24 hour   Intake             2340 ml   Output             1200 ml   Net             1140 ml       Nutrition  Orders Placed This Encounter   Procedures   • DIET NPO     Standing Status:   Standing     Number of Occurrences:   22     Order Specific Question:   Restrict to:     Answer:   Strict [1]     Comments:   Hold tube feeds     Physical Exam   Constitutional: No distress.   Neck: Normal range of motion.   Cardiovascular: Normal rate, regular rhythm and normal heart sounds.    No murmur heard.  Pulmonary/Chest: Effort normal and breath sounds normal. No respiratory distress. He has no wheezes.   Abdominal: Soft. Bowel sounds are normal. He exhibits no distension. There is no tenderness.   Musculoskeletal: He exhibits no edema.   Neurological: He is alert. He exhibits abnormal muscle tone (no movement of RUE and RLE motor is 0/5).   right facial droop,  aphasia   Skin: Skin is warm and dry.   Nursing note and vitals reviewed.      Recent Labs      01/06/18   0243  01/07/18   1111   WBC  14.3*  11.7*   RBC  5.44  5.37   HEMOGLOBIN  17.8  17.7   HEMATOCRIT  54.2*  53.3*   MCV  99.6*  99.3*   MCH  32.7  33.0   MCHC  32.8*  33.2*   RDW  43.3  42.9   PLATELETCT  272  238   MPV  13.8*  13.4*     Recent Labs      01/06/18   0243  01/07/18   1110   SODIUM  149*  148*   POTASSIUM  3.7  3.7   CHLORIDE  114*  114*   CO2  23  26   GLUCOSE  142*  121*   BUN  42*  39*   CREATININE  0.96  0.92   CALCIUM  10.4  10.1                      Assessment/Plan     * CVA (cerebral vascular accident) (CMS-HCC)   Assessment & Plan    Expressive aphasia with right sided weakness. MRI showed left frontal/parietal/temporal infarct.  and A1c 5.9%.  Had progression of aphasia and right hemiparesis 12/21. Stat CTA showed L ICA occlusion with possible dissection, which was confirmed on MRA. I discussed with Dr. Simeon 12/21 and 12/22, says the benefits of anticoagulation compared to antiplt are minimal when compared to the risk of hemorrhage. Consulted Dr. Cooper who says no surgical intervention recommended. Spoke with IR and patient has high risk for hemorrhage with stenting.  - tolerating tube feeds, consulted GI for PEG   - ST/PT/OT following, son wants long term placement near him in [place], [place]  - continue asa and statin  - heparin dvt ppx  - q4h neuro checks  Needs snf not candidate for rehab as per rehab team. To [facility] bed.           HTN (hypertension)   Assessment & Plan    Patient not on any medications as outpatient  IV PRN If SBP >160  On norvasc, Low dose lisinopril  bp stable will keep monitoring.            Hypokalemia   Assessment & Plan    Replete as needed         Hyponatremia   Assessment & Plan    Actually he has hyper natremia, starting on frre water flushes 250 cc TID   Monitor         Advance care planning   Assessment & Plan    Spoke with son, wife,  mother at bedside. They understand the severe situation. They want son to be primary contact. We discussed code status and they want to keep him full code, they are ok with artificial nutrition and PEG placement.  S/p Peg placement on 12/26  Tolerating peg feeding.               Reviewed items::  Medications reviewed, Labs reviewed and Radiology images reviewed  Alexander catheter::  No Alexander  DVT prophylaxis pharmacological::  Heparin

## 2018-01-08 NOTE — DISCHARGE PLANNING
Medical Social Worker    SW spoke to [facility] Admissions. They have denied pt due to not having a Medicaid bed open.

## 2018-01-08 NOTE — THERAPY
"Occupational Therapy Treatment completed with focus on ADLs, ADL transfers and upper extremity function.  Functional Status:  Min A for grooming while seated in w/c. Tactile cues for forehead; pt wiping around mouth repetitively. Pt max Ax2 for w/c>EOB going to L. Mod A for sit>supine for LB management. Pt observed to I hold onto RUE while seated in w/c. Pt required mod A to scoot while in bed. Will continue to follow for acute OT services while in-house.  Plan of Care: Will benefit from Occupational Therapy 4 times per week  Discharge Recommendations:  Equipment Will Continue to Assess for Equipment Needs. Post-acute therapy Discharge to a transitional care facility for continued skilled therapy services.    See \"Rehab Therapy-Acute\" Patient Summary Report for complete documentation.   "

## 2018-01-08 NOTE — PROGRESS NOTES
Saumya Rios Fall Risk Assessment:     Last Known Fall: Within the last month  Mobility: Hemiplegic, paraplegia, or quadriplegia  Medications: Cardiovascular or central nervous system meds  Mental Status/LOC/Awareness: Lethargic/oriented to person only  Toileting Needs: Incontinence  Volume/Electrolyte Status: Use of IV fluids/tube feeds  Communication/Sensory: Non-English patient/unable to speak/slurred speech  Behavior: Appropriate behavior  Saumya Rios Fall Risk Total: 19  Fall Risk Level: HIGH RISK    Universal Fall Precautions:  call light/belongings in reach, bed in low position and locked, siderails up x 2, wheelchairs and assistive devices out of sight, use non-slip footwear, adequate lighting, clutter free and spill free environment, educate on level of risk, educate to call for assistance    Fall Risk Level Interventions:    TRIAL ([employer] 8, NEURO, MED MICHAEL 5) Moderate Fall Risk Interventions  Place yellow fall risk ID band on patient: verified  Provide patient/family education based on risk assessment : verified  Educate patient/family to call staff for assistance when getting out of bed: verified  Place fall precaution signage outside patient door: verified  Utilize bed/chair fall alarm: verifiedTRIAL ([employer] 8, NEURO, [employer] MICHAEL 5) High Fall Risk Interventions  Place yellow fall risk ID band on patient: verified  Provide patient/family education based on risk assessment: completed  Educate patient/family to call staff for assistance when getting out of bed: completed  Place fall precaution signage outside patient door: verified  Place patient in room close to nursing station: completed  Utilize bed/chair fall alarm: verified  Notify charge of high risk for huddle: completed    Patient Specific Interventions:     Medication: review medications with patient and family  Mental Status/LOC/Awareness: reinforce falls education, check on patient hourly, utilize bed/chair fall alarm and reinforce the use of call  light  Toileting: provide frquent toileting  Volume/Electrolyte Status: ensure patient remains hydrated  Communication/Sensory: update plan of care on whiteboard  Behavioral: instruct/reinforce fall program rationale  Mobility: utilize bed/chair fall alarm and ensure bed is locked and in lowest position

## 2018-01-08 NOTE — PROGRESS NOTES
Report received from MICHAEL Matamoros. Pt is nonverbal (expressive aphasia), no family at bedside. Assessment completed. Pt has a PEG tube LUQ, running replete TF @85, goal. 250ml H2O flushes q8h (see I/O flowsheets), and is R side flaccid. Pt also has a condom cath in place as well as SCDs. Pt denies pain. Pt requires assistance of 2 people for repositioning. Pt educated regarding plan of care, including [id number] and placement. All questions answered. Call light and personal belongings in reach. No additional needs at this time. Bed alarm in use.

## 2018-01-08 NOTE — THERAPY
"Physical Therapy Treatment completed.   Bed Mobility:  Supine to Sit: Moderate Assist (Rt side of bed w/ HOB flat. )  Transfers: Sit to Stand: Maximal Assist (from EOB and // bars)  Gait: Pre-gait mobility in // bars   Plan of Care: Will benefit from Physical Therapy 5 times per week  Discharge Recommendations: Equipment: Will Continue to Assess for Equipment Needs. Recommend rehab consult.     See \"Rehab Therapy-Acute\" Patient Summary Report for complete documentation.       "

## 2018-01-08 NOTE — CARE PLAN
Problem: Discharge Barriers/Planning  Goal: Patient's continuum of care needs will be met    Intervention: Assess potential discharge barriers on admission and throughout hospital stay  Pt is pending placement to a SNF near his son.      Problem: Skin Integrity  Goal: Risk for impaired skin integrity will decrease    Intervention: Assess risk factors for impaired skin integrity and/or pressure ulcers  [id number] implemented, condom cath in use d/t incontinence, extra pillows used for repositioning, PT getting pt up to chair as well.

## 2018-01-08 NOTE — PROGRESS NOTES
Assumed pt care at 1900. Pt alert to self with expressive aphasia. Tube feeding running at 85, goal, and HOB at 30 degrees. Call light and personal belongings within reach. Pt performing own oral care when provided with supplies. Nurse assist to brush gingiva, gingiva bleeding. Q2 turns in place, Q4 hour oral care and Q4 hour neuro checks.  Medicated for pain per MAR.

## 2018-01-08 NOTE — DISCHARGE PLANNING
Medical Social Worker    SARIKA spoke to [facility]- Steven, and discussed pt. Steven stated to please resend a referral to them and they would take a second look at the pt.     SARIKA asked DANIEL Gomes to send an updated referral to [facility].

## 2018-01-09 PROBLEM — E87.0 HYPERNATREMIA: Status: ACTIVE | Noted: 2018-01-09

## 2018-01-09 PROBLEM — E87.6 HYPOKALEMIA: Status: RESOLVED | Noted: 2017-12-23 | Resolved: 2018-01-09

## 2018-01-09 PROBLEM — E87.1 HYPONATREMIA: Status: RESOLVED | Noted: 2017-12-21 | Resolved: 2018-01-09

## 2018-01-09 LAB
ANION GAP SERPL CALC-SCNC: 8 MMOL/L (ref 0–11.9)
BUN SERPL-MCNC: 37 MG/DL (ref 8–22)
CALCIUM SERPL-MCNC: 9.7 MG/DL (ref 8.5–10.5)
CHLORIDE SERPL-SCNC: 114 MMOL/L (ref 96–112)
CO2 SERPL-SCNC: 26 MMOL/L (ref 20–33)
CREAT SERPL-MCNC: 0.84 MG/DL (ref 0.5–1.4)
ERYTHROCYTE [DISTWIDTH] IN BLOOD BY AUTOMATED COUNT: 43.1 FL (ref 35.9–50)
GLUCOSE SERPL-MCNC: 116 MG/DL (ref 65–99)
HCT VFR BLD AUTO: 50.9 % (ref 42–52)
HGB BLD-MCNC: 16.8 G/DL (ref 14–18)
MCH RBC QN AUTO: 32.9 PG (ref 27–33)
MCHC RBC AUTO-ENTMCNC: 33 G/DL (ref 33.7–35.3)
MCV RBC AUTO: 99.6 FL (ref 81.4–97.8)
PLATELET # BLD AUTO: 203 K/UL (ref 164–446)
PMV BLD AUTO: 14.3 FL (ref 9–12.9)
POTASSIUM SERPL-SCNC: 3.7 MMOL/L (ref 3.6–5.5)
RBC # BLD AUTO: 5.11 M/UL (ref 4.7–6.1)
SODIUM SERPL-SCNC: 148 MMOL/L (ref 135–145)
WBC # BLD AUTO: 9.9 K/UL (ref 4.8–10.8)

## 2018-01-09 PROCEDURE — A9270 NON-COVERED ITEM OR SERVICE: HCPCS | Performed by: INTERNAL MEDICINE

## 2018-01-09 PROCEDURE — 770006 HCHG ROOM/CARE - MED/SURG/GYN SEMI*

## 2018-01-09 PROCEDURE — 97110 THERAPEUTIC EXERCISES: CPT

## 2018-01-09 PROCEDURE — 99232 SBSQ HOSP IP/OBS MODERATE 35: CPT | Performed by: INTERNAL MEDICINE

## 2018-01-09 PROCEDURE — 700111 HCHG RX REV CODE 636 W/ 250 OVERRIDE (IP): Performed by: INTERNAL MEDICINE

## 2018-01-09 PROCEDURE — A9270 NON-COVERED ITEM OR SERVICE: HCPCS | Performed by: HOSPITALIST

## 2018-01-09 PROCEDURE — 36415 COLL VENOUS BLD VENIPUNCTURE: CPT

## 2018-01-09 PROCEDURE — 700102 HCHG RX REV CODE 250 W/ 637 OVERRIDE(OP): Performed by: INTERNAL MEDICINE

## 2018-01-09 PROCEDURE — 80048 BASIC METABOLIC PNL TOTAL CA: CPT

## 2018-01-09 PROCEDURE — 700102 HCHG RX REV CODE 250 W/ 637 OVERRIDE(OP): Performed by: HOSPITALIST

## 2018-01-09 PROCEDURE — 97112 NEUROMUSCULAR REEDUCATION: CPT

## 2018-01-09 PROCEDURE — 85027 COMPLETE CBC AUTOMATED: CPT

## 2018-01-09 RX ADMIN — AMLODIPINE BESYLATE 10 MG: 5 TABLET ORAL at 09:33

## 2018-01-09 RX ADMIN — HEPARIN SODIUM 5000 UNITS: 5000 INJECTION, SOLUTION INTRAVENOUS; SUBCUTANEOUS at 04:37

## 2018-01-09 RX ADMIN — ASPIRIN 81 MG: 81 TABLET, CHEWABLE ORAL at 09:33

## 2018-01-09 RX ADMIN — HEPARIN SODIUM 5000 UNITS: 5000 INJECTION, SOLUTION INTRAVENOUS; SUBCUTANEOUS at 14:07

## 2018-01-09 RX ADMIN — LISINOPRIL 5 MG: 5 TABLET ORAL at 09:33

## 2018-01-09 RX ADMIN — NICOTINE 14 MG: 14 PATCH, EXTENDED RELEASE TRANSDERMAL at 04:36

## 2018-01-09 RX ADMIN — HEPARIN SODIUM 5000 UNITS: 5000 INJECTION, SOLUTION INTRAVENOUS; SUBCUTANEOUS at 20:31

## 2018-01-09 RX ADMIN — OMEPRAZOLE 40 MG: 20 CAPSULE, DELAYED RELEASE ORAL at 09:33

## 2018-01-09 RX ADMIN — POTASSIUM BICARBONATE 25 MEQ: 25 TABLET, EFFERVESCENT ORAL at 09:33

## 2018-01-09 RX ADMIN — ATORVASTATIN CALCIUM 80 MG: 80 TABLET, FILM COATED ORAL at 20:31

## 2018-01-09 RX ADMIN — POTASSIUM BICARBONATE 25 MEQ: 25 TABLET, EFFERVESCENT ORAL at 20:31

## 2018-01-09 ASSESSMENT — COGNITIVE AND FUNCTIONAL STATUS - GENERAL
SUGGESTED CMS G CODE MODIFIER MOBILITY: CM
TURNING FROM BACK TO SIDE WHILE IN FLAT BAD: A LOT
MOVING FROM LYING ON BACK TO SITTING ON SIDE OF FLAT BED: UNABLE
MOVING TO AND FROM BED TO CHAIR: A LOT
CLIMB 3 TO 5 STEPS WITH RAILING: TOTAL
WALKING IN HOSPITAL ROOM: TOTAL
MOBILITY SCORE: 9
STANDING UP FROM CHAIR USING ARMS: A LOT

## 2018-01-09 ASSESSMENT — GAIT ASSESSMENTS: GAIT LEVEL OF ASSIST: UNABLE TO PARTICIPATE

## 2018-01-09 NOTE — DISCHARGE PLANNING
Care Transition Team Discharge Planning    Anticipated Discharge Information  Anticipated discharge disposition: SNF  Discharge Address: [address], [place] [address]  Discharge Contact Phone Number: [phone]              Discharge Plan:  Discussed pt's case with PT, they are recommending Acute Rehab. [facility] has evaluated pt in the past, called rehab liaison and discussed pt's case and they will have rehab MD re-evaluate.     Discussed with [facility], [facility] evaluating and [facility] has spoken to [facility] to re-eval Medicaid bed availability.     Needs:   Rehab to re-evaulate    [facility].

## 2018-01-09 NOTE — PROGRESS NOTES
[facility] Hospitalist Progress Note    Date of Service: 2018    Chief Complaint  52 y.o. male admitted 2017 with AMS/aphasia/R HP/dysphagia    Interval Problem Update  Awake; severely aphasic, not able to verbalize much; can follow some commands but not all; tolerates TF so far.      Consultants/Specialty  Neurology/neurosurgery/GI/Acute Rehab    Disposition  snf        Review of Systems   Unable to perform ROS: Mental acuity      Physical Exam  Laboratory/Imaging   Hemodynamics  Temp (24hrs), Av.4 °C (97.5 °F), Min:36.2 °C (97.1 °F), Max:36.7 °C (98 °F)   Temperature: 36.2 °C (97.2 °F)  Pulse  Av.5  Min: 67  Max: 118    Blood Pressure: 116/81      Respiratory      Respiration: 18, Pulse Oximetry: 92 %             Fluids    Intake/Output Summary (Last 24 hours) at 18 1007  Last data filed at 18 0600   Gross per 24 hour   Intake             2540 ml   Output              600 ml   Net             1940 ml       Nutrition  Orders Placed This Encounter   Procedures   • DIET NPO     Standing Status:   Standing     Number of Occurrences:   22     Order Specific Question:   Restrict to:     Answer:   Strict [1]     Comments:   Hold tube feeds     Physical Exam   Constitutional: No distress.   HENT:   Head: Normocephalic.   Eyes: EOM are normal.   Neck: Neck supple.   Cardiovascular: Normal rate and regular rhythm.    Pulmonary/Chest: Effort normal and breath sounds normal.   Abdominal: Soft. Bowel sounds are normal. He exhibits no distension.   PEG site ok   Musculoskeletal: He exhibits no edema.   Neurological: He is alert.   Cannot move RUE/RLE to commands; good strength on L side; severe expressive aphasia and some receptive aphasia;   Skin: Skin is warm.       Recent Labs      18   1111  18   0312   WBC  11.7*  9.9   RBC  5.37  5.11   HEMOGLOBIN  17.7  16.8   HEMATOCRIT  53.3*  50.9   MCV  99.3*  99.6*   MCH  33.0  32.9   MCHC  33.2*  33.0*   RDW  42.9  43.1   PLATELETCT  238  203    MPV  13.4*  14.3*     Recent Labs      01/07/18   1110  01/09/18   0312   SODIUM  148*  148*   POTASSIUM  3.7  3.7   CHLORIDE  114*  114*   CO2  26 26   GLUCOSE  121*  116*   BUN  39*  37*   CREATININE  0.92  0.84   CALCIUM  10.1  9.7                      Assessment/Plan     * CVA (cerebral vascular accident) (CMS-HCC)   Assessment & Plan    Expressive/receptive aphasia/dysphagia/ right sided weakness. MRI showed left frontal/parietal/temporal infarct.  and A1c 5.9%.  Had progression of aphasia and right hemiparesis 12/21. Stat CTA showed L ICA occlusion with possible dissection, which was confirmed on MRA. I discussed with Dr. Simeon 12/21 and 12/22, says the benefits of anticoagulation compared to antiplt are minimal when compared to the risk of hemorrhage. Consulted Dr. Cooper who says no surgical intervention recommended. Spoke with IR and patient has high risk for hemorrhage with stenting.  - tolerating tube feeds, s/p PEG 12/26  - ST/PT/OT following, son wants long term placement near him in [place], [place]  - continue asa and statin  - heparin dvt ppx  - q4h neuro checks  Needs snf not candidate for rehab as per rehab team. Await SNF transfer.          HTN (hypertension)   Assessment & Plan    Patient not on any medications as outpatient  Controlled bp's on current rx. Cont to monitor.          Hypernatremia   Assessment & Plan    Increase free water per PEG and monitor labs; stable neuro status        Advance care planning   Assessment & Plan    Spoke with son, wife, mother at bedside. They understand the severe situation. They want son to be primary contact. We discussed code status and they want to keep him full code, they are ok with artificial nutrition and PEG placement.  S/p Peg placement on 12/26  Tolerating peg feeding.             Quality-Core Measures

## 2018-01-09 NOTE — DISCHARGE PLANNING
[facility] Updated referral resent to [facility] per Aries's(SARIKA) request.

## 2018-01-09 NOTE — CARE PLAN
Problem: Safety  Goal: Will remain free from falls    Intervention: Implement fall precautions  Side rails up x 3

## 2018-01-09 NOTE — THERAPY
"Physical Therapy Treatment completed.   Bed Mobility:  Supine to Sit: Maximal Assist  Transfers: Sit to Stand: Maximal Assist (wc<->bed/mat table)  Gait: Level Of Assist: Unable to Participate with Will Continue to Assess for Equipment Needs       Plan of Care: Will benefit from Physical Therapy 5 times per week  Discharge Recommendations: Equipment: Will Continue to Assess for Equipment Needs. Post-acute therapy Discharge to a transitional care facility for continued skilled therapy services.     See \"Rehab Therapy-Acute\" Patient Summary Report for complete documentation.       "

## 2018-01-09 NOTE — THERAPY
"Speech Language Therapy dysphagia treatment completed.   Functional Status:  Patient was seen for dysphagia therapy.  No response appreciated to yes/no questions, simple directives given max verbal, visual, tactile cues.  Reduced visualization of oral cavity, as pt did not follow instructions to open, however patient appeared eager to swab mouth when presented with oral swabs.  Patient did not allow for oral care from clinician, however he swabbed his own mouth prior to commencement of PO trials.  He appeared to shift around in bed while HOB was being raised.  Presented pt with small single ice chip x4, and 1/4 tsp NTL x3.  The patient presented with prolonged oral phase and 2-4 second delay in initiating swallow trigger.  Vocal quality remained clear with 3 vocalizations of expletives.  Pt was unable to follow directives for swallow strategies/precautions and/or oral motor movements with max verbal/visual cuing. Attempted use of communication board.  Patient visually scanned board, and then grabbed it into a near crumble and set it down.  He extended a fist straight forward x3, seemingly approximating a communication attempt vs aggressive action.  He was unresponsive to yes/no questions, use of AAC, and gestures.  Pt rolled over and appeared to point to his back and opened eyes wide while making eye contact.  Notified nursing staff of possible pain/discomfort.  Recommendations: Recommend to continue NPO/PEG. Patient may benefit from an instrumental evaluation when able to tolerate. SLP will continue to follow.  Plan of Care: Will benefit from Speech Therapy 5 times per week  Post-Acute Therapy: Discharge to a transitional care facility for continued skilled therapy services.     See \"Rehab Therapy-Acute\" Patient Summary Report for complete documentation.      "

## 2018-01-09 NOTE — PROGRESS NOTES
Saumya Rios Fall Risk Assessment:     Last Known Fall: Within the last month  Mobility: Hemiplegic, paraplegia, or quadriplegia  Medications: Cardiovascular or central nervous system meds  Mental Status/LOC/Awareness: Lethargic/oriented to person only  Toileting Needs: Incontinence  Volume/Electrolyte Status: Use of IV fluids/tube feeds  Communication/Sensory: Non-English patient/unable to speak/slurred speech  Behavior: Appropriate behavior  Saumya Rios Fall Risk Total: 19  Fall Risk Level: HIGH RISK    Universal Fall Precautions:  siderails up x 2, call light/belongings in reach, wheelchairs and assistive devices out of sight, bed in low position and locked, use non-slip footwear, clutter free and spill free environment, educate on level of risk, educate to call for assistance, adequate lighting    Fall Risk Level Interventions:    TRIAL ([employer] 8, NEURO, MED MICHAEL 5) Moderate Fall Risk Interventions  Place yellow fall risk ID band on patient: verified  Provide patient/family education based on risk assessment : verified  Educate patient/family to call staff for assistance when getting out of bed: verified  Place fall precaution signage outside patient door: verified  Utilize bed/chair fall alarm: verifiedTRIAL ([employer] 8, NEURO, MED MICHEAL 5) High Fall Risk Interventions  Place yellow fall risk ID band on patient: completed  Provide patient/family education based on risk assessment: completed  Educate patient/family to call staff for assistance when getting out of bed: completed  Place fall precaution signage outside patient door: completed  Place patient in room close to nursing station: completed  Utilize bed/chair fall alarm: completed  Notify charge of high risk for huddle: completed    Patient Specific Interventions:     Medication: limit combination of prn medications  Mental Status/LOC/Awareness: consider lap belt  Toileting: not applicable  Volume/Electrolyte Status: ensure patient remains  hydrated  Communication/Sensory: update plan of care on whiteboard  Behavioral: instruct/reinforce fall program rationale  Mobility: ensure bed is locked and in lowest position

## 2018-01-09 NOTE — CARE PLAN
Problem: Fluid Volume:  Goal: Will maintain balanced intake and output    Intervention: Monitor, educate, and encourage compliance with therapeutic intake of liquids  Free water flushed patient's PEG at 2000pm and 0400am with 250mL each time

## 2018-01-10 LAB
ANION GAP SERPL CALC-SCNC: 10 MMOL/L (ref 0–11.9)
BUN SERPL-MCNC: 36 MG/DL (ref 8–22)
CALCIUM SERPL-MCNC: 9.7 MG/DL (ref 8.5–10.5)
CHLORIDE SERPL-SCNC: 112 MMOL/L (ref 96–112)
CO2 SERPL-SCNC: 23 MMOL/L (ref 20–33)
CREAT SERPL-MCNC: 0.71 MG/DL (ref 0.5–1.4)
GLUCOSE SERPL-MCNC: 104 MG/DL (ref 65–99)
POTASSIUM SERPL-SCNC: 3.8 MMOL/L (ref 3.6–5.5)
SODIUM SERPL-SCNC: 145 MMOL/L (ref 135–145)

## 2018-01-10 PROCEDURE — A9270 NON-COVERED ITEM OR SERVICE: HCPCS | Performed by: INTERNAL MEDICINE

## 2018-01-10 PROCEDURE — 97530 THERAPEUTIC ACTIVITIES: CPT

## 2018-01-10 PROCEDURE — 770006 HCHG ROOM/CARE - MED/SURG/GYN SEMI*

## 2018-01-10 PROCEDURE — 97110 THERAPEUTIC EXERCISES: CPT

## 2018-01-10 PROCEDURE — 700102 HCHG RX REV CODE 250 W/ 637 OVERRIDE(OP): Performed by: HOSPITALIST

## 2018-01-10 PROCEDURE — 700102 HCHG RX REV CODE 250 W/ 637 OVERRIDE(OP): Performed by: INTERNAL MEDICINE

## 2018-01-10 PROCEDURE — 700111 HCHG RX REV CODE 636 W/ 250 OVERRIDE (IP): Performed by: INTERNAL MEDICINE

## 2018-01-10 PROCEDURE — A9270 NON-COVERED ITEM OR SERVICE: HCPCS | Performed by: HOSPITALIST

## 2018-01-10 PROCEDURE — 97112 NEUROMUSCULAR REEDUCATION: CPT

## 2018-01-10 PROCEDURE — 36415 COLL VENOUS BLD VENIPUNCTURE: CPT

## 2018-01-10 PROCEDURE — 80048 BASIC METABOLIC PNL TOTAL CA: CPT

## 2018-01-10 PROCEDURE — 99232 SBSQ HOSP IP/OBS MODERATE 35: CPT | Performed by: INTERNAL MEDICINE

## 2018-01-10 PROCEDURE — 92526 ORAL FUNCTION THERAPY: CPT

## 2018-01-10 RX ADMIN — OMEPRAZOLE 40 MG: 20 CAPSULE, DELAYED RELEASE ORAL at 08:39

## 2018-01-10 RX ADMIN — HEPARIN SODIUM 5000 UNITS: 5000 INJECTION, SOLUTION INTRAVENOUS; SUBCUTANEOUS at 06:07

## 2018-01-10 RX ADMIN — ASPIRIN 81 MG: 81 TABLET, CHEWABLE ORAL at 08:39

## 2018-01-10 RX ADMIN — ATORVASTATIN CALCIUM 80 MG: 80 TABLET, FILM COATED ORAL at 20:03

## 2018-01-10 RX ADMIN — POTASSIUM BICARBONATE 25 MEQ: 25 TABLET, EFFERVESCENT ORAL at 21:00

## 2018-01-10 RX ADMIN — HEPARIN SODIUM 5000 UNITS: 5000 INJECTION, SOLUTION INTRAVENOUS; SUBCUTANEOUS at 13:19

## 2018-01-10 RX ADMIN — AMLODIPINE BESYLATE 10 MG: 5 TABLET ORAL at 08:39

## 2018-01-10 RX ADMIN — LISINOPRIL 5 MG: 5 TABLET ORAL at 08:39

## 2018-01-10 RX ADMIN — HEPARIN SODIUM 5000 UNITS: 5000 INJECTION, SOLUTION INTRAVENOUS; SUBCUTANEOUS at 20:03

## 2018-01-10 RX ADMIN — NICOTINE 14 MG: 14 PATCH, EXTENDED RELEASE TRANSDERMAL at 06:07

## 2018-01-10 RX ADMIN — POTASSIUM BICARBONATE 25 MEQ: 25 TABLET, EFFERVESCENT ORAL at 08:39

## 2018-01-10 ASSESSMENT — COGNITIVE AND FUNCTIONAL STATUS - GENERAL
MOVING FROM LYING ON BACK TO SITTING ON SIDE OF FLAT BED: UNABLE
MOVING TO AND FROM BED TO CHAIR: A LOT
STANDING UP FROM CHAIR USING ARMS: A LOT
CLIMB 3 TO 5 STEPS WITH RAILING: TOTAL
WALKING IN HOSPITAL ROOM: TOTAL
TURNING FROM BACK TO SIDE WHILE IN FLAT BAD: A LOT
MOBILITY SCORE: 9
SUGGESTED CMS G CODE MODIFIER MOBILITY: CM

## 2018-01-10 NOTE — PROGRESS NOTES
Pt awake, nonverbal, follows some simple commands. Right side very weak. Up to chair with therapy, tolerated for 1 hour. Tolerating TF. BM this am. Incontinent of B&B. Bed alarm and SCDs in place.

## 2018-01-10 NOTE — PROGRESS NOTES
[facility] Hospitalist Progress Note    Date of Service: 1/10/2018    Chief Complaint  52 y.o. male admitted 2017 with AMS/aphasia/R HP/dysphagia    Interval Problem Update  Still cannot verbalize with severe aphasia; does not follow commands well; no new issues per RN    Consultants/Specialty  Neurology/neurosurgery/GI/Acute Rehab    Disposition  snf vs acute rehab        Review of Systems   Unable to perform ROS: Mental acuity      Physical Exam  Laboratory/Imaging   Hemodynamics  Temp (24hrs), Av.6 °C (97.9 °F), Min:36.3 °C (97.3 °F), Max:36.9 °C (98.4 °F)   Temperature: 36.3 °C (97.3 °F)  Pulse  Av.4  Min: 67  Max: 118    Blood Pressure: 137/79      Respiratory      Respiration: 19, Pulse Oximetry: 95 %        RLL Breath Sounds: Diminished, LLL Breath Sounds: Diminished    Fluids    Intake/Output Summary (Last 24 hours) at 01/10/18 1438  Last data filed at 01/10/18 1200   Gross per 24 hour   Intake             1600 ml   Output                0 ml   Net             1600 ml       Nutrition  Orders Placed This Encounter   Procedures   • DIET NPO     Standing Status:   Standing     Number of Occurrences:   22     Order Specific Question:   Restrict to:     Answer:   Strict [1]     Comments:   Hold tube feeds     Physical Exam   Constitutional: No distress.   HENT:   Head: Normocephalic.   Eyes: EOM are normal.   Neck: Neck supple.   Cardiovascular: Normal rate and regular rhythm.    Pulmonary/Chest: Effort normal and breath sounds normal.   Abdominal: Soft. Bowel sounds are normal. He exhibits no distension.   PEG site ok   Musculoskeletal: He exhibits no edema.   Neurological: He is alert.   Cannot move RUE/RLE to commands; good strength on L side; severe expressive aphasia and some receptive aphasia;   Skin: Skin is warm.       Recent Labs      18   0312   WBC  9.9   RBC  5.11   HEMOGLOBIN  16.8   HEMATOCRIT  50.9   MCV  99.6*   MCH  32.9   MCHC  33.0*   RDW  43.1   PLATELETCT  203   MPV  14.3*      Recent Labs      01/09/18   0312  01/10/18   0351   SODIUM  148*  145   POTASSIUM  3.7  3.8   CHLORIDE  114*  112   CO2  26  23   GLUCOSE  116*  104*   BUN  37*  36*   CREATININE  0.84  0.71   CALCIUM  9.7  9.7                      Assessment/Plan     * CVA (cerebral vascular accident) (CMS-HCC)- (present on admission)   Assessment & Plan    Expressive/receptive aphasia/dysphagia/ right sided weakness. MRI showed left frontal/parietal/temporal infarct.  and A1c 5.9%.  Had progression of aphasia and right hemiparesis 12/21. Stat CTA showed L ICA occlusion with possible dissection, which was confirmed on MRA. I discussed with Dr. Simeon 12/21 and 12/22, says the benefits of anticoagulation compared to antiplt are minimal when compared to the risk of hemorrhage. Consulted Dr. Cooper who says no surgical intervention recommended. Spoke with IR and patient has high risk for hemorrhage with stenting.  - tolerating tube feeds, s/p PEG 12/26  - ST/PT/OT following, son wants long term placement near him in [place], [place]  - continue asa and statin  - heparin dvt ppx  - q4h neuro checks; no major improvement of aphasia/dysphagia/R HP today  Needs snf not candidate for rehab as per rehab team previously. Await acute rehab re eval.          HTN (hypertension)- (present on admission)   Assessment & Plan    Patient not on any medications as outpatient  Controlled bp's on current rx. Cont to monitor.          Hypernatremia   Assessment & Plan    Increase free water per PEG with improved sodium level; stable neuro status;         Advance care planning   Assessment & Plan    Spoke with son, wife, mother at bedside. They understand the severe situation. They want son to be primary contact. We discussed code status and they want to keep him full code, they are ok with artificial nutrition and PEG placement.  S/p Peg placement on 12/26  Tolerating peg feeding.             Quality-Core Measures

## 2018-01-10 NOTE — CARE PLAN
Problem: Safety  Goal: Will remain free from injury  Outcome: PROGRESSING AS EXPECTED  Bed low, call light in reach and hourly rounds in place.    Problem: Skin Integrity  Goal: Risk for impaired skin integrity will decrease  Outcome: PROGRESSING AS EXPECTED  Patient is turned and repositioned every 2 hours.  Skin is kept dry and moisturized.

## 2018-01-10 NOTE — DISCHARGE PLANNING
Medical Social Work    Referral: [employer] #[id number]     Intervention: Received message from Smitha with [employer] [facility], attempted to call her back but had to leave a message.     Plan: As above, rehab to re-evaluate pt. Will await a call back from Smitha with pt's insurance.

## 2018-01-10 NOTE — THERAPY
"Physical Therapy Treatment completed.   Bed Mobility:  Supine to Sit: Maximal Assist  Transfers: Sit to Stand: Maximal Assist  Gait: Level Of Assist: Unable to Participate with Will Continue to Assess for Equipment Needs       Plan of Care: Will benefit from Physical Therapy 5 times per week  Discharge Recommendations: Equipment: Will Continue to Assess for Equipment Needs. Post-acute therapy Discharge to a transitional care facility for continued skilled therapy services.     See \"Rehab Therapy-Acute\" Patient Summary Report for complete documentation.       "

## 2018-01-10 NOTE — DISCHARGE PLANNING
[facility] has declined patient as patient as patient could potentially need LTC and unable to accommodate needs. [facility] has declined patient due to non-contracted insurance provider.

## 2018-01-10 NOTE — DISCHARGE PLANNING
Medical Social Worker    SW received call from [facility] to discuss pt and SNF denials. She stated that she would reach out to [facility] in regards to working something out with them.     Pt is to be reevaluated by [facility] Physiatrist.       Add:  SW received return call from [facility]. She stated that they would not be able to work out a plan with [facility].

## 2018-01-11 LAB — EKG IMPRESSION: NORMAL

## 2018-01-11 PROCEDURE — A9270 NON-COVERED ITEM OR SERVICE: HCPCS | Performed by: HOSPITALIST

## 2018-01-11 PROCEDURE — 700111 HCHG RX REV CODE 636 W/ 250 OVERRIDE (IP): Performed by: INTERNAL MEDICINE

## 2018-01-11 PROCEDURE — 51798 US URINE CAPACITY MEASURE: CPT

## 2018-01-11 PROCEDURE — 700102 HCHG RX REV CODE 250 W/ 637 OVERRIDE(OP): Performed by: INTERNAL MEDICINE

## 2018-01-11 PROCEDURE — 700102 HCHG RX REV CODE 250 W/ 637 OVERRIDE(OP): Performed by: HOSPITALIST

## 2018-01-11 PROCEDURE — 97535 SELF CARE MNGMENT TRAINING: CPT

## 2018-01-11 PROCEDURE — A9270 NON-COVERED ITEM OR SERVICE: HCPCS | Performed by: INTERNAL MEDICINE

## 2018-01-11 PROCEDURE — 97530 THERAPEUTIC ACTIVITIES: CPT

## 2018-01-11 PROCEDURE — 97116 GAIT TRAINING THERAPY: CPT

## 2018-01-11 PROCEDURE — 99233 SBSQ HOSP IP/OBS HIGH 50: CPT | Performed by: HOSPITALIST

## 2018-01-11 PROCEDURE — 770006 HCHG ROOM/CARE - MED/SURG/GYN SEMI*

## 2018-01-11 PROCEDURE — 97110 THERAPEUTIC EXERCISES: CPT

## 2018-01-11 RX ADMIN — ASPIRIN 81 MG: 81 TABLET, CHEWABLE ORAL at 09:48

## 2018-01-11 RX ADMIN — POTASSIUM BICARBONATE 25 MEQ: 25 TABLET, EFFERVESCENT ORAL at 20:48

## 2018-01-11 RX ADMIN — OMEPRAZOLE 40 MG: 20 CAPSULE, DELAYED RELEASE ORAL at 09:49

## 2018-01-11 RX ADMIN — LISINOPRIL 5 MG: 5 TABLET ORAL at 09:48

## 2018-01-11 RX ADMIN — AMLODIPINE BESYLATE 10 MG: 5 TABLET ORAL at 09:48

## 2018-01-11 RX ADMIN — NICOTINE 14 MG: 14 PATCH, EXTENDED RELEASE TRANSDERMAL at 06:05

## 2018-01-11 RX ADMIN — ATORVASTATIN CALCIUM 80 MG: 80 TABLET, FILM COATED ORAL at 20:48

## 2018-01-11 RX ADMIN — HEPARIN SODIUM 5000 UNITS: 5000 INJECTION, SOLUTION INTRAVENOUS; SUBCUTANEOUS at 20:48

## 2018-01-11 RX ADMIN — POTASSIUM BICARBONATE 25 MEQ: 25 TABLET, EFFERVESCENT ORAL at 09:49

## 2018-01-11 RX ADMIN — HEPARIN SODIUM 5000 UNITS: 5000 INJECTION, SOLUTION INTRAVENOUS; SUBCUTANEOUS at 06:05

## 2018-01-11 RX ADMIN — HEPARIN SODIUM 5000 UNITS: 5000 INJECTION, SOLUTION INTRAVENOUS; SUBCUTANEOUS at 14:39

## 2018-01-11 ASSESSMENT — GAIT ASSESSMENTS
ASSISTIVE DEVICE: PARALLEL BARS
DISTANCE (FEET): 5
GAIT LEVEL OF ASSIST: MODERATE ASSIST

## 2018-01-11 ASSESSMENT — COGNITIVE AND FUNCTIONAL STATUS - GENERAL
TURNING FROM BACK TO SIDE WHILE IN FLAT BAD: A LOT
CLIMB 3 TO 5 STEPS WITH RAILING: TOTAL
STANDING UP FROM CHAIR USING ARMS: A LOT
HELP NEEDED FOR BATHING: A LOT
WALKING IN HOSPITAL ROOM: TOTAL
MOVING FROM LYING ON BACK TO SITTING ON SIDE OF FLAT BED: UNABLE
SUGGESTED CMS G CODE MODIFIER DAILY ACTIVITY: CL
PERSONAL GROOMING: A LITTLE
EATING MEALS: TOTAL
MOVING TO AND FROM BED TO CHAIR: A LOT
TOILETING: A LOT
DRESSING REGULAR LOWER BODY CLOTHING: A LOT
MOBILITY SCORE: 9
DAILY ACTIVITIY SCORE: 12
SUGGESTED CMS G CODE MODIFIER MOBILITY: CM
DRESSING REGULAR UPPER BODY CLOTHING: A LOT

## 2018-01-11 NOTE — PROGRESS NOTES
Saumya Rios Fall Risk Assessment:     Last Known Fall: Within the last month  Mobility: Use of assistive device/requires assist of two people, Hemiplegic, paraplegia, or quadriplegia, Dizziness/generalized weakness  Medications: Cardiovascular or central nervous system meds  Mental Status/LOC/Awareness: Lethargic/oriented to person only (hard to assess d/t expressive aphasia)  Toileting Needs: Incontinence  Volume/Electrolyte Status: NPO greater than 24 hours, Use of IV fluids/tube feeds  Communication/Sensory: Non-English patient/unable to speak/slurred speech  Behavior: Appropriate behavior  Saumya Rios Fall Risk Total: 24  Fall Risk Level: HIGH RISK    Universal Fall Precautions:  call light/belongings in reach, bed in low position and locked, siderails up x 2, wheelchairs and assistive devices out of sight, use non-slip footwear, adequate lighting, clutter free and spill free environment, educate on level of risk, educate to call for assistance    Fall Risk Level Interventions:    TRIAL (TELE 8, NEURO, MED MICHAEL 5) Moderate Fall Risk Interventions  Place yellow fall risk ID band on patient: verified  Provide patient/family education based on risk assessment : verified  Educate patient/family to call staff for assistance when getting out of bed: verified  Place fall precaution signage outside patient door: verified  Utilize bed/chair fall alarm: verifiedTRIAL (TELE 8, NEURO, [employer] MICHAEL 5) High Fall Risk Interventions  Place yellow fall risk ID band on patient: verified  Provide patient/family education based on risk assessment: completed  Educate patient/family to call staff for assistance when getting out of bed: completed  Place fall precaution signage outside patient door: verified  Place patient in room close to nursing station: verified  Utilize bed/chair fall alarm: completed  Notify charge of high risk for huddle: completed    Patient Specific Interventions:     Medication: review medications with  patient and family  Mental Status/LOC/Awareness: reinforce falls education, check on patient hourly and utilize bed/chair fall alarm  Toileting: provide frquent toileting, monitor intake and output/use of appropriate interventions and do not leave patient unattended in bathroom/refer to toileting scripting  Volume/Electrolyte Status: ensure patient remains hydrated and monitor abnormal lab values  Communication/Sensory: update plan of care on whiteboard and ensure proper positioning when transferrng/ambulating  Behavioral: engage patient in daily activities, administer medication as ordered, instruct/reinforce fall program rationale and use appropriate de-escalation techniques  Mobility: dangle prior to standing, utilize bed/chair fall alarm, ensure bed is locked and in lowest position and collaborate with doctor for possible PT/OT consult

## 2018-01-11 NOTE — CARE PLAN
Problem: Communication  Goal: The ability to communicate needs accurately and effectively will improve  Outcome: PROGRESSING SLOWER THAN EXPECTED  Patient has expressive aphagia and is not able to communicate needs.    Problem: Mobility  Goal: Risk for activity intolerance will decrease  Outcome: PROGRESSING AS EXPECTED  PT working with patient.  Patient able to turn to right side and assist with bed mobility.

## 2018-01-11 NOTE — CARE PLAN
Problem: Knowledge Deficit  Goal: Knowledge of the prescribed therapeutic regimen will improve  Outcome: PROGRESSING AS EXPECTED  Pt kept up to date regarding POC and discharge disposition/plan. Pt reminded what all of his medications are for and what they are intended to do. Staff continues to anticipate needs d/t pt being expressive aphasia.     Problem: Pain Management  Goal: Pain level will decrease to patient's comfort goal  Outcome: PROGRESSING AS EXPECTED  Pt free of pain today. No moaning nor facial grimacing indicative of pain. Repositioned to prevent skin breakdown and promote comfort. Pt currently up in his chair, in the hallway. All needs met, call light within reach, will continue to monitor.

## 2018-01-11 NOTE — THERAPY
"Speech Language Therapy dysphagia treatment completed.     Functional Status: Patient seen today for dysphagia therapy. Oral cavity only minimally visualized d/t pt's inability to follow commands despite clinician model. Unable to facilitate reliable yes/no responses via visual model. PO given and consisted of single ice chips via tsp x 4 and nectars x 4. Initially, patient presented with delayed initiation of swallow upon palpation however timing improved as PO progressed. Unable to fully assess vocal quality aside from intermittent vocalizations, but no changes appreciated. At this time, continue to recommend with NPO/PEG. May want to consider FEES to further assess oropharyngeal swallow within next week or so if pt continues to make progress at bedside. SLP to continue following.     Recommendations:   1. NPO/PEG  2. Consider FEES within next week or so if pt continues to improve at bedside     Plan of Care: Will benefit from Speech Therapy 5 times per week  Post-Acute Therapy: Discharge to a transitional care facility for continued skilled therapy services.    See \"Rehab Therapy-Acute\" Patient Summary Report for complete documentation.     "

## 2018-01-11 NOTE — PROGRESS NOTES
Chart review to reassess appropriateness of inpatient rehabilitation    Patient is a 52 y.o. year-old male with a past medical history significant for hypertension, obesity, and insomnia admitted to [facility] on 12/19/2017  4:36 PM with a reported three-day history of altered mental status, difficulty communicating, and right upper extremity contractures    MRI revealed left frontal parietal temporal infarct     He has subsequently been diagnosed with a stroke, aphasia, and right hemiparesis. MRA indicated left ICA occlusion. Chart review indicates that he has an NG tube for nutrition and hydration. The internal medicine exam today indicates that he is not following commands, has minimal right-sided movement, and dense aphasia.    Updates:  After reviewing updated therapy notes, the patient still has profound impairments and would not likely be able to tolerate an inpatient rehabilitation level of care. Continue to recommend skilled nursing facility placement. Additionally, speech-language pathology note indicates significant dysphagia and recommends discussion of PEG tube placement. The patient continues to be a possibility for a transition program if endurance and function improve.    Cristian Esparza MD  Spinal Cord Injury Medicine  1/11/2018  10:34 AM

## 2018-01-11 NOTE — DISCHARGE PLANNING
Medical Social Worker    Referral: SNF vs RIRF    Intervention: Pt was reassessed by physiatrist. Physiatrist still recommending SNF. Pt has been declined by all facilities aside from [facility] due to medicaid insurance. Pt is pending a bed at [facility].     Plan: DC to [facility] once a bed becomes available for pt.

## 2018-01-11 NOTE — THERAPY
"Occupational Therapy Treatment completed with focus on ADLs, ADL transfers and patient education.  Functional Status:  Pt seen for OT tx today.  Pt was fatigued but cooperative throughout the session.  Continues to be limited by weakness, endurance, fatigue, cognition, and self care.   Pt completed seated gr/hy with min A to lean forward to reach for sink.  Needing constant cues to initiate, follow through, and problem solve during ADLs.  Pt completed sit to supine with mod A, sit to stand with max A, and room ambulation using FWW from bed to toilet with mod A.  Pt would benefit from continued inpt OT to increase independence with functional transfers, functional endurance, and ADLs.  Plan of Care: Will benefit from Occupational Therapy 4 times per week  Discharge Recommendations:  Equipment Will Continue to Assess for Equipment Needs. Post-acute therapy Discharge to a transitional care facility for continued skilled therapy services.    See \"Rehab Therapy-Acute\" Patient Summary Report for complete documentation.   "

## 2018-01-12 LAB
ANION GAP SERPL CALC-SCNC: 8 MMOL/L (ref 0–11.9)
BUN SERPL-MCNC: 26 MG/DL (ref 8–22)
CALCIUM SERPL-MCNC: 9 MG/DL (ref 8.5–10.5)
CHLORIDE SERPL-SCNC: 114 MMOL/L (ref 96–112)
CO2 SERPL-SCNC: 19 MMOL/L (ref 20–33)
CREAT SERPL-MCNC: 0.67 MG/DL (ref 0.5–1.4)
GLUCOSE SERPL-MCNC: 133 MG/DL (ref 65–99)
POTASSIUM SERPL-SCNC: 3.9 MMOL/L (ref 3.6–5.5)
SODIUM SERPL-SCNC: 141 MMOL/L (ref 135–145)

## 2018-01-12 PROCEDURE — A9270 NON-COVERED ITEM OR SERVICE: HCPCS | Performed by: INTERNAL MEDICINE

## 2018-01-12 PROCEDURE — 700112 HCHG RX REV CODE 229: Performed by: INTERNAL MEDICINE

## 2018-01-12 PROCEDURE — A9270 NON-COVERED ITEM OR SERVICE: HCPCS | Performed by: HOSPITALIST

## 2018-01-12 PROCEDURE — 80048 BASIC METABOLIC PNL TOTAL CA: CPT

## 2018-01-12 PROCEDURE — 700102 HCHG RX REV CODE 250 W/ 637 OVERRIDE(OP): Performed by: HOSPITALIST

## 2018-01-12 PROCEDURE — 97530 THERAPEUTIC ACTIVITIES: CPT

## 2018-01-12 PROCEDURE — 700102 HCHG RX REV CODE 250 W/ 637 OVERRIDE(OP): Performed by: INTERNAL MEDICINE

## 2018-01-12 PROCEDURE — 700111 HCHG RX REV CODE 636 W/ 250 OVERRIDE (IP): Performed by: INTERNAL MEDICINE

## 2018-01-12 PROCEDURE — 770006 HCHG ROOM/CARE - MED/SURG/GYN SEMI*

## 2018-01-12 PROCEDURE — 97116 GAIT TRAINING THERAPY: CPT

## 2018-01-12 PROCEDURE — 36415 COLL VENOUS BLD VENIPUNCTURE: CPT

## 2018-01-12 PROCEDURE — 99232 SBSQ HOSP IP/OBS MODERATE 35: CPT | Performed by: INTERNAL MEDICINE

## 2018-01-12 RX ORDER — NYSTATIN 100000 [USP'U]/G
POWDER TOPICAL 2 TIMES DAILY PRN
Status: DISCONTINUED | OUTPATIENT
Start: 2018-01-12 | End: 2018-01-17 | Stop reason: HOSPADM

## 2018-01-12 RX ORDER — POLYETHYLENE GLYCOL 3350 17 G/17G
1 POWDER, FOR SOLUTION ORAL DAILY
Status: DISCONTINUED | OUTPATIENT
Start: 2018-01-12 | End: 2018-01-17 | Stop reason: HOSPADM

## 2018-01-12 RX ORDER — NYSTATIN 100000 U/G
CREAM TOPICAL 2 TIMES DAILY
Status: DISCONTINUED | OUTPATIENT
Start: 2018-01-12 | End: 2018-01-12

## 2018-01-12 RX ORDER — NYSTATIN 100000 [USP'U]/G
POWDER TOPICAL PRN
Status: DISCONTINUED | OUTPATIENT
Start: 2018-01-12 | End: 2018-01-12

## 2018-01-12 RX ORDER — DOCUSATE SODIUM 100 MG/1
100 CAPSULE, LIQUID FILLED ORAL 2 TIMES DAILY
Status: DISCONTINUED | OUTPATIENT
Start: 2018-01-12 | End: 2018-01-13

## 2018-01-12 RX ADMIN — NICOTINE 14 MG: 14 PATCH, EXTENDED RELEASE TRANSDERMAL at 05:33

## 2018-01-12 RX ADMIN — POTASSIUM BICARBONATE 25 MEQ: 25 TABLET, EFFERVESCENT ORAL at 20:06

## 2018-01-12 RX ADMIN — ASPIRIN 81 MG: 81 TABLET, CHEWABLE ORAL at 09:09

## 2018-01-12 RX ADMIN — HEPARIN SODIUM 5000 UNITS: 5000 INJECTION, SOLUTION INTRAVENOUS; SUBCUTANEOUS at 20:06

## 2018-01-12 RX ADMIN — ATORVASTATIN CALCIUM 80 MG: 80 TABLET, FILM COATED ORAL at 20:06

## 2018-01-12 RX ADMIN — DOCUSATE SODIUM 100 MG: 100 CAPSULE ORAL at 11:59

## 2018-01-12 RX ADMIN — AMLODIPINE BESYLATE 10 MG: 5 TABLET ORAL at 09:09

## 2018-01-12 RX ADMIN — HEPARIN SODIUM 5000 UNITS: 5000 INJECTION, SOLUTION INTRAVENOUS; SUBCUTANEOUS at 13:49

## 2018-01-12 RX ADMIN — POTASSIUM BICARBONATE 25 MEQ: 25 TABLET, EFFERVESCENT ORAL at 09:09

## 2018-01-12 RX ADMIN — NYSTATIN: 100000 POWDER TOPICAL at 09:51

## 2018-01-12 RX ADMIN — DOCUSATE SODIUM 100 MG: 100 CAPSULE ORAL at 20:06

## 2018-01-12 RX ADMIN — OMEPRAZOLE 40 MG: 20 CAPSULE, DELAYED RELEASE ORAL at 09:09

## 2018-01-12 RX ADMIN — POLYETHYLENE GLYCOL (3350) 1 PACKET: 17 POWDER, FOR SOLUTION ORAL at 12:00

## 2018-01-12 RX ADMIN — ACETAMINOPHEN 650 MG: 325 TABLET, FILM COATED ORAL at 17:35

## 2018-01-12 RX ADMIN — HEPARIN SODIUM 5000 UNITS: 5000 INJECTION, SOLUTION INTRAVENOUS; SUBCUTANEOUS at 05:33

## 2018-01-12 RX ADMIN — LISINOPRIL 5 MG: 5 TABLET ORAL at 09:09

## 2018-01-12 ASSESSMENT — COGNITIVE AND FUNCTIONAL STATUS - GENERAL
MOVING TO AND FROM BED TO CHAIR: A LOT
WALKING IN HOSPITAL ROOM: TOTAL
STANDING UP FROM CHAIR USING ARMS: A LOT
SUGGESTED CMS G CODE MODIFIER MOBILITY: CM
TURNING FROM BACK TO SIDE WHILE IN FLAT BAD: A LOT
MOVING FROM LYING ON BACK TO SITTING ON SIDE OF FLAT BED: UNABLE
CLIMB 3 TO 5 STEPS WITH RAILING: TOTAL
MOBILITY SCORE: 9

## 2018-01-12 ASSESSMENT — GAIT ASSESSMENTS
GAIT LEVEL OF ASSIST: TOTAL ASSIST
DISTANCE (FEET): 150

## 2018-01-12 ASSESSMENT — PAIN SCALES - GENERAL: PAINLEVEL_OUTOF10: 0

## 2018-01-12 NOTE — PROGRESS NOTES
[facility]ist Progress Note    Date of Service: 2018    Chief Complaint  52 y.o. male admitted 2017 with AMS/aphasia/R HP/dysphagia    Interval Problem Update  Same aphasia and R HP; no new issues; took a few steps in parallel bars with PT yesterday.    Consultants/Specialty  Neurology/neurosurgery/GI/Acute Rehab    Disposition  snf         Review of Systems   Unable to perform ROS: Mental acuity      Physical Exam  Laboratory/Imaging   Hemodynamics  Temp (24hrs), Av.6 °C (97.9 °F), Min:36.1 °C (96.9 °F), Max:37.1 °C (98.7 °F)   Temperature: 36.7 °C (98 °F)  Pulse  Av.4  Min: 67  Max: 118    Blood Pressure: 130/76      Respiratory      Respiration: 17, Pulse Oximetry: 95 %        RUL Breath Sounds: Clear, RML Breath Sounds: Clear, RLL Breath Sounds: Clear, MIRTA Breath Sounds: Clear, LLL Breath Sounds: Clear    Fluids    Intake/Output Summary (Last 24 hours) at 18 1038  Last data filed at 18 0609   Gross per 24 hour   Intake             2110 ml   Output              400 ml   Net             1710 ml       Nutrition  Orders Placed This Encounter   Procedures   • DIET NPO     Standing Status:   Standing     Number of Occurrences:   22     Order Specific Question:   Restrict to:     Answer:   Strict [1]     Comments:   Hold tube feeds     Physical Exam   Constitutional: No distress.   HENT:   Head: Normocephalic.   Eyes: EOM are normal.   Neck: Neck supple.   Cardiovascular: Normal rate and regular rhythm.    Pulmonary/Chest: Effort normal and breath sounds normal.   Abdominal: Soft. Bowel sounds are normal. He exhibits no distension.   PEG site ok   Musculoskeletal: He exhibits no edema.   Neurological: He is alert.   Cannot move RUE/RLE to commands; good strength on L side; severe expressive aphasia and some receptive aphasia;   Skin: Skin is warm.           Recent Labs      01/10/18   0351  18   0212   SODIUM  145  141   POTASSIUM  3.8  3.9   CHLORIDE  112  114*   CO2  23  19*    GLUCOSE  104*  133*   BUN  36*  26*   CREATININE  0.71  0.67   CALCIUM  9.7  9.0                      Assessment/Plan     * CVA (cerebral vascular accident) (CMS-HCC)- (present on admission)   Assessment & Plan    Expressive/receptive aphasia/dysphagia/ right sided weakness. MRI showed left frontal/parietal/temporal infarct.   Had progression of aphasia and right hemiparesis 12/21. Stat CTA showed L ICA occlusion with possible dissection, which was confirmed on MRA.  Neurology and vascular surgery and recommended against anticoagulation or surgery.    - tolerating tube feeds, s/p PEG 12/26  - ST/PT/OT following, son wants long term placement near him in [place], [place]  - continue asa and statin  - heparin dvt ppx  Stable deficits;  Re eval by acute rehab team and deemed appropriate for snf at this time.          HTN (hypertension)- (present on admission)   Assessment & Plan    Patient not on any medications as outpatient  Controlled bp's on current rx. Cont to monitor.          Hypernatremia   Assessment & Plan    free water per PEG with improved sodium level; stable neuro status;         Advance care planning   Assessment & Plan    son to be primary contact per family  Full code wanted per family  S/p Peg placement on 12/26               Quality-Core Measures

## 2018-01-12 NOTE — CARE PLAN
Problem: Bowel/Gastric:  Goal: Normal bowel function is maintained or improved  Outcome: PROGRESSING AS EXPECTED  Pt has had multiple small bowel movements recently. Spoke with MD and got stool softeners ordered. Bowel sounds normoactive. Skin kept clean warm and dry. Will continue to monitor.    Problem: Respiratory:  Goal: Respiratory status will improve  Outcome: PROGRESSING AS EXPECTED  Pt breathing on room air. No SOB or resp distress. Incentive spirometer utilized. 02 >90%. Pt reminded to deep breathe and cough while laying in bed. Lungs CTA. Will continue to monitor.

## 2018-01-12 NOTE — DIETARY
WEEKLY TF UPDATE - TF via Small Bowel Cortrak: Replete with Fiber @ 85 ml/hr, providing 2040 kcals, 131 grams protein, 1693 mL free water per day. TF running @ goal. TF is meeting pt's estimated nutritional needs. Per SLP note 1/10 - continue to recommend with NPO/PEG.     Pertinent Labs: Glucose 133, BUN 26  Pertinent Meds: norvasc, lipitor, colace, prinivil, prilosec, miralax, klyte  Fluids: Free water flushes 250 ml QID (1000 ml)  GI: Last BM 1/12  Wt 1/8: 99 kg via bed scale. Wt fluctuation noted.   SKIN: surgical incision abdomen    PLAN/RECOMMEND - Continue current TF regimen with no changes. Fluids per MD. Weekly weights to monitor fluid and nutrition status. Diet upgrades per SLP    RD following.

## 2018-01-12 NOTE — DISCHARGE PLANNING
Medical Social Worker    Referral: IDT Rounds    Intervention: Pt discussed in rounds. Pt is pending a medicaid bed at [facility], who is the only accepting facility. SW confirmed with [facility] that there is not a bed available for pt today.     Plan: DC to [facility] once a bed becomes available.

## 2018-01-12 NOTE — THERAPY
"Physical Therapy Treatment completed.   Bed Mobility:  Supine to Sit: Moderate Assist (rolling to pt rt, patient able to push w/ left arm up. )  Transfers: Sit to Stand: Maximal Assist (bed->w/c->BSC->bed)  Gait: Level Of Assist: Total Assist in Litegait System    Plan of Care: Will benefit from Physical Therapy 5 times per week  Discharge Recommendations: Equipment: Will Continue to Assess for Equipment Needs. Post-acute therapy Discharge to a transitional care facility for continued skilled therapy services.     See \"Rehab Therapy-Acute\" Patient Summary Report for complete documentation.       "

## 2018-01-12 NOTE — PROGRESS NOTES
Saumya Rios Fall Risk Assessment:     Last Known Fall: Within the last month  Mobility: Dizziness/generalized weakness, Use of assistive device/requires assist of two people, Hemiplegic, paraplegia, or quadriplegia  Medications: Cardiovascular or central nervous system meds  Mental Status/LOC/Awareness: Lethargic/oriented to person only  Toileting Needs: Incontinence  Volume/Electrolyte Status: Use of IV fluids/tube feeds  Communication/Sensory: Non-English patient/unable to speak/slurred speech  Behavior: Depression/anxiety  Saumya Rios Fall Risk Total: 24  Fall Risk Level: HIGH RISK    Universal Fall Precautions:  call light/belongings in reach, bed in low position and locked, wheelchairs and assistive devices out of sight, siderails up x 2, use non-slip footwear, adequate lighting, clutter free and spill free environment, educate on level of risk, educate to call for assistance    Fall Risk Level Interventions:    TRIAL (TELE 8, NEURO, MED MICHAEL 5) Moderate Fall Risk Interventions  Place yellow fall risk ID band on patient: verified  Provide patient/family education based on risk assessment : verified  Educate patient/family to call staff for assistance when getting out of bed: verified  Place fall precaution signage outside patient door: verified  Utilize bed/chair fall alarm: verifiedTRIAL (TELE 8, NEURO, MED MICHAEL 5) High Fall Risk Interventions  Place yellow fall risk ID band on patient: verified  Provide patient/family education based on risk assessment: completed  Educate patient/family to call staff for assistance when getting out of bed: completed  Place fall precaution signage outside patient door: verified  Place patient in room close to nursing station: verified  Utilize bed/chair fall alarm: completed  Notify charge of high risk for huddle: completed    Patient Specific Interventions:     Medication: review medications with patient and family  Mental Status/LOC/Awareness: check on patient hourly,  utilize bed/chair fall alarm and reinforce the use of call light  Toileting: instruct male patients prone to dizziness to void while sitting, instruct patient/family on the use of grab bars and do not leave patient unattended in bathroom/refer to toileting scripting  Volume/Electrolyte Status: ensure patient remains hydrated and monitor abnormal lab values  Communication/Sensory: update plan of care on whiteboard and ensure proper positioning when transferrng/ambulating  Behavioral: encourage patient to voice feelings, engage patient in daily activities and use appropriate de-escalation techniques  Mobility: dangle prior to standing, utilize bed/chair fall alarm, ensure bed is locked and in lowest position, provide appropriate assistive device and instruct patient to exit bed on their strongest side

## 2018-01-13 PROBLEM — E87.0 HYPERNATREMIA: Status: RESOLVED | Noted: 2018-01-09 | Resolved: 2018-01-13

## 2018-01-13 PROCEDURE — A9270 NON-COVERED ITEM OR SERVICE: HCPCS | Performed by: INTERNAL MEDICINE

## 2018-01-13 PROCEDURE — 700102 HCHG RX REV CODE 250 W/ 637 OVERRIDE(OP): Performed by: INTERNAL MEDICINE

## 2018-01-13 PROCEDURE — 700111 HCHG RX REV CODE 636 W/ 250 OVERRIDE (IP): Performed by: INTERNAL MEDICINE

## 2018-01-13 PROCEDURE — 770006 HCHG ROOM/CARE - MED/SURG/GYN SEMI*

## 2018-01-13 PROCEDURE — 700102 HCHG RX REV CODE 250 W/ 637 OVERRIDE(OP): Performed by: HOSPITALIST

## 2018-01-13 PROCEDURE — A9270 NON-COVERED ITEM OR SERVICE: HCPCS | Performed by: HOSPITALIST

## 2018-01-13 PROCEDURE — 99232 SBSQ HOSP IP/OBS MODERATE 35: CPT | Performed by: INTERNAL MEDICINE

## 2018-01-13 PROCEDURE — 700112 HCHG RX REV CODE 229: Performed by: INTERNAL MEDICINE

## 2018-01-13 RX ORDER — DOCUSATE SODIUM 50 MG/5ML
100 LIQUID ORAL 2 TIMES DAILY
Status: DISCONTINUED | OUTPATIENT
Start: 2018-01-13 | End: 2018-01-17 | Stop reason: HOSPADM

## 2018-01-13 RX ADMIN — POTASSIUM BICARBONATE 25 MEQ: 25 TABLET, EFFERVESCENT ORAL at 09:15

## 2018-01-13 RX ADMIN — ASPIRIN 81 MG: 81 TABLET, CHEWABLE ORAL at 09:16

## 2018-01-13 RX ADMIN — NICOTINE 14 MG: 14 PATCH, EXTENDED RELEASE TRANSDERMAL at 05:36

## 2018-01-13 RX ADMIN — DOCUSATE SODIUM 100 MG: 50 LIQUID ORAL at 13:31

## 2018-01-13 RX ADMIN — POLYETHYLENE GLYCOL (3350) 1 PACKET: 17 POWDER, FOR SOLUTION ORAL at 09:15

## 2018-01-13 RX ADMIN — HEPARIN SODIUM 5000 UNITS: 5000 INJECTION, SOLUTION INTRAVENOUS; SUBCUTANEOUS at 05:36

## 2018-01-13 RX ADMIN — OMEPRAZOLE 40 MG: 20 CAPSULE, DELAYED RELEASE ORAL at 09:16

## 2018-01-13 RX ADMIN — HEPARIN SODIUM 5000 UNITS: 5000 INJECTION, SOLUTION INTRAVENOUS; SUBCUTANEOUS at 13:31

## 2018-01-13 RX ADMIN — LISINOPRIL 5 MG: 5 TABLET ORAL at 09:16

## 2018-01-13 RX ADMIN — AMLODIPINE BESYLATE 10 MG: 5 TABLET ORAL at 09:15

## 2018-01-13 RX ADMIN — DOCUSATE SODIUM 100 MG: 50 LIQUID ORAL at 20:13

## 2018-01-13 RX ADMIN — HEPARIN SODIUM 5000 UNITS: 5000 INJECTION, SOLUTION INTRAVENOUS; SUBCUTANEOUS at 20:13

## 2018-01-13 RX ADMIN — POTASSIUM BICARBONATE 25 MEQ: 25 TABLET, EFFERVESCENT ORAL at 20:14

## 2018-01-13 RX ADMIN — ATORVASTATIN CALCIUM 80 MG: 80 TABLET, FILM COATED ORAL at 20:14

## 2018-01-13 ASSESSMENT — PAIN SCALES - GENERAL
PAINLEVEL_OUTOF10: 0
PAINLEVEL_OUTOF10: 0

## 2018-01-13 NOTE — PROGRESS NOTES
[facility]ist Progress Note    Date of Service: 2018    Chief Complaint  52 y.o. male admitted 2017 with AMS/aphasia/R HP/dysphagia    Interval Problem Update  Still aphasic with R HP; no new issues.    Consultants/Specialty  Neurology/neurosurgery/GI/Acute Rehab    Disposition  snf         Review of Systems   Unable to perform ROS: Mental acuity      Physical Exam  Laboratory/Imaging   Hemodynamics  Temp (24hrs), Av.6 °C (97.9 °F), Min:36.4 °C (97.5 °F), Max:36.8 °C (98.3 °F)   Temperature: 36.7 °C (98 °F)  Pulse  Av.1  Min: 67  Max: 118    Blood Pressure: 127/76      Respiratory      Respiration: 17, Pulse Oximetry: 98 %        RUL Breath Sounds: Clear, RML Breath Sounds: Clear, RLL Breath Sounds: Clear, MIRTA Breath Sounds: Clear, LLL Breath Sounds: Clear    Fluids    Intake/Output Summary (Last 24 hours) at 18 1329  Last data filed at 18 0900   Gross per 24 hour   Intake             1385 ml   Output              600 ml   Net              785 ml       Nutrition  Orders Placed This Encounter   Procedures   • DIET NPO     Standing Status:   Standing     Number of Occurrences:   29     Order Specific Question:   Restrict to:     Answer:   Strict [1]     Comments:   Hold tube feeds     Physical Exam   Constitutional: No distress.   HENT:   Head: Normocephalic.   Eyes: EOM are normal.   Neck: Neck supple.   Cardiovascular: Normal rate and regular rhythm.    Pulmonary/Chest: Effort normal and breath sounds normal.   Abdominal: Soft. Bowel sounds are normal. He exhibits no distension.   PEG site ok   Musculoskeletal: He exhibits no edema.   Neurological: He is alert.   Cannot move RUE/RLE to commands; good strength on L side; severe expressive aphasia and some receptive aphasia;   Skin: Skin is warm.           Recent Labs      18   0212   SODIUM  141   POTASSIUM  3.9   CHLORIDE  114*   CO2  19*   GLUCOSE  133*   BUN  26*   CREATININE  0.67   CALCIUM  9.0                       Assessment/Plan     * CVA (cerebral vascular accident) (CMS-HCC)- (present on admission)   Assessment & Plan    Expressive/receptive aphasia/dysphagia/ right sided weakness. MRI showed left frontal/parietal/temporal infarct.   Had progression of aphasia and right hemiparesis 12/21. Stat CTA showed L ICA occlusion with possible dissection, which was confirmed on MRA.  Neurology and vascular surgery and recommended against anticoagulation or surgery.    - tolerating tube feeds, s/p PEG 12/26  - ST/PT/OT following, son wants long term placement near him in [place], [place]  - continue asa and statin  - heparin dvt ppx  Stable, no change of neuro deficits; await snf transfer          HTN (hypertension)- (present on admission)   Assessment & Plan    Patient not on any medications as outpatient  Controlled bp's on current rx. Cont to monitor.          Advance care planning   Assessment & Plan    son to be primary contact per family  Full code wanted per family  S/p Peg placement on 12/26               Quality-Core Measures

## 2018-01-13 NOTE — CARE PLAN
Problem: Communication  Goal: The ability to communicate needs accurately and effectively will improve  Outcome: PROGRESSING SLOWER THAN EXPECTED  Patient expressive and receptive aphasia.  Communication tools at bedside. Therapy in place.    Problem: Mobility  Goal: Risk for activity intolerance will decrease  Outcome: PROGRESSING SLOWER THAN EXPECTED  Patient right sided paralysis.  Q2 turns in place, mepilex inappropriate as patient is incont at times pulling off condom cath.

## 2018-01-13 NOTE — PROGRESS NOTES
Saumya Rios Fall Risk Assessment:     Last Known Fall: Within the last month  Mobility: Hemiplegic, paraplegia, or quadriplegia, Dizziness/generalized weakness, Use of assistive device/requires assist of two people  Medications: Cardiovascular or central nervous system meds  Mental Status/LOC/Awareness: Lethargic/oriented to person only  Toileting Needs: Incontinence  Volume/Electrolyte Status: Use of IV fluids/tube feeds  Communication/Sensory: Non-English patient/unable to speak/slurred speech  Behavior: Depression/anxiety  Saumya Rios Fall Risk Total: 24  Fall Risk Level: HIGH RISK    Universal Fall Precautions:  call light/belongings in reach, bed in low position and locked, wheelchairs and assistive devices out of sight, siderails up x 2, use non-slip footwear, adequate lighting, clutter free and spill free environment, educate on level of risk, educate to call for assistance    Fall Risk Level Interventions:    TRIAL (TELE 8, NEURO, MED MICHAEL 5) Moderate Fall Risk Interventions  Place yellow fall risk ID band on patient: verified  Provide patient/family education based on risk assessment : verified  Educate patient/family to call staff for assistance when getting out of bed: verified  Place fall precaution signage outside patient door: verified  Utilize bed/chair fall alarm: verifiedTRIAL (TELE 8, NEURO, [place] MICHAEL 5) High Fall Risk Interventions  Place yellow fall risk ID band on patient: verified  Provide patient/family education based on risk assessment: completed  Educate patient/family to call staff for assistance when getting out of bed: completed  Place fall precaution signage outside patient door: verified  Place patient in room close to nursing station: verified  Utilize bed/chair fall alarm: verified  Notify charge of high risk for huddle: verified    Patient Specific Interventions:     Medication: review medications with patient and family  Mental Status/LOC/Awareness: check on patient  hourly  Toileting: provide frquent toileting  Volume/Electrolyte Status: monitor abnormal lab values  Communication/Sensory: update plan of care on whiteboard  Behavioral: administer medication as ordered  Mobility: ensure bed is locked and in lowest position

## 2018-01-13 NOTE — PROGRESS NOTES
Received shift report from off going nurse.  Patient alert with expressive and receptive aphasia.  Tube feeds in place to peg.  Patient right hemiplegia . Was reported patient no problems overnight.  Patient progressing as planned.  Call light in reach and fall precautions in place.  Patient asked to call for assistance if needed.  All items in reach bed low for safety.  Patient educated on Plan of Care. Bed alarm on and activated

## 2018-01-14 PROCEDURE — A9270 NON-COVERED ITEM OR SERVICE: HCPCS | Performed by: INTERNAL MEDICINE

## 2018-01-14 PROCEDURE — 700102 HCHG RX REV CODE 250 W/ 637 OVERRIDE(OP): Performed by: INTERNAL MEDICINE

## 2018-01-14 PROCEDURE — 700102 HCHG RX REV CODE 250 W/ 637 OVERRIDE(OP): Performed by: HOSPITALIST

## 2018-01-14 PROCEDURE — 99232 SBSQ HOSP IP/OBS MODERATE 35: CPT | Performed by: INTERNAL MEDICINE

## 2018-01-14 PROCEDURE — 700112 HCHG RX REV CODE 229: Performed by: INTERNAL MEDICINE

## 2018-01-14 PROCEDURE — 700111 HCHG RX REV CODE 636 W/ 250 OVERRIDE (IP): Performed by: INTERNAL MEDICINE

## 2018-01-14 PROCEDURE — A9270 NON-COVERED ITEM OR SERVICE: HCPCS | Performed by: HOSPITALIST

## 2018-01-14 PROCEDURE — 770006 HCHG ROOM/CARE - MED/SURG/GYN SEMI*

## 2018-01-14 RX ADMIN — HEPARIN SODIUM 5000 UNITS: 5000 INJECTION, SOLUTION INTRAVENOUS; SUBCUTANEOUS at 21:39

## 2018-01-14 RX ADMIN — LISINOPRIL 5 MG: 5 TABLET ORAL at 09:00

## 2018-01-14 RX ADMIN — POLYETHYLENE GLYCOL (3350) 1 PACKET: 17 POWDER, FOR SOLUTION ORAL at 09:00

## 2018-01-14 RX ADMIN — ATORVASTATIN CALCIUM 80 MG: 80 TABLET, FILM COATED ORAL at 20:03

## 2018-01-14 RX ADMIN — POTASSIUM BICARBONATE 25 MEQ: 25 TABLET, EFFERVESCENT ORAL at 09:00

## 2018-01-14 RX ADMIN — HEPARIN SODIUM 5000 UNITS: 5000 INJECTION, SOLUTION INTRAVENOUS; SUBCUTANEOUS at 14:00

## 2018-01-14 RX ADMIN — OMEPRAZOLE 40 MG: 20 CAPSULE, DELAYED RELEASE ORAL at 09:00

## 2018-01-14 RX ADMIN — NICOTINE 14 MG: 14 PATCH, EXTENDED RELEASE TRANSDERMAL at 05:42

## 2018-01-14 RX ADMIN — HEPARIN SODIUM 5000 UNITS: 5000 INJECTION, SOLUTION INTRAVENOUS; SUBCUTANEOUS at 05:43

## 2018-01-14 RX ADMIN — AMLODIPINE BESYLATE 10 MG: 5 TABLET ORAL at 09:00

## 2018-01-14 RX ADMIN — ASPIRIN 81 MG: 81 TABLET, CHEWABLE ORAL at 09:00

## 2018-01-14 RX ADMIN — POTASSIUM BICARBONATE 25 MEQ: 25 TABLET, EFFERVESCENT ORAL at 20:03

## 2018-01-14 RX ADMIN — DOCUSATE SODIUM 100 MG: 50 LIQUID ORAL at 09:00

## 2018-01-14 ASSESSMENT — PAIN SCALES - PAIN ASSESSMENT IN ADVANCED DEMENTIA (PAINAD)
BODYLANGUAGE: RELAXED
BREATHING: NORMAL
FACIALEXPRESSION: SMILING OR INEXPRESSIVE
CONSOLABILITY: NO NEED TO CONSOLE
TOTALSCORE: 0

## 2018-01-14 ASSESSMENT — PATIENT HEALTH QUESTIONNAIRE - PHQ9
SUM OF ALL RESPONSES TO PHQ QUESTIONS 1-9: 0
1. LITTLE INTEREST OR PLEASURE IN DOING THINGS: NOT AT ALL
2. FEELING DOWN, DEPRESSED, IRRITABLE, OR HOPELESS: NOT AT ALL
SUM OF ALL RESPONSES TO PHQ9 QUESTIONS 1 AND 2: 0

## 2018-01-14 ASSESSMENT — PAIN SCALES - WONG BAKER
WONGBAKER_NUMERICALRESPONSE: DOESN'T HURT AT ALL
WONGBAKER_NUMERICALRESPONSE: DOESN'T HURT AT ALL

## 2018-01-14 NOTE — PROGRESS NOTES
[facility]    Date of Service: 2018    Chief Complaint  52 y.o. male admitted 2017 with AMS/aphasia/R HP/dysphagia    Interval Problem Update  Same severe aphasia and R HP; no new issues    Consultants/Specialty  Neurology/neurosurgery/GI/Acute Rehab    Disposition  snf         Review of Systems   Unable to perform ROS: Mental acuity      Physical Exam  Laboratory/Imaging   Hemodynamics  Temp (24hrs), Av.5 °C (97.7 °F), Min:36.1 °C (97 °F), Max:37 °C (98.6 °F)   Temperature: 37 °C (98.6 °F)  Pulse  Av  Min: 67  Max: 118    Blood Pressure: 128/74      Respiratory      Respiration: 18, Pulse Oximetry: 95 %        RUL Breath Sounds: Clear, RML Breath Sounds: Clear, RLL Breath Sounds: Clear, MIRTA Breath Sounds: Clear, LLL Breath Sounds: Clear    Fluids    Intake/Output Summary (Last 24 hours) at 18 1038  Last data filed at 18 0600   Gross per 24 hour   Intake              250 ml   Output              800 ml   Net             -550 ml       Nutrition  Orders Placed This Encounter   Procedures   • DIET NPO     Standing Status:   Standing     Number of Occurrences:   29     Order Specific Question:   Restrict to:     Answer:   Strict [1]     Comments:   Hold tube feeds     Physical Exam   Constitutional: No distress.   HENT:   Head: Normocephalic.   Eyes: EOM are normal.   Neck: Neck supple.   Cardiovascular: Normal rate and regular rhythm.    Pulmonary/Chest: Effort normal and breath sounds normal.   Abdominal: Soft. Bowel sounds are normal. He exhibits no distension.   PEG site ok   Musculoskeletal: He exhibits no edema.   Neurological: He is alert.   Cannot move RUE/RLE to commands; good strength on L side; severe expressive aphasia and some receptive aphasia;   Skin: Skin is warm.           Recent Labs      18   0212   SODIUM  141   POTASSIUM  3.9   CHLORIDE  114*   CO2  19*   GLUCOSE  133*   BUN  26*   CREATININE  0.67   CALCIUM  9.0                       Assessment/Plan     * CVA (cerebral vascular accident) (CMS-HCC)- (present on admission)   Assessment & Plan    Expressive/receptive aphasia/dysphagia/ right sided weakness. MRI showed left frontal/parietal/temporal infarct.   Had progression of aphasia and right hemiparesis 12/21. Stat CTA showed L ICA occlusion with possible dissection, which was confirmed on MRA.  Neurology and vascular surgery and recommended against anticoagulation or surgery.    - tolerating tube feeds, s/p PEG 12/26  - ST/PT/OT following, son wants long term placement near him in [place], [place]  - continue asa and statin  - heparin dvt ppx  Stable, no change of neuro deficits; await snf transfer          HTN (hypertension)- (present on admission)   Assessment & Plan    Patient not on any medications as outpatient  Controlled bp's on current rx. Cont to monitor.          Advance care planning   Assessment & Plan    son to be primary contact per family  Full code wanted per family  S/p Peg placement on 12/26               Quality-Core Measures

## 2018-01-14 NOTE — PROGRESS NOTES
Saumya Rios Fall Risk Assessment:     Last Known Fall: Within the last month  Mobility: Hemiplegic, paraplegia, or quadriplegia, Use of assistive device/requires assist of two people, Dizziness/generalized weakness  Medications: Cardiovascular or central nervous system meds  Mental Status/LOC/Awareness: Lethargic/oriented to person only  Toileting Needs: Incontinence  Volume/Electrolyte Status: Use of IV fluids/tube feeds  Communication/Sensory: Non-English patient/unable to speak/slurred speech  Behavior: Depression/anxiety  Saumya Rios Fall Risk Total: 24  Fall Risk Level: HIGH RISK    Universal Fall Precautions:  call light/belongings in reach, bed in low position and locked, wheelchairs and assistive devices out of sight, siderails up x 2, use non-slip footwear, adequate lighting, clutter free and spill free environment, educate on level of risk, educate to call for assistance    Fall Risk Level Interventions:    TRIAL (TELE 8, NEURO, MED MICHAEL 5) Moderate Fall Risk Interventions  Place yellow fall risk ID band on patient: verified  Provide patient/family education based on risk assessment : verified  Educate patient/family to call staff for assistance when getting out of bed: verified  Place fall precaution signage outside patient door: verified  Utilize bed/chair fall alarm: verifiedTRIAL (TELE 8, NEURO, [place] MICHAEL 5) High Fall Risk Interventions  Place yellow fall risk ID band on patient: verified  Provide patient/family education based on risk assessment: completed  Educate patient/family to call staff for assistance when getting out of bed: completed  Place fall precaution signage outside patient door: verified  Place patient in room close to nursing station: verified  Utilize bed/chair fall alarm: verified  Notify charge of high risk for huddle: verified    Patient Specific Interventions:     Medication: review medications with patient and family  Mental Status/LOC/Awareness: reinforce the use of call  light  Toileting: provide frquent toileting  Volume/Electrolyte Status: monitor abnormal lab values  Communication/Sensory: update plan of care on whiteboard  Behavioral: administer medication as ordered  Mobility: ensure bed is locked and in lowest position

## 2018-01-14 NOTE — PROGRESS NOTES
"Family at bedside and concerned their father has L eye pain, L eye irritation, or a headache. Assessed pt and said \"no\" to all the above when asked. Said \"yes\" when asked if he is frustrated with what happened to him. Emotional support given and repositioned. Call bell within reach  "

## 2018-01-14 NOTE — PROGRESS NOTES
Pt obeys some commands, for example, rolling. Pt nods head when asked if he is comfortable and shakes head when asked if he has pain. Call bell within reach and repositioning. G-tube site cleansed

## 2018-01-14 NOTE — PROGRESS NOTES
Family asking about facial droop  Expressed this was reported as his baseline from prior shift, family hasnt seen patient in a while per their report.  Patient has right sided hemiparesis.

## 2018-01-14 NOTE — PROGRESS NOTES
Saumya Rios Fall Risk Assessment:     Last Known Fall: Within the last month  Mobility: Hemiplegic, paraplegia, or quadriplegia, Use of assistive device/requires assist of two people, Dizziness/generalized weakness  Medications: Cardiovascular or central nervous system meds  Mental Status/LOC/Awareness: Lethargic/oriented to person only  Toileting Needs: Incontinence  Volume/Electrolyte Status: Use of IV fluids/tube feeds  Communication/Sensory: Non-English patient/unable to speak/slurred speech  Behavior: Depression/anxiety  Saumya Rios Fall Risk Total: 24  Fall Risk Level: HIGH RISK    Universal Fall Precautions:  call light/belongings in reach, bed in low position and locked, wheelchairs and assistive devices out of sight, siderails up x 2, use non-slip footwear, adequate lighting, clutter free and spill free environment, educate on level of risk, educate to call for assistance    Fall Risk Level Interventions:    TRIAL (TELE 8, NEURO, MED MICHAEL 5) Moderate Fall Risk Interventions  Place yellow fall risk ID band on patient: verified  Provide patient/family education based on risk assessment : verified  Educate patient/family to call staff for assistance when getting out of bed: verified  Place fall precaution signage outside patient door: verified  Utilize bed/chair fall alarm: verifiedTRIAL (TELE 8, NEURO, [employer] MICHAEL 5) High Fall Risk Interventions  Place yellow fall risk ID band on patient: verified  Provide patient/family education based on risk assessment: completed  Educate patient/family to call staff for assistance when getting out of bed: completed  Place fall precaution signage outside patient door: verified  Place patient in room close to nursing station: verified  Utilize bed/chair fall alarm: verified  Notify charge of high risk for huddle: verified    Patient Specific Interventions:     Medication: review medications with patient and family and limit combination of prn medications  Mental  Status/LOC/Awareness: reorient patient, reinforce falls education, check on patient hourly, utilize bed/chair fall alarm, reinforce the use of call light and provide activity  Toileting: consider obtaining elevated toilet seat or bedside commode (BSC), monitor intake and output/use of appropriate interventions and instruct male patients prone to dizziness to void while sitting  Volume/Electrolyte Status: ensure patient remains hydrated and monitor abnormal lab values  Communication/Sensory: provide communication alternatives/  Behavioral: encourage patient to voice feelings and engage patient in daily activities  Mobility: schedule physical activity throughout the day, provide comfort measures during transport, utilize bed/chair fall alarm and ensure bed is locked and in lowest position

## 2018-01-15 LAB
CRP SERPL HS-MCNC: 1.07 MG/DL (ref 0–0.75)
PREALB SERPL-MCNC: 22 MG/DL (ref 18–38)

## 2018-01-15 PROCEDURE — 36415 COLL VENOUS BLD VENIPUNCTURE: CPT

## 2018-01-15 PROCEDURE — 700102 HCHG RX REV CODE 250 W/ 637 OVERRIDE(OP): Performed by: INTERNAL MEDICINE

## 2018-01-15 PROCEDURE — A9270 NON-COVERED ITEM OR SERVICE: HCPCS | Performed by: INTERNAL MEDICINE

## 2018-01-15 PROCEDURE — 84134 ASSAY OF PREALBUMIN: CPT

## 2018-01-15 PROCEDURE — 700102 HCHG RX REV CODE 250 W/ 637 OVERRIDE(OP): Performed by: HOSPITALIST

## 2018-01-15 PROCEDURE — 770006 HCHG ROOM/CARE - MED/SURG/GYN SEMI*

## 2018-01-15 PROCEDURE — 700112 HCHG RX REV CODE 229: Performed by: INTERNAL MEDICINE

## 2018-01-15 PROCEDURE — A9270 NON-COVERED ITEM OR SERVICE: HCPCS | Performed by: HOSPITALIST

## 2018-01-15 PROCEDURE — 99232 SBSQ HOSP IP/OBS MODERATE 35: CPT | Performed by: INTERNAL MEDICINE

## 2018-01-15 PROCEDURE — 700111 HCHG RX REV CODE 636 W/ 250 OVERRIDE (IP): Performed by: INTERNAL MEDICINE

## 2018-01-15 PROCEDURE — 86140 C-REACTIVE PROTEIN: CPT

## 2018-01-15 RX ADMIN — HEPARIN SODIUM 5000 UNITS: 5000 INJECTION, SOLUTION INTRAVENOUS; SUBCUTANEOUS at 14:48

## 2018-01-15 RX ADMIN — DOCUSATE SODIUM 100 MG: 50 LIQUID ORAL at 09:29

## 2018-01-15 RX ADMIN — LISINOPRIL 5 MG: 5 TABLET ORAL at 09:29

## 2018-01-15 RX ADMIN — NICOTINE 14 MG: 14 PATCH, EXTENDED RELEASE TRANSDERMAL at 05:08

## 2018-01-15 RX ADMIN — POTASSIUM BICARBONATE 25 MEQ: 25 TABLET, EFFERVESCENT ORAL at 21:11

## 2018-01-15 RX ADMIN — ASPIRIN 81 MG: 81 TABLET, CHEWABLE ORAL at 09:29

## 2018-01-15 RX ADMIN — ATORVASTATIN CALCIUM 80 MG: 80 TABLET, FILM COATED ORAL at 21:11

## 2018-01-15 RX ADMIN — AMLODIPINE BESYLATE 10 MG: 5 TABLET ORAL at 09:29

## 2018-01-15 RX ADMIN — HEPARIN SODIUM 5000 UNITS: 5000 INJECTION, SOLUTION INTRAVENOUS; SUBCUTANEOUS at 05:08

## 2018-01-15 RX ADMIN — POTASSIUM BICARBONATE 25 MEQ: 25 TABLET, EFFERVESCENT ORAL at 09:29

## 2018-01-15 RX ADMIN — OMEPRAZOLE 40 MG: 20 CAPSULE, DELAYED RELEASE ORAL at 09:29

## 2018-01-15 RX ADMIN — HEPARIN SODIUM 5000 UNITS: 5000 INJECTION, SOLUTION INTRAVENOUS; SUBCUTANEOUS at 21:11

## 2018-01-15 ASSESSMENT — PAIN SCALES - WONG BAKER: WONGBAKER_NUMERICALRESPONSE: DOESN'T HURT AT ALL

## 2018-01-15 NOTE — CARE PLAN
Problem: Communication  Goal: The ability to communicate needs accurately and effectively will improve  Outcome: PROGRESSING SLOWER THAN EXPECTED  Has aphasia    Problem: Safety  Goal: Will remain free from injury  Outcome: PROGRESSING AS EXPECTED

## 2018-01-15 NOTE — DISCHARGE PLANNING
Medical Social Worker    SARIKA left message with [facility] admissions in regards to bed availability. SW is awaiting return call.     Add:  SW received [facility] Admissions message. They currently have an influenza outbreak and are not able to accept pt's at this time.     SARIKA requested that [facility] reach out to [facility] who declined pt due to being non-contracted with [employer]. Pt's secondary is [place]. [employer] is primary insurance, which [facility] is contracted with.

## 2018-01-15 NOTE — PROGRESS NOTES
Saumya Rios Fall Risk Assessment:     Last Known Fall: Within the last month  Mobility: Hemiplegic, paraplegia, or quadriplegia  Medications: Cardiovascular or central nervous system meds  Mental Status/LOC/Awareness: Lethargic/oriented to person only  Toileting Needs: Incontinence  Volume/Electrolyte Status: Use of IV fluids/tube feeds  Communication/Sensory: Non-English patient/unable to speak/slurred speech  Behavior: Appropriate behavior  Saumya Rios Fall Risk Total: 19  Fall Risk Level: HIGH RISK    Universal Fall Precautions:  call light/belongings in reach, bed in low position and locked, wheelchairs and assistive devices out of sight, siderails up x 2, use non-slip footwear, adequate lighting, educate to call for assistance, educate on level of risk, clutter free and spill free environment    Fall Risk Level Interventions:    TRIAL ([employer] 8, NEURO, MED MICHAEL 5) Moderate Fall Risk Interventions  Place yellow fall risk ID band on patient: verified  Provide patient/family education based on risk assessment : verified  Educate patient/family to call staff for assistance when getting out of bed: verified  Place fall precaution signage outside patient door: verified  Utilize bed/chair fall alarm: verifiedTRIAL ([employer]av[employer] 8, NEURO, [employer] MICHAEL 5) High Fall Risk Interventions  Place yellow fall risk ID band on patient: verified  Provide patient/family education based on risk assessment: completed  Educate patient/family to call staff for assistance when getting out of bed: completed  Place fall precaution signage outside patient door: verified  Place patient in room close to nursing station: verified  Utilize bed/chair fall alarm: verified  Notify charge of high risk for huddle: verified    Patient Specific Interventions:     Medication: review medications with patient and family  Mental Status/LOC/Awareness: reinforce falls education, check on patient hourly, utilize bed/chair fall alarm, reinforce the use of call  light and provide activity  Toileting: provide frquent toileting  Volume/Electrolyte Status: ensure patient remains hydrated  Communication/Sensory: update plan of care on whiteboard  Behavioral: encourage patient to voice feelings and engage patient in daily activities  Mobility: schedule physical activity throughout the day, utilize bed/chair fall alarm, ensure bed is locked and in lowest position, provide appropriate assistive device, instruct patient to exit bed on their strongest side and collaborate with doctor for possible PT/OT consult

## 2018-01-15 NOTE — PROGRESS NOTES
Pt awake, constantly moving left arm and leg, calm and non labored breathing. Pt nonverbal, nods at times. Fall precautions in place.

## 2018-01-15 NOTE — DISCHARGE PLANNING
[facility] updated flowsheet to reflect anticipated difficult discharge for the following reasons, noted by SW in AM huddle:    Pt's insurance (medicaid FFS)- [employer] has been approved, however difficulty in finding accepting facility.    LT care needs    Acuity of needs- Pt is requiring a high level of assistance.    Add: Update on insurance- Pt is only covered through [place] (a medicaid HMO) as of 12/23/17.

## 2018-01-15 NOTE — PROGRESS NOTES
[facility]ist Progress Note    Date of Service: 1/15/2018    Chief Complaint  52 y.o. male admitted 2017 with AMS/aphasia/R HP/dysphagia    Interval Problem Update  Same severe aphasia and R HP; no new issues    Consultants/Specialty  Neurology/neurosurgery/GI/Acute Rehab    Disposition  snf         Review of Systems   Unable to perform ROS: Mental acuity      Physical Exam  Laboratory/Imaging   Hemodynamics  Temp (24hrs), Av.7 °C (98.1 °F), Min:36.4 °C (97.5 °F), Max:37 °C (98.6 °F)   Temperature: 36.7 °C (98.1 °F)  Pulse  Av.1  Min: 67  Max: 118    Blood Pressure: 132/77      Respiratory      Respiration: 18, Pulse Oximetry: 94 %        RUL Breath Sounds: Clear, RML Breath Sounds: Clear, RLL Breath Sounds: Diminished, MIRTA Breath Sounds: Clear, LLL Breath Sounds: Diminished    Fluids    Intake/Output Summary (Last 24 hours) at 01/15/18 1326  Last data filed at 01/15/18 1130   Gross per 24 hour   Intake             1275 ml   Output             1700 ml   Net             -425 ml       Nutrition  Orders Placed This Encounter   Procedures   • DIET NPO     Standing Status:   Standing     Number of Occurrences:   29     Order Specific Question:   Restrict to:     Answer:   Strict [1]     Comments:   Hold tube feeds     Physical Exam   Constitutional: No distress.   HENT:   Head: Normocephalic.   Eyes: EOM are normal.   Neck: Neck supple.   Cardiovascular: Normal rate and regular rhythm.    Pulmonary/Chest: Effort normal and breath sounds normal.   Abdominal: Soft. Bowel sounds are normal. He exhibits no distension.   PEG site ok   Musculoskeletal: He exhibits no edema.   Neurological: He is alert.   Cannot move RUE/RLE to commands; good strength on L side; severe expressive aphasia and some receptive aphasia;   Skin: Skin is warm.                                Assessment/Plan     * CVA (cerebral vascular accident) (CMS-HCC)- (present on admission)   Assessment & Plan    Expressive/receptive  aphasia/dysphagia/ right sided weakness. MRI showed left frontal/parietal/temporal infarct.   Had progression of aphasia and right hemiparesis 12/21. Stat CTA showed L ICA occlusion with possible dissection, which was confirmed on MRA.  Neurology and vascular surgery and recommended against anticoagulation or surgery.    - tolerating tube feeds, s/p PEG 12/26  - ST/PT/OT following, son wants long term placement near him in [place], [place]  - continue asa and statin  - heparin dvt ppx  Stable, no change of neuro deficits; await snf transfer          HTN (hypertension)- (present on admission)   Assessment & Plan    Patient not on any medications as outpatient  Controlled bp's on current rx. Cont to monitor.          Advance care planning   Assessment & Plan    son to be primary contact per family  Full code wanted per family  S/p Peg placement on 12/26               Quality-Core Measures

## 2018-01-15 NOTE — PROGRESS NOTES
Saumya Rios Fall Risk Assessment:     Last Known Fall: Within the last month  Mobility: Hemiplegic, paraplegia, or quadriplegia, Use of assistive device/requires assist of two people, Dizziness/generalized weakness  Medications: Cardiovascular or central nervous system meds  Mental Status/LOC/Awareness: Lethargic/oriented to person only  Toileting Needs: Incontinence  Volume/Electrolyte Status: Use of IV fluids/tube feeds  Communication/Sensory: Non-English patient/unable to speak/slurred speech  Behavior: Depression/anxiety  Saumya Rios Fall Risk Total: 24  Fall Risk Level: HIGH RISK    Universal Fall Precautions:  call light/belongings in reach, bed in low position and locked, wheelchairs and assistive devices out of sight, siderails up x 2, use non-slip footwear, adequate lighting, clutter free and spill free environment, educate on level of risk, educate to call for assistance    Fall Risk Level Interventions:    TRIAL (TELE 8, NEURO, MED MICHAEL 5) Moderate Fall Risk Interventions  Place yellow fall risk ID band on patient: verified  Provide patient/family education based on risk assessment : verified  Educate patient/family to call staff for assistance when getting out of bed: verified  Place fall precaution signage outside patient door: verified  Utilize bed/chair fall alarm: verifiedTRIAL (TELE 8, NEURO, MED MICHAEL 5) High Fall Risk Interventions  Place yellow fall risk ID band on patient: verified  Provide patient/family education based on risk assessment: completed  Educate patient/family to call staff for assistance when getting out of bed: completed  Place fall precaution signage outside patient door: verified  Place patient in room close to nursing station: verified  Utilize bed/chair fall alarm: verified  Notify charge of high risk for huddle: verified    Patient Specific Interventions:     Medication: review medications with patient and family  Mental Status/LOC/Awareness: reorient patient,  reinforce falls education, check on patient hourly, utilize bed/chair fall alarm and reinforce the use of call light  Toileting: provide frquent toileting and monitor intake and output/use of appropriate interventions  Volume/Electrolyte Status: ensure patient remains hydrated  Communication/Sensory: update plan of care on whiteboard  Behavioral: not applicable  Mobility: utilize bed/chair fall alarm, ensure bed is locked and in lowest position and collaborate with doctor for possible PT/OT consult

## 2018-01-16 LAB
ANION GAP SERPL CALC-SCNC: 12 MMOL/L (ref 0–11.9)
BUN SERPL-MCNC: 28 MG/DL (ref 8–22)
CALCIUM SERPL-MCNC: 9.8 MG/DL (ref 8.5–10.5)
CHLORIDE SERPL-SCNC: 108 MMOL/L (ref 96–112)
CO2 SERPL-SCNC: 23 MMOL/L (ref 20–33)
CREAT SERPL-MCNC: 0.72 MG/DL (ref 0.5–1.4)
GLUCOSE SERPL-MCNC: 106 MG/DL (ref 65–99)
POTASSIUM SERPL-SCNC: 3.7 MMOL/L (ref 3.6–5.5)
SODIUM SERPL-SCNC: 143 MMOL/L (ref 135–145)

## 2018-01-16 PROCEDURE — A9270 NON-COVERED ITEM OR SERVICE: HCPCS | Performed by: HOSPITALIST

## 2018-01-16 PROCEDURE — 700102 HCHG RX REV CODE 250 W/ 637 OVERRIDE(OP): Performed by: INTERNAL MEDICINE

## 2018-01-16 PROCEDURE — 700102 HCHG RX REV CODE 250 W/ 637 OVERRIDE(OP): Performed by: HOSPITALIST

## 2018-01-16 PROCEDURE — 36415 COLL VENOUS BLD VENIPUNCTURE: CPT

## 2018-01-16 PROCEDURE — 700111 HCHG RX REV CODE 636 W/ 250 OVERRIDE (IP): Performed by: INTERNAL MEDICINE

## 2018-01-16 PROCEDURE — 99232 SBSQ HOSP IP/OBS MODERATE 35: CPT | Performed by: INTERNAL MEDICINE

## 2018-01-16 PROCEDURE — 80048 BASIC METABOLIC PNL TOTAL CA: CPT

## 2018-01-16 PROCEDURE — 97530 THERAPEUTIC ACTIVITIES: CPT

## 2018-01-16 PROCEDURE — 97112 NEUROMUSCULAR REEDUCATION: CPT

## 2018-01-16 PROCEDURE — A9270 NON-COVERED ITEM OR SERVICE: HCPCS | Performed by: INTERNAL MEDICINE

## 2018-01-16 PROCEDURE — G8978 MOBILITY CURRENT STATUS: HCPCS | Mod: CL

## 2018-01-16 PROCEDURE — G8979 MOBILITY GOAL STATUS: HCPCS | Mod: CJ

## 2018-01-16 PROCEDURE — 770006 HCHG ROOM/CARE - MED/SURG/GYN SEMI*

## 2018-01-16 PROCEDURE — 97535 SELF CARE MNGMENT TRAINING: CPT

## 2018-01-16 RX ADMIN — AMLODIPINE BESYLATE 10 MG: 5 TABLET ORAL at 08:31

## 2018-01-16 RX ADMIN — ASPIRIN 81 MG: 81 TABLET, CHEWABLE ORAL at 08:31

## 2018-01-16 RX ADMIN — LISINOPRIL 5 MG: 5 TABLET ORAL at 08:31

## 2018-01-16 RX ADMIN — HEPARIN SODIUM 5000 UNITS: 5000 INJECTION, SOLUTION INTRAVENOUS; SUBCUTANEOUS at 20:30

## 2018-01-16 RX ADMIN — POTASSIUM BICARBONATE 25 MEQ: 25 TABLET, EFFERVESCENT ORAL at 20:26

## 2018-01-16 RX ADMIN — OMEPRAZOLE 40 MG: 20 CAPSULE, DELAYED RELEASE ORAL at 08:31

## 2018-01-16 RX ADMIN — HEPARIN SODIUM 5000 UNITS: 5000 INJECTION, SOLUTION INTRAVENOUS; SUBCUTANEOUS at 04:50

## 2018-01-16 RX ADMIN — ACETAMINOPHEN 650 MG: 325 TABLET, FILM COATED ORAL at 08:31

## 2018-01-16 RX ADMIN — POTASSIUM BICARBONATE 25 MEQ: 25 TABLET, EFFERVESCENT ORAL at 08:31

## 2018-01-16 RX ADMIN — ATORVASTATIN CALCIUM 80 MG: 80 TABLET, FILM COATED ORAL at 20:26

## 2018-01-16 RX ADMIN — ACETAMINOPHEN 650 MG: 325 TABLET, FILM COATED ORAL at 20:30

## 2018-01-16 RX ADMIN — NICOTINE 14 MG: 14 PATCH, EXTENDED RELEASE TRANSDERMAL at 04:50

## 2018-01-16 RX ADMIN — HEPARIN SODIUM 5000 UNITS: 5000 INJECTION, SOLUTION INTRAVENOUS; SUBCUTANEOUS at 13:39

## 2018-01-16 ASSESSMENT — COGNITIVE AND FUNCTIONAL STATUS - GENERAL
DAILY ACTIVITIY SCORE: 12
SUGGESTED CMS G CODE MODIFIER DAILY ACTIVITY: CL
MOVING FROM LYING ON BACK TO SITTING ON SIDE OF FLAT BED: A LOT
DRESSING REGULAR LOWER BODY CLOTHING: A LOT
TOILETING: A LOT
DRESSING REGULAR UPPER BODY CLOTHING: A LOT
STANDING UP FROM CHAIR USING ARMS: A LOT
TURNING FROM BACK TO SIDE WHILE IN FLAT BAD: A LOT
CLIMB 3 TO 5 STEPS WITH RAILING: TOTAL
PERSONAL GROOMING: A LITTLE
WALKING IN HOSPITAL ROOM: TOTAL
EATING MEALS: TOTAL
MOBILITY SCORE: 10
MOVING TO AND FROM BED TO CHAIR: A LOT
HELP NEEDED FOR BATHING: A LOT
SUGGESTED CMS G CODE MODIFIER MOBILITY: CL

## 2018-01-16 ASSESSMENT — PAIN SCALES - WONG BAKER: WONGBAKER_NUMERICALRESPONSE: DOESN'T HURT AT ALL

## 2018-01-16 NOTE — PROGRESS NOTES
Saumya Rios Fall Risk Assessment:     Last Known Fall: Within the last month  Mobility: Hemiplegic, paraplegia, or quadriplegia  Medications: Cardiovascular or central nervous system meds  Mental Status/LOC/Awareness: Lethargic/oriented to person only  Toileting Needs: Incontinence  Volume/Electrolyte Status: Use of IV fluids/tube feeds  Communication/Sensory: Non-English patient/unable to speak/slurred speech  Behavior: Appropriate behavior  Saumya Rios Fall Risk Total: 19  Fall Risk Level: HIGH RISK    Universal Fall Precautions:  call light/belongings in reach, bed in low position and locked, wheelchairs and assistive devices out of sight, siderails up x 2, use non-slip footwear, adequate lighting, clutter free and spill free environment, educate on level of risk, educate to call for assistance    Fall Risk Level Interventions:    TRIAL ([employer] 8, NEURO, MED MICHAEL 5) Moderate Fall Risk Interventions  Place yellow fall risk ID band on patient: verified  Provide patient/family education based on risk assessment : verified  Educate patient/family to call staff for assistance when getting out of bed: verified  Place fall precaution signage outside patient door: verified  Utilize bed/chair fall alarm: verifiedTRIAL ([employer] 8, NEURO, [employer] MICHAEL 5) High Fall Risk Interventions  Place yellow fall risk ID band on patient: verified  Provide patient/family education based on risk assessment: completed  Educate patient/family to call staff for assistance when getting out of bed: completed  Place fall precaution signage outside patient door: verified  Place patient in room close to nursing station: verified  Utilize bed/chair fall alarm: verified  Notify charge of high risk for huddle: verified    Patient Specific Interventions:     Medication: review medications with patient and family  Mental Status/LOC/Awareness: reinforce falls education, check on patient hourly, utilize bed/chair fall alarm, reinforce the use of call  light and provide activity  Toileting: provide frquent toileting  Volume/Electrolyte Status: ensure patient remains hydrated  Communication/Sensory: update plan of care on whiteboard  Behavioral: encourage patient to voice feelings and engage patient in daily activities  Mobility: schedule physical activity throughout the day, utilize bed/chair fall alarm, ensure bed is locked and in lowest position, provide appropriate assistive device, instruct patient to exit bed on their strongest side and collaborate with doctor for possible PT/OT consult

## 2018-01-16 NOTE — CARE PLAN
Problem: Safety  Goal: Will remain free from injury  Outcome: PROGRESSING AS EXPECTED  Precautions in place.     Problem: Mobility  Goal: Risk for activity intolerance will decrease  Outcome: PROGRESSING AS EXPECTED  Up to chair, pt tolerates well and is able to help.

## 2018-01-16 NOTE — THERAPY
"Physical Therapy Treatment completed.   Bed Mobility:  Supine to Sit: Maximal Assist (rolling onto right, attempts to assist with LUE/LLE)  Transfers: Sit to Stand: Moderate Assist  Gait: Level Of Assist: Unable  Plan of Care: Will benefit from Physical Therapy 5 times per week  Discharge Recommendations: Equipment: Will Continue to Assess for Equipment Needs. Post-acute therapy Discharge to a transitional care facility for continued skilled therapy services, can tolerate 3 hrs of therapies.     PT tx session today addressing sitting and standing balance with weight shifts, functional transfer training, LE ther ex (AROM LLE, PROM RLE), and wheelchair mobility. Patient demonstrates improved activity tolerance and standing tolerance. Patient is motivated to work with therapy and cooperative throughout session. Continued limitations s/p RLE/UE weakness (no volitional mvmt noted), impaired balance, transfers, motor planning and initiation. Will continue to follow for acute LOS. Rec d/c to post acute services, patient can tolerate 3 hrs of therapies and is demonstrating improvement in functional mobility and activity tolerance.        See \"Rehab Therapy-Acute\" Patient Summary Report for complete documentation.       "

## 2018-01-16 NOTE — PROGRESS NOTES
[facility]    Date of Service: 2018    Chief Complaint  52 y.o. male admitted 2017 with AMS/aphasia/R HP/dysphagia    Interval Problem Update  Pt seen and examined, no change severe aphasia and R HP; no new issues    Consultants/Specialty  Neurology/neurosurgery/GI/Acute Rehab    Disposition  snf         Review of Systems   Unable to perform ROS: Mental acuity      Physical Exam  Laboratory/Imaging   Hemodynamics  Temp (24hrs), Av.4 °C (97.6 °F), Min:36.2 °C (97.2 °F), Max:36.8 °C (98.3 °F)   Temperature: 36.2 °C (97.2 °F)  Pulse  Av.2  Min: 67  Max: 118    Blood Pressure: 135/79      Respiratory      Respiration: 19, Pulse Oximetry: 93 %        RUL Breath Sounds: Clear, RML Breath Sounds: Clear, RLL Breath Sounds: Diminished, MIRTA Breath Sounds: Clear, LLL Breath Sounds: Diminished    Fluids    Intake/Output Summary (Last 24 hours) at 18 1534  Last data filed at 18 0714   Gross per 24 hour   Intake              310 ml   Output                0 ml   Net              310 ml       Nutrition  Orders Placed This Encounter   Procedures   • DIET NPO     Standing Status:   Standing     Number of Occurrences:   29     Order Specific Question:   Restrict to:     Answer:   Strict [1]     Comments:   Hold tube feeds     Physical Exam   Constitutional: No distress.   HENT:   Head: Normocephalic.   Eyes: EOM are normal.   Neck: Neck supple.   Cardiovascular: Normal rate and regular rhythm.    Pulmonary/Chest: Effort normal and breath sounds normal.   Abdominal: Soft. Bowel sounds are normal. He exhibits no distension.   PEG site ok   Musculoskeletal: He exhibits no edema.   Neurological: He is alert.   Cannot move RUE/RLE to commands; good strength on L side; severe expressive aphasia and some receptive aphasia;   Skin: Skin is warm.           Recent Labs      18   0249   SODIUM  143   POTASSIUM  3.7   CHLORIDE  108   CO2  23   GLUCOSE  106*   BUN  28*   CREATININE  0.72    CALCIUM  9.8                      Assessment/Plan     * CVA (cerebral vascular accident) (CMS-HCC)- (present on admission)   Assessment & Plan    Expressive/receptive aphasia/dysphagia/ right sided weakness. MRI showed left frontal/parietal/temporal infarct.   Had progression of aphasia and right hemiparesis 12/21. Stat CTA showed L ICA occlusion with possible dissection, which was confirmed on MRA.  Neurology and vascular surgery and recommended against anticoagulation or surgery.    - tolerating tube feeds, s/p PEG 12/26  - ST/PT/OT following, son wants long term placement near him in [place], [place]  - continue asa and statin  - heparin dvt ppx  Stable, no change of neuro deficits; await snf transfer          HTN (hypertension)- (present on admission)   Assessment & Plan    Patient not on any medications as outpatient  Controlled bp's on current rx. Cont to monitor.          Advance care planning   Assessment & Plan    son to be primary contact per family  Full code wanted per family  S/p Peg placement on 12/26               Quality-Core Measures

## 2018-01-16 NOTE — PROGRESS NOTES
Saumya Rios Fall Risk Assessment:     Last Known Fall: Within the last month  Mobility: Hemiplegic, paraplegia, or quadriplegia  Medications: Cardiovascular or central nervous system meds  Mental Status/LOC/Awareness: Lethargic/oriented to person only  Toileting Needs: Incontinence  Volume/Electrolyte Status: Use of IV fluids/tube feeds  Communication/Sensory: Non-English patient/unable to speak/slurred speech  Behavior: Appropriate behavior  Saumya Rios Fall Risk Total: 19  Fall Risk Level: HIGH RISK    Universal Fall Precautions:  call light/belongings in reach, bed in low position and locked, wheelchairs and assistive devices out of sight, siderails up x 2, use non-slip footwear, adequate lighting, educate to call for assistance, educate on level of risk, clutter free and spill free environment    Fall Risk Level Interventions:    TRIAL ([employer] 8, NEURO, MED MICHAEL 5) Moderate Fall Risk Interventions  Place yellow fall risk ID band on patient: verified  Provide patient/family education based on risk assessment : verified  Educate patient/family to call staff for assistance when getting out of bed: verified  Place fall precaution signage outside patient door: verified  Utilize bed/chair fall alarm: verifiedTRIAL (JEDI MIND 8, NEURO, [employer] MICHAEL 5) High Fall Risk Interventions  Place yellow fall risk ID band on patient: verified  Provide patient/family education based on risk assessment: completed  Educate patient/family to call staff for assistance when getting out of bed: completed  Place fall precaution signage outside patient door: verified  Place patient in room close to nursing station: verified  Utilize bed/chair fall alarm: verified  Notify charge of high risk for huddle: verified    Patient Specific Interventions:     Medication: review medications with patient and family  Mental Status/LOC/Awareness: reorient patient, reinforce falls education, check on patient hourly, utilize bed/chair fall alarm and  reinforce the use of call light  Toileting: provide frquent toileting and monitor intake and output/use of appropriate interventions  Volume/Electrolyte Status: ensure patient remains hydrated  Communication/Sensory: update plan of care on whiteboard  Behavioral: not applicable  Mobility: utilize bed/chair fall alarm, ensure bed is locked and in lowest position, instruct patient to exit bed on their strongest side and collaborate with doctor for possible PT/OT consult

## 2018-01-16 NOTE — THERAPY
"Occupational Therapy Treatment completed with focus on ADLs, ADL transfers and patient education.  Functional Status:  Pt seen for OT tx today.  Pt was cooperative but needs tactile cues throughout the session.  Continues to be limited by directions following, weakness, R side deficits, fatigue, endurance, and self care.  Pt completed toileting with max A x2 (pt was very impulsive reaching for stationary objects to support himself unable to follow directions or complete a scoot transfer to [facility]), LB dressing max A, and seated in w/c gr/hy with mod A.  Needing constant tactile cues to initiate, follow through, and problem solve.  Pt completed supine to sit, sit to stand, and stand pivot with max A.  Pt would benefit from continued inpt OT to increase independence with functional transfers, functional endurance, and ADLs.  Plan of Care: Will benefit from Occupational Therapy 4 times per week  Discharge Recommendations:  Equipment Will Continue to Assess for Equipment Needs. Post-acute therapy Discharge to a transitional care facility for continued skilled therapy services.    See \"Rehab Therapy-Acute\" Patient Summary Report for complete documentation.   "

## 2018-01-17 VITALS
TEMPERATURE: 98.4 F | BODY MASS INDEX: 29.14 KG/M2 | WEIGHT: 215.17 LBS | HEIGHT: 72 IN | RESPIRATION RATE: 19 BRPM | OXYGEN SATURATION: 93 % | DIASTOLIC BLOOD PRESSURE: 88 MMHG | HEART RATE: 99 BPM | SYSTOLIC BLOOD PRESSURE: 133 MMHG

## 2018-01-17 PROCEDURE — 700102 HCHG RX REV CODE 250 W/ 637 OVERRIDE(OP): Performed by: HOSPITALIST

## 2018-01-17 PROCEDURE — 97530 THERAPEUTIC ACTIVITIES: CPT

## 2018-01-17 PROCEDURE — 700111 HCHG RX REV CODE 636 W/ 250 OVERRIDE (IP): Performed by: INTERNAL MEDICINE

## 2018-01-17 PROCEDURE — A9270 NON-COVERED ITEM OR SERVICE: HCPCS | Performed by: INTERNAL MEDICINE

## 2018-01-17 PROCEDURE — 97116 GAIT TRAINING THERAPY: CPT

## 2018-01-17 PROCEDURE — A9270 NON-COVERED ITEM OR SERVICE: HCPCS | Performed by: HOSPITALIST

## 2018-01-17 PROCEDURE — 700102 HCHG RX REV CODE 250 W/ 637 OVERRIDE(OP): Performed by: INTERNAL MEDICINE

## 2018-01-17 PROCEDURE — 99239 HOSP IP/OBS DSCHRG MGMT >30: CPT | Performed by: INTERNAL MEDICINE

## 2018-01-17 PROCEDURE — 97535 SELF CARE MNGMENT TRAINING: CPT

## 2018-01-17 RX ORDER — ATORVASTATIN CALCIUM 80 MG/1
80 TABLET, FILM COATED ORAL EVERY EVENING
Qty: 30 TAB | Refills: 0 | Status: SHIPPED | OUTPATIENT
Start: 2018-01-17 | End: 2018-07-12 | Stop reason: SDUPTHER

## 2018-01-17 RX ORDER — LISINOPRIL 5 MG/1
5 TABLET ORAL DAILY
Qty: 30 TAB | Refills: 0 | Status: SHIPPED | OUTPATIENT
Start: 2018-01-18 | End: 2018-07-12 | Stop reason: SDUPTHER

## 2018-01-17 RX ORDER — AMLODIPINE BESYLATE 10 MG/1
10 TABLET ORAL DAILY
Qty: 30 TAB | Refills: 0 | Status: SHIPPED | OUTPATIENT
Start: 2018-01-18 | End: 2018-07-12 | Stop reason: SDUPTHER

## 2018-01-17 RX ORDER — ASPIRIN 81 MG/1
81 TABLET, CHEWABLE ORAL DAILY
Qty: 30 TAB | Refills: 0 | Status: SHIPPED | OUTPATIENT
Start: 2018-01-18 | End: 2018-07-12 | Stop reason: SDUPTHER

## 2018-01-17 RX ADMIN — NICOTINE 14 MG: 14 PATCH, EXTENDED RELEASE TRANSDERMAL at 05:17

## 2018-01-17 RX ADMIN — AMLODIPINE BESYLATE 10 MG: 5 TABLET ORAL at 08:34

## 2018-01-17 RX ADMIN — LISINOPRIL 5 MG: 5 TABLET ORAL at 08:34

## 2018-01-17 RX ADMIN — OMEPRAZOLE 40 MG: 20 CAPSULE, DELAYED RELEASE ORAL at 08:34

## 2018-01-17 RX ADMIN — HEPARIN SODIUM 5000 UNITS: 5000 INJECTION, SOLUTION INTRAVENOUS; SUBCUTANEOUS at 05:17

## 2018-01-17 RX ADMIN — POTASSIUM BICARBONATE 25 MEQ: 25 TABLET, EFFERVESCENT ORAL at 08:34

## 2018-01-17 RX ADMIN — ASPIRIN 81 MG: 81 TABLET, CHEWABLE ORAL at 08:34

## 2018-01-17 ASSESSMENT — COGNITIVE AND FUNCTIONAL STATUS - GENERAL
WALKING IN HOSPITAL ROOM: TOTAL
PERSONAL GROOMING: A LITTLE
DRESSING REGULAR UPPER BODY CLOTHING: A LOT
DRESSING REGULAR LOWER BODY CLOTHING: A LOT
MOVING FROM LYING ON BACK TO SITTING ON SIDE OF FLAT BED: A LOT
SUGGESTED CMS G CODE MODIFIER DAILY ACTIVITY: CL
HELP NEEDED FOR BATHING: A LOT
MOVING TO AND FROM BED TO CHAIR: A LOT
DRESSING REGULAR UPPER BODY CLOTHING: A LOT
CLIMB 3 TO 5 STEPS WITH RAILING: TOTAL
SUGGESTED CMS G CODE MODIFIER DAILY ACTIVITY: CL
SUGGESTED CMS G CODE MODIFIER MOBILITY: CL
DAILY ACTIVITIY SCORE: 12
MOBILITY SCORE: 10
STANDING UP FROM CHAIR USING ARMS: A LOT
DRESSING REGULAR LOWER BODY CLOTHING: A LOT
TOILETING: A LOT
TURNING FROM BACK TO SIDE WHILE IN FLAT BAD: A LOT
EATING MEALS: TOTAL
DAILY ACTIVITIY SCORE: 12
PERSONAL GROOMING: A LITTLE
TOILETING: A LOT
EATING MEALS: TOTAL
HELP NEEDED FOR BATHING: A LOT

## 2018-01-17 ASSESSMENT — GAIT ASSESSMENTS
DISTANCE (FEET): 5
GAIT LEVEL OF ASSIST: MAXIMAL ASSIST
ASSISTIVE DEVICE: PARALLEL BARS

## 2018-01-17 NOTE — THERAPY
"Occupational Therapy Treatment completed with focus on ADLs, ADL transfers and patient education.  Functional Status:  Pt seen for OT tx today.  Pt was nonverbal throughout the session however compliant with tactile cues.  Continues to be limited by cognition, weakness, endurance, and self care.  Pt completed LB dressing with max A supine, UB dressing with max A continuing to be unaware of R UE, and standing gr/hy in standing frame with max A as pt was fixated on cleaning mouth and not completing task despite cues.  Needing constant cues to initiate, follow through, and problem solve.  Pt tolerated standing frame for 13 minutes with TENS on R UE using CVA protocol on level 6 for neuro-musclar re-ed.  Pt completed supine to sit with mod A, sit to stand with mod A leaning to the R, and stand pivot with mod A due to impulsivity and reaching for stationary objects needing tactile cues for placement.  Pt would benefit from continued inpt OT to increase independence with functional transfers, functional endurance, and ADLs.  Plan of Care: Will benefit from Occupational Therapy 4 times per week  Discharge Recommendations:  Equipment Will Continue to Assess for Equipment Needs. Post-acute therapy Discharge to a transitional care facility for continued skilled therapy services.    See \"Rehab Therapy-Acute\" Patient Summary Report for complete documentation.   "

## 2018-01-17 NOTE — DISCHARGE INSTRUCTIONS
Discharge Instructions    Discharged to other by medical transportation with self. Discharged via ambulance, hospital escort: N/A.  Special equipment needed: Not Applicable    Be sure to schedule a follow-up appointment with your primary care doctor or any specialists as instructed.     Discharge Plan:   Diet Plan: Discussed  Activity Level: Discussed  Smoking Cessation Offered: Patient Refused  Confirmed Follow up Appointment: Patient to Call and Schedule Appointment (per SNF)  Confirmed Symptoms Management: Discussed  Medication Reconciliation Updated: Yes  Pneumococcal Vaccine Given - only chart on this line when given: Given (See MAR)  Influenza Vaccine Indication: Patient Refuses    I understand that a diet low in cholesterol, fat, and sodium is recommended for good health. Unless I have been given specific instructions below for another diet, I accept this instruction as my diet prescription.   Other diet: NPO; Tube feed Replete Fiber at 85ml/hr. 250ml Free Water flush Q6H.     Special Instructions: None    · Is patient discharged on Warfarin / Coumadin?   No     Depression / Suicide Risk    As you are discharged from this [facility] facility, it is important to learn how to keep safe from harming yourself.    Recognize the warning signs:  · Abrupt changes in personality, positive or negative- including increase in energy   · Giving away possessions  · Change in eating patterns- significant weight changes-  positive or negative  · Change in sleeping patterns- unable to sleep or sleeping all the time   · Unwillingness or inability to communicate  · Depression  · Unusual sadness, discouragement and loneliness  · Talk of wanting to die  · Neglect of personal appearance   · Rebelliousness- reckless behavior  · Withdrawal from people/activities they love  · Confusion- inability to concentrate     If you or a loved one observes any of these behaviors or has concerns about self-harm, here's what you can  do:  · Talk about it- your feelings and reasons for harming yourself  · Remove any means that you might use to hurt yourself (examples: pills, rope, extension cords, firearm)  · Get professional help from the community (Mental Health, Substance Abuse, psychological counseling)  · Do not be alone:Call your Safe Contact- someone whom you trust who will be there for you.  · Call your local CRISIS HOTLINE 218-8084 or 732-179-8429  · Call your local Children's Mobile Crisis Response Team [place] (821) 079-5720 or www.NetSanity  · Call the toll free National Suicide Prevention Hotlines   · National Suicide Prevention Lifeline 319-915-ORVX (1083)  · National Hope Line Network 800-SUICIDE (842-3886)

## 2018-01-17 NOTE — DISCHARGE PLANNING
Received PCS form from [facility](SARIKA). Arranged patient's transport to [facility] at 1400 via [facility]. Contacted MTM as patient has Medicaid HMO. MTM to contact [facility]. [facility]) and K[facility]) notified of transport time.

## 2018-01-17 NOTE — THERAPY
"Occupational Therapy Treatment completed with focus on ADLs, ADL transfers and upper extremity function.  Functional Status:  Pt participated in standing with standing frame. Pt required max A for LB dressing with increased ability to lift hips while supine. Pt required max A for supine>sit. Pt required mod A to transfer from EOB>w/c. Pt tolerated standing for 13 min and completed grooming while standing with min A. E-stim applied to RUE while pt was in standing frame to increase muscle activation in RUE. Pt demonstrated R post lean while standing. Pt required max A for sit>supine. Pt noticed to grimace with weightbearing to RUE. R shoulder subluxation noticed. Pt required max VC's throughout session for R-side awareness during functional transfers. [place] Fix tape applied for R shoulder subluxation. Will continue to follow for acute OT services while in-house.  Plan of Care: Will benefit from Occupational Therapy 4 times per week  Discharge Recommendations:  Equipment Will Continue to Assess for Equipment Needs. Post-acute therapy Discharge to a transitional care facility for continued skilled therapy services.    See \"Rehab Therapy-Acute\" Patient Summary Report for complete documentation.     "

## 2018-01-17 NOTE — THERAPY
"Physical Therapy Treatment completed.   Bed Mobility:  Supine to Sit: Moderate Assist  Transfers: Sit to Stand: Minimal Assist (in // bars)  Gait: Level Of Assist: Maximal Assist with Will Continue to Assess for Equipment Needs       Plan of Care: Will benefit from Physical Therapy 5 times per week  Discharge Recommendations: Equipment: Will Continue to Assess for Equipment Needs. Post-acute therapy Discharge to a transitional care facility for continued skilled therapy services.     See \"Rehab Therapy-Acute\" Patient Summary Report for complete documentation.       "

## 2018-01-17 NOTE — PROGRESS NOTES
Saumya Rios Fall Risk Assessment:     Last Known Fall: Within the last month  Mobility: Hemiplegic, paraplegia, or quadriplegia  Medications: Cardiovascular or central nervous system meds  Mental Status/LOC/Awareness: Lethargic/oriented to person only  Toileting Needs: Incontinence  Volume/Electrolyte Status: Use of IV fluids/tube feeds  Communication/Sensory: Non-English patient/unable to speak/slurred speech  Behavior: Appropriate behavior  Saumya Rios Fall Risk Total: 19  Fall Risk Level: HIGH RISK    Universal Fall Precautions:  call light/belongings in reach, bed in low position and locked, wheelchairs and assistive devices out of sight, siderails up x 2, use non-slip footwear, adequate lighting, clutter free and spill free environment, educate on level of risk, educate to call for assistance    Fall Risk Level Interventions:    TRIAL ([employer] 8, NEURO, MED MICHAEL 5) Moderate Fall Risk Interventions  Place yellow fall risk ID band on patient: verified  Provide patient/family education based on risk assessment : verified  Educate patient/family to call staff for assistance when getting out of bed: verified  Place fall precaution signage outside patient door: verified  Utilize bed/chair fall alarm: verifiedTRIAL ([employer] 8, NEURO, [employer] MICHAEL 5) High Fall Risk Interventions  Place yellow fall risk ID band on patient: verified  Provide patient/family education based on risk assessment: completed  Educate patient/family to call staff for assistance when getting out of bed: completed  Place fall precaution signage outside patient door: verified  Place patient in room close to nursing station: verified  Utilize bed/chair fall alarm: verified  Notify charge of high risk for huddle: verified    Patient Specific Interventions:     Medication: review medications with patient and family  Mental Status/LOC/Awareness: reinforce falls education, check on patient hourly, utilize bed/chair fall alarm, reinforce the use of call  light and provide activity  Toileting: provide frquent toileting  Volume/Electrolyte Status: ensure patient remains hydrated  Communication/Sensory: update plan of care on whiteboard  Behavioral: encourage patient to voice feelings and engage patient in daily activities  Mobility: schedule physical activity throughout the day, utilize bed/chair fall alarm, ensure bed is locked and in lowest position, provide appropriate assistive device, instruct patient to exit bed on their strongest side and collaborate with doctor for possible PT/OT consult

## 2018-01-17 NOTE — DISCHARGE SUMMARY
CHIEF COMPLAINT ON ADMISSION  Chief Complaint   Patient presents with   • ALOC   • Head Ache     x 1 month        CODE STATUS  Full Code    HPI & HOSPITAL COURSE  This is a 52 y.o. year old male who was admitted on 12/19/17 after presenting to ER for 3-4 day history of altered level of consciousness and also right side weakness. Pt was found to have an acute CVA involving the left  temporal, parietal and occipital lobe, pt not a TPA candidate started on aspirin and statin.  His CTA neck showed occlusion distal ICA complete occlusion, possibly from focal carotid dissection but no evidence of aortic dissection. Vascular surgery was consulted but no need for intervention at this time. Pt was seen by speech and has failed swallow eval and PEG placement was recommended so GI was consulted and PEG was placed. For continues tube feed.  Pt was seen by physical therapy and skilled has been recommended  Pt will be transferred to skilled today.        Therefore, he is discharged in guarded and stable condition for further post-acute management.     DISCHARGE PROBLEM LIST  Principal Problem:    CVA (cerebral vascular accident) (CMS-HCC) POA: Yes    HTN (hypertension) POA: Yes    Hyponatremia POA: Unknown    Hypokalemia POA: Unknown      FOLLOW UP  No future appointments.  Primary Care Provider    Schedule an appointment as soon as possible for a visit in 1 week  Hospital follow-up appointment with PCP    [facility]  [address] [facility] [address]          MEDICATIONS ON DISCHARGE   Case, Gilles   Home Medication Instructions GEORGE:[id number]    Printed on:01/17/18 1045   Medication Information                      amlodipine (NORVASC) 10 MG Tab  1 Tab by Per NG Tube route every day.             aspirin (ASA) 81 MG Chew Tab chewable tablet  1 Tab by Per NG Tube route every day.             atorvastatin (LIPITOR) 80 MG tablet  Take 1 Tab by mouth every evening.             lisinopril (PRINIVIL) 5 MG Tab  Take 1  Tab by mouth every day.             omeprazole 2 mg/mL in sodium bicarbonate (PRILOSEC)  Take 20 mL by mouth every day.             potassium bicarbonate (KLYTE) 25 MEQ tablet  Take 1 Tab by mouth 2 Times a Day.                 DIET  Orders Placed This Encounter   Procedures   • DIET NPO     Standing Status:   Standing     Number of Occurrences:   29     Order Specific Question:   Restrict to:     Answer:   Strict [1]     Comments:   Hold tube feeds       ACTIVITY  As tolerated.      LINES, DRAINS, AND WOUNDS  This is an automated list. Peripheral IVs will be removed prior to discharge.  PIV Group Left Forearm 20g Flexible Catheter (Active)   Line Secured Taped;Transparent 1/16/2018  8:11 PM   Site Condition / Description Assessed;Patent;Clean;Dry;Intact 1/16/2018  8:11 PM   Dressing Type / Description Transparent;Clean;Dry;Intact 1/16/2018  8:11 PM   Dressing Status Observed 1/16/2018  8:11 PM   Saline Locked Yes 1/16/2018  8:11 PM   Infiltration Grading [facility] by [facility] 0 1/16/2018  8:11 PM   Phlebitis Scale (Used by [facility]) 0 1/16/2018  8:11 PM   Date Primary Tubing Changed 12/24/18 1/2/2018 10:30 AM   NEXT Primary Tubing Change  12/26/18 1/2/2018 10:30 AM     Enteral Tube Group Abd- Midline PEG (Percutaneous Endo Gastric) (Active)   Placement Confirmation Gastric Aspirate Present 1/16/2018  8:11 PM   Method of Securement Not Applicable 1/16/2018  8:11 PM   Position Central 1/16/2018  8:11 PM   Tube Care Completed 1/16/2018  8:11 PM   Next Closed Access Valve Change Due 01/20/18 1/16/2018  8:11 PM   Residual Characteristics None 1/16/2018  8:11 PM   Head of Bed up 30º While Tube Feeding Yes 1/16/2018  8:11 PM   Site Condition / Description Intact;Clean;Dry ;Assessed 1/16/2018  8:11 PM   Dressing Type / Description Open to Air 1/16/2018  8:11 PM   Dressing Status Changed 1/14/2018  9:00 AM      Surgical Incision  Incision Abdomen (Active)   Wound Bed Red 1/16/2018  7:16 AM   Drainage  None 1/16/2018  7:16  AM   Periwound Skin Pink;Normal;Intact 1/16/2018  7:16 AM   Daily - Wound Closure Open to Air 1/16/2018  7:16 AM   Dressing Options Open to Air 1/13/2018  8:00 PM   Dressing Status / Change Clean;Dry;Intact 1/14/2018  8:56 PM                  MENTAL STATUS ON TRANSFER  Level of Consciousness: Alert  Orientation : Unable to Assess  Speech: Receptive Aphasia    CONSULTATIONS  Vascular surgery: Dr. Cooper   GI: Dr. Matos     PROCEDURES  None     LABORATORY  Lab Results   Component Value Date/Time    SODIUM 143 01/16/2018 02:49 AM    POTASSIUM 3.7 01/16/2018 02:49 AM    CHLORIDE 108 01/16/2018 02:49 AM    CO2 23 01/16/2018 02:49 AM    GLUCOSE 106 (H) 01/16/2018 02:49 AM    BUN 28 (H) 01/16/2018 02:49 AM    CREATININE 0.72 01/16/2018 02:49 AM        Lab Results   Component Value Date/Time    WBC 9.9 01/09/2018 03:12 AM    HEMOGLOBIN 16.8 01/09/2018 03:12 AM    HEMATOCRIT 50.9 01/09/2018 03:12 AM    PLATELETCT 203 01/09/2018 03:12 AM        Total time of the discharge process exceeds 40 minutes.

## 2018-01-17 NOTE — DISCHARGE PLANNING
Medical Social Worker    MIKAEL was informed by [facility] that [facility] can accept pt today.     Mikael informed pt's attending MD that pt can be accepted. MD stated that he would complete a DC summ before 1400.    MIKAEL faxed transport and PCS form to [facility].

## 2018-01-17 NOTE — PROGRESS NOTES
Saumya Rios Fall Risk Assessment:     Last Known Fall: Within the last month  Mobility: Hemiplegic, paraplegia, or quadriplegia  Medications: Cardiovascular or central nervous system meds  Mental Status/LOC/Awareness: Lethargic/oriented to person only  Toileting Needs: Incontinence  Volume/Electrolyte Status: Use of IV fluids/tube feeds  Communication/Sensory: Non-English patient/unable to speak/slurred speech  Behavior: Appropriate behavior  Saumya Rios Fall Risk Total: 19  Fall Risk Level: HIGH RISK    Universal Fall Precautions:  call light/belongings in reach, bed in low position and locked, wheelchairs and assistive devices out of sight, siderails up x 2, use non-slip footwear, adequate lighting, clutter free and spill free environment, educate on level of risk, educate to call for assistance    Fall Risk Level Interventions:    TRIAL ([employer] 8, NEURO, MED MICHAEL 5) Moderate Fall Risk Interventions  Place yellow fall risk ID band on patient: verified  Provide patient/family education based on risk assessment : verified  Educate patient/family to call staff for assistance when getting out of bed: verified  Place fall precaution signage outside patient door: verified  Utilize bed/chair fall alarm: verifiedTRIAL ([employer] 8, NEURO, [employer] MICHAEL 5) High Fall Risk Interventions  Place yellow fall risk ID band on patient: verified  Provide patient/family education based on risk assessment: completed  Educate patient/family to call staff for assistance when getting out of bed: completed  Place fall precaution signage outside patient door: verified  Place patient in room close to nursing station: verified  Utilize bed/chair fall alarm: verified  Notify charge of high risk for huddle: verified    Patient Specific Interventions:     Medication: review medications with patient and family  Mental Status/LOC/Awareness: reorient patient, reinforce falls education, check on patient hourly, utilize bed/chair fall alarm and reinforce  the use of call light  Toileting: provide frquent toileting and monitor intake and output/use of appropriate interventions  Volume/Electrolyte Status: ensure patient remains hydrated  Communication/Sensory: update plan of care on whiteboard  Behavioral: encourage patient to voice feelings  Mobility: utilize bed/chair fall alarm, ensure bed is locked and in lowest position and collaborate with doctor for possible PT/OT consult   I will SWITCH the dose or number of times a day I take the medications listed below when I get home from the hospital:  None

## 2018-01-17 NOTE — DISCHARGE PLANNING
Medical Social Worker    SARIKA informed pt's mother and charge/bedside RN that pt is to DC via [facility] to [facility]. SARIKA handed DC Pack to bedside RN.

## 2018-01-18 ENCOUNTER — HOSPITAL ENCOUNTER (OUTPATIENT)
Dept: LAB | Facility: MEDICAL CENTER | Age: 53
End: 2018-01-18
Attending: INTERNAL MEDICINE
Payer: COMMERCIAL

## 2018-01-18 LAB
ANION GAP SERPL CALC-SCNC: 10 MMOL/L (ref 0–11.9)
BASOPHILS # BLD AUTO: 1.5 % (ref 0–1.8)
BASOPHILS # BLD: 0.15 K/UL (ref 0–0.12)
BUN SERPL-MCNC: 33 MG/DL (ref 8–22)
CALCIUM SERPL-MCNC: 10 MG/DL (ref 8.5–10.5)
CHLORIDE SERPL-SCNC: 110 MMOL/L (ref 96–112)
CO2 SERPL-SCNC: 23 MMOL/L (ref 20–33)
CREAT SERPL-MCNC: 0.68 MG/DL (ref 0.5–1.4)
EOSINOPHIL # BLD AUTO: 0.59 K/UL (ref 0–0.51)
EOSINOPHIL NFR BLD: 5.8 % (ref 0–6.9)
ERYTHROCYTE [DISTWIDTH] IN BLOOD BY AUTOMATED COUNT: 42.2 FL (ref 35.9–50)
GLUCOSE SERPL-MCNC: 138 MG/DL (ref 65–99)
HCT VFR BLD AUTO: 48 % (ref 42–52)
HGB BLD-MCNC: 16.3 G/DL (ref 14–18)
IMM GRANULOCYTES # BLD AUTO: 0.07 K/UL (ref 0–0.11)
IMM GRANULOCYTES NFR BLD AUTO: 0.7 % (ref 0–0.9)
LYMPHOCYTES # BLD AUTO: 1.99 K/UL (ref 1–4.8)
LYMPHOCYTES NFR BLD: 19.6 % (ref 22–41)
MCH RBC QN AUTO: 33.3 PG (ref 27–33)
MCHC RBC AUTO-ENTMCNC: 34 G/DL (ref 33.7–35.3)
MCV RBC AUTO: 98 FL (ref 81.4–97.8)
MONOCYTES # BLD AUTO: 1.21 K/UL (ref 0–0.85)
MONOCYTES NFR BLD AUTO: 11.9 % (ref 0–13.4)
NEUTROPHILS # BLD AUTO: 6.15 K/UL (ref 1.82–7.42)
NEUTROPHILS NFR BLD: 60.5 % (ref 44–72)
NRBC # BLD AUTO: 0 K/UL
NRBC BLD-RTO: 0 /100 WBC
PLATELET # BLD AUTO: 242 K/UL (ref 164–446)
PMV BLD AUTO: 13.9 FL (ref 9–12.9)
POTASSIUM SERPL-SCNC: 3.7 MMOL/L (ref 3.6–5.5)
RBC # BLD AUTO: 4.9 M/UL (ref 4.7–6.1)
SODIUM SERPL-SCNC: 143 MMOL/L (ref 135–145)
WBC # BLD AUTO: 10.2 K/UL (ref 4.8–10.8)

## 2018-01-24 LAB
AMORPH CRY #/AREA URNS HPF: PRESENT /HPF
ANION GAP SERPL CALC-SCNC: 8 MMOL/L (ref 0–11.9)
APPEARANCE UR: ABNORMAL
BACTERIA #/AREA URNS HPF: NEGATIVE /HPF
BASOPHILS # BLD AUTO: 0.9 % (ref 0–1.8)
BASOPHILS # BLD: 0.08 K/UL (ref 0–0.12)
BILIRUB UR QL STRIP.AUTO: NEGATIVE
BUN SERPL-MCNC: 19 MG/DL (ref 8–22)
CALCIUM SERPL-MCNC: 9.5 MG/DL (ref 8.5–10.5)
CHLORIDE SERPL-SCNC: 104 MMOL/L (ref 96–112)
CO2 SERPL-SCNC: 23 MMOL/L (ref 20–33)
COLOR UR: YELLOW
CREAT SERPL-MCNC: 0.58 MG/DL (ref 0.5–1.4)
CULTURE IF INDICATED INDCX: YES UA CULTURE
EOSINOPHIL # BLD AUTO: 0.6 K/UL (ref 0–0.51)
EOSINOPHIL NFR BLD: 6.6 % (ref 0–6.9)
EPI CELLS #/AREA URNS HPF: ABNORMAL /HPF
ERYTHROCYTE [DISTWIDTH] IN BLOOD BY AUTOMATED COUNT: 40.2 FL (ref 35.9–50)
GLUCOSE SERPL-MCNC: 141 MG/DL (ref 65–99)
GLUCOSE UR STRIP.AUTO-MCNC: NEGATIVE MG/DL
HCT VFR BLD AUTO: 43.2 % (ref 42–52)
HGB BLD-MCNC: 14.4 G/DL (ref 14–18)
HYALINE CASTS #/AREA URNS LPF: ABNORMAL /LPF
IMM GRANULOCYTES # BLD AUTO: 0.04 K/UL (ref 0–0.11)
IMM GRANULOCYTES NFR BLD AUTO: 0.4 % (ref 0–0.9)
KETONES UR STRIP.AUTO-MCNC: NEGATIVE MG/DL
LEUKOCYTE ESTERASE UR QL STRIP.AUTO: ABNORMAL
LYMPHOCYTES # BLD AUTO: 1.75 K/UL (ref 1–4.8)
LYMPHOCYTES NFR BLD: 19.1 % (ref 22–41)
MCH RBC QN AUTO: 32.1 PG (ref 27–33)
MCHC RBC AUTO-ENTMCNC: 33.3 G/DL (ref 33.7–35.3)
MCV RBC AUTO: 96.2 FL (ref 81.4–97.8)
MICRO URNS: ABNORMAL
MONOCYTES # BLD AUTO: 1.12 K/UL (ref 0–0.85)
MONOCYTES NFR BLD AUTO: 12.2 % (ref 0–13.4)
NEUTROPHILS # BLD AUTO: 5.56 K/UL (ref 1.82–7.42)
NEUTROPHILS NFR BLD: 60.8 % (ref 44–72)
NITRITE UR QL STRIP.AUTO: NEGATIVE
NRBC # BLD AUTO: 0 K/UL
NRBC BLD-RTO: 0 /100 WBC
PH UR STRIP.AUTO: 8.5 [PH]
PLATELET # BLD AUTO: 183 K/UL (ref 164–446)
PMV BLD AUTO: 14.1 FL (ref 9–12.9)
POTASSIUM SERPL-SCNC: 3.8 MMOL/L (ref 3.6–5.5)
PROT UR QL STRIP: NEGATIVE MG/DL
RBC # BLD AUTO: 4.49 M/UL (ref 4.7–6.1)
RBC # URNS HPF: ABNORMAL /HPF
RBC UR QL AUTO: NEGATIVE
RENAL EPI CELLS #/AREA URNS HPF: ABNORMAL /HPF
SODIUM SERPL-SCNC: 135 MMOL/L (ref 135–145)
SP GR UR STRIP.AUTO: 1.02
UROBILINOGEN UR STRIP.AUTO-MCNC: 1 MG/DL
WBC # BLD AUTO: 9.2 K/UL (ref 4.8–10.8)
WBC #/AREA URNS HPF: ABNORMAL /HPF

## 2018-01-26 LAB
BACTERIA UR CULT: NORMAL
SIGNIFICANT IND 70042: NORMAL
SITE SITE: NORMAL
SOURCE SOURCE: NORMAL

## 2018-02-01 ENCOUNTER — HOSPITAL ENCOUNTER (OUTPATIENT)
Dept: LAB | Facility: MEDICAL CENTER | Age: 53
End: 2018-02-01
Attending: INTERNAL MEDICINE
Payer: COMMERCIAL

## 2018-02-12 LAB
ANION GAP SERPL CALC-SCNC: 8 MMOL/L (ref 0–11.9)
BASOPHILS # BLD AUTO: 1 % (ref 0–1.8)
BASOPHILS # BLD: 0.09 K/UL (ref 0–0.12)
BUN SERPL-MCNC: 18 MG/DL (ref 8–22)
CALCIUM SERPL-MCNC: 9.2 MG/DL (ref 8.5–10.5)
CHLORIDE SERPL-SCNC: 105 MMOL/L (ref 96–112)
CO2 SERPL-SCNC: 22 MMOL/L (ref 20–33)
CREAT SERPL-MCNC: 0.54 MG/DL (ref 0.5–1.4)
EOSINOPHIL # BLD AUTO: 0.42 K/UL (ref 0–0.51)
EOSINOPHIL NFR BLD: 4.6 % (ref 0–6.9)
ERYTHROCYTE [DISTWIDTH] IN BLOOD BY AUTOMATED COUNT: 40.1 FL (ref 35.9–50)
GLUCOSE SERPL-MCNC: 105 MG/DL (ref 65–99)
HCT VFR BLD AUTO: 40.5 % (ref 42–52)
HGB BLD-MCNC: 13.8 G/DL (ref 14–18)
IMM GRANULOCYTES # BLD AUTO: 0.05 K/UL (ref 0–0.11)
IMM GRANULOCYTES NFR BLD AUTO: 0.6 % (ref 0–0.9)
LYMPHOCYTES # BLD AUTO: 1.79 K/UL (ref 1–4.8)
LYMPHOCYTES NFR BLD: 19.7 % (ref 22–41)
MCH RBC QN AUTO: 32.7 PG (ref 27–33)
MCHC RBC AUTO-ENTMCNC: 34.1 G/DL (ref 33.7–35.3)
MCV RBC AUTO: 96 FL (ref 81.4–97.8)
MONOCYTES # BLD AUTO: 1.1 K/UL (ref 0–0.85)
MONOCYTES NFR BLD AUTO: 12.1 % (ref 0–13.4)
NEUTROPHILS # BLD AUTO: 5.62 K/UL (ref 1.82–7.42)
NEUTROPHILS NFR BLD: 62 % (ref 44–72)
NRBC # BLD AUTO: 0 K/UL
NRBC BLD-RTO: 0 /100 WBC
PLATELET # BLD AUTO: 249 K/UL (ref 164–446)
PMV BLD AUTO: 12.9 FL (ref 9–12.9)
POTASSIUM SERPL-SCNC: 3.9 MMOL/L (ref 3.6–5.5)
RBC # BLD AUTO: 4.22 M/UL (ref 4.7–6.1)
SODIUM SERPL-SCNC: 135 MMOL/L (ref 135–145)
WBC # BLD AUTO: 9.1 K/UL (ref 4.8–10.8)

## 2018-02-12 PROCEDURE — 80048 BASIC METABOLIC PNL TOTAL CA: CPT

## 2018-02-12 PROCEDURE — 85025 COMPLETE CBC W/AUTO DIFF WBC: CPT

## 2018-03-01 ENCOUNTER — HOSPITAL ENCOUNTER (OUTPATIENT)
Dept: LAB | Facility: MEDICAL CENTER | Age: 53
End: 2018-03-01
Attending: INTERNAL MEDICINE
Payer: COMMERCIAL

## 2018-03-19 LAB
ALBUMIN SERPL BCP-MCNC: 3.7 G/DL (ref 3.2–4.9)
ALBUMIN/GLOB SERPL: 1.2 G/DL
ALP SERPL-CCNC: 73 U/L (ref 30–99)
ALT SERPL-CCNC: 26 U/L (ref 2–50)
ANION GAP SERPL CALC-SCNC: 9 MMOL/L (ref 0–11.9)
AST SERPL-CCNC: 16 U/L (ref 12–45)
BILIRUB SERPL-MCNC: 0.6 MG/DL (ref 0.1–1.5)
BUN SERPL-MCNC: 13 MG/DL (ref 8–22)
CALCIUM SERPL-MCNC: 9.7 MG/DL (ref 8.5–10.5)
CHLORIDE SERPL-SCNC: 106 MMOL/L (ref 96–112)
CHOLEST SERPL-MCNC: 83 MG/DL (ref 100–199)
CK SERPL-CCNC: 34 U/L (ref 0–154)
CO2 SERPL-SCNC: 23 MMOL/L (ref 20–33)
CREAT SERPL-MCNC: 0.81 MG/DL (ref 0.5–1.4)
GLOBULIN SER CALC-MCNC: 3 G/DL (ref 1.9–3.5)
GLUCOSE SERPL-MCNC: 78 MG/DL (ref 65–99)
HCT VFR BLD AUTO: 42.4 % (ref 42–52)
HDLC SERPL-MCNC: 30 MG/DL
HGB BLD-MCNC: 14.1 G/DL (ref 14–18)
LDLC SERPL CALC-MCNC: 35 MG/DL
POTASSIUM SERPL-SCNC: 4 MMOL/L (ref 3.6–5.5)
PROT SERPL-MCNC: 6.7 G/DL (ref 6–8.2)
SODIUM SERPL-SCNC: 138 MMOL/L (ref 135–145)
TRIGL SERPL-MCNC: 88 MG/DL (ref 0–149)

## 2018-03-19 PROCEDURE — 36415 COLL VENOUS BLD VENIPUNCTURE: CPT

## 2018-03-19 PROCEDURE — 82550 ASSAY OF CK (CPK): CPT

## 2018-03-19 PROCEDURE — 85014 HEMATOCRIT: CPT

## 2018-03-19 PROCEDURE — 80053 COMPREHEN METABOLIC PANEL: CPT

## 2018-03-19 PROCEDURE — 85018 HEMOGLOBIN: CPT

## 2018-03-19 PROCEDURE — 80061 LIPID PANEL: CPT

## 2018-03-31 ENCOUNTER — HOSPITAL ENCOUNTER (OUTPATIENT)
Dept: LAB | Facility: MEDICAL CENTER | Age: 53
End: 2018-03-31
Attending: INTERNAL MEDICINE
Payer: MEDICAID

## 2018-04-01 ENCOUNTER — HOSPITAL ENCOUNTER (OUTPATIENT)
Dept: LAB | Facility: MEDICAL CENTER | Age: 53
End: 2018-04-01
Attending: INTERNAL MEDICINE
Payer: MEDICAID

## 2018-05-02 ENCOUNTER — HOSPITAL ENCOUNTER (OUTPATIENT)
Dept: LAB | Facility: MEDICAL CENTER | Age: 53
End: 2018-05-02
Attending: INTERNAL MEDICINE
Payer: COMMERCIAL

## 2018-05-14 LAB
CHOLEST SERPL-MCNC: 115 MG/DL (ref 100–199)
CK SERPL-CCNC: 42 U/L (ref 0–154)
HDLC SERPL-MCNC: 40 MG/DL
LDLC SERPL CALC-MCNC: 60 MG/DL
TRIGL SERPL-MCNC: 73 MG/DL (ref 0–149)

## 2018-05-14 PROCEDURE — 36415 COLL VENOUS BLD VENIPUNCTURE: CPT

## 2018-05-14 PROCEDURE — 82550 ASSAY OF CK (CPK): CPT

## 2018-05-14 PROCEDURE — 80061 LIPID PANEL: CPT

## 2018-06-01 ENCOUNTER — HOSPITAL ENCOUNTER (OUTPATIENT)
Dept: LAB | Facility: MEDICAL CENTER | Age: 53
End: 2018-06-01
Attending: INTERNAL MEDICINE
Payer: COMMERCIAL

## 2018-06-03 ENCOUNTER — HOSPITAL ENCOUNTER (OUTPATIENT)
Dept: RADIOLOGY | Facility: MEDICAL CENTER | Age: 53
End: 2018-06-03
Attending: HOSPITALIST
Payer: MEDICAID

## 2018-06-03 ENCOUNTER — APPOINTMENT (OUTPATIENT)
Dept: RADIOLOGY | Facility: IMAGING CENTER | Age: 53
End: 2018-06-03
Payer: COMMERCIAL

## 2018-06-03 DIAGNOSIS — R52 PAIN: ICD-10-CM

## 2018-06-03 DIAGNOSIS — I82.4Y1 DEEP VEIN THROMBOSIS (DVT) OF PROXIMAL VEIN OF RIGHT LOWER EXTREMITY, UNSPECIFIED CHRONICITY (HCC): ICD-10-CM

## 2018-06-03 DIAGNOSIS — M10.071 ACUTE IDIOPATHIC GOUT OF RIGHT FOOT: ICD-10-CM

## 2018-06-03 LAB
ANION GAP SERPL CALC-SCNC: 7 MMOL/L (ref 0–11.9)
BASOPHILS # BLD AUTO: 1.2 % (ref 0–1.8)
BASOPHILS # BLD: 0.08 K/UL (ref 0–0.12)
BUN SERPL-MCNC: 14 MG/DL (ref 8–22)
CALCIUM SERPL-MCNC: 9.3 MG/DL (ref 8.5–10.5)
CHLORIDE SERPL-SCNC: 107 MMOL/L (ref 96–112)
CO2 SERPL-SCNC: 26 MMOL/L (ref 20–33)
CREAT SERPL-MCNC: 0.67 MG/DL (ref 0.5–1.4)
EOSINOPHIL # BLD AUTO: 0.39 K/UL (ref 0–0.51)
EOSINOPHIL NFR BLD: 6 % (ref 0–6.9)
ERYTHROCYTE [DISTWIDTH] IN BLOOD BY AUTOMATED COUNT: 44.3 FL (ref 35.9–50)
GLUCOSE SERPL-MCNC: 80 MG/DL (ref 65–99)
HCT VFR BLD AUTO: 41 % (ref 42–52)
HGB BLD-MCNC: 13.4 G/DL (ref 14–18)
IMM GRANULOCYTES # BLD AUTO: 0.02 K/UL (ref 0–0.11)
IMM GRANULOCYTES NFR BLD AUTO: 0.3 % (ref 0–0.9)
LYMPHOCYTES # BLD AUTO: 1.92 K/UL (ref 1–4.8)
LYMPHOCYTES NFR BLD: 29.8 % (ref 22–41)
MCH RBC QN AUTO: 30.7 PG (ref 27–33)
MCHC RBC AUTO-ENTMCNC: 32.7 G/DL (ref 33.7–35.3)
MCV RBC AUTO: 93.8 FL (ref 81.4–97.8)
MONOCYTES # BLD AUTO: 0.82 K/UL (ref 0–0.85)
MONOCYTES NFR BLD AUTO: 12.7 % (ref 0–13.4)
NEUTROPHILS # BLD AUTO: 3.22 K/UL (ref 1.82–7.42)
NEUTROPHILS NFR BLD: 50 % (ref 44–72)
NRBC # BLD AUTO: 0 K/UL
NRBC BLD-RTO: 0 /100 WBC
PLATELET # BLD AUTO: 219 K/UL (ref 164–446)
PMV BLD AUTO: 12.8 FL (ref 9–12.9)
POTASSIUM SERPL-SCNC: 3.7 MMOL/L (ref 3.6–5.5)
RBC # BLD AUTO: 4.37 M/UL (ref 4.7–6.1)
SODIUM SERPL-SCNC: 140 MMOL/L (ref 135–145)
URATE SERPL-MCNC: 5.8 MG/DL (ref 2.5–8.3)
WBC # BLD AUTO: 6.5 K/UL (ref 4.8–10.8)

## 2018-06-03 PROCEDURE — 36415 COLL VENOUS BLD VENIPUNCTURE: CPT

## 2018-06-03 PROCEDURE — 84550 ASSAY OF BLOOD/URIC ACID: CPT

## 2018-06-03 PROCEDURE — 93971 EXTREMITY STUDY: CPT | Mod: RT

## 2018-06-03 PROCEDURE — 85025 COMPLETE CBC W/AUTO DIFF WBC: CPT

## 2018-06-03 PROCEDURE — 80048 BASIC METABOLIC PNL TOTAL CA: CPT

## 2018-06-26 ENCOUNTER — HOME HEALTH ADMISSION (OUTPATIENT)
Dept: HOME HEALTH SERVICES | Facility: HOME HEALTHCARE | Age: 53
End: 2018-06-26
Payer: MEDICAID

## 2018-06-30 ENCOUNTER — HOME CARE VISIT (OUTPATIENT)
Dept: HOME HEALTH SERVICES | Facility: HOME HEALTHCARE | Age: 53
End: 2018-06-30

## 2018-07-10 ENCOUNTER — HOME CARE VISIT (OUTPATIENT)
Dept: HOME HEALTH SERVICES | Facility: HOME HEALTHCARE | Age: 53
End: 2018-07-10
Payer: MEDICAID

## 2018-07-10 ENCOUNTER — TELEPHONE (OUTPATIENT)
Dept: HEALTH INFORMATION MANAGEMENT | Facility: OTHER | Age: 53
End: 2018-07-10

## 2018-07-10 VITALS
WEIGHT: 177.25 LBS | RESPIRATION RATE: 16 BRPM | BODY MASS INDEX: 23.49 KG/M2 | TEMPERATURE: 98.3 F | HEIGHT: 73 IN | SYSTOLIC BLOOD PRESSURE: 122 MMHG | DIASTOLIC BLOOD PRESSURE: 80 MMHG | OXYGEN SATURATION: 98 % | HEART RATE: 64 BPM

## 2018-07-10 PROCEDURE — G0493 RN CARE EA 15 MIN HH/HOSPICE: HCPCS

## 2018-07-10 PROCEDURE — 665001 SOC-HOME HEALTH

## 2018-07-10 SDOH — ECONOMIC STABILITY: HOUSING INSECURITY: UNSAFE COOKING RANGE AREA: 0

## 2018-07-10 SDOH — ECONOMIC STABILITY: HOUSING INSECURITY: UNSAFE APPLIANCES: 0

## 2018-07-10 ASSESSMENT — PATIENT HEALTH QUESTIONNAIRE - PHQ9
1. LITTLE INTEREST OR PLEASURE IN DOING THINGS: 00
2. FEELING DOWN, DEPRESSED, IRRITABLE, OR HOPELESS: 00

## 2018-07-10 ASSESSMENT — ACTIVITIES OF DAILY LIVING (ADL)
HOME_HEALTH_OASIS: 02
OASIS_M1830: 05

## 2018-07-11 ENCOUNTER — HOME CARE VISIT (OUTPATIENT)
Dept: HOME HEALTH SERVICES | Facility: HOME HEALTHCARE | Age: 53
End: 2018-07-11
Payer: MEDICAID

## 2018-07-11 VITALS
RESPIRATION RATE: 16 BRPM | HEART RATE: 82 BPM | DIASTOLIC BLOOD PRESSURE: 80 MMHG | SYSTOLIC BLOOD PRESSURE: 138 MMHG | OXYGEN SATURATION: 98 % | TEMPERATURE: 97.7 F

## 2018-07-11 PROCEDURE — G0151 HHCP-SERV OF PT,EA 15 MIN: HCPCS

## 2018-07-11 ASSESSMENT — ENCOUNTER SYMPTOMS: DEPRESSED MOOD: 1

## 2018-07-11 ASSESSMENT — ACTIVITIES OF DAILY LIVING (ADL)
IADLS_COMMENTS: <!--EPICS-->SEE OT REPORT<!--EPICE-->
ADLS_COMMENTS: <!--EPICS-->SEE OT REPORT<!--EPICE-->

## 2018-07-12 ENCOUNTER — HOME CARE VISIT (OUTPATIENT)
Dept: HOME HEALTH SERVICES | Facility: HOME HEALTHCARE | Age: 53
End: 2018-07-12
Payer: MEDICAID

## 2018-07-12 ENCOUNTER — OFFICE VISIT (OUTPATIENT)
Dept: MEDICAL GROUP | Facility: MEDICAL CENTER | Age: 53
End: 2018-07-12
Attending: FAMILY MEDICINE
Payer: MEDICAID

## 2018-07-12 VITALS
OXYGEN SATURATION: 98 % | RESPIRATION RATE: 17 BRPM | TEMPERATURE: 98.3 F | SYSTOLIC BLOOD PRESSURE: 136 MMHG | HEART RATE: 69 BPM | DIASTOLIC BLOOD PRESSURE: 72 MMHG

## 2018-07-12 VITALS
RESPIRATION RATE: 14 BRPM | DIASTOLIC BLOOD PRESSURE: 75 MMHG | SYSTOLIC BLOOD PRESSURE: 125 MMHG | HEIGHT: 73 IN | TEMPERATURE: 97.5 F | WEIGHT: 174 LBS | OXYGEN SATURATION: 93 % | HEART RATE: 80 BPM | BODY MASS INDEX: 23.06 KG/M2

## 2018-07-12 DIAGNOSIS — R47.1 DYSARTHRIA: ICD-10-CM

## 2018-07-12 DIAGNOSIS — I10 ESSENTIAL HYPERTENSION: ICD-10-CM

## 2018-07-12 DIAGNOSIS — Z86.73 STATUS POST CVA: ICD-10-CM

## 2018-07-12 DIAGNOSIS — R53.1 RIGHT SIDED WEAKNESS: ICD-10-CM

## 2018-07-12 PROCEDURE — 99203 OFFICE O/P NEW LOW 30 MIN: CPT | Performed by: FAMILY MEDICINE

## 2018-07-12 PROCEDURE — G0495 RN CARE TRAIN/EDU IN HH: HCPCS

## 2018-07-12 RX ORDER — ATORVASTATIN CALCIUM 80 MG/1
80 TABLET, FILM COATED ORAL EVERY EVENING
Qty: 30 TAB | Refills: 0 | Status: SHIPPED | OUTPATIENT
Start: 2018-07-12 | End: 2018-07-12

## 2018-07-12 RX ORDER — AMLODIPINE BESYLATE 10 MG/1
7.5 TABLET ORAL DAILY
Qty: 45 TAB | Refills: 3 | Status: SHIPPED | OUTPATIENT
Start: 2018-07-12 | End: 2018-07-12

## 2018-07-12 RX ORDER — ASPIRIN 81 MG/1
81 TABLET, CHEWABLE ORAL DAILY
Qty: 30 TAB | Refills: 3 | Status: SHIPPED | OUTPATIENT
Start: 2018-07-12 | End: 2018-11-14 | Stop reason: SDUPTHER

## 2018-07-12 RX ORDER — AMLODIPINE BESYLATE 2.5 MG/1
7.5 TABLET ORAL DAILY
Qty: 90 TAB | Refills: 3 | Status: SHIPPED | OUTPATIENT
Start: 2018-07-12 | End: 2019-09-24

## 2018-07-12 RX ORDER — LISINOPRIL 5 MG/1
5 TABLET ORAL DAILY
Qty: 30 TAB | Refills: 0 | Status: SHIPPED | OUTPATIENT
Start: 2018-07-12 | End: 2018-08-14 | Stop reason: SDUPTHER

## 2018-07-12 RX ORDER — ATORVASTATIN CALCIUM 40 MG/1
40 TABLET, FILM COATED ORAL DAILY
Qty: 30 TAB | Refills: 3 | Status: SHIPPED | OUTPATIENT
Start: 2018-07-12 | End: 2018-11-14 | Stop reason: SDUPTHER

## 2018-07-12 ASSESSMENT — ENCOUNTER SYMPTOMS
ABDOMINAL PAIN: 0
VOMITING: DENIES
NAUSEA: DENIES
EYES NEGATIVE: 1
COUGH: 0
PALPITATIONS: 0
SHORTNESS OF BREATH: 0
DIFFICULTY THINKING: 1
NAUSEA: 0
SPUTUM PRODUCTION: 0
RESPIRATORY SYMPTOMS COMMENTS: NO CONCERNS AT THE TIME OF THIS ASSESSMENT.
FEVER: 0
VOMITING: 0
CHILLS: 0

## 2018-07-12 NOTE — PROGRESS NOTES
Subjective:      Gilles Case is a 53 y.o. male who presents with Hospital Follow-up (stroke)            Patient 53-year-old male recently hospitalized for right sided weakness secondary to CVA multiple areas of his brain. He had been placed in rehabilitation and is currently receiving home health in his home. The PEG tube that had been placed in the hospital has been removed and he is able to tolerate oral medications as well as most foods. He is still having difficulty with speaking and weakness on the right side of his body. He is currently going through speech and physical therapy for these problems. Since he had not been seen in the hospital by a neurologist due to the length of time he had his symptoms, a referral to neurology will be made today for a further evaluation as well as assistance in management of his symptoms.  Will have patient come in for a medication review since he and his current caregiver are uncertain of the medications he is supposed to be taking. Will have patient bring in all of his medications for review.    Prior to having his cerebrovascular accident he states he was in a normal state of health without any medications or any idea of underlying health issues.    He has no previous surgical history.    His family medical history is consistent for diabetes, high blood pressure, cholesterol and strokes.    He is a former smoker having quit about a year ago. He denies any other substance use and is an occasional drinker.        Review of Systems   Constitutional: Negative for chills and fever.   HENT: Negative for hearing loss and tinnitus.    Eyes: Negative.    Respiratory: Negative for cough, sputum production and shortness of breath.    Cardiovascular: Negative for chest pain and palpitations.   Gastrointestinal: Negative for abdominal pain, nausea and vomiting.   Genitourinary: Negative.    Skin: Negative for rash.          Objective:     /75   Pulse 80   Temp 36.4 °C (97.5 °F)   " Resp 14   Ht 1.854 m (6' 1\")   Wt 78.9 kg (174 lb)   SpO2 93%   BMI 22.96 kg/m²      Physical Exam   Constitutional: He is oriented to person, place, and time.   Unable to speak   HENT:   Head: Normocephalic and atraumatic.   Eyes: Conjunctivae and EOM are normal. Pupils are equal, round, and reactive to light.   Neck: Normal range of motion. Neck supple. No thyromegaly present.   Cardiovascular: Normal rate, regular rhythm and normal heart sounds.  Exam reveals no friction rub.    No murmur heard.  Pulmonary/Chest: Effort normal and breath sounds normal. No respiratory distress. He has no wheezes. He has no rales.   Abdominal: Soft. Bowel sounds are normal. He exhibits no distension. There is no tenderness.   Musculoskeletal:   Brace on R leg, using cane   Lymphadenopathy:     He has no cervical adenopathy.   Neurological: He is alert and oriented to person, place, and time. No cranial nerve deficit. Coordination normal.   dysarthria   Skin: Skin is warm and dry.   Psychiatric: He has a normal mood and affect. His behavior is normal.   Nursing note and vitals reviewed.              Assessment/Plan:     1. Status post CVA  Will have him continue to work with home health and speech and physical therapy to help regain his previous function. A referral to neurology will also be made for assistance in further management of his stroke and stroke symptoms. We'll have him continues taking his medication as previously directed and will continue to follow.  - REFERRAL TO NEUROLOGY  - COMP METABOLIC PANEL; Future  - LIPID PROFILE; Future  - CBC WITH DIFFERENTIAL; Future    2. Essential hypertension  Will have him continue to take his medication as directed. He has been advised to monitor blood pressure at home and keep notes. If blood pressure elevated or having symptoms of CP, SOB or neurologic changes to go to the er.      3. Dysarthria  See above plan...  - REFERRAL TO NEUROLOGY    4. Right sided weakness  See above " plan.  - REFERRAL TO NEUROLOGY

## 2018-07-13 ENCOUNTER — HOME CARE VISIT (OUTPATIENT)
Dept: HOME HEALTH SERVICES | Facility: HOME HEALTHCARE | Age: 53
End: 2018-07-13
Payer: MEDICAID

## 2018-07-13 PROCEDURE — G0152 HHCP-SERV OF OT,EA 15 MIN: HCPCS

## 2018-07-13 PROCEDURE — G0153 HHCP-SVS OF S/L PATH,EA 15MN: HCPCS

## 2018-07-14 VITALS
RESPIRATION RATE: 18 BRPM | TEMPERATURE: 97.9 F | DIASTOLIC BLOOD PRESSURE: 75 MMHG | HEART RATE: 71 BPM | SYSTOLIC BLOOD PRESSURE: 130 MMHG

## 2018-07-15 VITALS
RESPIRATION RATE: 18 BRPM | OXYGEN SATURATION: 97 % | TEMPERATURE: 97.9 F | SYSTOLIC BLOOD PRESSURE: 130 MMHG | DIASTOLIC BLOOD PRESSURE: 75 MMHG | HEART RATE: 71 BPM

## 2018-07-15 ASSESSMENT — ACTIVITIES OF DAILY LIVING (ADL)
DRESSING_LB_ASSISTANCE: 0
HOUSEKEEPING_ASSISTANCE: 5
SHOPPING_ASSISTANCE: 5
BATHING_ASSISTANCE: 2
ORAL_CARE_ASSISTANCE: 0
MEAL_PREP_ASSISTANCE: 5
GROOMING_ASSISTANCE: 0
EATING_ASSISTANCE: 0
TELEPHONE_ASSISTANCE: 6
LAUNDRY_ASSISTANCE: 5
DRESSING_UB_ASSISTANCE: 0
TRANSPORTATION_ASSISTANCE: 6
TOILETING_ASSISTANCE: 0

## 2018-07-16 ENCOUNTER — HOME CARE VISIT (OUTPATIENT)
Dept: HOME HEALTH SERVICES | Facility: HOME HEALTHCARE | Age: 53
End: 2018-07-16
Payer: MEDICAID

## 2018-07-16 ENCOUNTER — HOSPITAL ENCOUNTER (OUTPATIENT)
Facility: MEDICAL CENTER | Age: 53
End: 2018-07-16
Attending: FAMILY MEDICINE
Payer: MEDICAID

## 2018-07-16 VITALS
HEART RATE: 62 BPM | TEMPERATURE: 98.4 F | RESPIRATION RATE: 16 BRPM | DIASTOLIC BLOOD PRESSURE: 60 MMHG | SYSTOLIC BLOOD PRESSURE: 100 MMHG | OXYGEN SATURATION: 98 %

## 2018-07-16 LAB
ALBUMIN SERPL BCP-MCNC: 4.1 G/DL (ref 3.2–4.9)
ALBUMIN/GLOB SERPL: 1.5 G/DL
ALP SERPL-CCNC: 73 U/L (ref 30–99)
ALT SERPL-CCNC: 25 U/L (ref 2–50)
ANION GAP SERPL CALC-SCNC: 9 MMOL/L (ref 0–11.9)
AST SERPL-CCNC: 17 U/L (ref 12–45)
BASOPHILS # BLD AUTO: 1 % (ref 0–1.8)
BASOPHILS # BLD: 0.06 K/UL (ref 0–0.12)
BILIRUB SERPL-MCNC: 0.5 MG/DL (ref 0.1–1.5)
BUN SERPL-MCNC: 16 MG/DL (ref 8–22)
CALCIUM SERPL-MCNC: 9.6 MG/DL (ref 8.5–10.5)
CHLORIDE SERPL-SCNC: 106 MMOL/L (ref 96–112)
CHOLEST SERPL-MCNC: 100 MG/DL (ref 100–199)
CO2 SERPL-SCNC: 23 MMOL/L (ref 20–33)
CREAT SERPL-MCNC: 0.77 MG/DL (ref 0.5–1.4)
EOSINOPHIL # BLD AUTO: 0.21 K/UL (ref 0–0.51)
EOSINOPHIL NFR BLD: 3.4 % (ref 0–6.9)
ERYTHROCYTE [DISTWIDTH] IN BLOOD BY AUTOMATED COUNT: 43.9 FL (ref 35.9–50)
GLOBULIN SER CALC-MCNC: 2.8 G/DL (ref 1.9–3.5)
GLUCOSE SERPL-MCNC: 71 MG/DL (ref 65–99)
HCT VFR BLD AUTO: 44.4 % (ref 42–52)
HDLC SERPL-MCNC: 42 MG/DL
HGB BLD-MCNC: 14.6 G/DL (ref 14–18)
IMM GRANULOCYTES # BLD AUTO: 0.02 K/UL (ref 0–0.11)
IMM GRANULOCYTES NFR BLD AUTO: 0.3 % (ref 0–0.9)
LDLC SERPL CALC-MCNC: 44 MG/DL
LYMPHOCYTES # BLD AUTO: 1.61 K/UL (ref 1–4.8)
LYMPHOCYTES NFR BLD: 26.3 % (ref 22–41)
MCH RBC QN AUTO: 30.7 PG (ref 27–33)
MCHC RBC AUTO-ENTMCNC: 32.9 G/DL (ref 33.7–35.3)
MCV RBC AUTO: 93.3 FL (ref 81.4–97.8)
MONOCYTES # BLD AUTO: 0.66 K/UL (ref 0–0.85)
MONOCYTES NFR BLD AUTO: 10.8 % (ref 0–13.4)
NEUTROPHILS # BLD AUTO: 3.57 K/UL (ref 1.82–7.42)
NEUTROPHILS NFR BLD: 58.2 % (ref 44–72)
NRBC # BLD AUTO: 0 K/UL
NRBC BLD-RTO: 0 /100 WBC
PLATELET # BLD AUTO: 229 K/UL (ref 164–446)
PMV BLD AUTO: 13.5 FL (ref 9–12.9)
POTASSIUM SERPL-SCNC: 4.3 MMOL/L (ref 3.6–5.5)
PROT SERPL-MCNC: 6.9 G/DL (ref 6–8.2)
RBC # BLD AUTO: 4.76 M/UL (ref 4.7–6.1)
SODIUM SERPL-SCNC: 138 MMOL/L (ref 135–145)
TRIGL SERPL-MCNC: 70 MG/DL (ref 0–149)
WBC # BLD AUTO: 6.1 K/UL (ref 4.8–10.8)

## 2018-07-16 PROCEDURE — 6650336 HCR  CONTAINER SHARPS 1 QT

## 2018-07-16 PROCEDURE — G0299 HHS/HOSPICE OF RN EA 15 MIN: HCPCS

## 2018-07-16 PROCEDURE — 80061 LIPID PANEL: CPT

## 2018-07-16 PROCEDURE — 80053 COMPREHEN METABOLIC PANEL: CPT

## 2018-07-16 PROCEDURE — 85025 COMPLETE CBC W/AUTO DIFF WBC: CPT

## 2018-07-16 ASSESSMENT — ENCOUNTER SYMPTOMS
RESPIRATORY SYMPTOMS COMMENTS: NO CONCERNS AT THE TIME OF THIS ASSESSMENT.
NAUSEA: DENIES
DIFFICULTY THINKING: 1
VOMITING: DENIES

## 2018-07-17 ENCOUNTER — HOME CARE VISIT (OUTPATIENT)
Dept: HOME HEALTH SERVICES | Facility: HOME HEALTHCARE | Age: 53
End: 2018-07-17
Payer: MEDICAID

## 2018-07-17 VITALS
SYSTOLIC BLOOD PRESSURE: 122 MMHG | TEMPERATURE: 98.1 F | HEART RATE: 67 BPM | DIASTOLIC BLOOD PRESSURE: 67 MMHG | RESPIRATION RATE: 16 BRPM | OXYGEN SATURATION: 98 %

## 2018-07-17 PROCEDURE — G0151 HHCP-SERV OF PT,EA 15 MIN: HCPCS

## 2018-07-18 ENCOUNTER — HOME CARE VISIT (OUTPATIENT)
Dept: HOME HEALTH SERVICES | Facility: HOME HEALTHCARE | Age: 53
End: 2018-07-18
Payer: MEDICAID

## 2018-07-18 VITALS
HEART RATE: 77 BPM | RESPIRATION RATE: 16 BRPM | TEMPERATURE: 97.8 F | SYSTOLIC BLOOD PRESSURE: 116 MMHG | DIASTOLIC BLOOD PRESSURE: 64 MMHG

## 2018-07-18 PROCEDURE — G0180 MD CERTIFICATION HHA PATIENT: HCPCS | Performed by: FAMILY MEDICINE

## 2018-07-18 PROCEDURE — G0153 HHCP-SVS OF S/L PATH,EA 15MN: HCPCS

## 2018-07-18 PROCEDURE — G0152 HHCP-SERV OF OT,EA 15 MIN: HCPCS

## 2018-07-19 ENCOUNTER — HOME CARE VISIT (OUTPATIENT)
Dept: HOME HEALTH SERVICES | Facility: HOME HEALTHCARE | Age: 53
End: 2018-07-19
Payer: MEDICAID

## 2018-07-19 VITALS
TEMPERATURE: 97.8 F | RESPIRATION RATE: 16 BRPM | DIASTOLIC BLOOD PRESSURE: 64 MMHG | OXYGEN SATURATION: 98 % | HEART RATE: 77 BPM | SYSTOLIC BLOOD PRESSURE: 116 MMHG

## 2018-07-19 VITALS
DIASTOLIC BLOOD PRESSURE: 78 MMHG | RESPIRATION RATE: 16 BRPM | HEART RATE: 62 BPM | OXYGEN SATURATION: 98 % | TEMPERATURE: 97 F | SYSTOLIC BLOOD PRESSURE: 128 MMHG

## 2018-07-19 VITALS
DIASTOLIC BLOOD PRESSURE: 78 MMHG | OXYGEN SATURATION: 98 % | HEART RATE: 62 BPM | SYSTOLIC BLOOD PRESSURE: 128 MMHG | RESPIRATION RATE: 16 BRPM | TEMPERATURE: 97 F

## 2018-07-19 PROCEDURE — G0495 RN CARE TRAIN/EDU IN HH: HCPCS

## 2018-07-19 PROCEDURE — G0151 HHCP-SERV OF PT,EA 15 MIN: HCPCS

## 2018-07-19 ASSESSMENT — ENCOUNTER SYMPTOMS
NAUSEA: DENIES
DIFFICULTY THINKING: 1
VOMITING: DENIES
RESPIRATORY SYMPTOMS COMMENTS: NO CONCERNS AT THE TIME OF THIS ASSESSMENT.

## 2018-07-20 ENCOUNTER — HOME CARE VISIT (OUTPATIENT)
Dept: HOME HEALTH SERVICES | Facility: HOME HEALTHCARE | Age: 53
End: 2018-07-20
Payer: MEDICAID

## 2018-07-20 PROCEDURE — G0153 HHCP-SVS OF S/L PATH,EA 15MN: HCPCS

## 2018-07-22 ENCOUNTER — HOME CARE VISIT (OUTPATIENT)
Dept: HOME HEALTH SERVICES | Facility: HOME HEALTHCARE | Age: 53
End: 2018-07-22
Payer: MEDICAID

## 2018-07-23 ENCOUNTER — HOME CARE VISIT (OUTPATIENT)
Dept: HOME HEALTH SERVICES | Facility: HOME HEALTHCARE | Age: 53
End: 2018-07-23
Payer: MEDICAID

## 2018-07-23 VITALS
DIASTOLIC BLOOD PRESSURE: 76 MMHG | TEMPERATURE: 97.6 F | OXYGEN SATURATION: 97 % | HEART RATE: 66 BPM | RESPIRATION RATE: 16 BRPM | SYSTOLIC BLOOD PRESSURE: 112 MMHG

## 2018-07-23 PROCEDURE — G0151 HHCP-SERV OF PT,EA 15 MIN: HCPCS

## 2018-07-24 ENCOUNTER — HOME CARE VISIT (OUTPATIENT)
Dept: HOME HEALTH SERVICES | Facility: HOME HEALTHCARE | Age: 53
End: 2018-07-24
Payer: MEDICAID

## 2018-07-24 VITALS
TEMPERATURE: 97.8 F | HEART RATE: 58 BPM | OXYGEN SATURATION: 99 % | SYSTOLIC BLOOD PRESSURE: 116 MMHG | RESPIRATION RATE: 16 BRPM | DIASTOLIC BLOOD PRESSURE: 68 MMHG

## 2018-07-24 PROCEDURE — G0152 HHCP-SERV OF OT,EA 15 MIN: HCPCS

## 2018-07-25 ENCOUNTER — HOME CARE VISIT (OUTPATIENT)
Dept: HOME HEALTH SERVICES | Facility: HOME HEALTHCARE | Age: 53
End: 2018-07-25
Payer: MEDICAID

## 2018-07-25 VITALS
TEMPERATURE: 98.1 F | HEART RATE: 56 BPM | RESPIRATION RATE: 16 BRPM | TEMPERATURE: 98 F | DIASTOLIC BLOOD PRESSURE: 68 MMHG | SYSTOLIC BLOOD PRESSURE: 118 MMHG | DIASTOLIC BLOOD PRESSURE: 68 MMHG | RESPIRATION RATE: 16 BRPM | SYSTOLIC BLOOD PRESSURE: 128 MMHG | HEART RATE: 56 BPM

## 2018-07-25 PROCEDURE — G0153 HHCP-SVS OF S/L PATH,EA 15MN: HCPCS

## 2018-07-26 ENCOUNTER — HOME CARE VISIT (OUTPATIENT)
Dept: HOME HEALTH SERVICES | Facility: HOME HEALTHCARE | Age: 53
End: 2018-07-26
Payer: MEDICAID

## 2018-07-26 VITALS
HEART RATE: 62 BPM | DIASTOLIC BLOOD PRESSURE: 70 MMHG | TEMPERATURE: 97.6 F | SYSTOLIC BLOOD PRESSURE: 133 MMHG | OXYGEN SATURATION: 98 % | RESPIRATION RATE: 16 BRPM

## 2018-07-26 PROCEDURE — G0151 HHCP-SERV OF PT,EA 15 MIN: HCPCS

## 2018-07-26 NOTE — Clinical Note
Received message back from Dr. Ramirez who stated pt can restart Senna Plus. He also wants pt to increase fiber and fluid intake. Dr. Ramirez wants to see pt in his office for an evaluation. Provided scheduling phone # to Dr. Ramirez's office for pt's mother to schedule. Pt's mother states pt does not like to drink water or eat fruits and vegetables. Educated pt on importance of following MD advise and that he will continue to have constipation unless he increases fluid and fiber intake. Pt's mother states he will encourage pt to follow through on diet.

## 2018-07-26 NOTE — Clinical Note
Pt has c/o constipation. He states he has not had a bowel movement in 5 days. His mother was wanting to know if he can take Senna Plus at bedtime? She states he was taking medication at [facility] and had success. Please advise. Thank you.

## 2018-07-26 NOTE — PROGRESS NOTES
If his medication is OTC then it is probably ok for him to take. Will also need to stay well hydrated and increase his fiber intake. Also should schedule an appt to be evaluated.   Thanks

## 2018-07-27 ENCOUNTER — HOME CARE VISIT (OUTPATIENT)
Dept: HOME HEALTH SERVICES | Facility: HOME HEALTHCARE | Age: 53
End: 2018-07-27
Payer: MEDICAID

## 2018-07-27 PROCEDURE — G0153 HHCP-SVS OF S/L PATH,EA 15MN: HCPCS

## 2018-07-28 VITALS
SYSTOLIC BLOOD PRESSURE: 106 MMHG | HEART RATE: 51 BPM | DIASTOLIC BLOOD PRESSURE: 70 MMHG | RESPIRATION RATE: 16 BRPM | TEMPERATURE: 99 F

## 2018-07-30 ENCOUNTER — HOME CARE VISIT (OUTPATIENT)
Dept: HOME HEALTH SERVICES | Facility: HOME HEALTHCARE | Age: 53
End: 2018-07-30
Payer: MEDICAID

## 2018-07-30 VITALS
HEART RATE: 63 BPM | RESPIRATION RATE: 16 BRPM | TEMPERATURE: 97.6 F | SYSTOLIC BLOOD PRESSURE: 112 MMHG | DIASTOLIC BLOOD PRESSURE: 64 MMHG | OXYGEN SATURATION: 99 %

## 2018-07-30 PROCEDURE — G0152 HHCP-SERV OF OT,EA 15 MIN: HCPCS

## 2018-07-31 ENCOUNTER — HOME CARE VISIT (OUTPATIENT)
Dept: HOME HEALTH SERVICES | Facility: HOME HEALTHCARE | Age: 53
End: 2018-07-31
Payer: MEDICAID

## 2018-07-31 PROCEDURE — G0153 HHCP-SVS OF S/L PATH,EA 15MN: HCPCS

## 2018-08-01 ENCOUNTER — HOME CARE VISIT (OUTPATIENT)
Dept: HOME HEALTH SERVICES | Facility: HOME HEALTHCARE | Age: 53
End: 2018-08-01
Payer: MEDICAID

## 2018-08-01 VITALS
HEART RATE: 84 BPM | TEMPERATURE: 97.1 F | DIASTOLIC BLOOD PRESSURE: 76 MMHG | SYSTOLIC BLOOD PRESSURE: 122 MMHG | RESPIRATION RATE: 16 BRPM | OXYGEN SATURATION: 98 %

## 2018-08-01 SDOH — ECONOMIC STABILITY: HOUSING INSECURITY: UNSAFE COOKING RANGE AREA: 0

## 2018-08-01 SDOH — ECONOMIC STABILITY: HOUSING INSECURITY: UNSAFE APPLIANCES: 0

## 2018-08-01 ASSESSMENT — ACTIVITIES OF DAILY LIVING (ADL)
HOME_HEALTH_OASIS: 01
OASIS_M1830: 04

## 2018-08-09 ENCOUNTER — SPEECH THERAPY (OUTPATIENT)
Dept: SPEECH THERAPY | Facility: REHABILITATION | Age: 53
End: 2018-08-09
Attending: FAMILY MEDICINE
Payer: MEDICAID

## 2018-08-09 DIAGNOSIS — R47.01 APHASIA: ICD-10-CM

## 2018-08-09 PROCEDURE — 92523 SPEECH SOUND LANG COMPREHEN: CPT

## 2018-08-09 ASSESSMENT — SOCIAL DETERMINANTS OF HEALTH (SDOH): SOCIAL_SUPPORT_SYSTEM: PARENT

## 2018-08-09 NOTE — OP THERAPY EVALUATION
Outpatient Speech Therapy  INITIAL EVALUATION    [facility]  [address]  [address]  [place] [address]  Phone:  [phone]  Fax:  [phone]    Date of Evaluation: 08/09/2018    Patient: Gilles Cobb Case  YOB: 1965  MRN: [id number]     Referring Provider: Santos Ramirez M.D.  [address]  [address]  [place] [address]   Referring Diagnosis Aphasia following cerebral infarction [I69.320];Unsteadiness on feet [R26.81]     Time Calculation  Start time: 0800  Stop time: 0900 Time Calculation (min): 60 minutes     Speech Therapy Occurrence Codes    Date of Onset of Impairment:  1/9/18   Date speech therapy care plan established or reviewed:  8/9/18   Date speech therapy treatment started:  8/9/18          Chief Complaint: Aphasia    Visit Diagnoses     ICD-10-CM   1. Aphasia R47.01     Subjective:   Reason for Therapy:     Reason For Evaluation:  Aphasia and Speech/Language  Social Support:     Accompanied By:  Parent  Therapy History:     Previous Treatments and Dates:  Completed speech therapy through [facility]. Patient is no longer home bound. Referred to outpatient speech therapy.     Past Medical History:   Diagnosis Date   • Dysphagia    • Hypertension    • Stroke (HCC)      Past Surgical History:   Procedure Laterality Date   • GASTROSCOPY-ENDO  12/26/2017    Procedure: GASTROSCOPY-ENDO;  Surgeon: Adam Aguirre M.D.;  Location: [facility];  Service: Gastroenterology   • PEG PLACEMENT  12/26/2017    Procedure: PEG PLACEMENT;  Surgeon: Adam Aguirre M.D.;  Location: [facility];  Service: Gastroenterology     Objective:   Treatments/Interventions Performed:  Cognitive-Linguistic training, Home program, Patient/Caregiver education, Compensatory strategy training and Speech/Language treatment  Patient participated on cognitive linguistic assessment.   Speech Therapy Assessment:     Expressive Language Assessment:     Portions of the  Western Aphasia Battery (WAB) are used.    Ability to exhibit appropriate naming: Severe.    Repeats speech accurately Moderate.    Patient's ability to use automatic language appropriately is Profound.    Patient's ability to verbalize wants and needs is Profound.    Patient's ability to sustain dialogues within a given topic is Profound.    Paraphasic is Present.    Perseveration is Present.    Receptive Language Assessment:     Personal information yes/no questions: WFL    Simple yes/no questions: Minimal    Patient follows 1 step simple commands: Severe    Patient follows 2 step simple commands:Severe  Patient completed Western Aphasia Battery (WAB) with a final aphasia quotient score of 22.3. Aphasia classification places patient with Broca's asphasia. Repetition and y/n questions as strength.   Speech Therapy Plan :   Prognosis & Recommendations  Impression Summary:  Patient presents with severe expressive and receptive aphasia.   Prognosis: Good  Goals  Short Term Goals:  1. Patient will name common items pictures when provided with carrier phrases with 70% accuracy.   2. Patient will utilize pictures to communication basic wants when provided with min verbal cues.   3. Patient will complete automatic speech tasks with 70% accuracy and mod verbal cues.   Short Term Goal Duration (Weeks):  4-6 weeks  Long Term Goals:  1. Patient will communicate wants and needs using single words or pictures 80% of the time given min cues.     Long Term Goal Duration (Weeks):  2-4 months  Therapy Recommendations  Recommendation:  Individual Speech Therapy,  Planned Therapy Interventions:  Speech/Language training, Compensatory Strategy Training, Home Program, Cognitive-Linguistic training, Patient/Caregiver Education and Other,   Duration (in weeks):  12  Frequency 1-2 times per week  Recommend speech therapy to address expressive and receptive language, as well as introduce alternative communication modalities and home  program.       Referring provider co-signature:  I have reviewed this plan of care and my co-signature certifies the need for services.  Certification Dates:   From 8/9/18     To 11/1/18    Physician Signature: ________________________________ Date: ______________

## 2018-08-13 ENCOUNTER — PHYSICAL THERAPY (OUTPATIENT)
Dept: PHYSICAL THERAPY | Facility: REHABILITATION | Age: 53
End: 2018-08-13
Attending: FAMILY MEDICINE
Payer: MEDICAID

## 2018-08-13 DIAGNOSIS — R26.81 UNSTEADINESS ON FEET: ICD-10-CM

## 2018-08-13 PROCEDURE — 97163 PT EVAL HIGH COMPLEX 45 MIN: CPT

## 2018-08-13 ASSESSMENT — BALANCE ASSESSMENTS
BALANCE - SITTING DYNAMIC: GOOD
BALANCE - STANDING STATIC: FAIR +
BALANCE - SITTING STATIC: GOOD
BALANCE - STANDING DYNAMIC: FAIR
WEIGHT SHIFT STANDING: POOR
WEIGHT SHIFT SITTING: FAIR

## 2018-08-13 ASSESSMENT — ENCOUNTER SYMPTOMS: PAIN SCALE: 0

## 2018-08-13 ASSESSMENT — ACTIVITIES OF DAILY LIVING (ADL)
AMBULATES (FT): 1000
AMBULATION_WITH_ASSISTIVE_DEVICE: INDEPENDENT

## 2018-08-13 NOTE — OP THERAPY EVALUATION
Outpatient Physical Therapy  INITIAL NEUROLOGICAL EVALUATION    [facility] Physical Therapy [facility]  [address]  [address]  [place] [address]  Phone:  [phone]  Fax:  [phone]    Date of Evaluation: 2018    Patient: Gilles Cobb Case  YOB: 1965  MRN: [id number]     Referring Provider: Santos Ramirez M.D.  [address]  [address]  [place] [address]   Referring Diagnosis Aphasia following cerebral infarction [I69.320];Unsteadiness on feet [R26.81]     Time Calculation  Start time: 0800  Stop time: 0900 Time Calculation (min): 60 minutes     Physical Therapy Occurrence Codes    Date of onset of impairment:  17   Date physical therapy care plan established or reviewed:  18   Date physical therapy treatment started:  18          Chief Complaint: unsteadiness on feet; S/P CVA  Visit Diagnoses     ICD-10-CM   1. Unsteadiness on feet R26.81       Subjective:   History of Present Illness:     Mechanism of injury:  2018 S/P CVA with right sided weakness and aphasia.  peg tube secondary to dysphagia. Skilled NSG d/c 3018 receiving home health PT,OT and speech.    Currently walking with SPC 1.00+ feet @ MOD I with R AFO  Living with mom who drives him to appointments.  Independent with ADLS except for donning/doffing shoe with AFO.  No steps in home  Been doing home exercise program from .        Headaches:  no headaches  Pain:     Current pain ratin  Social Support:     Lives in:  Apartment    Lives with:  Parents  Hand dominance:  Right  Treatments:     Previous treatment:  Home therapy    Current treatment:  Physical therapy and home exercise program    Discharged from (in last 30 days): home health care    Activities of Daily Living:     Patient reported ADL status: Independent with self care and functional mobility  Patient Goals:     Patient goals for therapy:  Return to sport/leisure activities, independence with ADLs/IADLs, increased strength and improved  balance    Other patient goals:  To walk without the single point cane; return to working-different field      Past Medical History:   Diagnosis Date   • Dysphagia    • Hypertension    • Stroke (HCC)      Past Surgical History:   Procedure Laterality Date   • GASTROSCOPY-ENDO  12/26/2017    Procedure: GASTROSCOPY-ENDO;  Surgeon: Adam Aguirre M.D.;  Location: [facility];  Service: Gastroenterology   • PEG PLACEMENT  12/26/2017    Procedure: PEG PLACEMENT;  Surgeon: Adam Aguirre M.D.;  Location: [facility];  Service: Gastroenterology     Social History   Substance Use Topics   • Smoking status: Former Smoker     Years: 1.00     Types: Cigarettes   • Smokeless tobacco: Never Used   • Alcohol use Yes      Comment: occasional     Family and Occupational History     Social History   • Marital status:      Spouse name: N/A   • Number of children: N/A   • Years of education: N/A       Objective:   Active Range of Motion:   Upper extremity (left):     All left upper extremity active range of motion: All within functional limits  Upper extremity (right):     All right upper extremity active range of motion: All below functional limits  Lower extremity (left):     All left lower extremity active range of motion: All within functional limits  Lower extremity (right):     All right lower extremity active range of motion: All below functional limits      Passive Range of Motion:   Lower extremity (left):     All left lower extremity passive range of motion: All within functional limits  Lower extremity (right):     All right lower extremity passive range of motion: All below functional limits    Strength:     Left lower extremity strength within functional limits.    Lower extremity (right):     Hip flexion: 3-    Hip extension: 3-    Knee extension: 2-    Ankle dorsiflexion: 1    Ankle plantar flexion: 1    Tone, Sensation and Coordination:   Tone:     Right upper extremity muscle tone:  Spastic    Right lower extremity muscle tone: Spastic    Modified King:   Lower extremity (right):     Hip adductors: 0    Knee extensors: 3    Ankle dorsiflexion (knee flexed): 2    Ankle dorsiflexion (knee extended): 3    Tone comments:   Extensor tone RLE    Cognition:     Orientation: normal to time, normal to place, normal to situation and normal to person    Direction following: three step    Short term memory: intact    Long term memory: intact    Attention span: intact    Sequencing: intact    Postural Control (Balance)     Sitting (static): Good    Sitting (dynamic): Good    Standing (static): Fair +    Standing (dynamic): Fair    Weight shift (sitting): Fair    Weight shift (standing): Poor    Weight-Bearing Status   Distance in feet: 1000  Assistive device used: single point cane  Other assistive device: Right AFO    Ambulation: Level Surfaces   Ambulation with assistive device: independent    Observational Gait   Gait: circumduction     Walking speed and stride length below functional limits.    Decreased right stance time, right swing time and right step length.   Stride width: wide.    Right foot contact pattern: toe to heel  Right arm swing: decreased  Base of support: wide-based    Quality of Movement During Gait     Hip   Hip (Right): Positive circumducted and hike.     Knee   Knee (Right): Positive stiff knee.     Ankle   Ankle (Right): Positive foot drop.     Foot Alignment   Foot Alignment (Right): Negative rigid hindfoot.      Exercises/Treatment  Time-based treatments/modalities:          Assessment, Response and Plan:   Assessment details:  Patient presents with impaired gait,balance and right sided weakness secondary to S/P CVA 12/19/2017. Patient's expressive aphasia made evaluation assessment difficult. Patient presents with significant spasticity and LE extensor synergy, UE flexor synergy which limits voluntary movement and gait.    Barriers to therapy:  Communication  Prognosis: good     Goals:   Short Term Goals:   Patient able to walk with or without assistive device x >1/2 mile in community Independently  Patient able to to go up and down curb INDEP with AD and right AFO  Short term goal time span:  4-6 weeks      Long Term Goals:     Gait with or without assistive device 1 + mile in community  Up and down 12 steps Mod I  Independent with home program  Long term goal time span:  2-4 months    Plan:   Therapy options:  Physical therapy treatment to continue      Functional Limitation G-Codes and Severity Modifiers  PT Functional Assessment Tool Used: tinetti balance assessment tool  PT Functional Assessment Score: balance score= 9/16  gait score= 4/12  total score = Balance + Gait = 13/28   Current:     Goal:         Referring provider co-signature:  I have reviewed this plan of care and my co-signature certifies the need for services.  Certification Dates:   From 8/13/2018   To 9/27/2018    Physician Signature: ________________________________ Date: ______________

## 2018-08-15 RX ORDER — LISINOPRIL 5 MG/1
TABLET ORAL
Qty: 30 TAB | Refills: 1 | Status: SHIPPED | OUTPATIENT
Start: 2018-08-15 | End: 2018-09-11 | Stop reason: SDUPTHER

## 2018-09-07 ENCOUNTER — OCCUPATIONAL THERAPY (OUTPATIENT)
Dept: OCCUPATIONAL THERAPY | Facility: REHABILITATION | Age: 53
End: 2018-09-07
Attending: FAMILY MEDICINE
Payer: MEDICAID

## 2018-09-07 DIAGNOSIS — I69.320 APHASIA FOLLOWING CEREBRAL INFARCTION: ICD-10-CM

## 2018-09-07 DIAGNOSIS — R26.81 UNSTEADY GAIT: ICD-10-CM

## 2018-09-07 PROCEDURE — 97167 OT EVAL HIGH COMPLEX 60 MIN: CPT

## 2018-09-07 ASSESSMENT — BALANCE ASSESSMENTS
BALANCE - SITTING STATIC: GOOD
BALANCE - STANDING STATIC: FAIR
BALANCE - SITTING DYNAMIC: FAIR +
BALANCE - STANDING DYNAMIC: FAIR -

## 2018-09-07 ASSESSMENT — ACTIVITIES OF DAILY LIVING (ADL)
POOR_BALANCE: 1
AMBULATION_WITH_ASSISTIVE_DEVICE: INDEPENDENT

## 2018-09-07 ASSESSMENT — ENCOUNTER SYMPTOMS: PAIN SCALE: 0

## 2018-09-07 NOTE — OP THERAPY EVALUATION
Outpatient Occupational Therapy  INITIAL NEUROLOGICAL EVALUATION    [facility] Occupational Therapy [place] [address]  [address]  [place] [address]  Phone:  [phone]  Fax:  [phone]    Date of Evaluation: 2018    Patient: Gilles Cobb Case  YOB: 1965  MRN: [id number]     Referring Provider: Santos Ramirez M.D.  [address]  [address]  [place] [address]   Referring Diagnosis Aphasia following cerebral infarction [I69.320];Unsteadiness on feet [R26.81]     Time Calculation  Start time: 830  Stop time: 930 Time Calculation (min): 60 minutes     Occupational Therapy Occurrence Codes    Date of onset of impairment:  17   Date of occupational therapy care plan established or reviewed:  18   Date of occupational therapy treatment started:  18          Chief Complaint: Extremity Weakness (right hemipareisis) and Other (expressive aphasia)    Visit Diagnoses     ICD-10-CM   1. Aphasia following cerebral infarction I69.320   2. Unsteady gait R26.81       Subjective:   History of Present Illness:     Date of onset:  2017    Mechanism of injury:  S/p left CVA on 17 with residual right hemipareisis and aphasia affecting ability to perform ADLs, tub transfer, and engage in work activities.  Prior level of function:  Independent ADLs, IADLs, driving, worked as a , enjoys watching sports  Pain:     Current pain ratin  Social Support:     Lives in:  Apartment (tub/shower combo)    Lives with: mother.  Hand dominance:  Right  Treatments:     Previous treatment:  Physical therapy, speech therapy and occupational therapy    Current treatment:  Physical therapy (SLP)    Discharged from (in last 30 days): home health care      Treatment Comments:  SNF until 18, home health OT 18-18  Activities of Daily Living:     Patient reported ADL status: Independent feeding/grooming, Mod I bathing sit/stand sponge bathe only as pt is unable to step over tub, min  assist dressing for doning/doffing right shoe/AFO, Independent toileting, Independent funct mobility with SPC.  Dependent for driving.  Patient Goals:     Patient goals for therapy:  Increased motion, independence with ADLs/IADLs, improved balance, increased strength and return to work      Past Medical History:   Diagnosis Date   • Dysphagia    • Hypertension    • Stroke (HCC)      Past Surgical History:   Procedure Laterality Date   • GASTROSCOPY-ENDO  12/26/2017    Procedure: GASTROSCOPY-ENDO;  Surgeon: Adam Aguirre M.D.;  Location: [facility];  Service: Gastroenterology   • PEG PLACEMENT  12/26/2017    Procedure: PEG PLACEMENT;  Surgeon: Adam Aguirre M.D.;  Location: [facility];  Service: Gastroenterology     Social History   Substance Use Topics   • Smoking status: Former Smoker     Years: 1.00     Types: Cigarettes   • Smokeless tobacco: Never Used   • Alcohol use Yes      Comment: occasional     Family and Occupational History     Social History   • Marital status:      Spouse name: N/A   • Number of children: N/A   • Years of education: N/A       Objective:   Active Range of Motion:   Upper extremity (left):     All left upper extremity active range of motion: All within functional limits  Upper extremity (right):     Shoulder flexion: Below functional limits (30 degrees)    Shoulder abduction: Below functional limits (30 degrees)    Shoulder external rotation: Below functional limits    Shoulder internal rotation: Within functional limits    Elbow flexion: Within functional limits    Elbow extension: Below functional limits    Forearm pronation: Below functional limits    Forearm supination: Below functional limits    Wrist flexion: Below functional limits    Wrist extension: Below functional limits    Fingers flexion: Within functional limits    Fingers extension: Below functional limits    Fingers abduction: Below functional limits    Fingers adduction: Below  functional limits      Passive Range of Motion:   Upper extremity (right):     Shoulder flexion: Below functional limits (80 degrees limited by pain)    Shoulder abduction: Below functional limits (40 degrees limited by pain)    Wrist flexion: Within functional limits    Strength:   Upper extremity strength (right):    Shoulder flexion: 2-    Shoulder abduction: 2-    Shoulder external rotation: 3-    Shoulder internal rotation: 3+    Elbow flexion: 4-    Elbow extension: 2+    Forearm pronation: 2+    Forearm supination: 2+    Wrist flexion: 2-    Wrist extension: 2    Fingers flexion: 3+    Fingers extension: 3-    Fingers abduction: 3-    Fingers adduction: 3-    , Prehension, Pinch:  /Prehension (left):      strength: Impaired (40 lbs)  /Prehension (right):      strength: Impaired (12 lbs)    Tone, Sensation and Coordination:   Tone:     Left upper extremity muscle tone: Normal    Right upper extremity muscle tone: Spastic    Modified King:   Upper extremity (right):     Shoulder flexors: 2    Shoulder internal rotators: 1+    Elbow flexors: 2    Elbow extensors: 2    Wrist flexors: 2    Wrist extensors: 1+    Fingers flexors: 3    Finger extensors: 1    Tone comments:   RUE rests in flexor synergy pattern with the exception of thumb resting in extension with associated tone.  Pronators: Grade 2 spasticity    Sensation   Upper extremity (left):     Light touch: Intact    Proprioception: Intact   Upper extremity (right):     Light touch: Impaired    Proprioception: Impaired     Coordination   Upper extremity (left):     Fine motor: Within functional limits    Gross motor: Within functional limits  Upper extremity (right):     Fine motor: Impaired (severely impaired)    Gross motor: Impaired (severely impaired)    Cognition:     Orientation: normal to time, normal to place and normal to person (with verbal options to respond yes/no)    Direction following: two step    Attention span:  intact    Sequencing: intact    Organization: intact    Judgement and safety awareness: intact    Hearing: intact    Cognition Comments:  Severe expressive aphasia, mild receptive aphasia.  Yes/no accurate when combined with body part movement to confirm response (arm up vs down).  Able to visually scan and identify requested letter using finger from a line of 8 letters.    Vision/Perception:     Visual tracking: impaired    Convergence: intact    Visual attentions: intact    Visual scanning: intact    R/L neglect: not present    Vision assistive device(s): reading glasses    Vision/Perception Comments:   Smooth pursuits with saccadic breakdown, saccades dysmetric    Postural Control (Balance)     Sitting (static): Good    Sitting (dynamic): Fair +    Standing (static): Fair    Standing (dynamic): Fair -    Ambulation: Level Surfaces   Ambulation with assistive device: independent (SPC)      Exercises/Treatments  Time-based treatments/modalities:           Assessment, Response and Plan:   Impairments: abnormal ADL function, abnormal coordination, abnormal muscle firing, abnormal muscle tone, abnormal or restricted ROM, difficulty performing job, fine motor function, impaired functional mobility, impaired balance, impaired physical strength, lacks appropriate home exercise program and limited ADL's    Assessment details:  Pt is a 53 y.o male s/p left CVA on 12/19/17 who presents to outpatient OT functioning below baseline secondary to residual RUE hemipareisis, hypertonicity, severely impaired motor control, diminished sensation, soft tissue restrictions, decreased balance, and severe expressive aphasia affecting ability to perform ADLs, functional mobility, and engage in work activities.    Prognosis: good    Goals:   Short Term Goals:   Pt will be independent positioning of RUE to prevent subluxation and contractures  Pt will require mod assist to don right shoe/AFO with adapt techniques  Pt will engage in 45  minutes of RUE NM re-ed to facilitate an increase in functional movement for ADLs  Pt will increase his standing balance 1/2 grade for BR transfers  Short term goal timespan:  2-4 weeks    Long Term Goals:   Pt will be Mod I LB dressing to don/doff right shoe/AFO with AE/techniques  Pt will be Mod I tub transfer with DME  Pt will increase RUE strength 1 whole grade for ADLs/IADLs  Pt will consistently incorporate his RUE as a functional assist during daily routine.  Pt will effectively communicate his needs in an emergency situation with compensatory techniques  Pt will be independent HEP for stretching and strengthening  Long term goal timespan:  4-6 weeks    Plan:   Therapy options:  Occupational therapy treatment to continue  Planned therapy interventions:  Cognitive Skill Development (CPT 17176), Functional Training, Self Care (CPT 33227), Neuromuscular Re-education (CPT 66679), Therapeutic Activities (CPT 17489) and Orthotic Training (CPT 60689)  Frequency:  2x week  Duration in weeks:  6  Duration in visits:  12  Discussed with:  Patient        Referring provider co-signature:  I have reviewed this plan of care and my co-signature certifies the need for services.  Certification Dates:   From 9/7/18  To 11/30/18    Physician Signature: ________________________________ Date: ______________

## 2018-09-10 ENCOUNTER — PHYSICAL THERAPY (OUTPATIENT)
Dept: PHYSICAL THERAPY | Facility: REHABILITATION | Age: 53
End: 2018-09-10
Attending: FAMILY MEDICINE
Payer: MEDICAID

## 2018-09-10 DIAGNOSIS — R26.81 UNSTEADINESS ON FEET: ICD-10-CM

## 2018-09-10 PROCEDURE — 97112 NEUROMUSCULAR REEDUCATION: CPT

## 2018-09-10 PROCEDURE — 97110 THERAPEUTIC EXERCISES: CPT

## 2018-09-10 PROCEDURE — 97116 GAIT TRAINING THERAPY: CPT

## 2018-09-10 NOTE — OP THERAPY DAILY TREATMENT
Outpatient Physical Therapy  DAILY TREATMENT     [facility] Physical [address]  [address]  [place] [address]  Phone:  [phone]  Fax:  [phone]    Date: 09/10/2018    Patient: Gilles Cobb Case  YOB: 1965  MRN: [id number]     Time Calculation  Start time: 1015  Stop time: 1110 Time Calculation (min): 55 minutes     Chief Complaint: s/p CVA  Visit #: 2    SUBJECTIVE:   Pt unable to express concerns or c/o. Denies any recent falls and he's doing exercises at home.     OBJECTIVE:      Therapeutic Exercises (CPT 12814):     1. Nustep L4 , 10 minutes    2. Gait with single pt cane worked on improving trunk rotaion and hip flexion    3. Sit to stand     4. Right LE ball curls    5. Bridging with ball     6. Left sidelying: right hip flexion    7. Standing reaching with cones across the body    8. Supine trunk rotations     9. 4 inch step ups     10. Side stepping with SBA      Time-based treatments/modalities:  Gait training minutes (CPT 45834): 10 minutes  Therapeutic exercise minutes (CPT 79794): 30 minutes  Neuromusc re-ed, balance, coor, post minutes (CPT 00948): 20 minutes     ASSESSMENT:   Patient tolerated treatment well. He had some difficulty with following cues and directions.     PLAN/RECOMMENDATIONS:   Plan for treatment: therapy treatment to continue next visit.  Planned interventions for next visit: gait training (CPT 91636), neuromuscular re-education (CPT 73099) and therapeutic exercise (CPT 95361).

## 2018-09-11 RX ORDER — LISINOPRIL 5 MG/1
TABLET ORAL
Qty: 30 TAB | Refills: 0 | Status: SHIPPED | OUTPATIENT
Start: 2018-09-11 | End: 2018-11-14 | Stop reason: SDUPTHER

## 2018-09-17 ENCOUNTER — SPEECH THERAPY (OUTPATIENT)
Dept: SPEECH THERAPY | Facility: REHABILITATION | Age: 53
End: 2018-09-17
Attending: FAMILY MEDICINE
Payer: MEDICAID

## 2018-09-17 ENCOUNTER — PHYSICAL THERAPY (OUTPATIENT)
Dept: PHYSICAL THERAPY | Facility: REHABILITATION | Age: 53
End: 2018-09-17
Attending: FAMILY MEDICINE
Payer: MEDICAID

## 2018-09-17 DIAGNOSIS — R26.81 UNSTEADINESS ON FEET: ICD-10-CM

## 2018-09-17 DIAGNOSIS — R47.01 EXPRESSIVE APHASIA: ICD-10-CM

## 2018-09-17 PROCEDURE — 92507 TX SP LANG VOICE COMM INDIV: CPT

## 2018-09-17 PROCEDURE — 97110 THERAPEUTIC EXERCISES: CPT

## 2018-09-17 PROCEDURE — 97112 NEUROMUSCULAR REEDUCATION: CPT

## 2018-09-17 PROCEDURE — 97116 GAIT TRAINING THERAPY: CPT

## 2018-09-17 ASSESSMENT — SOCIAL DETERMINANTS OF HEALTH (SDOH): SOCIAL_SUPPORT_SYSTEM: PARENT

## 2018-09-17 NOTE — OP THERAPY DAILY TREATMENT
Outpatient Physical Therapy  DAILY TREATMENT     [facility] Physical [address]  [address]  [place] [address]  Phone:  [phone]  Fax:  [phone]    Date: 09/17/2018    Patient: Gilles Cobb Case  YOB: 1965  MRN: [id number]     Time Calculation  Start time: 0930  Stop time: 1030 Time Calculation (min): 60 minutes     Visit #: 3    SUBJECTIVE: doing well; has speech communication book with him.      OBJECTIVE:  Current objective measures:           Therapeutic Exercises (CPT 03625):     1. Nustep L4 , 10 minutes    2. Gait with single pt cane worked on improving trunk rotaion and hip flexion    3. Sit to stand     4. Right LE ball curls    5. Bridging with ball     6. Left sidelying: right hip flexion    7. Standing reaching with cones across the body    8. Supine trunk rotations     9. 4 inch step ups     10. Side stepping with SBA    Therapeutic Treatments and Modalities:     1. Gait Training (CPT 75166), with SPC 4 x 200 feet with emphasis on reciprical gait, weightshift right, hip rotation     Time-based treatments/modalities:  Gait training minutes (CPT 76119): 30 minutes  Therapeutic exercise minutes (CPT 62265): 15 minutes  Neuromusc re-ed, balance, coor, post minutes (CPT 13117): 15 minutes       Pain rating before treatment: 0  Pain rating after treatment: 0    ASSESSMENT:   Response to treatment: Reciprocal gait improves with tactile and manual cues but unable to maintain independently. Continued retracted right pelvis and decreased hip flexion.      PLAN/RECOMMENDATIONS:   Plan for treatment: therapy treatment to continue next visit. UPOC to request more visits.  Planned interventions for next visit: continue with current treatment.

## 2018-09-17 NOTE — OP THERAPY DAILY TREATMENT
Outpatient Speech Therapy  DAILY TREATMENT     [facility] Speech [address]  [address]  [place] [address]  Phone:  [phone]  Fax:  [phone]    Date: 09/17/2018    Patient: Gilles Cobb Case  YOB: 1965  MRN: [id number]     Time Calculation  Start time: 0830  Stop time: 0929 Time Calculation (min): 59 minutes     Chief Complaint: Aphasia    Visit #: 2    Subjective:   Reason for Therapy:     Reason For Evaluation:  Aphasia  Social Support:     Accompanied By:  Parent (mother)    Patient Mental Status:  Alert and Responsive  Progress Factors:     Progression:  Getting Better  Additional Subjective Comments:      Patient arrived on time for scheduled speech therapy appointment with parent (mother).  Patient brought therapy materials used in home setting as requested during previous visit.  Patient alert and cooperative with noted frustration characterized by hitting of right hand to table, production of automatic speech (damn it) reduced as session progressed.       Objective:   Treatments/Interventions Performed:  Speech/Language treatment, Patient/Caregiver education and Compensatory strategy training  Treatment Intervention tool(s) used:  Expressive aphasia addressed through completion of structured speech production exercise:    1. Given structured picture identification through carrier phrase cues, patient named common items given direct visual (picture) support 84.6% accuracy.  Multiple repetitions (a minimum of 2 per trial) necessary to increase accuracy.  2.  Patient brought materials from home health speech therapy sessions to today's visit:  Patient was provided with external aids through establishment of communication notebook to organize information and create consistent means for patient to use external visual cues (e.g. Yes/no board).  Patient required direct cues to use board throughout session to obligatory contexts.    3. Aautomatic speech targeted through  "participation in simple sentence completion tasks:  Completed with 10/15 = 66.7% accuracy given moderate verbal, direct visual cues.          Speech Therapy Assessment:     Expressive Language Assessment:     Ability to exhibit appropriate naming: Profound.    Repeats speech accurately Moderate.    Paraphasic is Present.    Perseveration is Present (present 38.4% of the time during structured naming task).    Expressive language comments: Patient demonstrated increased accuracy in sound production when given multiple trials and direct verbal, visual cues using first letter sound cues. Patient demonstrated single instance of independent production of single word to answer simple question (favorite place to eat) using direct cues from communication book (found /t/ stated /t/ and then said \"taco\" independent).     Written Language Assessment:     Patient has ability to copy letters Minimal.    Patient ability to copy words Minimal.      Patient ability to write full name accurately Severe.    Speech Mechanism Assessment:     Patient's oral movements are voluntary and coordination: Severe      Speech Therapy Plan :   Prognosis: Good  Therapy Recommendations  Recommendation:  Individual Speech Therapy and Other, Continue with development of external communication aids as indicated  Planned Therapy Interventions:  Home Program, Compensatory Strategy Training, Patient/Caregiver Education and Speech/Language training,               "

## 2018-09-20 ENCOUNTER — SPEECH THERAPY (OUTPATIENT)
Dept: SPEECH THERAPY | Facility: REHABILITATION | Age: 53
End: 2018-09-20
Attending: FAMILY MEDICINE
Payer: MEDICAID

## 2018-09-20 ENCOUNTER — PHYSICAL THERAPY (OUTPATIENT)
Dept: PHYSICAL THERAPY | Facility: REHABILITATION | Age: 53
End: 2018-09-20
Attending: FAMILY MEDICINE
Payer: MEDICAID

## 2018-09-20 DIAGNOSIS — R47.01 EXPRESSIVE APHASIA: ICD-10-CM

## 2018-09-20 DIAGNOSIS — R26.81 UNSTEADINESS ON FEET: ICD-10-CM

## 2018-09-20 DIAGNOSIS — R48.2 APRAXIA: ICD-10-CM

## 2018-09-20 PROCEDURE — 97112 NEUROMUSCULAR REEDUCATION: CPT

## 2018-09-20 PROCEDURE — 97110 THERAPEUTIC EXERCISES: CPT

## 2018-09-20 PROCEDURE — 97116 GAIT TRAINING THERAPY: CPT

## 2018-09-20 PROCEDURE — 92507 TX SP LANG VOICE COMM INDIV: CPT

## 2018-09-20 NOTE — OP THERAPY DAILY TREATMENT
Outpatient Physical Therapy  DAILY TREATMENT     [facility] Physical [address]  [address]  [place] [address]  Phone:  [phone]  Fax:  [phone]    Date: 09/20/2018    Patient: Gilles Cobb Case  YOB: 1965  MRN: [id number]     Time Calculation  Start time: 0900  Stop time: 1000 Time Calculation (min): 60 minutes     Chief Complaint: No chief complaint on file.    Visit #: 4    SUBJECTIVE:  Doing well    OBJECTIVE:  Current objective measures:           Therapeutic Exercises (CPT 50910):     1. Nustep L4 , 10 minutes    2. Gait with single pt cane worked on improving trunk rotaion and hip flexion    3. Sit to stand     4. Right LE ball curls    5. Bridging with ball     6. Left sidelying: right hip flexion    7. Standing reaching with cones across the body    8. Supine trunk rotations     9. 4 inch step ups     10. Side stepping with SBA    Therapeutic Treatments and Modalities:     1. Gait Training (CPT 14301), with pole : lateral stepping, with SPC 4 x 200 feet with emphasis on reciprical gait, weightshift right, hip rotation , backward gait 3 x 50 feet @ SBA    Therapeutic Treatment and Modalities Summary: Nuero red ed: toe taps with level target and dynadisc with no UE support @ SBA     Time-based treatments/modalities:  Gait training minutes (CPT 53090): 25 minutes  Therapeutic exercise minutes (CPT 83837): 25 minutes  Neuromusc re-ed, balance, coor, post minutes (CPT 11591): 10 minutes       Pain rating before treatment: 0  Pain rating after treatment: 0    ASSESSMENT:   Response to treatment: improved reciprical gait with increased right weightshift and hip flexion with verbal cueing.      PLAN/RECOMMENDATIONS:   Plan for treatment: therapy treatment to continue next visit.  Planned interventions for next visit: continue with current treatment.

## 2018-09-20 NOTE — OP THERAPY DAILY TREATMENT
Outpatient Speech Therapy  DAILY TREATMENT     [address]  [address]  [place] [address]  Phone:  [phone]  Fax:  [phone]    Date: 09/20/2018    Patient: Gilles Cobb Case  YOB: 1965  MRN: [id number]     Time Calculation  Start time: 1006  Stop time: 1036 Time Calculation (min): 30 minutes     Chief Complaint: Aphasia    Visit #: 3    Subjective:   Additional Subjective Comments:      Patient alert, pleasant and cooperative throughout skilled speech therapy intervention.  Periods of frustration appeared reduced with patient requesting use of technology through use of [employer] language aleena through session to educate/ train for use in home setting.       Objective:   Treatments/Interventions Performed:  Speech/Language treatment, Patient/Caregiver education and Compensatory strategy training         Speech Therapy Assessment:     Expressive Language Assessment:     Ability to exhibit appropriate naming: Severe.    Paraphasic is Present.    Perseveration is Present.    Expressive language comments: Production of automatic speech targeted production of single words to phrase level stimuli/ written word cues 10/12 = 83% accuracy.  Confrontation naming targeted through use of progressive cues (semantic-phonemic) using language aleena, with patient stating name of object in picture stimuli 100% accuracy with direct imitation prompts/ instruction/ cues necessary 75% of the time.  Presence of apraxia suggested during activity as evidenced by repetitive sound placement despite direct verbal, visual prompts/ instruction/ cues.       Speech Therapy Plan :   Therapy Recommendations  Recommendation: Individual Speech Therapy,  Planned Therapy Interventions:  Speech/Language training, Patient/Caregiver Education, Compensatory Strategy Training and Home Program,

## 2018-09-24 ENCOUNTER — APPOINTMENT (OUTPATIENT)
Dept: PHYSICAL THERAPY | Facility: REHABILITATION | Age: 53
End: 2018-09-24
Attending: FAMILY MEDICINE
Payer: MEDICAID

## 2018-09-27 ENCOUNTER — PHYSICAL THERAPY (OUTPATIENT)
Dept: PHYSICAL THERAPY | Facility: REHABILITATION | Age: 53
End: 2018-09-27
Attending: FAMILY MEDICINE
Payer: MEDICAID

## 2018-09-27 ENCOUNTER — SPEECH THERAPY (OUTPATIENT)
Dept: SPEECH THERAPY | Facility: REHABILITATION | Age: 53
End: 2018-09-27
Attending: FAMILY MEDICINE
Payer: MEDICAID

## 2018-09-27 DIAGNOSIS — R26.81 UNSTEADINESS ON FEET: ICD-10-CM

## 2018-09-27 DIAGNOSIS — R47.01 EXPRESSIVE APHASIA: ICD-10-CM

## 2018-09-27 DIAGNOSIS — R48.2 APRAXIA: ICD-10-CM

## 2018-09-27 PROCEDURE — 97110 THERAPEUTIC EXERCISES: CPT

## 2018-09-27 PROCEDURE — 92507 TX SP LANG VOICE COMM INDIV: CPT

## 2018-09-27 PROCEDURE — 97112 NEUROMUSCULAR REEDUCATION: CPT

## 2018-09-27 PROCEDURE — 97116 GAIT TRAINING THERAPY: CPT

## 2018-09-27 NOTE — OP THERAPY DAILY TREATMENT
Outpatient Physical Therapy  DAILY TREATMENT     [facility] Physical [address]  [address]  [place] [address]  Phone:  [phone]  Fax:  [phone]    Date: 09/27/2018    Patient: Gilles Cobb Case  YOB: 1965  MRN: [id number]     Time Calculation  Start time: 0850  Stop time: 0950 Time Calculation (min): 60 minutes     Chief Complaint: Unsteadiness on feet  Visit #: 5    SUBJECTIVE: Patient appears motivated and  ready to participate      OBJECTIVE:  Current objective measures: Tinetti:  7/12 Gait , 11/16 Balance, Total : 18/28          Therapeutic Exercises (CPT 84716):     1. Nustep L4 , 10 minutes    3. Sit to stand with ball squeeze, x 15    4. Right LE ball curls, x 20 @ min assist    5. Bridging with ball , 15 x 10 sec    6. Left sidelying: right hip flexion    8. Supine trunk rotations     9. 4 inch step ups , 30X    Therapeutic Treatments and Modalities:     1. Gait Training (CPT 36323), with pole : lateral stepping, with SPC 4 x 200 feet with emphasis on reciprical gait, weightshift right, hip rotation , backward gait 3 x 50 feet @ SBA    Therapeutic Treatment and Modalities Summary: Nuero red ed: toe taps with level target and dynadisc with no UE support @ SBA     Time-based treatments/modalities:          Pain rating before treatment: 0  Pain rating after treatment: 0    ASSESSMENT:   Response to treatment: Improved Right LE forward progression/hip flexion and decreased circumduction after hip flexion/step ups. reciprical gait improves with cueing but patient reverts back to step to pattern.       PLAN/RECOMMENDATIONS:   Plan for treatment: therapy treatment to continue next visit.  Planned interventions for next visit: continue with current treatment.

## 2018-09-27 NOTE — OP THERAPY PROGRESS SUMMARY
Outpatient Physical Therapy  PROGRESS SUMMARY NOTE      [facility] Physical Therapy [facility]  [address]  [address]  [place] [address]  Phone:  [phone]  Fax:  [phone]    Date of Visit: 09/27/2018    Patient: Gilles Cobb Case  YOB: 1965  MRN: [id number]     Referring Provider: Santos Ramirez M.D.  [address] [facility]  [address]  [place] [address]   Referring Diagnosis Aphasia following cerebral infarction [I69.320];Unsteadiness on feet [R26.81]     Visit Diagnoses     ICD-10-CM   1. Unsteadiness on feet R26.81       Rehab Potential: excellent    Physical Therapy Occurrence Codes    Date of onset of impairment:  12/19/17   Date physical therapy care plan established or reviewed:  8/13/18   Date physical therapy treatment started:  8/13/18          Cert Period: 8/13/2018 to 9/27/2018  Progress Report Period:8/13/2018 to 9/27/2018    Functional Limitation G-Codes and Severity Modifiers  PT Functional Assessment Tool Used: Tinetti  PT Functional Assessment Score: 18/28   Current status:     Goal status:           Objective Findings and Assessment:   Patient progression towards goals: Patient able to walk with or without assistive device x >1/2 mile in community Independently  Patient able to to go up and down curb INDEP with AD and right AF0  Short term goals Met      Objective findings and assessment details: Patient ambulates with improved reciprocal  pattern with verbal cueing using a SPC and right AFO up to 1/2 mile.  Patient continues to have difficulty with Right hip flexion and forward progression in swing phase of Gait secondary to weakness and extensor tone.    Tinetti Balance indicates mod-high risk of fall with a total score of 18/28; 11/16 Balance and 7/12 Gait.  Recommend continued PT to meet goals stated below and optimize functional [place] and outcome.    Goals:   Short Term Goals:   Patient demonstrates reciprical gait pattern without verbal or tactile cueing.  Tinetti > 20/28      Short term goal time span:  2-4 weeks      Long Term Goals:    Gait with or without assistive device 1 + mile in community  Up and down 12 steps Mod I  Independent with home program  Long term goal time span:  6-8 weeks    Plan:   Planned therapy interventions:  Gait Training (CPT 86145), Therapeutic Exercise (CPT 07251), Therapeutic Activities (CPT 89433) and Neuromuscular Re-education (CPT 69302)  Frequency:  2x week  Duration in weeks:  8  Duration in visits:  12    9/27/2018-11/22/2018    Referring provider co-signature:  I have reviewed this plan of care and my co-signature certifies the need for services.    Physician Signature: ________________________________ Date: ______________

## 2018-09-27 NOTE — OP THERAPY DAILY TREATMENT
Outpatient Speech Therapy  DAILY TREATMENT     [address]  [address]  [place] [address]  Phone:  [phone]  Fax:  [phone]    Date: 09/27/2018    Patient: Gilles Cobb Case  YOB: 1965  MRN: [id number]     Time Calculation  Start time: 1001  Stop time: 1050 Time Calculation (min): 49 minutes     Chief Complaint: Inability To Get Words Out    Visit #: 4    Subjective:   Reason for Therapy:     Reason For Evaluation:  Aphasia (expressive)  Progress Factors:     Progression:  Getting Better  Additional Subjective Comments:      Patient with continued request for use of [employer] application during therapy session to educate/ train on use of home program in home setting.  Patient with reduced frustration and increased willingness to repeat utterances to increase accuracy observed.       Objective:   Treatments/Interventions Performed:  Speech/Language treatment, Patient/Caregiver education and Compensatory strategy training         Speech Therapy Assessment:     Expressive Language Assessment:     Paraphasic is Present (syntactic).    Perseveration is Present.    Expressive language comments: Expressive language comments: Production of automatic speech targeted production of single words to phrase level stimuli/ written word cues 13/14 = 92.8% accuracy, need for phonemic cues 3/14 = 21.4% of the time/ all other productions produced to phrase level cues only.  Confrontation naming targeted through use of progressive cues (semantic-phonemic) using language aleena, with patient stating name of object in picture stimuli 66% accuracy with direct imitation prompts/ instruction/ cues necessary 80% of the time.  Increased episodes of syntactic paraphasias noted.       Speech Therapy Plan :   Therapy Recommendations  Recommendation:  Individual Speech Therapy,  Planned Therapy Interventions:  Home Program, Patient/Caregiver Education, Compensatory Strategy Training and  Speech/Language training,

## 2018-10-02 ENCOUNTER — SPEECH THERAPY (OUTPATIENT)
Dept: SPEECH THERAPY | Facility: REHABILITATION | Age: 53
End: 2018-10-02
Attending: FAMILY MEDICINE
Payer: MEDICAID

## 2018-10-02 ENCOUNTER — APPOINTMENT (OUTPATIENT)
Dept: PHYSICAL THERAPY | Facility: REHABILITATION | Age: 53
End: 2018-10-02
Attending: FAMILY MEDICINE
Payer: MEDICAID

## 2018-10-02 DIAGNOSIS — R48.2 APRAXIA: ICD-10-CM

## 2018-10-02 DIAGNOSIS — R47.01 EXPRESSIVE APHASIA: ICD-10-CM

## 2018-10-02 PROCEDURE — 92507 TX SP LANG VOICE COMM INDIV: CPT

## 2018-10-02 ASSESSMENT — SOCIAL DETERMINANTS OF HEALTH (SDOH): SOCIAL_SUPPORT_SYSTEM: PARENT

## 2018-10-02 NOTE — OP THERAPY DAILY TREATMENT
Outpatient Speech Therapy  DAILY TREATMENT     [address]  [address]  [place] [address]  Phone:  [phone]  Fax:  [phone]    Date: 10/02/2018    Patient: Gilles Cobb Case  YOB: 1965  MRN: [id number]     Time Calculation  Start time: 0830  Stop time: 0930 Time Calculation (min): 60 minutes     Chief Complaint: Aphasia (expressive type)    Visit #: 5    Subjective:   Reason for Therapy:     Reason For Evaluation:  Aphasia  Social Support:     Accompanied By:  Parent (present and supportive. Consulted with ST regarding strategies to use to assist patient in implementation of home program.)    Patient Mental Status:  Alert and Responsive  Progress Factors:     Progression:  Getting Better  Additional Subjective Comments:      Patient arrived on time for scheduled speech therapy visit.  Patient continues to demonstrate intermittent periods of frustration when encountering difficulties with verbal expression.  Patient with increased willingness to use external aids (e.g. Notebook) to obtain information to increase communicative competence.       Objective:   Treatments/Interventions Performed:  Speech/Language treatment, Patient/Caregiver education and Compensatory strategy training         Speech Therapy Assessment:     Expressive Language Assessment:     Ability to exhibit appropriate naming: Severe (generative naming to picture only cues 15% accuracy. ).    Patient's ability to use automatic language appropriately is Minimal.    Paraphasic is Present (30.7%).    Perseveration is Present (15.3%).    Expressive language comments: Production of automatic speech targeted production of single words to phrase level stimuli/ written word cues 12/13 = 92.3% accuracy, need for phonemic cues 55% of the time/ all other productions produced to phrase level cues only.  Confrontation naming targeted through use of progressive cues (semantic-phonemic), with patient  stating name of object in picture stimuli 25/30 = 83.3% accuracy with carrier phrase prompts necessary 100% of the time.  Increased episodes of syntactic paraphasias, perseverations noted.       Speech Therapy Plan :   Therapy Recommendations  Recommendation: Individual Speech Therapy,  Planned Therapy Interventions:  Home Program, Patient/Caregiver Education, Compensatory Strategy Training and Speech/Language training,   Plan Details:  Continue with speech therapy addressing linguistic expression at single word level.

## 2018-10-03 ENCOUNTER — PHYSICAL THERAPY (OUTPATIENT)
Dept: PHYSICAL THERAPY | Facility: REHABILITATION | Age: 53
End: 2018-10-03
Attending: FAMILY MEDICINE
Payer: MEDICAID

## 2018-10-03 DIAGNOSIS — R26.81 UNSTEADINESS ON FEET: ICD-10-CM

## 2018-10-03 PROCEDURE — 97116 GAIT TRAINING THERAPY: CPT

## 2018-10-03 PROCEDURE — 97110 THERAPEUTIC EXERCISES: CPT

## 2018-10-03 NOTE — OP THERAPY DAILY TREATMENT
Outpatient Physical Therapy  DAILY TREATMENT     [facility] Physical [address]  [address]  [place] [address]  Phone:  [phone]  Fax:  [phone]    Date: 10/03/2018    Patient: Gilles Cobb Case  YOB: 1965  MRN: [id number]     Time Calculation  Start time: 0800  Stop time: 0855 Time Calculation (min): 55 minutes     Chief Complaint: No chief complaint on file.    Visit #: 6    SUBJECTIVE:  Pt reports no changes. States he has been walking at home without cane. Difficult for pt to provide subjective due to expressive aphasia.     OBJECTIVE:          Therapeutic Exercises (CPT 78134):     1. Nustep , L6 for 5 minutes L8 for remaining 5 minutes (10 minutes total)    2. Cone reach in sitting, crossbody to R 10x, to L 5x with mod A with R hand    4. Right LE ball curls, x 20 @ min assist    5. Bridging, 15 x 10 sec    6. Left sidelying: right hip flexion, with min A    8. Supine trunk rotations , 10x, additional 10 with clasped arms performing opposite rotation    9. 4 inch toe taps, 30X R    11. Reaching in standing, to PT crossbody to R, in balance bar    12. 2 inch toe taps , forward alternating LE 15x    13. Lateral step 2 inch , 10x min A    Therapeutic Treatments and Modalities:     1. Gait Training (CPT 49340), lateral stepping 4 x 50ft CGA, forward with SPC 4 x 200 ft with emphasis on reciprical gait, weightshift right, hip rotation/flexion, backward gait 5 x 20 feet @ SBA/CGA    Time-based treatments/modalities:  Gait training minutes (CPT 45150): 25 minutes  Therapeutic exercise minutes (CPT 05307): 30 minutes       Pain rating before treatment: 0  Pain rating after treatment: 0    ASSESSMENT:   Response to treatment: Reciprocal gait continues to show improvement. Pt had difficulty with hip flexion today while walking, required mod verbal cues to perform. Pt demo'd fair trunk rotation in supine and sittign but there was little carry-over today during ambulation.      PLAN/RECOMMENDATIONS:   Plan for treatment: therapy treatment to continue next visit.  Planned interventions for next visit: continue with current treatment and gait training (CPT 91287).

## 2018-10-05 ENCOUNTER — PHYSICAL THERAPY (OUTPATIENT)
Dept: PHYSICAL THERAPY | Facility: REHABILITATION | Age: 53
End: 2018-10-05
Attending: FAMILY MEDICINE
Payer: MEDICAID

## 2018-10-05 DIAGNOSIS — R26.81 UNSTEADINESS ON FEET: ICD-10-CM

## 2018-10-05 PROCEDURE — 97110 THERAPEUTIC EXERCISES: CPT

## 2018-10-05 PROCEDURE — 97116 GAIT TRAINING THERAPY: CPT

## 2018-10-05 NOTE — OP THERAPY DAILY TREATMENT
Outpatient Physical Therapy  DAILY TREATMENT     [facility] Physical [address].  [address]  [place] [address]  Phone:  [phone]  Fax:  [phone]    Date: 10/05/2018    Patient: Gilles Cobb Case  YOB: 1965  MRN: [id number]     Time Calculation  Start time: 0800  Stop time: 0855 Time Calculation (min): 55 minutes     Chief Complaint: No chief complaint on file.    Visit #: 7    SUBJECTIVE:  Per pt's mom he is walking at home without SPC, but has to touch the walls. She states it takes Gilles 10 minutes to get from her apartment to her car (approx 100 ft.).     OBJECTIVE:    Current Objective: 6MWT: 435ft with SPC, no rest breaks taken      Therapeutic Exercises (CPT 04953):     1. Nustep , L8 10 minutes total    4. Right LE ball curls, x 20 @ min assist for R LE    5. Bridging, 2x10  (10 sec hold)    6. March in supine, mod A for R LE 10x    8. Supine trunk rotations , 10x    9. Toe taps to dynadisc, 20x, at balance bar    11. Reaching in standing, to PT crossbody to R, in balance bar    12. March in standing, mod A for R LE    Therapeutic Treatments and Modalities:     1. Gait Training (CPT 12974), SBA to close SPV: lateral stepping 4 x 50ft with pole, backward gait 5 x 20 feet. SBA with SPC forward  x400 ft with emphasis on reciprical gait, weightshift right, hip flexion. , 6MWT: 435ft SaO2 at completion : 95%. HR: 74 bpm    Time-based treatments/modalities:  Gait training minutes (CPT 32935): 25 minutes  Therapeutic exercise minutes (CPT 41506): 30 minutes       Pain rating before treatment: 0  Pain rating after treatment: 0    ASSESSMENT:   Response to treatment: Pt continues to have difficulty weight shifting to R LE during stance phase as he widens his base of support and decreased step  Length on the right. This can improve with verbal cues and when pt performs he can begin to demo a step through gait pattern.      PLAN/RECOMMENDATIONS:   Plan for treatment: Pt has  currently used all authorized PT visits, will request for more unless pt's primary PT deems continuation to be unfit.  Planned interventions for next visit: Currently on hold until for authorization.

## 2018-10-11 ENCOUNTER — SPEECH THERAPY (OUTPATIENT)
Dept: SPEECH THERAPY | Facility: REHABILITATION | Age: 53
End: 2018-10-11
Attending: FAMILY MEDICINE
Payer: MEDICAID

## 2018-10-11 DIAGNOSIS — R48.2 APRAXIA: ICD-10-CM

## 2018-10-11 DIAGNOSIS — R47.01 EXPRESSIVE APHASIA: ICD-10-CM

## 2018-10-11 PROCEDURE — 92507 TX SP LANG VOICE COMM INDIV: CPT

## 2018-10-11 ASSESSMENT — SOCIAL DETERMINANTS OF HEALTH (SDOH): SOCIAL_SUPPORT_SYSTEM: PARENT

## 2018-10-11 ASSESSMENT — ENCOUNTER SYMPTOMS: NO PATIENT REPORTED PAIN: 1

## 2018-10-11 NOTE — OP THERAPY PROGRESS SUMMARY
Outpatient Speech Therapy  PROGRESS NOTE      [facility] Speech [facility]  [address]  [address]  [place] [address]  Phone:  [phone]  Fax:  [phone]    Date of Visit: 10/11/2018    Patient: Gilles Cobb Case  YOB: 1965  MRN: [id number]     Referring Provider: Santos Ramirez M.D.  [address] [facility]  [address]  [place] [address]   Referring Diagnosis Aphasia following cerebral infarction [I69.320];Unsteadiness on feet [R26.81]      Visit #: 6    Time Calculation             Speech Therapy Occurrence Codes    Date of Onset of Impairment:  1/9/18   Date speech therapy care plan established or reviewed:  8/9/18   Date speech therapy treatment started:  8/9/18          Chief Complaint: Aphasia and Inability To Get Words Out    Visit Diagnoses     ICD-10-CM   1. Expressive aphasia R47.01   2. Apraxia R48.2       Subjective:   Reason for Therapy:     Reason For Evaluation:  Aphasia    Onset Date:  1/9/2018    Onset Description:  Patient s/p CVA on 12/19/2018 with right sided weakness and Broca's aphasia.  Social Support:     Accompanied By:  Parent (mother present and supportive assisting patient in attendance to outpatient therapy sessions. )    Patient Mental Status:  Alert and Responsive  Progress Factors:     Progression:  Getting Better  Pain:     no pain reported    Therapy History:     Previous Treatments and Dates:  Patient has participated in evaluation and five therapy sessions of outpatient speech therapy addressing severe deficits in language expressions secondary to a diagnosis of Broca's Aphasia s/p CVA.   Additional Subjective Comments:      Patient has demonstrated consistent attendance and good participation and motivation during skilled speech therapy sessions addressing severe deficits in expressive language secondary to a diagnosis of Aphasia s/p CVA.        Objective:   Treatments/Interventions Performed:  Speech/Language treatment, Compensatory strategy training and  Patient/Caregiver education  Treatment Intervention tool(s) used:  Direct, skilled speech therapy services targeted improving independence in communication skills through participation in structured, language based activities targeting production of single words.  Use of external visual cues to increase accuracy in communication addressed with patient provided with speech/ communication notebook containing personal information to increase patient ability to communicate simple, personal information with listeners as necessary.       Speech Therapy Assessment:     Expressive Language Assessment:     Communicates using gestures: WFL.    Ability to exhibit appropriate naming: Severe.    Explains function of object Profound.    Repeats speech accurately Minimal.    Patient's ability to use automatic language appropriately is Minimal.    Patient's ability to verbalize wants and needs is Profound.    Patient's ability to sustain dialogues within a given topic is Severe.    Paraphasic is Present (semantic and syntactic).    Perseveration is Present.    Expressive language comments: Patient continues to present with severe deficits in expressive language skills as evidenced by performance during skilled therapy sessions.  Patient has demonstrated progress with expressive language skills characterized by improved naming of picture/ common objects to single word level; however, patient continues to necessitate a structured, systematic form of modeling (e.g. Prompting from written, verbal cues) in an attempt to increase accuracy during confrontation naming tasks completing to <20% accuracy.  Patient is most successful in production of single words during automatic speech production at this time completing with 80% or greater accuracy to a minimum of 2 trial/ prompt level.       Speech Therapy Plan :   Prognosis & Recommendations  Impression Summary:  Based on patient progress with skilled intervention and areas of continued  need, participation in direct, skilled speech therapy to address deficits in expressive language skills to address SEVERE expressive Aphasia remains necessary and medically indicated. Patient has met 3/3 (100%) of short term goals during the initial intervention period.  Patient currently does not possess a systematic means of communication beyond use of yes/no questions to communicate personal and medically relevant information, even with familiar listeners.  Without participation in direct, skilled speech therapy, patient is at HIGH risk for safety and medically related concerns as patient is unable to independently communicate needs with familiar and unfamiliar listeners including medical personal.  Continued participation in direct, skilled speech therapy in the outpatient setting is recommended and warranted.    Prognosis:  Good  Goals  Short Term Goals:  NEW GOALS 10/11/2018:  1) Patient will participate in AAC evaluation   2) Patient will complete automatic speech tasks with 85% accuracy given min verbal cues.   3) Patient will use Semantic Feature Analysis to increase vocabulary around a specific object/ item 65% accuracy given direct verbal/ visual prompts/ instruction/ cues.    4) Patient will complete confrontation naming to single word level given faded prompting/ cues 75% accuracy.     Patient progression on Short Term Goals:  1. Patient will name common items pictures when provided with carrier phrases with 70% accuracy:  GOAL MET.    2. Patient will utilize pictures to communication basic wants when provided with min verbal cues:  GOAL MET.    3. Patient will complete automatic speech tasks with 70% accuracy and mod verbal cues:  GOAL MET.     Long Term Goals:      Patient progression on Long Term Goals:  1. Patient will communicate wants and needs using single words or pictures 80% of the time given min cues.   GOAL IN PROGRESS.     Therapy Recommendations  Recommendation:  Individual Speech  Therapy,  Planned Therapy Interventions:  Patient/Caregiver Education, Home Program, Compensatory Strategy Training and Speech/Language training,   Plan Details:  POC updated on 10/11/2018; however, patient returning to speech therapy for first time since re-certification 11/13/2018.  POC has been modified to reflect certification dates approved by Medicaid.  Patient POC modified to 2 times per week for 8 weeks and then reassess.   Frequency:  2x week  Duration (in weeks):  8          Referring provider co-signature:  I have reviewed this plan of care and my co-signature certifies the need for services.  Certification Dates:   From 10/30/2018     To 12/31/2018    Physician Signature: ________________________________ Date: ______________

## 2018-10-11 NOTE — OP THERAPY DAILY TREATMENT
Outpatient Speech Therapy  DAILY TREATMENT     [address]  [address]  [place] [address]  Phone:  [phone]  Fax:  [phone]    Date: 10/11/2018    Patient: Gilles Cobb Case  YOB: 1965  MRN: [id number]     Time Calculation  Start time: 0905  Stop time: 0935 Time Calculation (min): 30 minutes     Chief Complaint: Aphasia and Inability To Get Words Out    Visit #: 6    Subjective:   Reason for Therapy:     Reason For Evaluation:  Aphasia (expressive)  Social Support:     Accompanied By:  Parent (mother present and supportive.  )    Patient Mental Status:  Alert and Responsive  Progress Factors:     Progression:  Getting Better  Additional Subjective Comments:      Patient preferred to work on confrontation naming using TACTUS application during therapy session to educate/ train on use of home program in home setting.  Patient with marked improvements in independence in initiation of speech production throughout therapy session.       Objective:   Treatments/Interventions Performed:  Speech/Language treatment, Patient/Caregiver education and Compensatory strategy training         Speech Therapy Assessment:     Expressive Language Assessment:     Ability to exhibit appropriate naming: Severe (Confrontation naming addressed. ).    Paraphasic is Absent (semantic and syntactic).    Perseveration is Absent.    Expressive language comments: Patient participated in direct, skilled speech therapy addressing naming. Confrontation naming completed with the following accuracy/ cues provided: to written first letter 2/18 = 11%; to written whole word 1/18 = 5%; Fill-in-the-blank:  6/18 = 33%; Syntactic cues:  3/18 = 16.6%; Whole word repetition:  6/18 = 33%.  Patient remains most successful in use of automatic speech given direct verbal prompting.       Speech Therapy Plan :   Therapy Recommendations  Recommendation: Individual Speech Therapy,  Planned Therapy  Interventions:  Home Program, Patient/Caregiver Education, Compensatory Strategy Training and Speech/Language training,   Plan Details:  Progress note for continuation of ST services recommended and completed.

## 2018-10-24 ENCOUNTER — OCCUPATIONAL THERAPY (OUTPATIENT)
Dept: OCCUPATIONAL THERAPY | Facility: REHABILITATION | Age: 53
End: 2018-10-24
Attending: FAMILY MEDICINE
Payer: MEDICAID

## 2018-10-24 DIAGNOSIS — R26.81 UNSTEADY GAIT: ICD-10-CM

## 2018-10-24 DIAGNOSIS — I69.320 APHASIA FOLLOWING CEREBRAL INFARCTION: ICD-10-CM

## 2018-10-24 PROCEDURE — 97112 NEUROMUSCULAR REEDUCATION: CPT

## 2018-10-24 NOTE — OP THERAPY DAILY TREATMENT
"  Outpatient Occupational Therapy  DAILY TREATMENT     [facility] Occupational [address]  [address]  [place] [address]  Phone:  [phone]  Fax:  [phone]    Date: 10/24/2018    Patient: Gilles Cobb Case  YOB: 1965  MRN: [id number]     Time Calculation  Start time: 0800  Stop time: 0900 Time Calculation (min): 60 minutes     Chief Complaint: Extremity Weakness (right)    Visit #: 2    SUBJECTIVE: \"Ya\" (do you have energy?), \"Nope\" (are you in any pain\")    OBJECTIVE:  Current objective measures:    strength:  Left: 78lbs; Right: 18 lbs  Right UE Strength:  Shoulder flexion/abduction: 2-/5, Shoulder ER: 3-/5, Sh IR 3+/5  Elbow flexion: 4-/5, Elbow extension: 2+/5, Forearm supination/pronation: 2+/5  Wrist flexion: 2-/5, Wrist extension: 2/5, Digit flexion: 3+/5 except thumb flexion 2-/5, Digit extension 3-/5, Digit abduction/adduction: 3-/5  Modified King:   Upper extremity (right):   Shoulder flexors: 2, Shoulder internal rotators: 1+, Elbow flexors: 2, Elbow extensors: 2, Wrist flexors: 2, Wrist extensors: 1+, Digit flexors: 3, Digit extensors: 1  RUE rests in flexor synergy pattern with the exception of thumb resting in extension with associated grade 3 extensor tone.  Pronators: Grade 2 spasticity        Therapeutic Treatments and Modalities:    1. Neuromuscular Re-education (CPT 82970)    Therapeutic Treatments and Modalities Summary: Foam roll placed in right hand with velfoam strap to position thumb in flexion/adduction for WB downward through right forearm, LH providing dorsal wrist pressure to maintain extension x 10 reps with 10 second hold.  WB forward through wrist with 1/2 foam roll and dorsal/lateral thumb support x 10 reps.  Right shoulder passive sustained stretch to 90 degrees limited by pain. Right thumb joint mobilization and traction at MCP/IP joint and passive sustained stretch into flexion/opposition as tolerated secondary to severe tightness " and extensor tone, performed joint blocking exercise at thumb IP joint, placing thumb in palmar hand with other digits flexed over thumb for sustained stretch.  Performed active/active assisted right thumb flexion/opposition while constraining digits 2-5 with initial and intermittent assistance to activate motor plan and movement pattern.  Issued written HEP to include active IP flex/ext, composite thumb flexion stretch to hold in palm with other digits, and active/AA stretch of thumb MCP joint into flexion with good understanding.      Time-based treatments/modalities:  Neuromusc re-ed minutes (CPT 89705): 60 minutes        ASSESSMENT:   Response to treatment: Pt making good progress today in his RUE neuromuscular recovery in response to facilitory and inhibitory techniques described above with improved activation of thumb flexion and opposition in order to maintain a functional grasp and release.  Severe thumb joint tightness, soft tissue restrictions, and extensor tone persists along with motor planning deficits which will required aggressive therapy.  Instructed on written HEP with good understanding.  Pt highly motivated.    PLAN/RECOMMENDATIONS:   Plan for treatment: therapy treatment to continue next visit.  Planned interventions for next visit: continue with current treatment, functional training/self care (CPT 63012), neuromuscular re-education (CPT 84073), orthotic training (CPT 96093) and therapeutic activities (CPT 74490)

## 2018-10-29 ENCOUNTER — OCCUPATIONAL THERAPY (OUTPATIENT)
Dept: OCCUPATIONAL THERAPY | Facility: REHABILITATION | Age: 53
End: 2018-10-29
Attending: FAMILY MEDICINE
Payer: MEDICAID

## 2018-10-29 DIAGNOSIS — I69.320 APHASIA FOLLOWING CEREBRAL INFARCTION: ICD-10-CM

## 2018-10-29 DIAGNOSIS — R26.81 UNSTEADY GAIT: ICD-10-CM

## 2018-10-29 PROCEDURE — 97760 ORTHOTIC MGMT&TRAING 1ST ENC: CPT

## 2018-10-29 PROCEDURE — 97112 NEUROMUSCULAR REEDUCATION: CPT | Mod: XU

## 2018-10-29 NOTE — OP THERAPY DAILY TREATMENT
"  Outpatient Occupational Therapy  DAILY TREATMENT     [facility] Occupational Therapy [address]  [address]  [place] [address]  Phone:  [phone]  Fax:  [phone]    Date: 10/29/2018    Patient: Gilles Cobb Case  YOB: 1965  MRN: [id number]     Time Calculation  Start time: 0930  Stop time: 1030 Time Calculation (min): 60 minutes     Chief Complaint: Extremity Weakness (right)    Visit #: 3    SUBJECTIVE: \"Better\"    OBJECTIVE:  Current objective measures:   Shoulder flexion/abduction: 2-/5, Shoulder ER: 3-/5, Sh IR 3+/5  Elbow flexion: 4-/5, Elbow extension: 2+/5, Forearm supination/pronation: 2+/5  Wrist flexion: 2-/5, Wrist extension: 2/5, Digit flexion: 3+/5 except thumb flexion 2-/5, Digit extension 3-/5, Digit abduction/adduction: 3-/5  RUE rests in flexor synergy pattern with the exception of thumb resting in full extension with associated grade 3 extensor tone.  Pronators: Grade 2 spasticity        Therapeutic Treatments and Modalities:    1. Orthotic Training (CPT 41049)    2. Neuromuscular Re-education (CPT 34357)    Therapeutic Treatments and Modalities Summary: Initiated fabrication of right orthoplast hand-based splint to facilitate thumb in abducted position with slight flexion to prevent hyperextension of thumb r/t extensor spasticity.  Orthotic needs further modifications.  Fit with foam roll and velfoam strap to maintain functional hand position including thumb in abduction with comfortable fit.  Pt require max assist to don r/t motor planning deficits, briefly reviewed with mother who verbalized understanding.  RUE WB through elbow to hand in pronation vs supination with dorsal support to maintain positions.  Stretching to right thumb into internal rotation, opposition, and composite flexion followed by active movements.  Pt performed self stretching of thumb held in palm using other digits.    Time-based treatments/modalities:  Neuromusc re-ed minutes (CPT " 69303): 30 minutes  Orthotics training initial, minutes (CPT 44003): 30 minutes        ASSESSMENT:   Response to treatment: Pt making gains with right hand motor control and flexibility in response to stretching and WB exercises.  Positioning RH in functional hand position, including thumb in abduction to be addressed further as extensor tone in thumb persists at Grade 3 on MAS .  Continue with HEP.     PLAN/RECOMMENDATIONS:   Plan for treatment: therapy treatment to continue next visit.  Planned interventions for next visit: continue with current treatment, functional training/self care (CPT 84549), neuromuscular re-education (CPT 13992), orthotic training (CPT 84588) and therapeutic activities (CPT 11719)

## 2018-10-31 ENCOUNTER — OCCUPATIONAL THERAPY (OUTPATIENT)
Dept: OCCUPATIONAL THERAPY | Facility: REHABILITATION | Age: 53
End: 2018-10-31
Attending: FAMILY MEDICINE
Payer: MEDICAID

## 2018-10-31 DIAGNOSIS — I69.320 APHASIA FOLLOWING CEREBRAL INFARCTION: ICD-10-CM

## 2018-10-31 DIAGNOSIS — R26.81 UNSTEADY GAIT: ICD-10-CM

## 2018-10-31 PROCEDURE — 97530 THERAPEUTIC ACTIVITIES: CPT

## 2018-10-31 PROCEDURE — 97112 NEUROMUSCULAR REEDUCATION: CPT

## 2018-10-31 NOTE — OP THERAPY DAILY TREATMENT
"  Outpatient Occupational Therapy  DAILY TREATMENT     [facility] Occupational Therapy [address]  [address]  [place] [address]  Phone:  [phone]  Fax:  [phone]    Date: 10/31/2018    Patient: Gilles Cobb Case  YOB: 1965  MRN: [id number]     Time Calculation  Start time: 0930  Stop time: 1030 Time Calculation (min): 60 minutes     Chief Complaint: Extremity Weakness (right)    Visit #: 4    SUBJECTIVE: \"Ok\" (in response to hand roll feeling comfortable)    OBJECTIVE:  Current objective measures:   Shoulder flexion/abduction: 2-/5, Shoulder ER: 3-/5, Sh IR 3+/5  Elbow flexion: 4-/5, Elbow extension: 2+/5, Forearm supination/pronation: 2+/5  Wrist flexion: 2-/5, Wrist extension: 2/5, Digit flexion: 3+/5 except thumb flexion 2-/5, Digit extension 3-/5, Digit abduction/adduction: 3-/5  RUE rests in flexor synergy pattern with the exception of thumb resting in full extension with associated grade 3 extensor tone.  Wrist flexors Grade 2, digit flexors Grade 3 flexor spasticity  Pronators: Grade 2 spasticity        Therapeutic Treatments and Modalities:    1. Neuromuscular Re-education (CPT 84430)    2. Therapeutic Activities (CPT 28732)    Therapeutic Treatments and Modalities Summary: Right shoulder joint mobilization via posterior glides secondary to joint capsule tightness, passive sustained stretch of left shoulder into ff/abd/horizontal limited by pain at 90 degrees, assisted PNF diagonals D1 pattern, assisted IR/ER, seated EOM for lateral WB through right wrist with lateral arm support to maintain elbow extension.  Shoulder girdle stabilization using wooden dowel in vertical orientation for elevation/depresssion/protraction/retraction.  Large cones placed horizontally on table for RH to grasp and place in vertical orientation (very difficult), reversed sequence with improved ability to relax and release cone from flexor spasticity after using exaggerated grasp.    Time-based " treatments/modalities:  Therapeutic activity minutes (CPT 43377): 15 minutes  Neuromusc re-ed minutes (CPT 79323): 45 minutes      ASSESSMENT:   Response to treatment:  Pt making good progress with his RUE neuromuscular recovery in response to both facilitory and inhibitory techniques in response to WB, shoulder girdle stabilization exercises, PNF, and functional grasp/release activities.  Flexor spasticity remains a strong barrier.  HEP updated with good understanding.    PLAN/RECOMMENDATIONS:   Plan for treatment: therapy treatment to continue next visit.  Planned interventions for next visit: continue with current treatment, functional training/self care (CPT 87298), neuromuscular re-education (CPT 70608), orthotic training (CPT 27889) and therapeutic activities (CPT 86163)

## 2018-11-01 ENCOUNTER — PHYSICAL THERAPY (OUTPATIENT)
Dept: PHYSICAL THERAPY | Facility: REHABILITATION | Age: 53
End: 2018-11-01
Attending: FAMILY MEDICINE
Payer: MEDICAID

## 2018-11-01 DIAGNOSIS — R26.81 UNSTEADINESS ON FEET: ICD-10-CM

## 2018-11-01 PROCEDURE — 97110 THERAPEUTIC EXERCISES: CPT

## 2018-11-01 PROCEDURE — 97116 GAIT TRAINING THERAPY: CPT

## 2018-11-01 NOTE — OP THERAPY DAILY TREATMENT
Outpatient Physical Therapy  DAILY TREATMENT     [facility] Physical Therapy [address]  [address]  [place] [address]  Phone:  [phone]  Fax:  [phone]    Date: 11/01/2018    Patient: Gilles Cobb Case  YOB: 1965  MRN: [id number]     Time Calculation  Start time: 1300  Stop time: 1400 Time Calculation (min): 60 minutes     Chief Complaint: walking problem  Visit #: 8    SUBJECTIVE: doing well, walking in community [facility]    OBJECTIVE:  Current objective measures:         Therapeutic Exercises (CPT 74816):     1. Nustep , L8 10 minutes total    2. Seated HS curls with skate board assist, x 30 min assist    3. Sergey stretch knee flexion R, x 30 reps    4. Right LE ball curls, x 20 @ min assist for R LE    5. Bridging, 2x10  (10 sec hold)    6. March in supine, mod A for R LE 10x    12. March in standing, mod A for R LE    Therapeutic Treatments and Modalities:     1. Gait Training (CPT 23994), SBA to close SPV: lateral stepping 4 x 50ft with pole, backward gait 5 x 20 feet. SBA with SPC forward  x400 ft with emphasis on reciprical gait, weightshift right, hip flexion.     Time-based treatments/modalities:  Gait training minutes (CPT 66562): 30 minutes  Therapeutic exercise minutes (CPT 47658): 30 minutes       Pain rating before treatment: 0  Pain rating after treatment: 0    ASSESSMENT:   Response to treatment: patient demonstrates improved swing RLE and reciprical gait with verbal cueing and after hip/knee flexion there ex.      PLAN/RECOMMENDATIONS:   Plan for treatment: therapy treatment to continue next visit.  Planned interventions for next visit: continue with current treatment.

## 2018-11-06 ENCOUNTER — PHYSICAL THERAPY (OUTPATIENT)
Dept: PHYSICAL THERAPY | Facility: REHABILITATION | Age: 53
End: 2018-11-06
Attending: FAMILY MEDICINE
Payer: MEDICAID

## 2018-11-06 ENCOUNTER — OCCUPATIONAL THERAPY (OUTPATIENT)
Dept: OCCUPATIONAL THERAPY | Facility: REHABILITATION | Age: 53
End: 2018-11-06
Attending: FAMILY MEDICINE
Payer: MEDICAID

## 2018-11-06 DIAGNOSIS — I69.320 APHASIA FOLLOWING CEREBRAL INFARCTION: ICD-10-CM

## 2018-11-06 DIAGNOSIS — R26.81 UNSTEADY GAIT: ICD-10-CM

## 2018-11-06 DIAGNOSIS — R26.81 UNSTEADINESS ON FEET: ICD-10-CM

## 2018-11-06 PROCEDURE — 97110 THERAPEUTIC EXERCISES: CPT

## 2018-11-06 PROCEDURE — 97112 NEUROMUSCULAR REEDUCATION: CPT

## 2018-11-06 PROCEDURE — 97116 GAIT TRAINING THERAPY: CPT

## 2018-11-06 PROCEDURE — 97530 THERAPEUTIC ACTIVITIES: CPT

## 2018-11-06 NOTE — OP THERAPY DAILY TREATMENT
Outpatient Physical Therapy  DAILY TREATMENT     [facility] Physical [address]  [address]  [place] [address]  Phone:  [phone]  Fax:  [phone]    Date: 11/06/2018    Patient: Gilles Cobb Case  YOB: 1965  MRN: [id number]     Time Calculation  Start time: 1000  Stop time: 1100 Time Calculation (min): 60 minutes     Chief Complaint: Unsteadiness   Visit #: 9    SUBJECTIVE:  Walking >1 mile at home      OBJECTIVE:  Current objective measures:           Therapeutic Exercises (CPT 44483):     1. Nustep , L8 10 minutes total    2. Sit to stand with 4 inch step under left LE, x 15 25 inch mat    4. Right LE ball curls, x 20 @ min assist for R LE    5. Bridging, 2x10  (10 sec hold)    6. March in supine, mod A for R LE 10x    Therapeutic Treatments and Modalities:     1. Gait Training (CPT 47592), Indep-SUP 4 x 250 feet right AFO and SPC with emphasis on weightshift right and reciprical gait.      2. Neuromuscular Re-education (CPT 66276), weightshifts in standing with 4 inch step to facilitate; reaching challenges using ball lateral, forward and above head bilateral UE @ Supervised assist    Time-based treatments/modalities:  Gait training minutes (CPT 97221): 20 minutes  Therapeutic exercise minutes (CPT 28727): 20 minutes  Neuromusc re-ed, balance, coor, post minutes (CPT 89436): 20 minutes       Pain rating before treatment: 0  Pain rating after treatment: 0    ASSESSMENT: decreased cueing required to maintain reciprical gait and weightshift right with gait  Response to treatment:     PLAN/RECOMMENDATIONS:   Plan for treatment: therapy treatment to continue next visit.  Planned interventions for next visit: continue with current treatment.

## 2018-11-06 NOTE — OP THERAPY DAILY TREATMENT
"  Outpatient Occupational Therapy  DAILY TREATMENT     [facility] Occupational Therapy [address].  [address]  [place] [address]  Phone:  [phone]  Fax:  [phone]    Date: 11/06/2018    Patient: Gilles Cobb Case  YOB: 1965  MRN: [id number]     Time Calculation  Start time: 0905  Stop time: 1005 Time Calculation (min): 60 minutes     Chief Complaint: Extremity Weakness (right)    Visit #: 5    SUBJECTIVE: \"No\"    OBJECTIVE:  Current objective measures:   Shoulder flexion/abduction: 2-/5, Shoulder ER: 3-/5, Sh IR 3+/5  Elbow flexion: 4-/5, Elbow extension: 3/5, Forearm supination/pronation: 2+/5  Wrist flexion: 2-/5, Wrist extension: 2/5, Digit flexion: 3+/5 except thumb flexion 2-/5, Digit extension 3-/5, Digit abduction/adduction: 3-/5  RUE rests in flexor synergy pattern with the exception of thumb resting in full extension with associated grade 3 extensor tone.  Wrist flexors Grade 2, digit flexors Grade 3 flexor spasticity  Pronators: Grade 2 spasticity        Therapeutic Treatments and Modalities:    1. Neuromuscular Re-education (CPT 26180)    2. Therapeutic Activities (CPT 45730)    Therapeutic Treatments and Modalities Summary: Right shoulder joint mobilization via posterior glides secondary to joint capsule tightness, passive sustained stretch of left shoulder into ff/abd with manual pressure to pec major to increase stretch, limited by pain at 90 degrees, elbow extension in supine to visual target with slow controlled flexion to left shoulder and proximal support.  Quadruped with min right lateral elbow support to maintain extension for co-contraction during right-sided WB.  Large peg board to place/remove pegs.  Able to place 3 with severe difficulty using 3-pt vs 2-pt pinch, mod cues for contract/relax/release sequence, able to remove 12 with an extended period of time and concentration.  Velcro board BH on large dowel turning forward/backward with RH as a " CY.      Time-based treatments/modalities:  Therapeutic activity minutes (CPT 91258): 30 minutes  Neuromusc re-ed minutes (CPT 48297): 30 minutes      ASSESSMENT:   Response to treatment: Pt making good progress today with his RUE neuromuscular functioning in response to WB including quadruped, follow by RH functional grasp/relax/release sequence during activities which require unilateral RH use vs bilateral integration.  Pt is also making steady improvements in motor planning.  HEP updated with good understanding.    PLAN/RECOMMENDATIONS:   Plan for treatment: therapy treatment to continue next visit.  Planned interventions for next visit: continue with current treatment, neuromuscular re-education (CPT 17974), orthotic training (CPT 37457) and therapeutic activities (CPT 70096)

## 2018-11-08 ENCOUNTER — PHYSICAL THERAPY (OUTPATIENT)
Dept: PHYSICAL THERAPY | Facility: REHABILITATION | Age: 53
End: 2018-11-08
Attending: FAMILY MEDICINE
Payer: MEDICAID

## 2018-11-08 ENCOUNTER — OCCUPATIONAL THERAPY (OUTPATIENT)
Dept: OCCUPATIONAL THERAPY | Facility: REHABILITATION | Age: 53
End: 2018-11-08
Attending: FAMILY MEDICINE
Payer: MEDICAID

## 2018-11-08 DIAGNOSIS — R26.81 UNSTEADINESS ON FEET: ICD-10-CM

## 2018-11-08 DIAGNOSIS — I69.320 APHASIA FOLLOWING CEREBRAL INFARCTION: ICD-10-CM

## 2018-11-08 DIAGNOSIS — R26.81 UNSTEADY GAIT: ICD-10-CM

## 2018-11-08 PROCEDURE — 97112 NEUROMUSCULAR REEDUCATION: CPT

## 2018-11-08 PROCEDURE — 97116 GAIT TRAINING THERAPY: CPT

## 2018-11-08 PROCEDURE — 97530 THERAPEUTIC ACTIVITIES: CPT

## 2018-11-08 PROCEDURE — 97110 THERAPEUTIC EXERCISES: CPT

## 2018-11-08 PROCEDURE — 97535 SELF CARE MNGMENT TRAINING: CPT | Mod: XU

## 2018-11-08 NOTE — OP THERAPY DAILY TREATMENT
"  Outpatient Occupational Therapy  DAILY TREATMENT     [facility] Occupational [address].  [address]  [place] [address]  Phone:  [phone]  Fax:  [phone]    Date: 11/08/2018    Patient: Gilles Cobb Case  YOB: 1965  MRN: [id number]     Time Calculation  Start time: 0900  Stop time: 1000 Time Calculation (min): 60 minutes     Chief Complaint: Extremity Weakness (right)    Visit #: 6    SUBJECTIVE: \"Ok\"    OBJECTIVE:  Current objective measures:   Shoulder flexion/abduction: 2-/5, Shoulder ER: 3-/5, Sh IR 3+/5  Elbow flexion: 4-/5, Elbow extension: 3/5, Forearm supination/pronation: 2+/5  Wrist flexion: 2-/5, Wrist extension: 2/5, Digit flexion: 3+/5 except thumb flexion 2-/5, Digit extension 3-/5, Digit abduction/adduction: 3-/5  RUE rests in flexor synergy pattern except thumb in extension.  Wrist flexors Grade 2, digit flexors Grade 3 flexor spasticity  Pronators: Grade 2 spasticity        Therapeutic Treatments and Modalities:    1. Self Care ADL Training (CPT 07490)    2. Neuromuscular Re-education (CPT 53250)    3. Therapeutic Activities (CPT 02141)    Therapeutic Treatments and Modalities Summary: Supervised to doff right AFO/shoe, min assist to don AFO, max assist to don sneaker. Right hand \"ok/shoot\" exercise x 8 reps.  Gross abduction/adduction with focus on contract/relax/contract sequence with associated reactions and light WB.  Removal of tall pegs using RH, severe difficulty releasing r/t flexor spasticity.  Hand flat on table over pillow case for forward/backward and side/side movement patterns combined with light WB.  HEP updated with good understanding.    Time-based treatments/modalities:  Therapeutic activity minutes (CPT 92157): 15 minutes  Self-care/ADL training minutes (CPT 43935): 15 minutes  Neuromusc re-ed minutes (CPT 46496): 30 minutes      ASSESSMENT:   Response to treatment:  Pt making fair progress today in his RUE neuromuscular recovery with " slight improvements in ability to relax and release objects using his RH due to persistent flexor spasticity.  Pt became agitated and irritable when asked to demonstrate ability to don/doff right AFO/brace.  He required max assist to don sneaker and was not responsive to learning techniques to assist, therefore, it was not addressed further.  HEP updated with good understanding.    PLAN/RECOMMENDATIONS:   Plan for treatment: therapy treatment to continue next visit.  Planned interventions for next visit: continue with current treatment, functional training/self care (CPT 69974), neuromuscular re-education (CPT 51773), orthotic training (CPT 52220) and therapeutic activities (CPT 85410)

## 2018-11-08 NOTE — OP THERAPY DAILY TREATMENT
Outpatient Physical Therapy  DAILY TREATMENT     [facility] Physical [address].  [address]  [place] [address]  Phone:  [phone]  Fax:  [phone]    Date: 11/08/2018    Patient: Gilles Cobb Case  YOB: 1965  MRN: [id number]     Time Calculation  Start time: 1000  Stop time: 1100 Time Calculation (min): 60 minutes     Chief Complaint: No chief complaint on file.    Visit #: 10    SUBJECTIVE: Patient motivated to participate      OBJECTIVE:  Current objective measures:           Therapeutic Exercises (CPT 73242):     1. Nustep , L8 10 minutes total    2. Sit to stand with 4 inch step under left LE, x 15 25 inch mat    3. Standing mini lunge, x 20 each    Therapeutic Treatments and Modalities:     1. Gait Training (CPT 79654), Indep-SUP 4 x 250 feet right AFO and with and without SPC emphasis on weightshift right and reciprical gait.  ; backward gait and step over 1/2 rollers 3 x 8 ft @ cg assist    2. Neuromuscular Re-education (CPT 74335), weightshifts in standing with 4 inch step to facilitate; reaching challenges using ball lateral, forward and above head bilateral UE @ Supervised assist    Time-based treatments/modalities:  Gait training minutes (CPT 52459): 20 minutes  Therapeutic exercise minutes (CPT 66518): 20 minutes  Neuromusc re-ed, balance, coor, post minutes (CPT 02652): 20 minutes       Pain rating before treatment: 0  Pain rating after treatment: 0    ASSESSMENT:   Response to treatment: gait appeared improved without use of SPC but patient instructed to continue cane use in community for safety.  Decreased verbal cues to weightshift demonstrating increased awareness.    PLAN/RECOMMENDATIONS:   Plan for treatment: therapy treatment to continue next visit.  Planned interventions for next visit: continue with current treatment.

## 2018-11-13 ENCOUNTER — PHYSICAL THERAPY (OUTPATIENT)
Dept: PHYSICAL THERAPY | Facility: REHABILITATION | Age: 53
End: 2018-11-13
Attending: FAMILY MEDICINE
Payer: MEDICAID

## 2018-11-13 ENCOUNTER — SPEECH THERAPY (OUTPATIENT)
Dept: SPEECH THERAPY | Facility: REHABILITATION | Age: 53
End: 2018-11-13
Attending: FAMILY MEDICINE
Payer: MEDICAID

## 2018-11-13 ENCOUNTER — OCCUPATIONAL THERAPY (OUTPATIENT)
Dept: OCCUPATIONAL THERAPY | Facility: REHABILITATION | Age: 53
End: 2018-11-13
Attending: FAMILY MEDICINE
Payer: MEDICAID

## 2018-11-13 DIAGNOSIS — I69.320 APHASIA FOLLOWING CEREBRAL INFARCTION: ICD-10-CM

## 2018-11-13 DIAGNOSIS — R26.81 UNSTEADY GAIT: ICD-10-CM

## 2018-11-13 DIAGNOSIS — R47.01 EXPRESSIVE APHASIA: ICD-10-CM

## 2018-11-13 DIAGNOSIS — R26.81 UNSTEADINESS ON FEET: ICD-10-CM

## 2018-11-13 DIAGNOSIS — R48.2 APRAXIA: ICD-10-CM

## 2018-11-13 PROCEDURE — 97116 GAIT TRAINING THERAPY: CPT

## 2018-11-13 PROCEDURE — 97110 THERAPEUTIC EXERCISES: CPT

## 2018-11-13 PROCEDURE — 97112 NEUROMUSCULAR REEDUCATION: CPT

## 2018-11-13 PROCEDURE — 92507 TX SP LANG VOICE COMM INDIV: CPT

## 2018-11-13 ASSESSMENT — SOCIAL DETERMINANTS OF HEALTH (SDOH): SOCIAL_SUPPORT_SYSTEM: PARENT

## 2018-11-13 NOTE — OP THERAPY DAILY TREATMENT
Outpatient Speech Therapy  DAILY TREATMENT     [address]  [address]  [place] [address]  Phone:  [phone]  Fax:  [phone]    Date: 11/13/2018    Patient: Gilles Cobb Case  YOB: 1965  MRN: [id number]     Time Calculation  Start time: 0830  Stop time: 0900 Time Calculation (min): 30 minutes     Chief Complaint: Aphasia (Expressive )    Visit #: 7    Subjective:   Reason for Therapy:     Reason For Evaluation:  Aphasia (Expressive)  Social Support:     Accompanied By:  Parent (present and supportive; waited in lobby per patient request)    Patient Mental Status:  Alert and Responsive  Additional Subjective Comments:      Patient arrived to speech therapy with communication book independent.  Patient demonstrates continued motivation to improve communication skills secondary to presence of severe expressive Aphasia.       Objective:   Treatments/Interventions Performed:  Patient/Caregiver education, Compensatory strategy training and Speech/Language treatment  Treatment Intervention tool(s) used:  Direct, skilled speech therapy services targeted improving independence in communication skills through participation in structured, language based activities targeting production of single words.  Use of external visual cues reinforced through use of communication book for communication of simple personal information (e.g. Name, likes/ dislikes).          Speech Therapy Assessment:     Expressive Language Assessment:     Ability to exhibit appropriate naming: Moderate (Automatic naming targeted:  opposites: completed to 7/12 = 58.3% accuracy.  ).     Jargon is Present.    Paraphasic is Present.    Perseveration is Present.    Expressive language comments: Naming common items given direct visual support (picture cues) completed to:  5/7 = 71.4% accuracy given 2-3 verbal repetitions of words to increase accuracy in speech production.  Answering simple questions  with focus on delayed imitation of single words:  Completed to 80% accuracy given repetition of initial prompt 50% of the time.       Speech Therapy Plan :   Therapy Recommendations  Recommendation: Individual Speech Therapy,  Planned Therapy Interventions:  Home Program, Patient/Caregiver Education, Compensatory Strategy Training and Speech/Language training,

## 2018-11-13 NOTE — OP THERAPY DAILY TREATMENT
"  Outpatient Occupational Therapy  DAILY TREATMENT     [facility] Occupational Therapy [address].  [address]  [place] [address]  Phone:  [phone]  Fax:  [phone]    Date: 11/13/2018    Patient: Gilles Cobb Case  YOB: 1965  MRN: [id number]     Time Calculation  Start time: 0900  Stop time: 1000 Time Calculation (min): 60 minutes     Chief Complaint: Extremity Weakness (right)    Visit #: 7    SUBJECTIVE: \"Nope\" (in response to pain question)    OBJECTIVE:  Current objective measures:   Shoulder flexion/abduction: 2-/5, Shoulder ER: 3-/5, Sh IR 3+/5  Elbow flexion: 4-/5, Elbow extension: 3/5, Forearm supination/pronation: 2+/5  Wrist flexion: 2+/5, Wrist extension: 2/5, Digit flexion: 3+/5 except thumb flexion 2-/5, Digit extension 3/5, Digit abduction/adduction: 3-/5  RUE rests in flexor synergy pattern except thumb in extension.  Wrist flexors Grade 2, digit flexors Grade 3 flexor spasticity  Pronators: Grade 2 spasticity        Therapeutic Treatments and Modalities:    1. Neuromuscular Re-education (CPT 88920)    Therapeutic Treatments and Modalities Summary: RUE on arm skate with towel roll in hand, elbow extended for isometric resistance to protraction and facilitation of elbow extension with muscle belly tapping, elbow flexion/extension table top with arm skate slow and controlled movements with lateral elbow support to isolate extension without compensatory shoulder movements.  Forearm in neutral on skate for controlled shoulder ER/IR with blocking at lateral upper arm PRN during ER.  Arm over wedge, foam roll in hand for wrist extension using high speed vibration to extrinsic extensors and counter pressure over dorsal distal forearm.   Forearm held in supination, hand roll for active wrist flexion with proximal wrist pressure.  Instructed on self-stretch using LH to increase right supination.  Self stabilization of wrist in g.e position for controlled digit " flexion/relax/extension sequence including thumb flexion after making a full fist.  HEP updated with good understanding.    Time-based treatments/modalities:  Neuromusc re-ed minutes (CPT 44873): 60 minutes      ASSESSMENT:   Response to treatment: Pt making good progress today in his RUE neuromuscular recovery in response to facilitory and inhibitory techniques described above with an increase in strength and control of elbow to digit movement patterns focused on contract agonist-relax-contract antagonist sequence with decreased influence of flexor spasticity.  HEP updated with good understanding.    PLAN/RECOMMENDATIONS:   Plan for treatment: therapy treatment to continue next visit.  Planned interventions for next visit: continue with current treatment, neuromuscular re-education (CPT 40503) and therapeutic activities (CPT 85916)

## 2018-11-13 NOTE — OP THERAPY DAILY TREATMENT
Outpatient Physical Therapy  DAILY TREATMENT     [facility] Physical [address]  [address]  [place] [address]  Phone:  [phone]  Fax:  [phone]    Date: 11/13/2018    Patient: Gilles Cobb Case  YOB: 1965  MRN: [id number]     Time Calculation  Start time: 1000  Stop time: 1100 Time Calculation (min): 60 minutes     Chief Complaint: walking problem  Visit #: 11    SUBJECTIVE:  Ready to work, step to gait observed    OBJECTIVE:  Current objective measures:           Therapeutic Exercises (CPT 62939):     1. Nustep , L8 10 minutes total    2. Sit to stand with 4 inch step under left LE, x 15 25 inch mat    3. Standing mini lunge, x 20 each    Therapeutic Treatments and Modalities:     1. Gait Training (CPT 84517), Indep-SUP 4 x 250 feet right AFO and with and without SPC emphasis on weightshift right and reciprical gait.  ; backward gait and step over 1/2 rollers 3 x 8 ft @ cg assist    2. Neuromuscular Re-education (CPT 65770), weightshifts in standing with 4 inch step to facilitate; reaching challenges using ball lateral, forward and above head bilateral UE @ Supervised assist    3. Neuromuscular Re-education (CPT 46319)    Time-based treatments/modalities:  Gait training minutes (CPT 94008): 30 minutes  Therapeutic exercise minutes (CPT 51325): 15 minutes  Neuromusc re-ed, balance, coor, post minutes (CPT 73406): 15 minutes       Pain rating before treatment: 0  Pain rating after treatment: 0    ASSESSMENT: excessive left knee flexion mid and terminal stance despite cueing.  Improved RLE progression in swing post there ex emphasizing hip flexion and ball wall squats.      PLAN/RECOMMENDATIONS:   Plan for treatment: therapy treatment to continue next visit.  Planned interventions for next visit: continue with current treatment.

## 2018-11-14 RX ORDER — ATORVASTATIN CALCIUM 40 MG/1
TABLET, FILM COATED ORAL
Qty: 30 TAB | Refills: 0 | Status: SHIPPED | OUTPATIENT
Start: 2018-11-14 | End: 2018-12-14 | Stop reason: SDUPTHER

## 2018-11-14 RX ORDER — LISINOPRIL 5 MG/1
TABLET ORAL
Qty: 30 TAB | Refills: 0 | Status: SHIPPED | OUTPATIENT
Start: 2018-11-14 | End: 2018-12-14 | Stop reason: SDUPTHER

## 2018-11-14 RX ORDER — ASPIRIN 81 MG/1
TABLET, CHEWABLE ORAL
Qty: 30 TAB | Refills: 0 | Status: SHIPPED | OUTPATIENT
Start: 2018-11-14 | End: 2018-12-14 | Stop reason: SDUPTHER

## 2018-11-15 ENCOUNTER — OCCUPATIONAL THERAPY (OUTPATIENT)
Dept: OCCUPATIONAL THERAPY | Facility: REHABILITATION | Age: 53
End: 2018-11-15
Attending: FAMILY MEDICINE
Payer: MEDICAID

## 2018-11-15 DIAGNOSIS — R26.81 UNSTEADY GAIT: ICD-10-CM

## 2018-11-15 DIAGNOSIS — I69.320 APHASIA FOLLOWING CEREBRAL INFARCTION: ICD-10-CM

## 2018-11-15 PROCEDURE — 97112 NEUROMUSCULAR REEDUCATION: CPT

## 2018-11-15 PROCEDURE — 97530 THERAPEUTIC ACTIVITIES: CPT

## 2018-11-15 NOTE — OP THERAPY DAILY TREATMENT
"  Outpatient Occupational Therapy  DAILY TREATMENT     [facility] Occupational Therapy [address].  [address]  [place] [address]  Phone:  [phone]  Fax:  [phone]    Date: 11/15/2018    Patient: Gilles Cobb Case  YOB: 1965  MRN: [id number]     Time Calculation  Start time: 0900  Stop time: 1000 Time Calculation (min): 60 minutes     Chief Complaint: Extremity Weakness (right)    Visit #: 8    SUBJECTIVE: \"Ya\" (is your arm feeling better?)    OBJECTIVE:  Current objective measures:   Shoulder flexion/abduction: 2-/5, Shoulder ER: 3-/5, Sh IR 3+/5  Elbow flexion: 4-/5, Elbow extension: 3/5, Forearm supination/pronation: 2+/5  Wrist flexion: 2+/5, Wrist extension: 2/5, Digit flexion: 3+/5 except thumb flexion 2-/5, Digit extension 3/5, Digit abduction/adduction: 3-/5  RUE rests in flexor synergy pattern except thumb in extension.  Wrist flexors Grade 2, digit flexors Grade 3 flexor spasticity  Pronators: Grade 2 spasticity        Therapeutic Treatments and Modalities:    1. Neuromuscular Re-education (CPT 47286)    2. Therapeutic Activities (CPT 68123)    Therapeutic Treatments and Modalities Summary: STM right volar forearm to extrinsic flexor musculature to decrease spasticity, right thumb passive sustained stretch into flexion, attempts at isolated thumb flexion including joint blocking met with strong resistance of extensor tone in thumb and flexor tone in other digits.   Grasp of bottle in RH incorporating thumb for functional movement pattern of grasp, supination, flex elbow sequence to pronation, elbow extension, and release.  Right shoulder joint mob, PNF hands clasped D1/D2 patterns x 15 reps.  Red ball between legs for BH WB with scapular depression, repeat for roll forward/back with scapular retraction  Beanbag grasped from tray table with RH for D2 pattern to release into bucket on floor.  Composite fist/extension with thumb in palm first followed by isolated thumb " flexion.  HEP updated.    Time-based treatments/modalities:  Therapeutic activity minutes (CPT 23112): 30 minutes  Neuromusc re-ed minutes (CPT 36669): 30 minutes        ASSESSMENT:   Response to treatment:  Pt making slow but steady gains in his RUE neuromuscular recovery in response to facilitory and inhibitory techniques in the areas of proximal muscle strength and functional grasp and release of various sized objects in lieu of persistent flexor spasticity.  HEP updated with good understanding.    PLAN/RECOMMENDATIONS:   Plan for treatment: therapy treatment to continue next visit.  Planned interventions for next visit: continue with current treatment, functional training/self care (CPT 65170), neuromuscular re-education (CPT 94778), orthotic training (CPT 50918) and therapeutic activities (CPT 13753)

## 2018-11-20 ENCOUNTER — OCCUPATIONAL THERAPY (OUTPATIENT)
Dept: OCCUPATIONAL THERAPY | Facility: REHABILITATION | Age: 53
End: 2018-11-20
Attending: FAMILY MEDICINE
Payer: MEDICAID

## 2018-11-20 DIAGNOSIS — R26.81 UNSTEADY GAIT: ICD-10-CM

## 2018-11-20 DIAGNOSIS — I69.320 APHASIA FOLLOWING CEREBRAL INFARCTION: ICD-10-CM

## 2018-11-20 PROCEDURE — 97112 NEUROMUSCULAR REEDUCATION: CPT

## 2018-11-20 PROCEDURE — 97530 THERAPEUTIC ACTIVITIES: CPT

## 2018-11-20 NOTE — OP THERAPY DAILY TREATMENT
"  Outpatient Occupational Therapy  DAILY TREATMENT     [facility] Occupational Therapy [address]  [address]  [place] [address]  Phone:  [phone]  Fax:  [phone]    Date: 11/20/2018    Patient: Gilles Cobb Case  YOB: 1965  MRN: [id number]     Time Calculation  Start time: 0330  Stop time: 0430 Time Calculation (min): 60 minutes     Chief Complaint: Extremity Weakness (right)    Visit #: 9    SUBJECTIVE:  \"Ya\" (to doing exercises with his hand)    OBJECTIVE:  Current objective measures:   Shoulder flexion/abduction: 2-/5, Shoulder ER: 3-/5, Sh IR 3+/5  Elbow flexion: 4-/5, Elbow extension: 3/5, Forearm supination/pronation: 2+/5  Wrist flexion: 2+/5, Wrist extension: 2/5, Digit flexion: 3+/5 except thumb flexion 2-/5, Digit extension 3/5, Digit abduction/adduction: 3-/5  RUE rests in flexor synergy pattern except thumb in extension.  Wrist flexors Grade 2, digit flexors Grade 2 flexor spasticity  Pronators: Grade 2 spasticity          Therapeutic Treatments and Modalities:    1. Neuromuscular Re-education (CPT 60526)    2. Therapeutic Activities (CPT 73650)    Therapeutic Treatments and Modalities Summary: Standing at mat for WB over BUE on red wedge with elbows extended, seated WB forward through bilateral wrists with left elbow extended and tactile input to proximal musculature.  Isolated right IF extension from flat palm position on wedge.  Right elbow flexion/extenion with 2lb weight g.e plane over wedge x 20 reps.  Repeated for shoulder ER/IR with proximal support to maintain upper arm adducted.   Hand exerciser 2 green/1 tan for 5 second squeeze and slow and controlled release.  Active digit flexion/supination transition to pronation/digit extension.  Repeated sequence with towel roll for exaggerated grasp/relax/release with improved success.    Time-based treatments/modalities:  Therapeutic activity minutes (CPT 66074): 30 minutes  Neuromusc re-ed minutes (CPT 31614): 30 " minutes        ASSESSMENT:   Response to treatment:  Pt making good progress today in his RUE neuromuscular recovery in response to both facilitory and inhibitory techniques with an improved ability to perform right hand functional grasp/relax/release sequence with decreased influence of digit flexor spasticity.  Pt remains highly motivated.  HEP updated with good understanding.    PLAN/RECOMMENDATIONS:   Plan for treatment: therapy treatment to continue next visit.  Planned interventions for next visit: continue with current treatment, functional training/self care (CPT 06671), neuromuscular re-education (CPT 02449) and therapeutic activities (CPT 66419)

## 2018-11-21 ENCOUNTER — SPEECH THERAPY (OUTPATIENT)
Dept: SPEECH THERAPY | Facility: REHABILITATION | Age: 53
End: 2018-11-21
Attending: FAMILY MEDICINE
Payer: MEDICAID

## 2018-11-21 ENCOUNTER — PHYSICAL THERAPY (OUTPATIENT)
Dept: PHYSICAL THERAPY | Facility: REHABILITATION | Age: 53
End: 2018-11-21
Attending: FAMILY MEDICINE
Payer: MEDICAID

## 2018-11-21 DIAGNOSIS — R26.81 UNSTEADINESS ON FEET: ICD-10-CM

## 2018-11-21 DIAGNOSIS — R48.2 APRAXIA: ICD-10-CM

## 2018-11-21 DIAGNOSIS — R47.01 EXPRESSIVE APHASIA: ICD-10-CM

## 2018-11-21 PROCEDURE — 97116 GAIT TRAINING THERAPY: CPT

## 2018-11-21 PROCEDURE — 92507 TX SP LANG VOICE COMM INDIV: CPT

## 2018-11-21 PROCEDURE — 97112 NEUROMUSCULAR REEDUCATION: CPT

## 2018-11-21 PROCEDURE — 97110 THERAPEUTIC EXERCISES: CPT

## 2018-11-21 ASSESSMENT — SOCIAL DETERMINANTS OF HEALTH (SDOH): SOCIAL_SUPPORT_SYSTEM: PARENT

## 2018-11-21 NOTE — OP THERAPY DAILY TREATMENT
Outpatient Physical Therapy  DAILY TREATMENT     [facility] Physical [address].  [address]  [place] [address]  Phone:  [phone]  Fax:  [phone]    Date: 11/21/2018    Patient: Gilles Cobb Case  YOB: 1965  MRN: [id number]     Time Calculation  Start time: 1000  Stop time: 1100 Time Calculation (min): 60 minutes     Chief Complaint: walking problem  Visit #: 12    SUBJECTIVE: Patient able to shower, dress and put shoes on Independently per patient's mother      OBJECTIVE:  Current objective measures:           Therapeutic Exercises (CPT 25831):     1. Nustep , L8 10 minutes total    2. Sit to stand with 4 inch step under left LE, x 15 25 inch mat    3. Standing mini lunge, x 20 each    4. Ball  to and from floor    5. Dynadisc DL standing with head turns, 7 x 1 min    Therapeutic Treatments and Modalities:     1. Gait Training (CPT 30275), Indep-SUP 4 x 250 feet right AFO and with and without SPC emphasis on weightshift right and reciprical gait.  ; backward gait and step over 1/2 rollers 3 x 8 ft @ cg assist    2. Neuromuscular Re-education (CPT 16958), weightshifts in standing with reaching challenges using ball lateral, forward and above head bilateral UE @ Supervised assist; ball catch and kick    3. Neuromuscular Re-education (CPT 91227)    Time-based treatments/modalities:          Pain rating before treatment: 0  Pain rating after treatment: 0    ASSESSMENT:   Response to treatment: improved weightshift right and symtrical squat picking up ball from floor.     PLAN/RECOMMENDATIONS:   Plan for treatment: therapy treatment to continue next visit.  Planned interventions for next visit: continue with current treatment.

## 2018-11-21 NOTE — OP THERAPY DAILY TREATMENT
"  Outpatient Speech Therapy  DAILY TREATMENT     [facility] Speech [address].  [address]  [place] [address]  Phone:  [phone]  Fax:  [phone]    Date: 11/21/2018    Patient: Gilles Cobb Case  YOB: 1965  MRN: [id number]     Time Calculation  Start time: 0830  Stop time: 0930 Time Calculation (min): 60 minutes     Chief Complaint: Aphasia (patient \"frustrated\" regarding inability to communicate)    Visit #: 8    Subjective:   Reason for Therapy:     Reason For Evaluation:  Aphasia (Expressive)  Social Support:     Accompanied By:  Parent (mother present and supportive)    Patient Mental Status:  Alert and Responsive  Additional Subjective Comments:      Patient/ mother inquired regarding Augmentative Alternative Communication. Patient scheduled for AAC evaluation 12/10 at 11:30am.       Objective:   Treatments/Interventions Performed:  Patient/Caregiver education, Compensatory strategy training and Speech/Language treatment         Speech Therapy Assessment:     Expressive Language Assessment:     Ability to exhibit appropriate naming: Moderate (Automatic speech produciton to single words:  opposites:  completed ot 61.5% accuracy necessitating syntactic cues 9/13 = 69% of the time. ).    Expressive language comments: Using picture to increase communicative competence addressed through providing patient with static AAC in field of 20 addressing communication of basic emotions/ physical states, and basic needs. Patient demonstrated independence in identification of icon to increase communication of basic needs: 90% accuracy. Patient answering of yes/no questions given direct visual support of yes/ no communication board:  Completed to 90% accuracy independent.        Speech Therapy Plan :   Therapy Recommendations  Recommendation: Individual Speech Therapy,  Planned Therapy Interventions:  Home Program, Patient/Caregiver Education and Speech/Language training,   Plan " Details:  Further exploration in use of letter board.  Date for dynamic AAC evaluation set.

## 2018-11-26 ENCOUNTER — PHYSICAL THERAPY (OUTPATIENT)
Dept: PHYSICAL THERAPY | Facility: REHABILITATION | Age: 53
End: 2018-11-26
Attending: FAMILY MEDICINE
Payer: MEDICAID

## 2018-11-26 DIAGNOSIS — R26.81 UNSTEADINESS ON FEET: ICD-10-CM

## 2018-11-26 PROCEDURE — 97112 NEUROMUSCULAR REEDUCATION: CPT

## 2018-11-26 PROCEDURE — 97110 THERAPEUTIC EXERCISES: CPT

## 2018-11-26 PROCEDURE — 97116 GAIT TRAINING THERAPY: CPT

## 2018-11-26 NOTE — OP THERAPY DAILY TREATMENT
Outpatient Physical Therapy  DAILY TREATMENT     [facility] Physical [address].  [address]  [place] [address]  Phone:  [phone]  Fax:  [phone]    Date: 11/26/2018    Patient: Gilles Cobb Case  YOB: 1965  MRN: [id number]     Time Calculation  Start time: 1030  Stop time: 1130 Time Calculation (min): 60 minutes     Chief Complaint: walking problem  Visit #: 13    SUBJECTIVE: doing well     OBJECTIVE:  Current objective measures:           Therapeutic Exercises (CPT 71489):     1. Nustep , L8 10 minutes total    2. Sit to stand with 4 inch step under left LE, x 15 25 inch mat    3. Standing mini lunge, x 20 each    4. Ball  to and from floor    5. Dynadisc DL standing with head turns, 7 x 1 min    Therapeutic Treatments and Modalities:     1. Gait Training (CPT 90752), Indep-SUP 4 x 250 feet right AFO and with and without SPC emphasis on weightshift right and reciprical gait.  ; backward gait and agility ladder ,     2. Neuromuscular Re-education (CPT 48799), weightshifts in standing with reaching challenges using ball lateral, forward and above head bilateral UE @ Supervised assist; ball catch and kick    3. Neuromuscular Re-education (CPT 43942)    Therapeutic Treatment and Modalities Summary: 6 minute walk test : 375 feet without assistive device    Time-based treatments/modalities:  Gait training minutes (CPT 08896): 25 minutes  Therapeutic exercise minutes (CPT 65365): 20 minutes  Neuromusc re-ed, balance, coor, post minutes (CPT 04725): 20 minutes       Pain rating before treatment: 0  Pain rating after treatment: 0    ASSESSMENT:   Response to treatment: Patient demonstrating improved use of R hand/UE  with ball activities including catching and tossing.  Patient still requires cueing to use reciprocal gait.  Patient has no more authorized visits.    PLAN/RECOMMENDATIONS:   Plan for treatment: therapy treatment to continue next visit. If further auth is  received.  Planned interventions for next visit: continue with current treatment.

## 2018-11-27 ENCOUNTER — SPEECH THERAPY (OUTPATIENT)
Dept: SPEECH THERAPY | Facility: REHABILITATION | Age: 53
End: 2018-11-27
Attending: FAMILY MEDICINE
Payer: MEDICAID

## 2018-11-27 ENCOUNTER — OCCUPATIONAL THERAPY (OUTPATIENT)
Dept: OCCUPATIONAL THERAPY | Facility: REHABILITATION | Age: 53
End: 2018-11-27
Attending: FAMILY MEDICINE
Payer: MEDICAID

## 2018-11-27 DIAGNOSIS — R48.2 APRAXIA: ICD-10-CM

## 2018-11-27 DIAGNOSIS — R26.81 UNSTEADY GAIT: ICD-10-CM

## 2018-11-27 DIAGNOSIS — I69.320 APHASIA FOLLOWING CEREBRAL INFARCTION: ICD-10-CM

## 2018-11-27 DIAGNOSIS — R47.01 EXPRESSIVE APHASIA: ICD-10-CM

## 2018-11-27 PROCEDURE — 97110 THERAPEUTIC EXERCISES: CPT

## 2018-11-27 PROCEDURE — 92507 TX SP LANG VOICE COMM INDIV: CPT

## 2018-11-27 PROCEDURE — 97112 NEUROMUSCULAR REEDUCATION: CPT

## 2018-11-27 ASSESSMENT — SOCIAL DETERMINANTS OF HEALTH (SDOH): SOCIAL_SUPPORT_SYSTEM: PARENT

## 2018-11-27 NOTE — OP THERAPY DAILY TREATMENT
"  Outpatient Occupational Therapy  DAILY TREATMENT     [facility] Occupational Therapy [address].  [address]  [place] [address]  Phone:  [phone]  Fax:  [phone]    Date: 11/27/2018    Patient: Gilles Cobb Case  YOB: 1965  MRN: [id number]     Time Calculation  Start time: 1100  Stop time: 1145 Time Calculation (min): 45 minutes     Chief Complaint: Extremity Weakness (right)    Visit #: 10    SUBJECTIVE: \"Nope\" (any pain or anything new?)    OBJECTIVE:  Current objective measures:  Right shoulder AROM ff/abd: 80 degrees.  Shoulder flexion/abduction: 3-/5, Shoulder ER: 3-/5, Sh IR 3+/5  Elbow flexion: 4-/5, Elbow extension: 3/5, Forearm supination/pronation: 2+/5  Wrist flexion: 2+/5, Wrist extension: 2/5, Digit flexion: 3+/5 except thumb flexion 2-/5, Digit extension 3/5, Digit abduction/adduction: 3-/5  RUE rests in flexor synergy pattern except thumb in extension.  Wrist flexors Grade 2, digit flexors Grade 2 flexor spasticity  Pronators: Grade 2 spasticity        Therapeutic Exercises (CPT 99198):     1. Right shoulder    Therapeutic Exercise Summary:  Right shoulder isometric strengthening exercises focused on ff/abd/add/ER x 8 reps with 10 second hold.  Issued written hand-out.    Therapeutic Treatments and Modalities:    1. Neuromuscular Re-education (CPT 24238)    Therapeutic Treatments and Modalities Summary: Quadruped with min assist to achieve desired position x 10 reps with 10 second hold, seated EOM for right lateral lean for WB through RUE while maintaining elbow/wrist/digit extension.  Right hand positioned over right knee for active wrist combined with digit extension initiated from relaxed position.    Time-based treatments/modalities:  Therapeutic exercise minutes (CPT 08655): 15 minutes  Neuromusc re-ed minutes (CPT 87298): 30 minutes      ASSESSMENT:   Response to treatment: Pt making good progress today with his RUE neuromuscular recovery regarding his " ability to relax and activate his right wrist/digits into extension along with improved proximal muscle strength in response to graded WB and isometric exercises. Issued updated written HEP with good understanding.    PLAN/RECOMMENDATIONS:   Plan for treatment: therapy treatment to continue next visit.  Planned interventions for next visit: continue with current treatment, neuromuscular re-education (CPT 69666), therapeutic activities (CPT 34393) and therapeutic exercise (CPT 91990)

## 2018-11-27 NOTE — OP THERAPY DAILY TREATMENT
Outpatient Speech Therapy  DAILY TREATMENT     [facility] Speech [address]  [address]  [place] [address]  Phone:  [phone]  Fax:  [phone]    Date: 11/27/2018    Patient: Gilles Cobb Case  YOB: 1965  MRN: [id number]     Time Calculation  Start time: 0900  Stop time: 1000 Time Calculation (min): 60 minutes     Chief Complaint: Aphasia    Visit #: 9    Subjective:   Reason for Therapy:     Reason For Evaluation:  Aphasia (expressive)  Social Support:     Accompanied By:  Parent    Patient Mental Status:  Alert and Responsive  Progress Factors:     Progression:  Getting Better  Additional Subjective Comments:      Patient demonstrated increased episodes of emotional lability (tearfulness) when encountering errors in speech production given structured therapy tasks this day.       Objective:   Treatments/Interventions Performed:  Speech/Language treatment, Patient/Caregiver education and Compensatory strategy training         Speech Therapy Assessment:     Expressive Language Assessment:     Ability to exhibit appropriate naming: Minimal (Given guided instruction through use of carrier phrase, patient completed confrontation naming to visual (picture) cues:  75% accuracy).    Repeats speech accurately Moderate (single word repetition:  answering simple questions with prompts completed to 60% accuracy given a minimum of 2-3 verbal repetitions per patient request. ).    Expressive language comments: Use of communication board continued:  Simple communication board consisting of yes/no alphabet board/ days of week/ months of year and 1-10 used to communicate date, month, year, month and date of birth to independent level.       Speech Therapy Plan :   Therapy Recommendations  Recommendation: Individual Speech Therapy,  Planned Therapy Interventions:  Home Program, Patient/Caregiver Education, Compensatory Strategy Training and Speech/Language training,

## 2018-12-04 ENCOUNTER — SPEECH THERAPY (OUTPATIENT)
Dept: SPEECH THERAPY | Facility: REHABILITATION | Age: 53
End: 2018-12-04
Attending: FAMILY MEDICINE
Payer: MEDICAID

## 2018-12-04 DIAGNOSIS — R47.01 EXPRESSIVE APHASIA: ICD-10-CM

## 2018-12-04 DIAGNOSIS — R48.2 APRAXIA: ICD-10-CM

## 2018-12-04 PROCEDURE — 92507 TX SP LANG VOICE COMM INDIV: CPT

## 2018-12-04 ASSESSMENT — SOCIAL DETERMINANTS OF HEALTH (SDOH): SOCIAL_SUPPORT_SYSTEM: PARENT

## 2018-12-04 NOTE — OP THERAPY DAILY TREATMENT
Outpatient Speech Therapy  DAILY TREATMENT     [address].  [address]  [place] [address]  Phone:  [phone]  Fax:  [phone]    Date: 12/04/2018    Patient: Gilles Cobb Case  YOB: 1965  MRN: [id number]     Time Calculation  Start time: 0900  Stop time: 1000 Time Calculation (min): 60 minutes     Chief Complaint: Aphasia    Visit #: 10    Subjective:   Reason for Therapy:     Reason For Evaluation:  Aphasia (Expressive)  Social Support:     Accompanied By:  Parent (mother present and supportive; waited in lobby during therapy session per patient request)    Patient Mental Status:  Alert and Responsive  Additional Subjective Comments:      Patient brought external communication aid (previously established communication notebook) independent to therapy session. Patient continued to demonstrate increased frustration with periods of emotional lability during speech production tasks.  Encouragement provided by ST resulting in improved performance.       Objective:   Treatments/Interventions Performed:  Speech/Language treatment, Patient/Caregiver education and Compensatory strategy training         Speech Therapy Assessment:     Expressive Language Assessment:     Ability to exhibit appropriate naming: Minimal (Given guided instruction through use of carrier phrase, patient completed confrontation naming to visual (picture) cues:  80% accuracy).    Repeats speech accurately Minimal (Delayed imitation of single words:  completed to 85% accuracy given verbal repetitions as necessary to increase accuracy. ).    Patient's ability to use automatic language appropriately is Moderate (Automatic speech targeted: production of single word (opposites) given direct visual (written word) support completed to 66.6% accuracy. ).    Paraphasic is Present.    Perseveration is Present.    Expressive language comments: Use of communication board continued:  Simple communication  board consisting of yes/no alphabet board/ days of week/ months of year and 1-10 used to communicate date, month, day of week to independent level.  Patient independent in ID of letters using letter board; however, patient unable to sequence letters accurately to spell two letter words requiring direct instruction/ prompts/ cues to complete.         Reading Comprehension Assessment:     Patient matches written word to picture: WFL (100% accuracy to single words)    Patient matches written word to object: WFL (100% accuracy for written word)    Patient ability to comprehend single words: WFL    Patient ability to comprehend short phrases: Minimal      Speech Therapy Plan :   Therapy Recommendations  Recommendation:  Individual Speech Therapy,  Planned Therapy Interventions:  Home Program, Patient/Caregiver Education, Compensatory Strategy Training and Speech/Language training,

## 2018-12-05 ENCOUNTER — OCCUPATIONAL THERAPY (OUTPATIENT)
Dept: OCCUPATIONAL THERAPY | Facility: REHABILITATION | Age: 53
End: 2018-12-05
Attending: FAMILY MEDICINE
Payer: MEDICAID

## 2018-12-05 DIAGNOSIS — R26.81 UNSTEADY GAIT: ICD-10-CM

## 2018-12-05 DIAGNOSIS — I69.320 APHASIA FOLLOWING CEREBRAL INFARCTION: ICD-10-CM

## 2018-12-05 PROCEDURE — 97112 NEUROMUSCULAR REEDUCATION: CPT

## 2018-12-05 NOTE — OP THERAPY DAILY TREATMENT
"  Outpatient Occupational Therapy  DAILY TREATMENT     [facility] Occupational Therapy [address]  [address]  [place] [address]  Phone:  [phone]  Fax:  [phone]    Date: 12/05/2018    Patient: Gilles Cobb Case  YOB: 1965  MRN: [id number]     Time Calculation  Start time: 0200  Stop time: 0230 Time Calculation (min): 30 minutes     Chief Complaint: Extremity Weakness (right)    Visit #: 11    SUBJECTIVE: \"Nope\"    OBJECTIVE:  Current objective measures:   RUE spasticity: Wrist flexors Grade 2, digit flexors Grade 2   Pronators: Grade 2 spasticity        Therapeutic Treatments and Modalities:    1. Neuromuscular Re-education (CPT 74713)    Therapeutic Treatments and Modalities Summary: FES right wrist/digit extensors at 30 Hz for functional grasp/supinate when stimulation inactive, pronate/release when stimulation active holding various sized cones.  Repeated gross digit flexion/extension without object in hand coordinated with stimulation as above.  Performed active RH flex/ext with OT then self-assisted to maintain wrist in slight extension during exercise.    Time-based treatments/modalities:  Neuromusc re-ed minutes (CPT 06473): 30 minutes      ASSESSMENT:   Response to treatment: Pt making good progress today in response to FES to facilitate right wrist/digit extensor activity following grasp of cylindrical objects with a decrease of influence of flexor spasticity following interventions described above.  HEP updated with good understanding.    PLAN/RECOMMENDATIONS:   Plan for treatment: therapy treatment to continue next visit.  Planned interventions for next visit: continue with current treatment, neuromuscular re-education (CPT 81610), therapeutic activities (CPT 95450) and therapeutic exercise (CPT 05956)      "

## 2018-12-07 ENCOUNTER — SPEECH THERAPY (OUTPATIENT)
Dept: SPEECH THERAPY | Facility: REHABILITATION | Age: 53
End: 2018-12-07
Attending: FAMILY MEDICINE
Payer: MEDICAID

## 2018-12-07 DIAGNOSIS — R47.01 EXPRESSIVE APHASIA: ICD-10-CM

## 2018-12-07 DIAGNOSIS — R48.2 APRAXIA: ICD-10-CM

## 2018-12-07 PROCEDURE — 92507 TX SP LANG VOICE COMM INDIV: CPT

## 2018-12-07 ASSESSMENT — SOCIAL DETERMINANTS OF HEALTH (SDOH): SOCIAL_SUPPORT_SYSTEM: PARENT

## 2018-12-07 NOTE — OP THERAPY DAILY TREATMENT
"  Outpatient Speech Therapy  DAILY TREATMENT     [facility] Speech [address]  [address]  [place] [address]  Phone:  [phone]  Fax:  [phone]    Date: 12/07/2018    Patient: Gilles Cobb Case  YOB: 1965  MRN: [id number]     Time Calculation  Start time: 0930  Stop time: 1030 Time Calculation (min): 60 minutes     Chief Complaint: Aphasia    Visit #: 11    Subjective:   Reason for Therapy:     Reason For Evaluation:  Aphasia (expressive)  Social Support:     Accompanied By:  Parent (mother present and supportive)    Patient Mental Status:  Alert and Responsive  Progress Factors:     Progression:  Getting Better  Additional Subjective Comments:      Patient continues to present with severe expressive Aphasia limiting accuracy in speech production, even at single word level. Patient is beginning to demonstrate increased accuracy in production of simple, automatic speech phrases including:  I don't know; however, patient continues to demonstrate significantly reduced accuracy in independent productions (e.g. Saying \"you're welcome\" versus hello during simple greeting).       Objective:   Treatments/Interventions Performed:  Speech/Language treatment, Patient/Caregiver education and Compensatory strategy training         Speech Therapy Assessment:     Expressive Language Assessment:     Ability to exhibit appropriate naming: Minimal (Given guided instruction through use of carrier phrase, patient completed confrontation naming to visual (picture) cues:  10/14 = 71.4% accuracy).    Repeats speech accurately Supervison Required (Single word repetition during structured naming 90% accuracy; supervision required for need for repeats as necessary).    Patient's ability to use automatic language appropriately is Moderate (Patient demonstrated independence in production of \"I don't know\" and \"darn it\" during structured speech production tasks).    Patient's ability to verbalize wants " and needs is WFL (When using static communication board: patient independent in locating icons in book and accessing icons to state physical state/ feeling or choice 100% accuracy).     Jargon is Present.    Paraphasic is Present.    Perseveration is Present.    Reading Comprehension Assessment:     Patient ability to comprehend short phrases: WFL (100% accuracy in field of 2:  identifying picture to cooresponding sentence level description)    Reading comprehension comments: Identifying word to match definition:  Completed to 90% accuracy independent.     Cognitive Linguistic Assessment:     Patient simple reasoning ability: Minimal (Simple reasoning:  determining picture that did not belong given field of 4:  completed to 5/7 = 71.4% accuracy)        Speech Therapy Plan :   Therapy Recommendations  Recommendation: Individual Speech Therapy,  Planned Therapy Interventions:  Home Program, Patient/Caregiver Education, Compensatory Strategy Training and Speech/Language training,   Plan Details:  Informally address cognitive-linguistic function as it pertains to speech-language tasks

## 2018-12-10 ENCOUNTER — OCCUPATIONAL THERAPY (OUTPATIENT)
Dept: OCCUPATIONAL THERAPY | Facility: REHABILITATION | Age: 53
End: 2018-12-10
Attending: FAMILY MEDICINE
Payer: MEDICAID

## 2018-12-10 ENCOUNTER — TELEPHONE (OUTPATIENT)
Dept: MEDICAL GROUP | Facility: MEDICAL CENTER | Age: 53
End: 2018-12-10

## 2018-12-10 DIAGNOSIS — I69.320 APHASIA FOLLOWING CEREBRAL INFARCTION: ICD-10-CM

## 2018-12-10 DIAGNOSIS — R26.81 UNSTEADY GAIT: ICD-10-CM

## 2018-12-10 PROCEDURE — 97112 NEUROMUSCULAR REEDUCATION: CPT

## 2018-12-10 RX ORDER — CYCLOBENZAPRINE HCL 10 MG
10 TABLET ORAL 3 TIMES DAILY PRN
Qty: 30 TAB | Refills: 1 | Status: SHIPPED | OUTPATIENT
Start: 2018-12-10 | End: 2019-09-24 | Stop reason: SDUPTHER

## 2018-12-10 NOTE — TELEPHONE ENCOUNTER
1. Caller Name: Luis Alfredo                                         Call Back Number: [phone]      Patient approves a detailed voicemail message: N\A    Luis Alfredo from Occupational Therapy is requesting an anti-spasticity medication on behalf of patient to help with treatment. Please advise.

## 2018-12-10 NOTE — OP THERAPY DAILY TREATMENT
"  Outpatient Occupational Therapy  DAILY TREATMENT     [facility] Occupational Therapy [address]  [address]  [place] [address]  Phone:  [phone]  Fax:  [phone]    Date: 12/10/2018    Patient: Gilles Cobb Case  YOB: 1965  MRN: [id number]     Time Calculation  Start time: 1100  Stop time: 1130 Time Calculation (min): 30 minutes     Chief Complaint: Extremity Weakness (right, spasticity)    Visit #: 12    SUBJECTIVE: \"Not really\"    OBJECTIVE:  Current objective measures:   RUE spasticity: Wrist flexors Grade 2, digit flexors Grade 2   Pronators: Grade 2 spasticity        Therapeutic Treatments and Modalities:    1. Neuromuscular Re-education (CPT 58179)    Therapeutic Treatments and Modalities Summary: TENS right wrist/digit extensors at 33 Hz x 5 minutes followed by active composite fist/extension with focus on wrist in neutral x 20, isolated digit sequential extension \"counting\" x 20 reps, towel placed on table top for grasp/release with towel in horizontal vs vertical orientation, flexor spasticity in RF/SF strongest barrier to release of towel.  WB table top extended hand circular movements x 20.    Time-based treatments/modalities:  Neuromusc re-ed minutes (CPT 10413): 30 minutes      ASSESSMENT:   Response to treatment: Pt making slow but steady progress in his RUE neuromuscular recovery in response to both facilitory and inhibitory techniques described above with an improvement in RH strength and motor control for active movement.  However, he remains limited in functional grasp/release of objects due to persistent flexor tone in digits.  Phone message left for Evan (med asst for Dr. Ramirez) regarding whether he would benefit from an anti-spasticity medication to complement his therapies.    PLAN/RECOMMENDATIONS:   Plan for treatment: therapy treatment to continue next visit.  Planned interventions for next visit: continue with current treatment, neuromuscular re-education " (CPT 54545) and therapeutic activities (CPT 69707)

## 2018-12-11 ENCOUNTER — SPEECH THERAPY (OUTPATIENT)
Dept: SPEECH THERAPY | Facility: REHABILITATION | Age: 53
End: 2018-12-11
Attending: FAMILY MEDICINE
Payer: MEDICAID

## 2018-12-11 DIAGNOSIS — R47.01 EXPRESSIVE APHASIA: ICD-10-CM

## 2018-12-11 DIAGNOSIS — R48.2 APRAXIA: ICD-10-CM

## 2018-12-11 PROCEDURE — 92507 TX SP LANG VOICE COMM INDIV: CPT

## 2018-12-11 ASSESSMENT — SOCIAL DETERMINANTS OF HEALTH (SDOH): SOCIAL_SUPPORT_SYSTEM: PARENT

## 2018-12-11 NOTE — OP THERAPY DAILY TREATMENT
Outpatient Speech Therapy  DAILY TREATMENT     [facility] Speech [address].  [address]  [place] [address]  Phone:  [phone]  Fax:  [phone]    Date: 12/11/2018    Patient: Gilles Cobb Case  YOB: 1965  MRN: [id number]     Time Calculation  Start time: 0910  Stop time: 1000 Time Calculation (min): 50 minutes     Chief Complaint: Aphasia (Expressive Aphasia)    Visit #: 12    Subjective:   Reason for Therapy:     Reason For Evaluation:  Aphasia (Expressive)  Social Support:     Accompanied By:  Parent (mother present; waited in lobby per patient request)    Patient Mental Status:  Alert and Responsive  Progress Factors:     Progression:  Getting Better  Additional Subjective Comments:      Patient with no recent noted changes or reports to medical history.  Patient remains motivated to participate in direct, outpatient speech therapy services to improve accuracy in speech production secondary to presence of expressive Aphasia.       Objective:   Treatments/Interventions Performed:  Speech/Language treatment, Patient/Caregiver education and Compensatory strategy training         Speech Therapy Assessment:     Expressive Language Assessment:     Ability to exhibit appropriate naming: Moderate (Given direct visual cues and object descriptor or carrier phrase, patient produced single words with 9/17 = 52% accuracy given syntactic (first sound) cues.  Use of direct imitation during naming exercise resulted in improved accuracy to 15/17 = 88%. ).    Paraphasic is Present.    Perseveration is Present.    Expressive language comments: Patient performance during structured naming tasks continues to be limited by presence of semantic/ syntactic paraphasias and perseverations of previously stated single words.  Patient is most accuracy when cues are provided in a multi-modality approach including seeing word/ picture/ syntactic (sound) cues.       Speech Therapy Plan :   Therapy  Recommendations  Recommendation: Individual Speech Therapy,  Planned Therapy Interventions:  Home Program, Patient/Caregiver Education, Compensatory Strategy Training and Speech/Language training,

## 2018-12-13 ENCOUNTER — SPEECH THERAPY (OUTPATIENT)
Dept: SPEECH THERAPY | Facility: REHABILITATION | Age: 53
End: 2018-12-13
Attending: FAMILY MEDICINE
Payer: MEDICAID

## 2018-12-13 ENCOUNTER — OCCUPATIONAL THERAPY (OUTPATIENT)
Dept: OCCUPATIONAL THERAPY | Facility: REHABILITATION | Age: 53
End: 2018-12-13
Attending: FAMILY MEDICINE
Payer: MEDICAID

## 2018-12-13 DIAGNOSIS — R47.01 EXPRESSIVE APHASIA: ICD-10-CM

## 2018-12-13 DIAGNOSIS — I69.320 APHASIA FOLLOWING CEREBRAL INFARCTION: ICD-10-CM

## 2018-12-13 DIAGNOSIS — R26.81 UNSTEADY GAIT: ICD-10-CM

## 2018-12-13 DIAGNOSIS — R48.2 APRAXIA: ICD-10-CM

## 2018-12-13 PROCEDURE — 97112 NEUROMUSCULAR REEDUCATION: CPT

## 2018-12-13 PROCEDURE — 92507 TX SP LANG VOICE COMM INDIV: CPT

## 2018-12-13 PROCEDURE — 97530 THERAPEUTIC ACTIVITIES: CPT

## 2018-12-13 NOTE — OP THERAPY DAILY TREATMENT
"  Outpatient Occupational Therapy  DAILY TREATMENT     [facility] Occupational Therapy [address].  [address]  [place] [address]  Phone:  [phone]  Fax:  [phone]    Date: 12/13/2018    Patient: Gilles Cobb Case  YOB: 1965  MRN: [id number]     Time Calculation  Start time: 1100  Stop time: 1145 Time Calculation (min): 45 minutes     Chief Complaint: Extremity Weakness (right)    Visit #: 13    SUBJECTIVE: \"Ok\" (Pt's PCP prescribed Flexeril to help manage spasticity)     OBJECTIVE:  Current objective measures:  RUE AROM WFL except right shoulder AROM ff/abd 75/75 degrees, PROM 90/90 degrees, limited by pain and right thumb opposition 1/4 range, PROM WNL.  RUE strength: Shoulder flexion/abd: 2-/5, ER: 3-/5, IR 3+/5  Elbow flexion: 4/5, extension 3+/5,  Forearm pronation/supination: 4-/5  Wrist flexion: 2-/5, extension 2/5  Digit flexion/extension: 3+/5, Digit abduction/adduction 3-/5  Left  strength: 80 lbs  Right  strength: 30 lbs      Modified King:  Upper extremity (right):     Shoulder flexors: 2    Shoulder internal rotators: 1+    Elbow flexors: 2    Elbow extensors: 2    Pronators: Grade 1+ spasticity    Wrist flexors: 2    Wrist extensors: 1+    Digit flexors: 3    Thumb extensors: 2     ADLs: Modified independent except supervised to doff right AFO/shoe, min assist to don AFO, max assist to don sneaker.  Able to stabilize bottles with RH while left removes/replaces cap  Balance: standing static Good minus, dynamic fair plus  Transfers: Mod I toilet/tub transfer.  Mod I functional mobility with SPC  HEP: independent        Therapeutic Treatments and Modalities:    1. Neuromuscular Re-education (CPT 36550)    2. Therapeutic Activities (CPT 44376)    Therapeutic Treatments and Modalities Summary: Re-tested all areas noted above.  Reviewed and performed examples of all exercises for HEP including WB, passive sustained stretching, composite flexion/extension, " isometric shoulder strengthening, functional grasp/relax/release sequence, incorporating RH as a stabilizer to hold cylindrical objects, and motor control exercises with good understanding.     Time-based treatments/modalities:  Therapeutic activity minutes (CPT 97924): 15 minutes  Neuromusc re-ed minutes (CPT 89240): 30 minutes      ASSESSMENT:   Response to treatment: Pt has actively participated in skilled OT program and has made good progress towards the LTG's established in his initial POC.  Pt has made gains with his RUE neuromuscular recovery in response to both facilitory and inhibitory techniques with an increase in strength, AROM, and motor control for ADLs and functional mobility. Pt is able to incorporate his RUE as a GFA during his daily routine.  He remains limited by persistent flexor spasticity interfering with his ability to release objects from his RH once he has grasped them.  Pt was prescribed Flexeril this week to assist with spasticity management.  At this point, pt is safe to d/c to his HEP and may return next year if his spasticity has decreased and there is a functional improvement in his RUE.    PLAN/RECOMMENDATIONS:   Plan for treatment: no further treatment as pt has reached a plateau.  Planned interventions for next visit: D/C to HEP.  D/C OT.

## 2018-12-13 NOTE — OP THERAPY DAILY TREATMENT
Outpatient Speech Therapy  DAILY TREATMENT     [facility] Speech [address].  [address]  [place] [address]  Phone:  [phone]  Fax:  [phone]    Date: 12/13/2018    Patient: Gilles Cobb Case  YOB: 1965  MRN: [id number]     Time Calculation  Start time: 0930  Stop time: 1030 Time Calculation (min): 60 minutes     Chief Complaint: Aphasia    Visit #: 13    Subjective:   Reason for Therapy:     Reason For Evaluation:  Aphasia (expressive)  Social Support:     Patient Mental Status:  Alert and Responsive  Additional Subjective Comments:      Patient showing improved motivation and initiation for speech production given participation in structured speech therapy sessions addressing Aphasia.  Patient also demonstrating increased motivation to participate in and complete home exercise program returning with previously assigned work completed to today's session.       Objective:   Treatments/Interventions Performed:  Patient/Caregiver education, Home program, Speech/Language treatment and Compensatory strategy training         Speech Therapy Assessment:     Expressive Language Assessment:     Ability to exhibit appropriate naming: Minimal (Given direct visual cues and object descriptor or carrier phrase, patient produced single words with = 75% accuracy given carrier phrase/ syntactic (first sound) cues.  ).    Paraphasic is Present.    Perseveration is Present.      Speech Therapy Plan :   Therapy Recommendations  Recommendation: Individual Speech Therapy,  Planned Therapy Interventions:  Home Program, Patient/Caregiver Education, Compensatory Strategy Training and Speech/Language training,

## 2018-12-17 RX ORDER — LISINOPRIL 5 MG/1
TABLET ORAL
Qty: 30 TAB | Refills: 0 | Status: SHIPPED | OUTPATIENT
Start: 2018-12-17 | End: 2019-01-14 | Stop reason: SDUPTHER

## 2018-12-17 RX ORDER — ASPIRIN 81 MG/1
TABLET, CHEWABLE ORAL
Qty: 30 TAB | Refills: 0 | Status: SHIPPED | OUTPATIENT
Start: 2018-12-17 | End: 2019-01-14 | Stop reason: SDUPTHER

## 2018-12-17 RX ORDER — ATORVASTATIN CALCIUM 40 MG/1
TABLET, FILM COATED ORAL
Qty: 30 TAB | Refills: 0 | Status: SHIPPED | OUTPATIENT
Start: 2018-12-17 | End: 2019-01-14 | Stop reason: SDUPTHER

## 2018-12-19 ENCOUNTER — SPEECH THERAPY (OUTPATIENT)
Dept: SPEECH THERAPY | Facility: REHABILITATION | Age: 53
End: 2018-12-19
Attending: FAMILY MEDICINE
Payer: MEDICAID

## 2018-12-19 DIAGNOSIS — R47.01 APHASIA: ICD-10-CM

## 2018-12-19 DIAGNOSIS — R48.2 APRAXIA: ICD-10-CM

## 2018-12-19 PROCEDURE — 92507 TX SP LANG VOICE COMM INDIV: CPT

## 2018-12-19 ASSESSMENT — SOCIAL DETERMINANTS OF HEALTH (SDOH): SOCIAL_SUPPORT_SYSTEM: PARENT

## 2018-12-19 NOTE — OP THERAPY DAILY TREATMENT
Outpatient Speech Therapy  DAILY TREATMENT     [facility] Speech [address]  [address]  [place] [address]  Phone:  [phone]  Fax:  [phone]    Date: 12/19/2018    Patient: Gilles Cobb Case  YOB: 1965  MRN: [id number]     Time Calculation  Start time: 0901  Stop time: 0951 Time Calculation (min): 50 minutes     Chief Complaint: Aphasia    Visit #: 14    Subjective:   Social Support:     Accompanied By:  Parent    Patient Mental Status:  Alert  Progress Factors:     Progression:  Getting Better    Patient arrived on time to speech therapy with parent. Was pleasant and cooperative throughout session.  Objective:   Treatments/Interventions Performed:  Home program, Speech/Language treatment, Compensatory strategy training and Patient/Caregiver education       Speech Therapy Assessment:     Expressive Language Assessment:     Communicates using gestures: Moderate.    Ability to exhibit appropriate naming: Severe.    Written Language Assessment:     Patient ability to copy words WFL.    Patient participated in speech therapy targeting expressive and receptive language as well as written expression. Was able to name pictures given semantic and phonemic cues with 77% accuracy. When provided with letter tiles plus a distracter, patient was able to spell target words with 88% accuracy and copy words with 100% accuracy. Automatic speech tasks in unison with clinician and tapping with % accuracy.   Speech Therapy Plan :   Therapy Recommendations  Recommendation: Individual Speech Therapy,  Planned Therapy Interventions:  Speech/Language training and Home Program,     Continue speech therapy to address expressive and receptive language, reinforce use of home program.

## 2018-12-28 ENCOUNTER — SPEECH THERAPY (OUTPATIENT)
Dept: SPEECH THERAPY | Facility: REHABILITATION | Age: 53
End: 2018-12-28
Attending: FAMILY MEDICINE
Payer: MEDICAID

## 2018-12-28 DIAGNOSIS — R47.01 EXPRESSIVE APHASIA: ICD-10-CM

## 2018-12-28 DIAGNOSIS — R48.2 APRAXIA: ICD-10-CM

## 2018-12-28 PROCEDURE — 92507 TX SP LANG VOICE COMM INDIV: CPT

## 2018-12-28 ASSESSMENT — SOCIAL DETERMINANTS OF HEALTH (SDOH): SOCIAL_SUPPORT_SYSTEM: PARENT

## 2018-12-28 NOTE — OP THERAPY PROGRESS SUMMARY
Outpatient Speech Therapy  PROGRESS NOTE      [facility] Speech Therapy [facility]  [address]  [address]  [place] [address]  Phone:  [phone]  Fax:  [phone]    Date of Visit: 12/28/2018    Patient: Gilles Cobb Case  YOB: 1965  MRN: [id number]     Referring Provider: Santos Ramirez M.D.  [address] [facility]  [address]  [place] [address]   Referring Diagnosis Aphasia following cerebral infarction [I69.320];Unsteadiness on feet [R26.81]      Visit #: 15    Time Calculation             Speech Therapy Occurrence Codes    Date of Onset of Impairment:  1/9/18   Date speech therapy care plan established or reviewed:  8/9/18   Date speech therapy treatment started:  8/9/18          Chief Complaint: Aphasia    Visit Diagnoses     ICD-10-CM   1. Expressive aphasia R47.01   2. Apraxia R48.2       Subjective:   Reason for Therapy:     Reason For Evaluation:  Aphasia (Expressive Aphasia)    Onset Date:  1/9/2018    Onset Description:  Patient s/p CVA on 1/19/2018 with right sided weakness and Broca's aphasia.  Social Support:     Accompanied By:  Parent (mother present and supportive assisting patient in attendance to outpatient therapy sessions)  Progress Factors:     Progression:  Getting Better  Therapy History:     Previous Treatments and Dates:  Patient has participated in direct, outpatient speech therapy addressing severe deficits in language expressions secondary to a diagnosis of Broca's Aphasia s/p CVA since initial evaluation 8/9/2018.   Additional Subjective Comments:      Patient has demonstrated consistent attendance and good participation and motivation during skilled speech therapy sessions addressing severe deficits in expressive language secondary to a diagnosis of Aphasia s/p CVA.           Objective:   Treatments/Interventions Performed:  Patient/Caregiver education, Compensatory strategy training and Speech/Language treatment  Treatment Intervention tool(s) used:  Direct, skilled speech therapy  services continues to target independence in communication skills through participation in structured, language based activities targeting production of single words and understanding of language at sentence/ simple paragraph level.  Use of external communication aids continues through use of speech/ communication notebook containing picture icons to field of 20 to increase independence in communication of simple wants, needs and physical states in addition to information related to personal information including family information, interests and hobbies also present in notebook.       Speech Therapy Assessment:     Expressive Language Assessment:     Communicates using gestures: Moderate (as complexity of language tasks increase/ patient with reduced accuracy in use of gesture to effectively communicate.  For example, patient will point in air instead of seeking use of notebook or other external aids to increase listener understanding. ).    Ability to exhibit appropriate naming: Severe.    Explains function of object Profound.    Repeats speech accurately Supervison Required (to single words given repetitive practice as necessary).    Patient's ability to use automatic language appropriately is Moderate.    Patient's ability to verbalize wants and needs is Severe.    Paraphasic is Present (semantic and syntactice).    Perseveration is Present (severe during structured naming tasks at single word level).    Expressive language comments: Patient continues to present with severe deficits in expressive language skills as evidenced by performance during skilled therapy sessions.  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   Patient participated in AAC evaluation on 12/11/2018 to address severity of expressive language deficit present at this time.       Speech Therapy Plan :   Prognosis & Recommendations  Impression Summary:  Based on patient progress with skilled intervention and areas of continued need, participation in direct, skilled speech therapy to address deficits in expressive language skills to address SEVERE expressive Aphasia remains necessary and medically indicated. Patient has demonstrated progress on 2/3 (66%) short term goals during the intervention period.  Patient demonstrates increased accuracy and independence in communication of simple wants, needs and physical states with use of external communication aids; however, patient required direct verbal prompting in order to initiate use.  Patient currently does not possess a systematic means of communication or consistency in use of external communication aids to effectively and independently communicate wants, needs and physical states with familiar listeners or medical professionals involved in his care. Without participation in direct, skilled speech therapy, patient remains at HIGH risk for safety and medically related concerns as patient is unable to independently communicate needs with familiar and unfamiliar listeners including medical personal.  Continued participation in direct, skilled speech therapy in the outpatient setting is recommended and warranted.  Goals  Short Term Goals:  NEW GOAL(S):  1/1/2018:  1) Patient will use external communication aids when encountering communication breakdown to increase listener understanding regarding simple personal information, want, need or physical states 3/5 (60%) accuracy.  2) Patient will complete automatic speech tasks with 85% accuracy given min verbal cues.  3) Patient will complete confrontation  naming to single word level given moderate prompting/ verbal cues 75% accuracy.  4) Patient will demonstrate comprehension to simple paragraphs of increasing length and complexity 92% accuracy independent.   Patient progression on Short Term Goals:  1) Patient will participate in AAC evaluation:  Completed 12/11/2018.  See report for details regarding evaluation and recommendations for Speech generating AAC device.    2) Patient will complete automatic speech tasks with 85% accuracy given min verbal cues:   Completed to 66.6% accuracy given direct phonemic cues.  GOAL TO CONTINUE.  3) Patient will use Semantic Feature Analysis to increase vocabulary around a specific object/ item 65% accuracy given direct verbal/ visual prompts/ instruction/ cues:  NOT ADDRESSING.  GOAL RECOMMENDED TO DC as patient has not progressed beyond automatic speech production level during the current intervention period.     4) Patient will complete confrontation naming to single word level given faded prompting/ cues 75% accuracy:  Completed to 77% accuracy given direct semantic, phonemic cues.  GOAL TO CONTINUE.  Patient progression on Long Term Goals:  1. Patient will communicate wants and needs using single words or pictures 80% of the time given min cues.     2.  Patient will access and use external communication aids to increase listener understanding 80% of the time to obligatory context(s).   Therapy Recommendations  Recommendation:  Individual Speech Therapy,  Planned Therapy Interventions:  Home Program, Patient/Caregiver Education, Compensatory Strategy Training, Speech/Language training and Non-speech Gen Device Therapy,   Plan Details:  Non-speech generating device therapy recommended pending approval for speech generating device  Frequency:  2x week  Duration (in weeks):  10  Plan Comments  Additional comments:  20 units ([id number])          Referring provider co-signature:  I have reviewed this plan of care and my co-signature  certifies the need for services.  Certification Dates:   From 1/1.2019     To 3/15.2019    Physician Signature: ________________________________ Date: ______________      Detail Level: Detailed

## 2018-12-28 NOTE — OP THERAPY DAILY TREATMENT
Outpatient Speech Therapy  DAILY TREATMENT     [address]  [address]  [place] [address]  Phone:  [phone]  Fax:  [phone]    Date: 12/28/2018    Patient: Gilles Cobb Case  YOB: 1965  MRN: [id number]     Time Calculation  Start time: 0830  Stop time: 0930 Time Calculation (min): 60 minutes     Chief Complaint: Aphasia    Visit #: 15    Subjective:   Reason for Therapy:     Reason For Evaluation:  Aphasia (Expressive)  Social Support:     Accompanied By:  Parent (mother present and supportive; waited in lobby per patient request)    Patient Mental Status:  Alert and Responsive  Additional Subjective Comments:      Patient remains highly motivated to improve accuracy in simple speech production skills to work towards independence in communication of wants, needs with familiar listeners.       Objective:   Treatments/Interventions Performed:  Speech/Language treatment, Patient/Caregiver education and Compensatory strategy training         Speech Therapy Assessment:     Expressive Language Assessment:     Ability to exhibit appropriate naming: Severe.    Patient's ability to verbalize wants and needs is Severe.    Agrammatism is Present.    Paraphasic is Present.    Perseveration is Present.    Expressive language comments: Structured speech therapy targeted expressive and receptive language skills through structured, guided practice targeting accuracy in production of single words and understanding of language at sentence level.  Confrontation naming targeted:  Given direct visual cues through use of carrier phrase, patient named picture cues to 11/15 = 73% accuracy with use of phonemic cues required at least 50% of the time or greater.  Accuracy of verbal expression remains limited secondary to presence of paraphasias/ severe perseverations.     Written Language Assessment:     Patient ability to copy words WFL.      Speech Therapy Plan :   Therapy  Recommendations  Recommendation: Individual Speech Therapy,  Planned Therapy Interventions:  Home Program, Patient/Caregiver Education, Compensatory Strategy Training and Speech/Language training,   Plan Details:  Continued participation in direct, outpatient speech therapy recommended with incorporation of AAC into next treatment period.

## 2019-01-08 ENCOUNTER — SPEECH THERAPY (OUTPATIENT)
Dept: SPEECH THERAPY | Facility: REHABILITATION | Age: 54
End: 2019-01-08
Attending: FAMILY MEDICINE
Payer: MEDICAID

## 2019-01-08 DIAGNOSIS — R47.01 EXPRESSIVE APHASIA: ICD-10-CM

## 2019-01-08 DIAGNOSIS — R48.2 APRAXIA: ICD-10-CM

## 2019-01-08 PROCEDURE — 92507 TX SP LANG VOICE COMM INDIV: CPT

## 2019-01-08 ASSESSMENT — SOCIAL DETERMINANTS OF HEALTH (SDOH): SOCIAL_SUPPORT_SYSTEM: PARENT

## 2019-01-08 NOTE — OP THERAPY DAILY TREATMENT
Outpatient Speech Therapy  DAILY TREATMENT     [facility] Speech [address]  [address]  [place] [address]  Phone:  [phone]  Fax:  [phone]    Date: 01/08/2019    Patient: Gilles Cobb Case  YOB: 1965  MRN: [id number]     Time Calculation  Start time: 1300  Stop time: 1400 Time Calculation (min): 60 minutes     Chief Complaint: Aphasia (Expressive)    Visit #: 16    Subjective:   Reason for Therapy:     Reason For Evaluation:  Aphasia (expressive)  Social Support:     Accompanied By:  Parent (mother present and supportive; waited in lobby per patient request)    Patient Mental Status:  Alert and Responsive      Objective:   Treatments/Interventions Performed:  Speech/Language treatment, Patient/Caregiver education and Compensatory strategy training         Speech Therapy Assessment:     Expressive Language Assessment:     Ability to exhibit appropriate naming: Moderate.    Repeats speech accurately Minimal (given multiple repetitions).    Patient's ability to verbalize wants and needs is Moderate.    Paraphasic is Present.    Perseveration is Present.    Expressive language comments: Accuracy in oral motor planning for first sounds of words targeted in an attempt to improve accuracy in word productions secondary to presence of Apraxia.  Patient was successful in imitating clinician to produce first sounds of words approximately 50% accuracy.  Patient continues to require direct cues to use external visual support/ aids (picture icons) to increase communication of simple medically relevant information including physical states.     Written Language Assessment:     Patient ability to copy words WFL.    Language comments: Patient participated in speech therapy targeting expressive and receptive language as well as written expression. Was able to name pictures given semantic and phonemic cues and use of carrier phrase:  70% accuracy. When provided with letter tiles plus a  distracter, patient was able to spell target words with 100% accuracy and copy words with 100% accuracy.        Speech Therapy Plan :   Therapy Recommendations  Recommendation: Individual Speech Therapy,  Planned Therapy Interventions:  Home Program, Patient/Caregiver Education, Compensatory Strategy Training and Speech/Language training,   Plan Details:  Continue with POC.  Reinforce home program for practice in expressive-receptive language skills at home.

## 2019-01-14 RX ORDER — ATORVASTATIN CALCIUM 40 MG/1
TABLET, FILM COATED ORAL
Qty: 30 TAB | Refills: 2 | Status: SHIPPED | OUTPATIENT
Start: 2019-01-14 | End: 2019-04-15 | Stop reason: SDUPTHER

## 2019-01-14 RX ORDER — ASPIRIN 81 MG/1
TABLET, CHEWABLE ORAL
Qty: 30 TAB | Refills: 2 | Status: SHIPPED | OUTPATIENT
Start: 2019-01-14 | End: 2019-04-15 | Stop reason: SDUPTHER

## 2019-01-14 RX ORDER — LISINOPRIL 5 MG/1
TABLET ORAL
Qty: 30 TAB | Refills: 2 | Status: SHIPPED | OUTPATIENT
Start: 2019-01-14 | End: 2019-04-15 | Stop reason: SDUPTHER

## 2019-01-29 ENCOUNTER — TELEPHONE (OUTPATIENT)
Dept: MEDICAL GROUP | Facility: MEDICAL CENTER | Age: 54
End: 2019-01-29

## 2019-01-29 ENCOUNTER — OFFICE VISIT (OUTPATIENT)
Dept: MEDICAL GROUP | Facility: MEDICAL CENTER | Age: 54
End: 2019-01-29
Attending: NURSE PRACTITIONER
Payer: MEDICAID

## 2019-01-29 VITALS
OXYGEN SATURATION: 97 % | DIASTOLIC BLOOD PRESSURE: 62 MMHG | HEIGHT: 73 IN | BODY MASS INDEX: 23.59 KG/M2 | WEIGHT: 178 LBS | SYSTOLIC BLOOD PRESSURE: 108 MMHG | RESPIRATION RATE: 16 BRPM | HEART RATE: 68 BPM | TEMPERATURE: 98.6 F

## 2019-01-29 DIAGNOSIS — R47.1 DYSARTHRIA: ICD-10-CM

## 2019-01-29 DIAGNOSIS — R47.02 DYSPHASIA: ICD-10-CM

## 2019-01-29 DIAGNOSIS — I63.9 CEREBROVASCULAR ACCIDENT (CVA), UNSPECIFIED MECHANISM (HCC): ICD-10-CM

## 2019-01-29 PROCEDURE — 99213 OFFICE O/P EST LOW 20 MIN: CPT | Performed by: NURSE PRACTITIONER

## 2019-01-29 NOTE — ASSESSMENT & PLAN NOTE
"Reports Speech Therapist recommended and ordered a Speech machine to assist w communication w others.  He will be able type a phrase  And machine can then say the phrase to patients.     He has undergone extensive physical therapy for his right sided weakness and Speech Therapy, but still only can talk in one word phrases, and at times is slow to speak.  His cognitive understanding of my speech and his mother's speech today is good, but clearly he has some frustration w not being able to easily communicate his needs or respond with words in communication with others.    We discussed that this \"speech machine\" will help him communicate his needs and decrease his frustration.    We will fax order to Speech Therapy at [phone].    Mother reports she believes a company in [facility] is waiting for a PCP note from today   And order so they can send him the speech machine.  I have obtained the # of this un-named company from Gilles's mother and Kristin BARNARD M.A. Will call them   So we can fax today's encounter notes and order to them.    I recommended they make f/u appt w Dr Ramirez for about 3 months and call/return sooner if any concerns or issues.  "

## 2019-01-29 NOTE — PROGRESS NOTES
"Chief Complaint:   Chief Complaint   Patient presents with   • Follow-Up     speech machine       HPI:  Gilles is here today for a follow-up on CVA; Request for order and notes be faxed to  A company in [facility] for a \"speech machine\"    His mother accompanies him today.    His PCP, DR Ramirez is not available to see Gilles today.    His PMH includes  HTN,  CVA  Dysarthria/Dysphasia  Dysphagia  Right sided weakness 2' CVA  Former Smoker    [place]  Report:   No Entries    Review of Records;  1/8/18 most recent Speech Therapy appt for Expressive Aphasia    7/12/18 Clinic appt w Dr Ramirez for f/u on CVA, Refer to Neurology,   Labs ordered. To continue post CVA therapy.    Dysphasia  Reports   A [facility] Speech Therapist recommended and ordered a \"Speech machine\"  to assist w communication w others.  He will be able type a phrase  And machine can then say the phrase to patients.     He has undergone extensive physical therapy for his right sided weakness and Speech Therapy, but still only can talk in one word phrases, and at times is slow to speak.  His cognitive understanding of my speech and his mother's speech today is good, but clearly he has some frustration w not being able to easily communicate his needs or respond with words in communication with others.    We discussed that this \"speech machine\" will help him communicate his needs and decrease his frustration.    We have fax'd order to Speech Therapy at [phone].    Mother reports she believes a company in [facility] is waiting for a PCP note from today   And order so they can send him the speech machine.  I have obtained the # of this un-named company from Gilles's mother and Kristin BARNARD M.A. Will call them   So we can fax today's encounter notes and order to them.    I recommended they make f/u appt w Dr Ramirez for about 3 months and call/return sooner if any concerns or issues.      Patient Active Problem List    Diagnosis Date Noted   • " "HTN (hypertension) 12/20/2017     Priority: High   • CVA (cerebral vascular accident) (HCC) 12/19/2017     Priority: High   • Dysphasia 01/29/2019   • Dysarthria 07/12/2018   • Right sided weakness 07/12/2018   • Advance care planning 12/21/2017       Allergies:Poison oak extract [extract of poison oak]    Medicines as of today:  Current Outpatient Prescriptions   Medication Sig Dispense Refill   • Misc. Devices Misc Speech machine to set up phrases that patient can choose  And machine can speak that info to another person  For communication help.  Dx Expressive Aphasia, Dysarthria 1 Each 0   • aspirin (ASA) 81 MG Chew Tab chewable tablet TAKE 1 TABLET ORALLY ONCE DAILY 30 Tab 2   • atorvastatin (LIPITOR) 40 MG Tab TAKE 1 TABLET ORALLY ONCE DAILY 30 Tab 2   • lisinopril (PRINIVIL) 5 MG Tab TAKE 1 TABLET ORALLY ONCE DAILY 30 Tab 2   • cyclobenzaprine (FLEXERIL) 10 MG Tab Take 1 Tab by mouth 3 times a day as needed. 30 Tab 1   • amLODIPine (NORVASC) 2.5 MG Tab Take 3 Tabs by mouth every day. 90 Tab 3     No current facility-administered medications for this visit.        Social History   Substance Use Topics   • Smoking status: Former Smoker     Years: 1.00     Types: Cigarettes   • Smokeless tobacco: Never Used   • Alcohol use Yes      Comment: occasional       Past Medical History:   Diagnosis Date   • Dysphagia    • Hypertension    • Stroke (HCC)        Family History   Problem Relation Age of Onset   • Family history unknown: Yes       ROS:  Review of Systems   See HPI Above    Exam:  Blood pressure 108/62, pulse 68, temperature 37 °C (98.6 °F), temperature source Temporal, resp. rate 16, height 1.854 m (6' 0.99\"), weight 80.7 kg (178 lb), SpO2 97 %. Body mass index is 23.49 kg/m².    General:  Well nourished, well developed male in NAD  HENT:Head is grossly normal. PERRL.  Neck: Supple. Trachea is midline.  Pulmonary: Clear to ausculation .  Normal effort. No rales, ronchi, or wheezing.   Cardiovascular: Regular " rate and rhythm.  Abdomen-Abdomen is soft, No tenderness.  Upper extremities- right  very weak compared to left. Right arm is movable but decreased ROM.   Lower extremities- neg for edema, redness, tenderness. Right leg noticeable weaker than left.  Walks slowly using walking cane and has limp.  Neuro- A & O x 4. Speech is one word responses, typically no or yes and appropriate.    Current medications, allergies, and problem list reviewed with patient and updated in [facility] today.    Assessment/Plan:  1. Dysphasia  Misc. Devices Misc-Speech Machine RX   2. Dysarthria  Misc. Devices Misc   3. Cerebrovascular accident (CVA), unspecified mechanism (HCC)  Misc. Devices Misc  F/u w Speech Therapy and Physical Therapy as planned.     Notes and order to be faxed to company in [place], [place].  Copy of order faxed to [facility].  Return in about 3 months (around 4/29/2019) for w PCP, DR Ramirez for follow up r/t CVA and other medical issues..

## 2019-01-31 NOTE — TELEPHONE ENCOUNTER
Kris called back left phone number and email address with Ginette.    Sent email asking for steps on how to order a speech machine.

## 2019-02-01 ENCOUNTER — APPOINTMENT (OUTPATIENT)
Dept: SPEECH THERAPY | Facility: REHABILITATION | Age: 54
End: 2019-02-01
Payer: MEDICAID

## 2019-02-05 NOTE — TELEPHONE ENCOUNTER
Spoke with Kris. The process works by him meeting with the pt's speech therapist and they work together to get the pt a machine that will best benefit the pt.     Spoke with pt's mother, pt has already had a meeting with the speech therapist and a representative.    I reached back out to Kris to see what we need to do next to get machine ordered for pt.

## 2019-02-15 ENCOUNTER — SPEECH THERAPY (OUTPATIENT)
Dept: SPEECH THERAPY | Facility: REHABILITATION | Age: 54
End: 2019-02-15
Attending: FAMILY MEDICINE
Payer: MEDICAID

## 2019-02-15 DIAGNOSIS — R48.2 APRAXIA: ICD-10-CM

## 2019-02-15 DIAGNOSIS — R47.01 EXPRESSIVE APHASIA: ICD-10-CM

## 2019-02-15 PROCEDURE — 92507 TX SP LANG VOICE COMM INDIV: CPT

## 2019-02-15 ASSESSMENT — SOCIAL DETERMINANTS OF HEALTH (SDOH): SOCIAL_SUPPORT_SYSTEM: PARENT

## 2019-02-16 NOTE — OP THERAPY DAILY TREATMENT
Outpatient Speech Therapy  DAILY TREATMENT     [address]  [address]  [place] [address]  Phone:  [phone]  Fax:  [phone]    Date: 02/15/2019    Patient: Gilles Cobb Case  YOB: 1965  MRN: [id number]     Time Calculation  Start time: 1505  Stop time: 1535 Time Calculation (min): 30 minutes     Chief Complaint: Aphasia    Visit #: 17    Subjective:   Reason for Therapy:     Reason For Evaluation:  Aphasia  Social Support:     Accompanied By:  Parent (mother present and supportive, waited in lobby per patient request)    Patient Mental Status:  Alert and Responsive  Additional Subjective Comments:      Patient returned to speech therapy following break with good results.       Objective:   Treatments/Interventions Performed:  Speech/Language treatment, Compensatory strategy training and Patient/Caregiver education         Speech Therapy Assessment:     Expressive Language Assessment:     Paraphasic is Present.    Perseveration is Present.    Expressive language comments: Expressive language skills targeted production of automatic speech: opposites:  Completed to 8/12 = 66.7% accuracy to independent level.  Patient was accurately in direct imitation of verbalizations to 4/4 = 100% accuracy during structured activity.  Use of functional language targeted:  Guided phrase completion with patient completing to 6/11 = 54.5% accuracy with patient again 100% accurate in direct imitation to re-cast of incorrect verbalizations.        Speech Therapy Plan :   Prognosis & Recommendations  Impression Summary:  Patient was noted to demonstrate increased improvement in independent generation of language to automatic speech tasks upon return to speech therapy.  Continued participation recommended and warranted.   Therapy Recommendations  Recommendation:  Individual Speech Therapy,  Planned Therapy Interventions:  Home Program, Patient/Caregiver Education,  Speech/Language training and Compensatory Strategy Training,   Plan Details:  Continue with POC.

## 2019-02-18 ENCOUNTER — TELEPHONE (OUTPATIENT)
Dept: PHYSICAL THERAPY | Facility: REHABILITATION | Age: 54
End: 2019-02-18

## 2019-02-18 NOTE — OP THERAPY DISCHARGE SUMMARY
Outpatient Physical Therapy  DISCHARGE SUMMARY NOTE      [facility] [address].  [address]  [place] [address]  Phone:  [phone]  Fax:  [phone]    Date of Visit: 02/18/2019    Patient: Gilles Cobb Case  YOB: 1965  MRN: [id number]     Referring Provider: No referring provider defined for this encounter.   Referring Diagnosis No admission diagnoses are documented for this encounter.     Physical Therapy Occurrence Codes    Date of onset of impairment:  12/19/17   Date physical therapy care plan established or reviewed:  8/13/18   Date physical therapy treatment started:  8/13/18          Functional Limitations and Severity Modifiers      Goal:     Discharge:         Your patient is being discharged from Physical Therapy with the following comments:   · Patient demonstrates a plateau in progress with gait and balance.  Patient demonstrates a  6 minute walk test : 375 feet with Right AFO  assistive device @ Indep.     Comments:  Patient has been seen for  13 visits from 8/13/2019 to 11/26/2019 with focus on gait training and static and standing balance training.  Improved gait with verbal cueing but decreased carryover observed. Patient demonstrates improved symetrical squat.    Limitations Remaining:      Recommendations:  Discharge from PT secondary to no additional authorization received for additional visits.    Adrianne Lundy, PT, MSPT    Date: 2/18/2019

## 2019-02-21 ASSESSMENT — BALANCE ASSESSMENTS
BALANCE - STANDING DYNAMIC: FAIR
WEIGHT SHIFT SITTING: FAIR
BALANCE - SITTING STATIC: GOOD
WEIGHT SHIFT STANDING: POOR
BALANCE - SITTING DYNAMIC: GOOD
BALANCE - STANDING STATIC: FAIR +

## 2019-02-21 ASSESSMENT — ENCOUNTER SYMPTOMS: PAIN SCALE: 0

## 2019-02-21 ASSESSMENT — ACTIVITIES OF DAILY LIVING (ADL)
AMBULATES (FT): 1000
AMBULATION_WITH_ASSISTIVE_DEVICE: INDEPENDENT

## 2019-02-21 NOTE — OP THERAPY EVALUATION
Outpatient Physical Therapy  INITIAL NEUROLOGICAL EVALUATION    [facility]  [address]  [address]  [place] [address]  Phone:  [phone]  Fax:  [phone]    Date of Evaluation: 2018    Patient: Gilles Cobb Case  YOB: 1965  MRN: [id number]     Referring Provider: Santos Ramirez M.D.  [address]  [place] [address]   Referring Diagnosis Aphasia following cerebral infarction [I69.320];Unsteadiness on feet [R26.81]     Time Calculation  Start time: 0800  Stop time: 0900 Time Calculation (min): 60 minutes     Physical Therapy Occurrence Codes    Date of onset of impairment:  17   Date physical therapy care plan established or reviewed:  18   Date physical therapy treatment started:  18          Chief Complaint: unsteadiness on feet; S/P CVA  Visit Diagnoses     ICD-10-CM   1. Unsteadiness on feet R26.81       Subjective:   History of Present Illness:     Mechanism of injury:  2018 S/P CVA with right sided weakness and aphasia.  peg tube secondary to dysphagia. Skilled NSG d/c 3018 receiving home health PT,OT and speech.    Currently walking with SPC 1.00+ feet @ MOD I with R AFO  Living with mom who drives him to appointments.  Independent with ADLS except for donning/doffing shoe with AFO.  No steps in home  Been doing home exercise program from .        Headaches:  no headaches  Pain:     Current pain ratin  Social Support:     Lives in:  Apartment    Lives with:  Parents  Hand dominance:  Right  Treatments:     Previous treatment:  Home therapy    Current treatment:  Physical therapy and home exercise program    Discharged from (in last 30 days): home health care    Activities of Daily Living:     Patient reported ADL status: Independent with self care and functional mobility  Patient Goals:     Patient goals for therapy:  Return to sport/leisure activities, independence with ADLs/IADLs, increased strength and improved  balance    Other patient goals:  To walk without the single point cane; return to working-different field      Past Medical History:   Diagnosis Date   • Dysphagia    • Hypertension    • Stroke (HCC)      Past Surgical History:   Procedure Laterality Date   • GASTROSCOPY-ENDO  12/26/2017    Procedure: GASTROSCOPY-ENDO;  Surgeon: Adam Aguirre M.D.;  Location: ENDOSCOPY [facility];  Service: Gastroenterology   • PEG PLACEMENT  12/26/2017    Procedure: PEG PLACEMENT;  Surgeon: Adam Aguirre M.D.;  Location: ENDOSCOPY [facility];  Service: Gastroenterology     Social History   Substance Use Topics   • Smoking status: Former Smoker     Years: 1.00     Types: Cigarettes   • Smokeless tobacco: Never Used   • Alcohol use Yes      Comment: occasional     Family and Occupational History     Social History   • Marital status: Legally      Spouse name: N/A   • Number of children: N/A   • Years of education: N/A       Objective:   Active Range of Motion:   Upper extremity (left):     All left upper extremity active range of motion: All within functional limits  Upper extremity (right):     All right upper extremity active range of motion: All below functional limits  Lower extremity (left):     All left lower extremity active range of motion: All within functional limits  Lower extremity (right):     All right lower extremity active range of motion: All below functional limits      Passive Range of Motion:   Lower extremity (left):     All left lower extremity passive range of motion: All within functional limits  Lower extremity (right):     All right lower extremity passive range of motion: All below functional limits    Strength:     Left lower extremity strength within functional limits.    Lower extremity (right):     Hip flexion: 3-    Hip extension: 3-    Knee extension: 2-    Ankle dorsiflexion: 1    Ankle plantar flexion: 1    Tone, Sensation and Coordination:   Tone:     Right upper extremity  muscle tone: Spastic    Right lower extremity muscle tone: Spastic    Modified King:   Lower extremity (right):     Hip adductors: 0    Knee extensors: 3    Ankle dorsiflexion (knee flexed): 2    Ankle dorsiflexion (knee extended): 3    Tone comments:   Extensor tone RLE    Cognition:     Orientation: normal to time, normal to place, normal to situation and normal to person    Direction following: three step    Short term memory: intact    Long term memory: intact    Attention span: intact    Sequencing: intact    Postural Control (Balance)     Sitting (static): Good    Sitting (dynamic): Good    Standing (static): Fair +    Standing (dynamic): Fair    Weight shift (sitting): Fair    Weight shift (standing): Poor    Weight-Bearing Status   Distance in feet: 1000  Assistive device used: single point cane  Other assistive device: Right AFO    Ambulation: Level Surfaces   Ambulation with assistive device: independent    Observational Gait   Gait: circumduction     Walking speed and stride length below functional limits.    Decreased right stance time, right swing time and right step length.   Stride width: wide.    Right foot contact pattern: toe to heel  Right arm swing: decreased  Base of support: wide-based    Quality of Movement During Gait     Hip   Hip (Right): Positive circumducted and hike.     Knee   Knee (Right): Positive stiff knee.     Ankle   Ankle (Right): Positive foot drop.     Foot Alignment   Foot Alignment (Right): Negative rigid hindfoot.      Exercises/Treatment  Time-based treatments/modalities:          Assessment, Response and Plan:   Assessment details:  Patient presents with impaired gait,balance and right sided weakness secondary to S/P CVA 12/19/2017. Patient's expressive aphasia made evaluation assessment difficult. Patient presents with significant spasticity and LE extensor synergy, UE flexor synergy which limits voluntary movement and gait.    Barriers to therapy:   Communication  Prognosis: good    Goals:   Short Term Goals:   Patient able to walk with or without assistive device x >1/2 mile in community Independently  Patient able to to go up and down curb INDEP with AD and right AFO  Short term goal time span:  4-6 weeks      Long Term Goals:     Gait with or without assistive device 1 + mile in community  Up and down 12 steps Mod I  Independent with home program  Long term goal time span:  2-4 months    Plan:   Therapy options:  Physical therapy treatment to continue  Planned therapy interventions:  Therapeutic Exercise (CPT 19578), Neuromuscular Re-education (CPT 57008) and Gait Training (CPT 44854)  Frequency:  2x week  Duration in weeks:  8  Duration in visits:  12  Discussed with:  Patient      Functional Limitation G-Codes and Severity Modifiers      Current:     Goal:         Referring provider co-signature:  I have reviewed this plan of care and my co-signature certifies the need for services.  Certification Dates:   From 8/13/2018   To 9/27/2018    Physician Signature: ________________________________ Date: ______________

## 2019-03-01 ENCOUNTER — SPEECH THERAPY (OUTPATIENT)
Dept: SPEECH THERAPY | Facility: REHABILITATION | Age: 54
End: 2019-03-01
Attending: FAMILY MEDICINE
Payer: MEDICAID

## 2019-03-01 DIAGNOSIS — R47.01 EXPRESSIVE APHASIA: ICD-10-CM

## 2019-03-01 DIAGNOSIS — R48.2 APRAXIA: ICD-10-CM

## 2019-03-01 PROCEDURE — 92507 TX SP LANG VOICE COMM INDIV: CPT

## 2019-03-01 ASSESSMENT — SOCIAL DETERMINANTS OF HEALTH (SDOH): SOCIAL_SUPPORT_SYSTEM: PARENT

## 2019-03-02 NOTE — OP THERAPY DAILY TREATMENT
"  Outpatient Speech Therapy  DAILY TREATMENT     [facility] [address].  [address]  [place] [address]  Phone:  [phone]  Fax:  [phone]    Date: 03/01/2019    Patient: Gilles Cobb Case  YOB: 1965  MRN: [id number]     Time Calculation  Start time: 1300  Stop time: 1400 Time Calculation (min): 60 minutes     Chief Complaint: Aphasia    Visit #: 18    Subjective:   Reason for Therapy:     Reason For Evaluation:  Aphasia  Social Support:     Accompanied By:  Parent (patient brought to therapy session by parent, mother)    Patient Mental Status:  Alert and Responsive  Additional Subjective Comments:      Patient appeared motivated and participated well with all structured therapy activities. No recent changes to medical history noted or reported.       Objective:   Treatments/Interventions Performed:  Speech/Language treatment, Compensatory strategy training, Patient/Caregiver education and Home program         Speech Therapy Assessment:     Expressive Language Assessment:     Paraphasic is Present.    Perseveration is Present.    Expressive language comments: Expressive language skills targeted: confrontation naming to pictures: patient was provided with carrier phrase with patient completing with 11/12 = 91.6% accuracy.  Given letter tiles, patient able to write word named with 11/12 = 91.6% accuracy.  Production of phrase level stating \"I like\" \"I don't like\" to previously named carrier phrase: completed with 9/10 = 90% accuracy given direct verbal cues faded to independent.     Using static communication board, patient was independent in stating day, date; date of birth. Patient with improved accuracy in use of external communication aids to prompting.       Speech Therapy Plan :   Therapy Recommendations  Recommendation: Individual Speech Therapy,  Planned Therapy Interventions:  Home Program, Patient/Caregiver Education, Compensatory Strategy Training and " Speech/Language training,   Plan Details:  Continue with POC.  Reinforced completion of home program.

## 2019-03-04 ENCOUNTER — SPEECH THERAPY (OUTPATIENT)
Dept: SPEECH THERAPY | Facility: REHABILITATION | Age: 54
End: 2019-03-04
Attending: FAMILY MEDICINE
Payer: MEDICAID

## 2019-03-04 DIAGNOSIS — R48.2 APRAXIA: ICD-10-CM

## 2019-03-04 DIAGNOSIS — R47.01 EXPRESSIVE APHASIA: ICD-10-CM

## 2019-03-04 PROCEDURE — 92507 TX SP LANG VOICE COMM INDIV: CPT

## 2019-03-04 ASSESSMENT — SOCIAL DETERMINANTS OF HEALTH (SDOH): SOCIAL_SUPPORT_SYSTEM: PARENT

## 2019-03-05 NOTE — OP THERAPY DAILY TREATMENT
"  Outpatient Speech Therapy  DAILY TREATMENT     [address]  [address]  [place] [address]  Phone:  [phone]  Fax:  [phone]    Date: 03/04/2019    Patient: Gilles Cobb Case  YOB: 1965  MRN: [id number]     Time Calculation  Start time: 1400  Stop time: 1500 Time Calculation (min): 60 minutes     Chief Complaint: Aphasia    Visit #: 19    Subjective:   Reason for Therapy:     Reason For Evaluation:  Aphasia  Social Support:     Accompanied By:  Parent (mother present and supportive)    Patient Mental Status:  Alert and Responsive  Additional Subjective Comments:      Patient presented with increased agitation at start of speech therapy session. Given direct picture cues, through icons, patient was independent in communicating \"upset\" regarding current situation and deficits in speech-language communication skill.      Objective:   Treatments/Interventions Performed:  Speech/Language treatment, Compensatory strategy training and Patient/Caregiver education  Treatment Intervention tool(s) used:  Speech therapy focused on increasing patient functional communication through use of external visual/ communication aids with focus on picture icons in field of 12 to increase communicative competence with a variety of listeners.  Static communication pages were provided in the area(s) of:  Food, transportation, places to visit in the community and personal information.          Speech Therapy Assessment:     Expressive Language Assessment:     Communicates using gestures: Moderate.    Ability to exhibit appropriate naming: Severe.    Explains function of object Severe.    Repeats speech accurately Moderate (to single word level given direct imitation prompts/ instruction cues).    Patient's ability to verbalize wants and needs is Severe.    Paraphasic is Present.    Perseveration is Present.    Expressive language comments: Patient was noted to frequently engage in " use of gestures during session in an attempt to increase communication.  Gestures were most accurate for communication of numbers (1-5).  Gestures frequently used by patient consisted of patient pointing in the air and in the direction of the clinician when a communication message was not understood requiring clinician to provide patient with direct cues to use external communication aids provided with increase listener understanding 100% of obligatory contexts.  Patient encouraged to bring communication book as established to each therapy session in order to use during therapy to increase accuracy in use; as patient did not bring book to today's therapy session. 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       Speech Therapy Plan :   Therapy Recommendations  Recommendation: Individual Speech Therapy,  Planned Therapy Interventions:  Compensatory Strategy Training, Home Program, Patient/Caregiver Education and Speech/Language training,   Plan Details:  Continue with POC.  Use of communication book in home setting reinforced.

## 2019-03-07 ENCOUNTER — SPEECH THERAPY (OUTPATIENT)
Dept: SPEECH THERAPY | Facility: REHABILITATION | Age: 54
End: 2019-03-07
Attending: FAMILY MEDICINE
Payer: MEDICAID

## 2019-03-07 DIAGNOSIS — R48.2 APRAXIA: ICD-10-CM

## 2019-03-07 DIAGNOSIS — R47.01 EXPRESSIVE APHASIA: ICD-10-CM

## 2019-03-07 PROCEDURE — 92507 TX SP LANG VOICE COMM INDIV: CPT

## 2019-03-07 ASSESSMENT — SOCIAL DETERMINANTS OF HEALTH (SDOH): SOCIAL_SUPPORT_SYSTEM: PARENT

## 2019-03-07 NOTE — OP THERAPY DAILY TREATMENT
Outpatient Speech Therapy  DAILY TREATMENT     [facility] [address]  [address]  [place] [address]  Phone:  [phone]  Fax:  [phone]    Date: 03/07/2019    Patient: Gilles Cobb Case  YOB: 1965  MRN: [id number]     Time Calculation  Start time: 1400  Stop time: 1500 Time Calculation (min): 60 minutes     Chief Complaint: Aphasia    Visit #: 20    Subjective:   Reason for Therapy:     Reason For Evaluation:  Aphasia  Social Support:     Accompanied By:  Parent (mother, present and supportive, waited in lobby per patient request)    Patient Mental Status:  Alert and Responsive  Additional Subjective Comments:      Patient alert, pleasant and motivated.  Patient with increased independence in production of automatic language during structured therapy tasks when provided with direct visual support. Patient independent in bringing communication book to speech therapy session containing pages with icons addressed in previous ST session.       Objective:   Treatments/Interventions Performed:  Patient/Caregiver education, Compensatory strategy training, Speech/Language treatment and Home program         Speech Therapy Assessment:     Expressive Language Assessment:     Expressive language comments: Accuracy in speech production targeted:  completion of simple/ common phrases:  Patient able to generate single word to complete phrase with .  Verbal apraxia present with single word responses requiring 2-4 trials to generate accurate speech sounds during activity. Patient with noted difficulty in production of /k/ during structured activity.     Communication book initiated with patient independent in use of book to identify pages related to personal information, food, and places. Patient was independent in identifying icon related to things he ate for lunch (french fries, tacos) and stated single words independent of any cues given visual only cues.     Reading  Comprehension Assessment:     Reading comprehension comments: Reading comprehension for simple phrases:  Choosing the correct word to complete the phrase targeted:  Patient independent in identification of single word provided choice of 3:  9/10 = 90% accuracy.       Speech Therapy Plan :   Therapy Recommendations  Recommendation: Individual Speech Therapy,  Planned Therapy Interventions:  Home Program, Patient/Caregiver Education, Compensatory Strategy Training and Speech/Language training,   Plan Details:  Continue with POC.  Reinforced completion of home practice of speech-language skills providing patient with tasks addressing automatic speech production.

## 2019-03-11 ENCOUNTER — SPEECH THERAPY (OUTPATIENT)
Dept: SPEECH THERAPY | Facility: REHABILITATION | Age: 54
End: 2019-03-11
Attending: FAMILY MEDICINE
Payer: MEDICAID

## 2019-03-11 DIAGNOSIS — R47.01 EXPRESSIVE APHASIA: ICD-10-CM

## 2019-03-11 DIAGNOSIS — R48.2 APRAXIA: ICD-10-CM

## 2019-03-11 PROCEDURE — 92507 TX SP LANG VOICE COMM INDIV: CPT

## 2019-03-11 ASSESSMENT — SOCIAL DETERMINANTS OF HEALTH (SDOH): SOCIAL_SUPPORT_SYSTEM: PARENT

## 2019-03-11 NOTE — OP THERAPY DAILY TREATMENT
Outpatient Speech Therapy  DAILY TREATMENT     [facility] [address]  [address]  [place] [address]  Phone:  [phone]  Fax:  [phone]    Date: 03/11/2019    Patient: Gilles Cobb Case  YOB: 1965  MRN: [id number]     Time Calculation  Start time: 1400  Stop time: 1500 Time Calculation (min): 60 minutes     Chief Complaint: Aphasia    Visit #: 21    Subjective:   Reason for Therapy:     Reason For Evaluation:  Aphasia  Social Support:     Accompanied By:  Parent (mother present and supportive, waited in lobby per patient request)    Patient Mental Status:  Alert and Responsive  Additional Subjective Comments:      Patient with increased use of static alternative means of communication to increase communicative competence during speech therapy session. Patient appears more motivated to alternative means of communication resulting in less noted periods of frustration during independent communication attempts; although patient continues to necessitate direct verbal cues to initiate use versus engaging in attempts at gesture which are often limiting to pointing.       Objective:   Treatments/Interventions Performed:  Patient/Caregiver education, Compensatory strategy training and Speech/Language treatment         Speech Therapy Assessment:     Expressive Language Assessment:     Expressive language comments: Expressive language skills targeted through confrontation naming at single word level targeting production of common, everyday objects to picture stimuli: patient required direct verbal carrier phrase (semantic) and sound (phonemic) cues to produce single words with 6/8 = 75% accuracy.  Patient with presence of paraphasias 25%/ apraxia 20% of total responses.     Communication book continued with patient independent in use of book to identify pages related to personal information, food, and places. Patient was independent in identifying icon related to things he ate  for lunch (hamburger).  Patient was noted to demonstrate increased engagement in book with information added related to personal preference (e.g. Restaurants he enjoys/ food he would order off the menus) with information added to communication book.       Speech Therapy Plan :   Therapy Recommendations  Recommendation: Individual Speech Therapy,  Planned Therapy Interventions:  Compensatory Strategy Training, Home Program, Patient/Caregiver Education and Speech/Language training,   Plan Details:  Continue with POC. Reinforced use of communication book in home setting.

## 2019-03-13 ENCOUNTER — SPEECH THERAPY (OUTPATIENT)
Dept: SPEECH THERAPY | Facility: REHABILITATION | Age: 54
End: 2019-03-13
Attending: FAMILY MEDICINE
Payer: MEDICAID

## 2019-03-13 DIAGNOSIS — R48.2 APRAXIA: ICD-10-CM

## 2019-03-13 DIAGNOSIS — R47.01 EXPRESSIVE APHASIA: ICD-10-CM

## 2019-03-13 PROCEDURE — 92507 TX SP LANG VOICE COMM INDIV: CPT

## 2019-03-13 ASSESSMENT — SOCIAL DETERMINANTS OF HEALTH (SDOH): SOCIAL_SUPPORT_SYSTEM: PARENT

## 2019-03-13 NOTE — OP THERAPY DAILY TREATMENT
Outpatient Speech Therapy  DAILY TREATMENT     [facility] Speech [address]  [address]  [place] [address]  Phone:  [phone]  Fax:  [phone]    Date: 03/13/2019    Patient: Gilles Cobb Case  YOB: 1965  MRN: [id number]     Time Calculation  Start time: 1200  Stop time: 1230 Time Calculation (min): 30 minutes     Chief Complaint: Aphasia    Visit #: 22    Subjective:   Reason for Therapy:     Reason For Evaluation:  Aphasia  Social Support:     Accompanied By:  Parent (mother, present and supportive)    Patient Mental Status:  Alert and Responsive  Additional Subjective Comments:      Patient remains motivated to improve speech-language skills following CVA.  Patient demonstrated increased independence in use of automatic speech at single word level for automatic speech productions during simple conversation.       Objective:   Treatment Intervention tool(s) used:  Expressive/ receptive language skills therapy completed addressing confrontation naming to single word level given maximum verbal prompts/ instruction/ cues.  Receptive language skills targeted understanding of phrase/ simple sentences through identification object described.          Speech Therapy Assessment:     Expressive Language Assessment:     Ability to exhibit appropriate naming: Severe.    Agrammatism is Present.    Paraphasic is Present.    Perseveration is Present.    Expressive language comments: Confrontation naming addressed to single word level naming common objects:  Patient required direct verbal cues through use of carrier phrase and phonemic cues (first letter sound), resulting in naming of objects 6/8 = 75% accuracy.      Receptive Language Assessment:     Receptive language comments: Receptive language skills targeted: reading phrase/ simple sentence describing object, patient was asked to identify picture object described in field of 4: patient completed to 100% accuracy independent.        Speech Therapy Plan :   Prognosis & Recommendations  Impression Summary:  Patient continues to present with presence of severe perseverations and apraxia of oral motor movements during independent speech production tasks requiring direct cues from clinician to initiate and accurately complete speech production at single word level.    Therapy Recommendations  Recommendation: Individual Speech Therapy,  Planned Therapy Interventions:  Home Program, Patient/Caregiver Education, Compensatory Strategy Training and Speech/Language training,   Plan Details:  Continue with POC.

## 2019-03-15 ENCOUNTER — SPEECH THERAPY (OUTPATIENT)
Dept: SPEECH THERAPY | Facility: REHABILITATION | Age: 54
End: 2019-03-15
Attending: FAMILY MEDICINE
Payer: MEDICAID

## 2019-03-15 DIAGNOSIS — R47.01 EXPRESSIVE APHASIA: ICD-10-CM

## 2019-03-15 DIAGNOSIS — R48.2 APRAXIA: ICD-10-CM

## 2019-03-15 PROCEDURE — 92507 TX SP LANG VOICE COMM INDIV: CPT

## 2019-03-15 ASSESSMENT — SOCIAL DETERMINANTS OF HEALTH (SDOH): SOCIAL_SUPPORT_SYSTEM: PARENT

## 2019-03-15 NOTE — OP THERAPY DAILY TREATMENT
Outpatient Speech Therapy  DAILY TREATMENT     [facility] Speech [address]  [address]  [place] [address]  Phone:  [phone]  Fax:  [phone]    Date: 03/15/2019    Patient: Gilles Cobb Case  YOB: 1965  MRN: [id number]     Time Calculation  Start time: 1020  Stop time: 1118 Time Calculation (min): 58 minutes     Chief Complaint: Aphasia    Visit #: 23    Subjective:   Reason for Therapy:     Reason For Evaluation:  Aphasia  Social Support:     Accompanied By:  Parent (mother, present and supportive, waited in the lobby)    Patient Mental Status:  Alert and Responsive  Additional Subjective Comments:      Patient independent in bringing communication notebook to skilled therapy sessions to increase communicative competence using static communication needs (e.g. Letter and number board).  Patient motivation to improve speech-language skills through participation in speech therapy appears to be increasing resulting in patient demonstrating increased independence in initiating multiple attempts at structured speech tasks and less periods of frustration as evidenced by no episodes of emotional lability.       Objective:   Treatments/Interventions Performed:  Speech/Language treatment, Compensatory strategy training and Patient/Caregiver education         Speech Therapy Assessment:     Expressive Language Assessment:     Paraphasic is Present.    Expressive language comments: Expressive language skills were targeted through participation in structured speech therapy tasks addressing:  Confrontation naming.  Confrontation naming addressed to single word level naming common objects:  Patient required direct verbal cues through use of carrier phrase and phonemic cues (first letter sound), resulting in naming of objects 8/9 = 89% accuracy.      Word/ simple phrase production given direct imitation cues targeted.  Patient demonstrated direct imitation to single words, simple,  automatic/ common phrases 7/9 = 77.8% accuracy. Patient accuracy in verbal expression remains limited by presence of severe verbal apraxia.     Receptive Language Assessment:     Receptive language comments: Receptive language skills targeted: reading phrase level description of common bject, patient was asked to identify picture object described in field of 12: patient completed to 7/9 = 77.8% accuracy independent.   Patient demonstrated reduced accuracy in identification of pictures when presented in larger field as compared to performance during previous session (100% accuracy in field of 4).       Speech Therapy Plan :   Therapy Recommendations  Recommendation: Individual Speech Therapy,  Planned Therapy Interventions:  Compensatory Strategy Training, Home Program, Patient/Caregiver Education and Speech/Language training,   Plan Details:  Continue with POC.  Reinforced completion of home program.

## 2019-03-22 ENCOUNTER — SPEECH THERAPY (OUTPATIENT)
Dept: SPEECH THERAPY | Facility: REHABILITATION | Age: 54
End: 2019-03-22
Attending: FAMILY MEDICINE
Payer: MEDICAID

## 2019-03-22 DIAGNOSIS — R47.01 EXPRESSIVE APHASIA: ICD-10-CM

## 2019-03-22 DIAGNOSIS — R48.2 APRAXIA: ICD-10-CM

## 2019-03-22 PROCEDURE — 92507 TX SP LANG VOICE COMM INDIV: CPT

## 2019-03-22 ASSESSMENT — SOCIAL DETERMINANTS OF HEALTH (SDOH): SOCIAL_SUPPORT_SYSTEM: PARENT

## 2019-03-22 NOTE — OP THERAPY DAILY TREATMENT
Outpatient Speech Therapy  DAILY TREATMENT     [facility] Speech [address]  [address]  [place] [address]  Phone:  [phone]  Fax:  [phone]    Date: 03/22/2019    Patient: Gilles Cobb Case  YOB: 1965  MRN: [id number]     Time Calculation  Start time: 0835  Stop time: 0930 Time Calculation (min): 55 minutes     Chief Complaint: Aphasia    Visit #: 24    Subjective:   Reason for Therapy:     Reason For Evaluation:  Aphasia  Social Support:     Accompanied By:  Parent (mother, present and supportive, waited in lobby per patient request)    Patient Mental Status:  Alert and Responsive  Additional Subjective Comments:      Patient appeared in good spirits, participating well with all therapy tasks. No recent changes to medical history noted or reported.       Objective:   Treatments/Interventions Performed:  Patient/Caregiver education, Compensatory strategy training, Speech/Language treatment and Home program         Speech Therapy Assessment:     Expressive Language Assessment:     Expressive language comments: Expressive language skills were targeted through participation in structured speech therapy tasks addressing:  Confrontation naming.  Confrontation naming addressed to single word level naming common objects:  Patient required direct verbal cues through use of carrier phrase and phonemic cues (first letter sound), resulting in naming of objects to picture stimuli 10/12 = 83% accuracy.       Word/ simple phrase production given direct imitation cues targeted.  Patient demonstrated direct imitation to single words, simple, automatic/ common phrases 90% accuracy. Multiple trials necessary to increase accuracy in verbal expression secondary to presence of verbal apraxia.     Receptive Language Assessment:     Receptive language comments: Receptive language skills targeted: reading phrase level description of common object, patient was asked to identify picture object  described in field of 12: 8/9 = 89% accuracy independent, representing a 10% improvement from previous session.  Additionally, patient demonstrating improvements in processing time necessary to accurately complete task when task target receptive language skill.       Speech Therapy Plan :   Therapy Recommendations  Recommendation: Individual Speech Therapy,  Planned Therapy Interventions:  Home Program, Patient/Caregiver Education, Compensatory Strategy Training and Speech/Language training,   Plan Details:  Continue with POC.  Reinforce home program.  D/C planning versus continuation to be discussed at next therapy visit.

## 2019-03-26 ENCOUNTER — SPEECH THERAPY (OUTPATIENT)
Dept: SPEECH THERAPY | Facility: REHABILITATION | Age: 54
End: 2019-03-26
Attending: FAMILY MEDICINE
Payer: MEDICAID

## 2019-03-26 DIAGNOSIS — R47.01 EXPRESSIVE APHASIA: ICD-10-CM

## 2019-03-26 DIAGNOSIS — R48.2 APRAXIA: ICD-10-CM

## 2019-03-26 PROCEDURE — 92507 TX SP LANG VOICE COMM INDIV: CPT

## 2019-03-26 ASSESSMENT — SOCIAL DETERMINANTS OF HEALTH (SDOH): SOCIAL_SUPPORT_SYSTEM: PARENT

## 2019-03-26 NOTE — OP THERAPY DISCHARGE SUMMARY
Outpatient Speech Therapy  DISCHARGE SUMMARY NOTE      [facility] Therapy [facility]  [address]  [address]  [place] [address]  Phone:  [phone]  Fax:  [phone]    Date of Visit: 03/26/2019    Patient: Gilles Cobb Case  YOB: 1965  MRN: [id number]     Referring Provider: Santos Ramirez M.D.  [address]  [place] [address]   Referring Diagnosis: Aphasia following cerebral infarction [I69.320];Unsteadiness on feet [R26.81]     Visit #: 25    Time Calculation             Speech Therapy Occurrence Codes    Date of Onset of Impairment:  1/9/18   Date speech therapy care plan established or reviewed:  8/9/18   Date speech therapy treatment started:  8/9/18          Chief Complaint: Aphasia    Visit Diagnoses     ICD-10-CM   1. Expressive aphasia R47.01   2. Apraxia R48.2       Subjective:   Reason for Therapy:     Reason For Evaluation:  Aphasia  Therapy History:     Previous Treatments and Dates:  Patient participated in direct, skilled outpatient speech therapy services addressing Aphasia, Apraxia secondary to CVA beginning 8/9/2018.      Objective:   Treatments/Interventions Performed:  Patient/Caregiver education, Compensatory strategy training and Speech/Language treatment  Treatment Intervention tool(s) used:  Patient participated in direct, structured outpatient speech therapy sessions targeting improving speech, language skills through participation in structured therapy activities with focus in accuracy of speech production of single words given automatic speech production, confrontation naming, object-function and reading comprehension at phrase, progressed to simple sentence level tasks.       Speech Therapy Assessment:     Expressive Language Assessment:     Portions of the Other are used.    Ability to exhibit appropriate naming: Severe.    Explains function of object Severe.    Repeats speech accurately Severe (beyond a single word level).    Patient's ability to use  automatic language appropriately is Moderate.    Patient's ability to verbalize wants and needs is Moderate (when incorporating use of external communication aids).    Patient's ability to sustain dialogues within a given topic is Profound.    Paraphasic is Present.    Perseveration is Present.    Expressive language comments: Patient expressive language skills remain limited secondary to presence of severe Aphasia and severe oral, verbal apraxia.  Patient continues to necessitate direct verbal, visual cues for production of single words during structured tasks, even to automatic speech production level.  Patient was provided with external communication aids in an attempt to increase independence through use of direct visual support through picture/ icon cues; however, patient necessitates direct verbal, visual cues to initiate use at this time.      Written Language Assessment:     Patient has ability to copy letters WFL.    Patient ability to copy words WFL.      Patient ability to write full name accurately Supervision Required.    Ability to write single words (dictation) Minimal.    Ability to write single words (spontaneously) Profound.     Receptive Language Assessment:     Patient ability to identify body parts: WFL    Patient ability to identify objects: [facility]    Simple yes/no questions: [facility]    Complex yes/no questions: Minimal    Patient understands simple paragraph: [facility]    Patient understands simple conversation: [facility]      Speech Therapy Plan :   Prognosis & Recommendations  Impression Summary:  Extensive education completed with patient/ mother regarding progress noted with speech therapy, including limitations of verbal expression at this time as a result of severe aphasia/ apraxia.  Patient remains unable to independently communicate to structured tasks to independent level requiring direct verbal (e.g. Carrier phrase level stimuli) and visual (written text) cues.  Patient remains reliant on the listener  to help interpret information and is most accurate at communication when provided with yes/no responses.  Patient was also provided with static means of communication through communication notebook containing personally relevant information in addition to picture icon supported cues to communicate simple physical states, wants and ideas which patient is able to use independently; however, he required direct cues to initiate use. Patient has likely reached optimal level of function for current treatment period, while continued participation in speech therapy recommended following 3-4 month break in services to identify patient ability to independently continue/ carryover in use of exercises addressing speech-language as part of home program.  Patient was evaluated for a dynamic, augmentative, alternative communication device during the intervention period with report and recommendations for device based on patient communication need completed.  It is recommended patient return for outpatient therapy services should he obtain the device or in 3-4 months.    Prognosis:  Good  Patient progression on Short Term Goals:  1) Patient will use external communication aids when encountering communication breakdown to increase listener understanding regarding simple personal information, want, need or physical states 3/5 (60%) accuracy:  Patient uses external communication aids to 80% or greater accuracy; however, patient necessitates direct cues to initiate use.  2) Patient will complete automatic speech tasks with 85% accuracy given min verbal cues:  GOAL MET.  3) Patient will complete confrontation naming to single word level given moderate prompting/ verbal cues 75% accuracy:  PARTIALLY MET.  Patient completes confrontation naming tasks to 75% accuracy; however, patient necessitates direct carrier phrase and phonemic (first letter sound) cues to complete accurately.  4) Patient will demonstrate comprehension to simple  paragraphs of increasing length and complexity 92% accuracy independent: GOAL MET.   Patient progression on Long Term Goals:  1. Patient will communicate wants and needs using single words or pictures 80% of the time given min cues:  GOAL MET with use of static, external communication aids such as communication notebook.     2.  Patient will access and use external communication aids to increase listener understanding 80% of the time to obligatory context(s):  NOT MET.  Patient requires direct cues to access and use external communication aids.   Therapy Recommendations  Recommendation:  No further speech therapy indicated at this time, Return to speech therapy in 3-4 months or if patient obtains augmentative alternative communication device  Planned Therapy Interventions:  Home Program,   Plan Details:  Continue with home program.

## 2019-03-26 NOTE — OP THERAPY DAILY TREATMENT
Outpatient Speech Therapy  DAILY TREATMENT     [address]  [address]  [place] [address]  Phone:  [phone]  Fax:  [phone]    Date: 03/26/2019    Patient: Gilles Cbob Case  YOB: 1965  MRN: [id number]     Time Calculation  Start time: 0830  Stop time: 0930 Time Calculation (min): 60 minutes     Chief Complaint: Aphasia    Visit #: 25    Subjective:   Reason for Therapy:     Reason For Evaluation:  Aphasia  Social Support:     Accompanied By:  Parent (mother, present and supportive.   Patient requested mother sit in on therapy session for end of therapy discussion/ education/ recommendations.)    Patient Mental Status:  Alert and Responsive  Progress Factors:     Progression:  Getting Better  Additional Subjective Comments:      Patient arrived to speech therapy in good spirits.  Patient participated well with all structured therapy activities. Mother reports that patient is independently working on speech production skills at home such as singing with music. Patient mother reports that patient is demonstrating increased independence in production of automatic speech (e.g. While watching sports) and is able to communicate single words, simple messages on the telephone with son and sister.       Objective:   Treatments/Interventions Performed:  Speech/Language treatment, Compensatory strategy training and Patient/Caregiver education  Treatment Intervention tool(s) used:  Speech-language skills targeted through completion of structured therapy activities with focus in accuracy of speech production of single words given automatic speech production tasks.          Speech Therapy Assessment:     Expressive Language Assessment:     Expressive language comments: Expressive language skills targeted:  Speech production for automatic speech tasks:  Patient produced single words given direct verbal (stimuli prompt) cues to state opposites with 8/10 = 80% accuracy  given direct visual (written word) cues as necessary to increase accuracy.       Speech Therapy Plan :   Prognosis & Recommendations  Impression Summary:  Extensive education completed with patient/ mother during therapy session regarding progress noted with speech therapy, including limitations of verbal expression at this time as a result of severe aphasia/ apraxia.  Patient remains unable to independently communicate to structured tasks to independent level requiring direct verbal (e.g. Carrier phrase level stimuli) and visual (written text) cues.  Patient remains reliant on the listener to help interpret information and is most accurate at communication when provided with yes/no responses.  Patient was also provided with static means of communication through communication notebook containing personally relevant information in addition to picture icon supported cues to communicate simple physical states, wants and ideas which patient is able to use independently; however, he required direct cues to initiate use. Patient has likely reached optimal level of function for current treatment period, while continued participation in speech therapy recommended following 3-4 month break in services to identify patient ability to independently continue/ carryover in use of exercises addressing speech-language as part of home program.  Patient was evaluated for a dynamic, augmentative, alternative communication device during the intervention period with report and recommendations for device based on patient communication need completed.  It is recommended patient return for outpatient therapy services should he obtain the device or in 3-4 months.    Therapy Recommendations  Recommendation:  No further speech therapy indicated at this time,  Planned Therapy Interventions:  Home Program,   Plan Details:  Patient/ mother in attendance to today's therapy visit, encouraged to continue with home program addressing speech,  language skills secondary to CVA resulting in severe aphasia, apraxia.

## 2019-03-28 ENCOUNTER — OFFICE VISIT (OUTPATIENT)
Dept: MEDICAL GROUP | Facility: MEDICAL CENTER | Age: 54
End: 2019-03-28
Attending: FAMILY MEDICINE
Payer: MEDICAID

## 2019-03-28 VITALS
OXYGEN SATURATION: 100 % | HEART RATE: 80 BPM | HEIGHT: 73 IN | SYSTOLIC BLOOD PRESSURE: 130 MMHG | DIASTOLIC BLOOD PRESSURE: 80 MMHG | BODY MASS INDEX: 23.59 KG/M2 | RESPIRATION RATE: 16 BRPM | TEMPERATURE: 98.3 F | WEIGHT: 178 LBS

## 2019-03-28 DIAGNOSIS — Z00.00 HEALTH CARE MAINTENANCE: ICD-10-CM

## 2019-03-28 DIAGNOSIS — Z12.11 COLON CANCER SCREENING: ICD-10-CM

## 2019-03-28 DIAGNOSIS — I10 ESSENTIAL HYPERTENSION: ICD-10-CM

## 2019-03-28 DIAGNOSIS — R47.02 DYSPHASIA: ICD-10-CM

## 2019-03-28 DIAGNOSIS — R53.1 RIGHT SIDED WEAKNESS: ICD-10-CM

## 2019-03-28 DIAGNOSIS — Z86.73 HISTORY OF STROKE: ICD-10-CM

## 2019-03-28 PROCEDURE — 99214 OFFICE O/P EST MOD 30 MIN: CPT | Performed by: FAMILY MEDICINE

## 2019-03-28 PROCEDURE — 99212 OFFICE O/P EST SF 10 MIN: CPT | Performed by: FAMILY MEDICINE

## 2019-03-28 ASSESSMENT — ENCOUNTER SYMPTOMS
COUGH: 0
TREMORS: 0
FOCAL WEAKNESS: 1
SPUTUM PRODUCTION: 0
PSYCHIATRIC NEGATIVE: 1
SPEECH CHANGE: 1
FEVER: 0
VOMITING: 0
ABDOMINAL PAIN: 0
SHORTNESS OF BREATH: 0
EYES NEGATIVE: 1
CHILLS: 0
SEIZURES: 0
NAUSEA: 0
SENSORY CHANGE: 1
NECK PAIN: 0
BACK PAIN: 1
TINGLING: 1
PALPITATIONS: 0

## 2019-03-28 NOTE — PROGRESS NOTES
Subjective:      Gilles Cobb Case is a 53 y.o. male who presents with Follow-Up (cva)            Patient 53-year-old male with a history of an expressive aphasia, right-sided weakness following a stroke, hypertension and here for health care maintenance.    He has been followed by speech therapy and has been determined to need a dynamic augmentative alternative speech device.  He will continue to follow-up with speech therapy for continued assistance in management of his expressive aphasia as well as for the use of the speech device he will be getting.  We will continue to follow.    He also has right-sided weakness and uses a cane to ambulate.  He is mobile but has some difficulty with certain tasks due to his right-sided weakness.  We will have patient continue to take his medications as directed for his blood pressure and cholesterol.  Blood work will be reordered to recheck his lipid panel as well as complete metabolic panel and thyroid function.  He will continue to take his medications as directed for his cholesterol.  He is not having any chest pain, shortness of breath, darkening of his urine or unusual muscle aches or pains.  Also advised patient to avoid fatty and fried foods as well as increase his fiber intake.  He is also been advised to exercise as tolerated 4-5 times weekly for 20-30 minutes at a time.    A fit screen has also been ordered for patient today.  Discussed the importance of getting a fit screen done and if positive will refer patient for a colonoscopy for a further evaluation of any colon polyps or colon cancer.  We will continue to follow.     Current medications, allergies, and problem list reviewed with patient and updated in EPIC.          Review of Systems   Constitutional: Negative for chills and fever.   HENT: Negative for hearing loss and tinnitus.    Eyes: Negative.    Respiratory: Negative for cough, sputum production and shortness of breath.    Cardiovascular: Negative for  "chest pain and palpitations.   Gastrointestinal: Negative for abdominal pain, nausea and vomiting.   Musculoskeletal: Positive for back pain. Negative for joint pain and neck pain.   Skin: Negative for rash.   Neurological: Positive for tingling, sensory change, speech change and focal weakness. Negative for tremors and seizures.   Endo/Heme/Allergies: Negative.    Psychiatric/Behavioral: Negative.           Objective:     /80 (BP Location: Left arm, Patient Position: Sitting)   Pulse 80   Temp 36.8 °C (98.3 °F)   Resp 16   Ht 1.854 m (6' 1\")   Wt 80.7 kg (178 lb)   SpO2 100%   BMI 23.48 kg/m²      Physical Exam   Constitutional: He is oriented to person, place, and time.   BMI 23.48   HENT:   Head: Normocephalic and atraumatic.   Nose: Nose normal.   Mouth/Throat: Oropharynx is clear and moist.   Eyes: Pupils are equal, round, and reactive to light. Conjunctivae and EOM are normal.   Neck: Normal range of motion. Neck supple.   Cardiovascular: Normal rate, regular rhythm and normal heart sounds.  Exam reveals no friction rub.    No murmur heard.  Pulmonary/Chest: Effort normal and breath sounds normal. No respiratory distress. He has no wheezes. He has no rales.   Abdominal: Soft. Bowel sounds are normal. He exhibits no distension. There is no tenderness.   Musculoskeletal:   Right-sided weakness   Neurological: He is alert and oriented to person, place, and time. He displays abnormal reflex. He exhibits abnormal muscle tone. Coordination abnormal.   Expressive aphasia   Skin: Skin is warm and dry.   Nursing note and vitals reviewed.              Assessment/Plan:     1. Right sided weakness  We will have patient continue to use his cane to ambulate.  Physical therapy may be necessary if he has any decline in his physical ability.  We will continue to follow.  - TSH WITH REFLEX TO FT4; Future  - Comp Metabolic Panel; Future  - Lipid Profile; Future  - CBC WITH DIFFERENTIAL; Future  - VITAMIN D,25 " HYDROXY; Future    2. Dysphasia  He will follow-up with speech therapy for continued assistance in working with his expressive aphasia as well as teaching him the use of his speech device once he is able to get it.  We will continue to follow.    3. Essential hypertension  We will have him continue to use his blood pressure medications as directed.  We will also have him check his blood pressure at home and keep notes.  We will continue to follow.    4. Health care maintenance  We will have him continue to use his other medications as directed.  We will also reorder a lipid profile, complete metabolic panel, complete blood count, thyroid function, vitamin D level.  Discussed diet and exercise to help lower maintain his cholesterol.  We will continue to follow.  - TSH WITH REFLEX TO FT4; Future  - Comp Metabolic Panel; Future  - Lipid Profile; Future  - CBC WITH DIFFERENTIAL; Future  - VITAMIN D,25 HYDROXY; Future    5. Colon cancer screening  Will order a FIT for for screening. Will continue to follow.  - OCCULT BLOOD FECES IMMUNOASSAY; Future

## 2019-04-15 RX ORDER — ASPIRIN 81 MG/1
TABLET, CHEWABLE ORAL
Qty: 30 TAB | Refills: 0 | Status: SHIPPED | OUTPATIENT
Start: 2019-04-15 | End: 2019-05-13 | Stop reason: SDUPTHER

## 2019-04-15 RX ORDER — ATORVASTATIN CALCIUM 40 MG/1
TABLET, FILM COATED ORAL
Qty: 30 TAB | Refills: 0 | Status: SHIPPED | OUTPATIENT
Start: 2019-04-15 | End: 2019-05-13 | Stop reason: SDUPTHER

## 2019-04-15 RX ORDER — LISINOPRIL 5 MG/1
TABLET ORAL
Qty: 30 TAB | Refills: 0 | Status: SHIPPED | OUTPATIENT
Start: 2019-04-15 | End: 2019-05-13 | Stop reason: SDUPTHER

## 2019-05-13 RX ORDER — ASPIRIN 81 MG/1
TABLET, CHEWABLE ORAL
Qty: 30 TAB | Refills: 0 | Status: SHIPPED | OUTPATIENT
Start: 2019-05-13 | End: 2019-06-12 | Stop reason: SDUPTHER

## 2019-05-13 RX ORDER — LISINOPRIL 5 MG/1
TABLET ORAL
Qty: 30 TAB | Refills: 2 | Status: SHIPPED | OUTPATIENT
Start: 2019-05-13 | End: 2019-08-09 | Stop reason: SDUPTHER

## 2019-05-13 RX ORDER — ATORVASTATIN CALCIUM 40 MG/1
TABLET, FILM COATED ORAL
Qty: 30 TAB | Refills: 2 | Status: SHIPPED | OUTPATIENT
Start: 2019-05-13 | End: 2019-08-09 | Stop reason: SDUPTHER

## 2019-06-06 ENCOUNTER — OFFICE VISIT (OUTPATIENT)
Dept: MEDICAL GROUP | Facility: MEDICAL CENTER | Age: 54
End: 2019-06-06
Attending: FAMILY MEDICINE
Payer: MEDICAID

## 2019-06-06 VITALS
SYSTOLIC BLOOD PRESSURE: 120 MMHG | HEIGHT: 73 IN | RESPIRATION RATE: 14 BRPM | DIASTOLIC BLOOD PRESSURE: 80 MMHG | HEART RATE: 60 BPM | OXYGEN SATURATION: 97 % | BODY MASS INDEX: 23.59 KG/M2 | TEMPERATURE: 98 F | WEIGHT: 178 LBS

## 2019-06-06 DIAGNOSIS — R47.1 DYSARTHRIA: ICD-10-CM

## 2019-06-06 DIAGNOSIS — Z86.73 HISTORY OF STROKE: ICD-10-CM

## 2019-06-06 DIAGNOSIS — R53.1 RIGHT SIDED WEAKNESS: ICD-10-CM

## 2019-06-06 DIAGNOSIS — R47.01 APHASIA: ICD-10-CM

## 2019-06-06 DIAGNOSIS — R47.02 DYSPHASIA: ICD-10-CM

## 2019-06-06 DIAGNOSIS — I63.9 CEREBROVASCULAR ACCIDENT (CVA), UNSPECIFIED MECHANISM (HCC): ICD-10-CM

## 2019-06-06 PROCEDURE — 99212 OFFICE O/P EST SF 10 MIN: CPT | Performed by: FAMILY MEDICINE

## 2019-06-06 PROCEDURE — 99214 OFFICE O/P EST MOD 30 MIN: CPT | Performed by: FAMILY MEDICINE

## 2019-06-06 ASSESSMENT — ENCOUNTER SYMPTOMS
FEVER: 0
SPEECH CHANGE: 1
CHILLS: 0
NAUSEA: 0
ABDOMINAL PAIN: 0
SHORTNESS OF BREATH: 0
COUGH: 0
PSYCHIATRIC NEGATIVE: 1
PALPITATIONS: 0
SPUTUM PRODUCTION: 0
VOMITING: 0

## 2019-06-06 NOTE — PROGRESS NOTES
"Subjective:      Gilles Cobb Case is a 54 y.o. male who presents with Orders Needed (speech device)            Patient 54-year-old male here for follow-up of his aphasia and dysarthria.  He has been trying to get a assistive speech device, but has been denied on several occasions.  He is here for a face-to-face visit to verify that he actually still has difficulty with speaking.  He is still unable to form words but is able to understand commands and is able to follow directions.  He has already been assessed by a speech therapist and has a report specifying his needs for the assistive speaking device.  Today he will also be referred to neurology for additional assistance in managing his post stroke symptoms.  We will continue to follow.     Current medications, allergies, and problem list reviewed with patient and updated in EPIC.          Review of Systems   Constitutional: Negative for chills and fever.   HENT: Negative for hearing loss and tinnitus.    Respiratory: Negative for cough, sputum production and shortness of breath.    Cardiovascular: Negative for chest pain and palpitations.   Gastrointestinal: Negative for abdominal pain, nausea and vomiting.   Skin: Negative for rash.   Neurological: Positive for speech change.   Psychiatric/Behavioral: Negative.           Objective:     /80 (BP Location: Left arm, Patient Position: Sitting)   Pulse 60   Temp 36.7 °C (98 °F)   Resp 14   Ht 1.854 m (6' 1\")   Wt 80.7 kg (178 lb)   SpO2 97%   BMI 23.48 kg/m²      Physical Exam   Constitutional: He is oriented to person, place, and time.   HENT:   Head: Normocephalic and atraumatic.   Cardiovascular: Normal rate, regular rhythm and normal heart sounds.  Exam reveals no friction rub.    No murmur heard.  Pulmonary/Chest: Effort normal and breath sounds normal. No respiratory distress. He has no wheezes. He has no rales.   Abdominal: Soft. Bowel sounds are normal. He exhibits no distension. There is no " tenderness.   Neurological: He is alert and oriented to person, place, and time.   Right-sided weakness with brace on right lower leg, unable to speak but able to follow commands.   Skin: Skin is warm and dry.   Psychiatric: He has a normal mood and affect. His behavior is normal.   Nursing note and vitals reviewed.              Assessment/Plan:     1. Dysarthria  In order for his speech assistive device has been sent in a face-to-face was done today.  He is still having difficulty speaking and forming words.  Referral to neurology has also been made today.  We will continue to follow.  - REFERRAL TO NEUROLOGY    2. Aphasia  See above plan.  - REFERRAL TO NEUROLOGY    3. Right sided weakness  See above plan.    4. History of stroke  See above plan.

## 2019-06-10 ENCOUNTER — TELEPHONE (OUTPATIENT)
Dept: MEDICAL GROUP | Facility: MEDICAL CENTER | Age: 54
End: 2019-06-10

## 2019-06-10 NOTE — TELEPHONE ENCOUNTER
1. Name: Mother, Arina Hsu     Call Back Number: [phone]  Patient approves a detailed voicemail message: yes    2. Patient is requesting Rx order for Speech Generating Device. Mother was told it was going to be sent last Thursday 6/6/19 but the company who the order must be placed through, [employer] has not received anything.  Please fax the order as soon as possible at [phone].    Patient was informed they may receive a return phone call from our office with any additional questions before processing this request.

## 2019-06-11 ENCOUNTER — HOSPITAL ENCOUNTER (OUTPATIENT)
Dept: LAB | Facility: MEDICAL CENTER | Age: 54
End: 2019-06-11
Attending: FAMILY MEDICINE
Payer: MEDICAID

## 2019-06-11 DIAGNOSIS — Z00.00 HEALTH CARE MAINTENANCE: ICD-10-CM

## 2019-06-11 DIAGNOSIS — R53.1 RIGHT SIDED WEAKNESS: ICD-10-CM

## 2019-06-11 LAB
25(OH)D3 SERPL-MCNC: 28 NG/ML (ref 30–100)
ALBUMIN SERPL BCP-MCNC: 4.5 G/DL (ref 3.2–4.9)
ALBUMIN/GLOB SERPL: 1.6 G/DL
ALP SERPL-CCNC: 52 U/L (ref 30–99)
ALT SERPL-CCNC: 29 U/L (ref 2–50)
ANION GAP SERPL CALC-SCNC: 8 MMOL/L (ref 0–11.9)
AST SERPL-CCNC: 20 U/L (ref 12–45)
BASOPHILS # BLD AUTO: 1.3 % (ref 0–1.8)
BASOPHILS # BLD: 0.08 K/UL (ref 0–0.12)
BILIRUB SERPL-MCNC: 0.7 MG/DL (ref 0.1–1.5)
BUN SERPL-MCNC: 15 MG/DL (ref 8–22)
CALCIUM SERPL-MCNC: 9.5 MG/DL (ref 8.5–10.5)
CHLORIDE SERPL-SCNC: 106 MMOL/L (ref 96–112)
CHOLEST SERPL-MCNC: 118 MG/DL (ref 100–199)
CO2 SERPL-SCNC: 25 MMOL/L (ref 20–33)
CREAT SERPL-MCNC: 0.74 MG/DL (ref 0.5–1.4)
EOSINOPHIL # BLD AUTO: 0.23 K/UL (ref 0–0.51)
EOSINOPHIL NFR BLD: 3.6 % (ref 0–6.9)
ERYTHROCYTE [DISTWIDTH] IN BLOOD BY AUTOMATED COUNT: 46.1 FL (ref 35.9–50)
FASTING STATUS PATIENT QL REPORTED: NORMAL
GLOBULIN SER CALC-MCNC: 2.9 G/DL (ref 1.9–3.5)
GLUCOSE SERPL-MCNC: 82 MG/DL (ref 65–99)
HCT VFR BLD AUTO: 46.1 % (ref 42–52)
HDLC SERPL-MCNC: 54 MG/DL
HGB BLD-MCNC: 15 G/DL (ref 14–18)
IMM GRANULOCYTES # BLD AUTO: 0.02 K/UL (ref 0–0.11)
IMM GRANULOCYTES NFR BLD AUTO: 0.3 % (ref 0–0.9)
LDLC SERPL CALC-MCNC: 50 MG/DL
LYMPHOCYTES # BLD AUTO: 1.75 K/UL (ref 1–4.8)
LYMPHOCYTES NFR BLD: 27.6 % (ref 22–41)
MCH RBC QN AUTO: 31.8 PG (ref 27–33)
MCHC RBC AUTO-ENTMCNC: 32.5 G/DL (ref 33.7–35.3)
MCV RBC AUTO: 97.7 FL (ref 81.4–97.8)
MONOCYTES # BLD AUTO: 0.8 K/UL (ref 0–0.85)
MONOCYTES NFR BLD AUTO: 12.6 % (ref 0–13.4)
NEUTROPHILS # BLD AUTO: 3.46 K/UL (ref 1.82–7.42)
NEUTROPHILS NFR BLD: 54.6 % (ref 44–72)
NRBC # BLD AUTO: 0 K/UL
NRBC BLD-RTO: 0 /100 WBC
PLATELET # BLD AUTO: 227 K/UL (ref 164–446)
PMV BLD AUTO: 12.8 FL (ref 9–12.9)
POTASSIUM SERPL-SCNC: 4.1 MMOL/L (ref 3.6–5.5)
PROT SERPL-MCNC: 7.4 G/DL (ref 6–8.2)
RBC # BLD AUTO: 4.72 M/UL (ref 4.7–6.1)
SODIUM SERPL-SCNC: 139 MMOL/L (ref 135–145)
TRIGL SERPL-MCNC: 68 MG/DL (ref 0–149)
TSH SERPL DL<=0.005 MIU/L-ACNC: 0.92 UIU/ML (ref 0.38–5.33)
WBC # BLD AUTO: 6.3 K/UL (ref 4.8–10.8)

## 2019-06-11 PROCEDURE — 36415 COLL VENOUS BLD VENIPUNCTURE: CPT

## 2019-06-11 PROCEDURE — 85025 COMPLETE CBC W/AUTO DIFF WBC: CPT

## 2019-06-11 PROCEDURE — 80061 LIPID PANEL: CPT

## 2019-06-11 PROCEDURE — 82306 VITAMIN D 25 HYDROXY: CPT

## 2019-06-11 PROCEDURE — 80053 COMPREHEN METABOLIC PANEL: CPT

## 2019-06-11 PROCEDURE — 84443 ASSAY THYROID STIM HORMONE: CPT

## 2019-06-12 RX ORDER — ASPIRIN 81 MG/1
TABLET, CHEWABLE ORAL
Qty: 30 TAB | Refills: 2 | Status: SHIPPED | OUTPATIENT
Start: 2019-06-12 | End: 2019-09-09 | Stop reason: SDUPTHER

## 2019-06-26 ENCOUNTER — TELEPHONE (OUTPATIENT)
Dept: MEDICAL GROUP | Facility: MEDICAL CENTER | Age: 54
End: 2019-06-26

## 2019-06-26 NOTE — TELEPHONE ENCOUNTER
1. Name: Arina Ortzi (mother)  Call Back Number: [phone] (home)     Patient approves a detailed voicemail message: yes    2. Patient is requesting referral to [facility] Speech Therapy    3. Clinical Reason for Request: help with Speech    4. Specialty & Provider Name/Lab/Imaging Location Preference: [facility]    5. Is appointment scheduled for requested order/referral: no    Patient was informed they may receive a return phone call from our office with any additional questions before processing this request.    Patient's mother, Arina Ortiz, stopped by the office stating they have received the [employer]ox to help Gilles with his speech. Now they need a referral placed to [facility] Speech Therapy

## 2019-06-27 ENCOUNTER — TELEPHONE (OUTPATIENT)
Dept: MEDICAL GROUP | Facility: MEDICAL CENTER | Age: 54
End: 2019-06-27

## 2019-06-27 DIAGNOSIS — I69.320 APHASIA AS LATE EFFECT OF CEREBROVASCULAR ACCIDENT: ICD-10-CM

## 2019-06-27 NOTE — TELEPHONE ENCOUNTER
1. Caller Name: Gilles Cobb Case                                           Call Back Number: [phone] (home)         Patient approves a detailed voicemail message: N\A    Patient is requesting a referral to [facility] speech therapy. Please advise.

## 2019-07-16 ENCOUNTER — SPEECH THERAPY (OUTPATIENT)
Dept: SPEECH THERAPY | Facility: REHABILITATION | Age: 54
End: 2019-07-16
Attending: FAMILY MEDICINE
Payer: MEDICAID

## 2019-07-16 DIAGNOSIS — I69.320 APHASIA AS LATE EFFECT OF CEREBROVASCULAR ACCIDENT: ICD-10-CM

## 2019-07-16 PROCEDURE — 92523 SPEECH SOUND LANG COMPREHEN: CPT

## 2019-07-16 ASSESSMENT — ENCOUNTER SYMPTOMS: NO PATIENT REPORTED PAIN: 1

## 2019-07-16 ASSESSMENT — SOCIAL DETERMINANTS OF HEALTH (SDOH): SOCIAL_SUPPORT_SYSTEM: PARENT

## 2019-07-16 NOTE — OP THERAPY EVALUATION
Outpatient Speech Therapy  INITIAL EVALUATION    [facility]  [address]  [address]  [place] [address]  Phone:  [phone]  Fax:  [phone]    Date of Evaluation: 07/16/2019    Patient: Gilles Cobb Case  YOB: 1965  MRN: [id number]     Referring Provider: Santos Ramirez M.D.  [address]  [place] [address]   Referring Diagnosis Aphasia as late effect of cerebrovascular accident [I69.320]     Time Calculation  Start time: 1030  Stop time: 1130 Time Calculation (min): 60 minutes     Speech Therapy Occurrence Codes    Date of Onset of Impairment:  1/9/18   Date speech therapy care plan established or reviewed:  7/16/19   Date speech therapy treatment started:  7/16/19          Chief Complaint: Aphasia (patient arrived to speech therapy evaluation with newly acquired AAC device)    Visit Diagnoses     ICD-10-CM   1. Aphasia as late effect of cerebrovascular accident I69.320     Subjective:   Reason for Therapy:     Reason For Evaluation:  Aphasia    Onset Date:  1/9/2018    Onset Description:  Patient 54 year old male returned to outpatient speech therapy following acquisition of Speech Generating Device for Augmentative Alternative Communication means secondary to long standing, severe expressive Aphasia following CVA.   Social Support:     Accompanied By:  Parent    Patient Mental Status:  Alert and Responsive  Pain:     no pain reported    Therapy History:     Previous Treatments and Dates:  Patient with previous participation in outpatient speech therapy services addressing Aphasia from 8/9/2018-3/26/2019.  At time of discharge, patient remained reliant on the listener to help interpret information and was most accurate at communication when provided with yes/no responses.  Patient was also provided with static means of communication through communication notebook. Return to outpatient speech therapy in 3-4 months recommended at time of discharge.     Hearing:  No  Deficits    Vision:  Eye Glasses  Additional Subjective Comments:      Patient is right handed but secondary to hemiparesis uses left hand for direct access to SGD.     Past Medical History:   Diagnosis Date   • Dysphagia    • Hypertension    • Stroke (HCC)      Past Surgical History:   Procedure Laterality Date   • GASTROSCOPY-ENDO  12/26/2017    Procedure: GASTROSCOPY-ENDO;  Surgeon: Adam Aguirre M.D.;  Location: ENDOSCOPY [facility];  Service: Gastroenterology   • PEG PLACEMENT  12/26/2017    Procedure: PEG PLACEMENT;  Surgeon: Adam Aguirre M.D.;  Location: ENDOSCOPY [facility];  Service: Gastroenterology     Objective:   Treatments/Interventions Performed:  Patient/Caregiver education  Other Treatment Interventions:  Western Aphasia Battery (WAB)  Treatment Intervention tool(s) used:  Expressive-receptive language skills function was formally assessed through administration of the WAB.  Results revealed an Aphasia Quotient (AQ) of 25.2 consistent with severe Aphasia.  An Aphasia classification of Broca's type was determined based on subtest results.      Speech Therapy Assessment:     Expressive Language Assessment:     Portions of the Western Aphasia Battery (WAB) are used.    Communicates using gestures: Severe.    Ability to exhibit appropriate naming: Profound.    Explains function of object Profound.    Repeats speech accurately Moderate.    Patient's ability to use automatic language appropriately is Severe.    Patient's ability to verbalize wants and needs is Severe.    Patient's ability to sustain dialogues within a given topic is Profound.     Jargon is Present.    Agrammatism is Present.    Paraphasic is Present.    Perseveration is Present.    Expressive language comments: Results of WAB suggested that patient presented with Aphasia consistent with Broca's type resulting in consistent, haulted speech patterns with patient largely unable to independently communicate, even to simple  "single word level such as stating first, last name.  When encountering a word finding difficulty, patient often times attempted to use gestures, but gestures were limited to patient pointing in air and holding up fingers.  Independent verbalizations were limited to production of \"umm\" \"I don't know\" \"yes/ no\".     Receptive Language Assessment:     Portions of the Western Aphasia Battery (WAB) are used.    Patient ability to identify body parts: Moderate    Patient ability to identify objects: Minimal    Patient ability to discriminate right and left: Severe    Personal information yes/no questions: WFL    Simple yes/no questions: WFL    Complex yes/no questions: Minimal    Patient follows 2 step simple commands:WFL    Patient follows 3 step simple commands: WFL    Patient follows 1 step complex commands: WFL    Patient follows 2 step complex commands: Moderate    Patient understands simple conversation: WFL    Receptive language comments: Patient with increased difficulty in following of two step, complex directions per results of WAB.  Patient with increased difficulty with right-left discrimination with body part identification.     Speech Therapy Plan :   Prognosis & Recommendations  Impression Summary:  Gilles Gavin, a 54 year old male, participated in a speech-language re-evaluation at [facility] to assess current expressive-receptive language skills and determine POC given patient returns to outpatient therapy with Speech Generating, Augmentative-Alternative Communication device per recommendations of previous ST treatment.  Evaluation of expressive-receptive language skills function revealed that patient continues to present with severe deficits in expressive language skills with results of WAB suggesting type of Aphasia experienced consistent with Broca's Aphasia.  Speech therapy will focus on understanding, training and education in use of SGD in addition to targeting expressive language " skills to increase patient communicative competence with familiar and unfamiliar listeners.   Prognosis:  Good  Goals  Short Term Goals:  1.  Patient will demonstrate understanding and independence in completion of simple operations of SGD, including turning device on/ off, making changes to icons 80% accuracy, independent.  2.  Patient will independently navigate SGD to participate in a simple greeting/ leave taking with familiar, progressing to unfamiliar listeners 4/5 obligatory contexts (80% accuracy).  3. Patient will independently navigate SGD to convey and independent need, thought or idea to familiar, progressing to unfamiliar listeners 4/5 obligatory context (80% accuracy).  4.  Patient will independently navigate SGD to combine two words combining modifier + noun/ verb (for example, good game) given moderate verbal prompts/ instruction/ cues 80% accuracy.   5.  Patient will increase expressive language skills to independent state personal information including first, last name, age 4/5 (80% accuracy), given moderate verbal prompts/ instruction/ cues.   6. Patient will increase expressive language skills completing confrontation naming tasks of nouns, verbs completing with 75% accuracy, given moderate verbal prompts/ instruction/ cues as needed.   Short Term Goal Duration (Weeks):  4-6 weeks  Long Term Goals:  1.  Using SGD, patient will demonstrate independence in generating a need, thought or idea to increase independence in communication with familiar, unfamiliar listeners 4/5 (80%) of obligatory contexts.   2.  Patient will increase expressive language skills to demonstrate independence in communication of simple, personally relevant information to familiar, unfamiliar listeners 80% accuracy.   Long Term Goal Duration (Weeks):  1-2 months  Therapy Recommendations  Recommendation:  Individual Speech Therapy,  Planned Therapy Interventions:  Home Program, Patient/Caregiver Education and Speech Gen Device  Therapy,   Plan Details:  20 units (23458)/ 20 units (01050)  Frequency:  2x week  Duration (in weeks):  10            Referring provider co-signature:  I have reviewed this plan of care and my co-signature certifies the need for services.  Certification Dates:   From 7/16.2019     To 9/24.2019    Physician Signature: ________________________________ Date: ______________

## 2019-07-18 ENCOUNTER — APPOINTMENT (OUTPATIENT)
Dept: SPEECH THERAPY | Facility: REHABILITATION | Age: 54
End: 2019-07-18
Attending: FAMILY MEDICINE
Payer: MEDICAID

## 2019-07-22 ENCOUNTER — APPOINTMENT (OUTPATIENT)
Dept: SPEECH THERAPY | Facility: REHABILITATION | Age: 54
End: 2019-07-22
Attending: FAMILY MEDICINE
Payer: MEDICAID

## 2019-07-29 ENCOUNTER — APPOINTMENT (OUTPATIENT)
Dept: SPEECH THERAPY | Facility: REHABILITATION | Age: 54
End: 2019-07-29
Attending: FAMILY MEDICINE
Payer: MEDICAID

## 2019-08-07 ENCOUNTER — SPEECH THERAPY (OUTPATIENT)
Dept: SPEECH THERAPY | Facility: REHABILITATION | Age: 54
End: 2019-08-07
Attending: FAMILY MEDICINE
Payer: MEDICAID

## 2019-08-07 DIAGNOSIS — R47.01 EXPRESSIVE APHASIA: ICD-10-CM

## 2019-08-07 DIAGNOSIS — R48.2 APRAXIA: ICD-10-CM

## 2019-08-07 DIAGNOSIS — I69.320 APHASIA AS LATE EFFECT OF CEREBROVASCULAR ACCIDENT: ICD-10-CM

## 2019-08-07 PROCEDURE — 92507 TX SP LANG VOICE COMM INDIV: CPT | Mod: XU

## 2019-08-07 PROCEDURE — 92609 USE OF SPEECH DEVICE SERVICE: CPT

## 2019-08-07 ASSESSMENT — SOCIAL DETERMINANTS OF HEALTH (SDOH): SOCIAL_SUPPORT_SYSTEM: PARENT

## 2019-08-07 NOTE — OP THERAPY DAILY TREATMENT
"  Outpatient Speech Therapy  DAILY TREATMENT     [facility] Speech [address]  [address]  [place] [address]  Phone:  [phone]  Fax:  [phone]    Date: 08/07/2019    Patient: Gilles Cobb Case  YOB: 1965  MRN: [id number]     Time Calculation  Start time: 0904  Stop time: 1000 Time Calculation (min): 56 minutes       Chief Complaint: Aphasia    Visit #: 2    Subjective:   Reason for Therapy:     Reason For Evaluation:  Aphasia  Social Support:     Accompanied By:  Parent (mother, present, waited in lobby per patient request)    Patient Mental Status:  Alert and Responsive  Additional Subjective Comments:      Patient returns to speech therapy following initial evaluation with no recent changes to medical history noted or reported.        Objective:   Treatments/Interventions Performed:  Speech/Language treatment and Patient/Caregiver education  Other Treatment Interventions:  Therapy initiated using Dynamic Speech Generating Device (SGD)    Short Term Goals:  1.  Patient will demonstrate understanding and independence in completion of simple operations of SGD, including turning device on/ off, making changes to icons 80% accuracy, independent.  Current:  Patient oriented to SGD device.  Repetitive training provided related to location of icons to large categories with focus on Quickfire tab.     2.  Patient will independently navigate SGD to participate in a simple greeting/ leave taking with familiar, progressing to unfamiliar listeners 4/5 obligatory contexts (80% accuracy).  Current: Patient required direct instruction to use SGD to initiate a greeting beyond \"hi\" icon.  Patient required direct instruction and repetitive trials to accurate generate 2 button greeting \"Hi, how are you?\"    3. Patient will independently navigate SGD to convey and independent need, thought or idea to familiar, progressing to unfamiliar listeners 4/5 obligatory context (80% " "accuracy).  Current:  Use of SGD to communicate personally relevant information related to name of children initiated under icon \"my phrases.\"     4.  Patient will independently navigate SGD to combine two words combining modifier + noun/ verb (for example, good game) given moderate verbal prompts/ instruction/ cues 80% accuracy.   Current:  Patient required direct instruction and repetitive trials to accurately generate 2 button greeting \"Hi, how are you?\"    5.  Patient will increase expressive language skills to independent state personal information including first, last name, age 4/5 (80% accuracy), given moderate verbal prompts/ instruction/ cues.   Current:  Patient independent in navigating SGD to find first, last name.  Patient with repetition to device cues resulitng in increased accuracy in verbal expression.     6. Patient will increase expressive language skills completing confrontation naming tasks of nouns, verbs completing with 75% accuracy, given moderate verbal prompts/ instruction/ cues as needed.   Current:  Not address. Continue.     Speech Therapy Assessment:     Expressive Language Assessment:     Expressive language comments: Patient continues to present with severe expressive Aphasia limiting independence in communication with initiation of dynamic AAC to address. Patient was noted to demonstrate increased verbalizations given direct imitation prompt/ instruction/ cues when using AAC device.       Speech Therapy Plan :   Prognosis & Recommendations  Impression Summary:  Gilles Gavin, a 54 year old male, participated in first follow up session of outpatient speech therapy following initial evaluation returning to therapy with dynamic AAC/ SGD.     Patient demonstrated good motivation in use of AAC; however, direct cues were necessary to navigation to information beyond that on home screen. Patient was independent in simple functions of device (such as turning on and off), with direct " instruction/ cues provided on how to delete unwanted icons during today's session.    Patient was noted to demonstrate increased independent attempts at verbalizations with support from dynamic AAC throughout therapy session.   Prognosis:  Good  Prognosis Details:  Expected continued understanding in use and function of AAC with continued direct, outpatient speech therapy services.   Therapy Recommendations  Recommendation:  Individual Speech Therapy,  Planned Therapy Interventions:  Home Program, Patient/Caregiver Education, Compensatory Strategy Training, Speech/Language training and Speech Gen Device Therapy,   Plan Details:  Continue with POC.  Patient encouraged to familiarize self to device in areas targeted including:  My phrases, greetings and topic of interest in area of sports.

## 2019-08-09 ENCOUNTER — SPEECH THERAPY (OUTPATIENT)
Dept: SPEECH THERAPY | Facility: REHABILITATION | Age: 54
End: 2019-08-09
Attending: FAMILY MEDICINE
Payer: MEDICAID

## 2019-08-09 DIAGNOSIS — R48.2 APRAXIA: ICD-10-CM

## 2019-08-09 DIAGNOSIS — R47.01 EXPRESSIVE APHASIA: ICD-10-CM

## 2019-08-09 DIAGNOSIS — I69.320 APHASIA AS LATE EFFECT OF CEREBROVASCULAR ACCIDENT: ICD-10-CM

## 2019-08-09 DIAGNOSIS — I10 ESSENTIAL HYPERTENSION: ICD-10-CM

## 2019-08-09 PROCEDURE — 92609 USE OF SPEECH DEVICE SERVICE: CPT

## 2019-08-09 PROCEDURE — 92507 TX SP LANG VOICE COMM INDIV: CPT | Mod: XU

## 2019-08-09 ASSESSMENT — SOCIAL DETERMINANTS OF HEALTH (SDOH): SOCIAL_SUPPORT_SYSTEM: PARENT

## 2019-08-09 NOTE — OP THERAPY DAILY TREATMENT
"  Outpatient Speech Therapy  DAILY TREATMENT     [facility] Speech [address]  [address]  [place] [address]  Phone:  [phone]  Fax:  [phone]    Date: 08/09/2019    Patient: Gilles Cobb Case  YOB: 1965  MRN: [id number]     Time Calculation  Start time: 1030  Stop time: 1130 Time Calculation (min): 60 minutes       Chief Complaint: Aphasia (\"I don't know\")    Visit #: 3    Subjective:   Reason for Therapy:     Reason For Evaluation:  Aphasia  Social Support:     Accompanied By:  Parent (mother, present, waited in lobby per patient request)    Patient Mental Status:  Alert and Responsive  Additional Subjective Comments:      No recent changes to medical history noted or reported.  Patient appears in good spirits, ready to continue therapy using SGD.       Objective:   Treatments/Interventions Performed:  Patient/Caregiver education, Compensatory strategy training and Speech/Language treatment  Other Treatment Interventions:  SGD Therapy    Short Term Goals:  1.  Patient will demonstrate understanding and independence in completion of simple operations of SGD, including turning device on/ off, making changes to icons 80% accuracy, independent.  Current:  Patient oriented to SGD device.  Repetitive training provided related to location of icons in About Me tab.      2.  Patient will independently navigate SGD to participate in a simple greeting/ leave taking with familiar, progressing to unfamiliar listeners 4/5 obligatory contexts (80% accuracy).  Current: Following education from previous session, patient was independent in accessing Hi/ How are you? Icons to start session to verbal only cues.      3. Patient will independently navigate SGD to convey and independent need, thought or idea to familiar, progressing to unfamiliar listeners 4/5 obligatory context (80% accuracy).  Current:  Use of SGD to communicate personally relevant information related to personal information " "completed with modification to About me page under topics icon.  Patient required direct cues to navigate to modified page 3/3 independent attempts.       4.  Patient will independently navigate SGD to combine two words combining modifier + noun/ verb (for example, good game) given moderate verbal prompts/ instruction/ cues 80% accuracy.   Current:  Patient required direct instruction and repetitive trials to accurately generate 2 button greeting \"Hi, how are you?\"     5.  Patient will increase expressive language skills to independent state personal information including first, last name, age 4/5 (80% accuracy), given moderate verbal prompts/ instruction/ cues.   Current:  Patient independent in navigating SGD to find first, last name.  Patient with repetition to device cues resulitng in increased accuracy in verbal expression.      6. Patient will increase expressive language skills completing confrontation naming tasks of nouns, verbs completing with 75% accuracy, given moderate verbal prompts/ instruction/ cues as needed.   Current:  Expressive language skills targeted through participation in structured, confrontation naming tasks:  Patient required direct verbal support/ instruction/ cues in the form of either semantic or phonemic cues completing with 6/11 = 54.5% accuracy, independent.     Speech Therapy Assessment:     Expressive Language Assessment:     Ability to exhibit appropriate naming: Moderate (Given direct verbal prompts/ instruction/ cues).    Paraphasic is Present.    Perseveration is Present.      Speech Therapy Plan :   Prognosis & Recommendations  Impression Summary:  Gilles Gavin, a 54 year old male, continued participation in direct, outpatient speech therapy following initial evaluation returning to therapy with dynamic AAC/ SGD.      Patient continues to be highly motivated to use AAC; however, direct cues remain necessary to navigation to information beyond that on home screen. Patient " independent attempts at verbalizations with support from dynamic AAC continue to increase during structured sessions.   Prognosis:  Good  Prognosis Details:  Expect continued improvement in understanding and use of SGD with continued, direct outpatient speech therapy services.   Therapy Recommendations  Recommendation:  Individual Speech Therapy,  Planned Therapy Interventions:  Home Program, Patient/Caregiver Education, Compensatory Strategy Training, Speech Gen Device Therapy and Speech/Language training,   Plan Details:  Continue with POC.  Modifications to AAC targeted during today's therapy session included About me page under Topics tab.  Patient encouraged to practice for increased independence in ability to communicate personal information with clinician.

## 2019-08-12 ENCOUNTER — APPOINTMENT (OUTPATIENT)
Dept: SPEECH THERAPY | Facility: REHABILITATION | Age: 54
End: 2019-08-12
Attending: FAMILY MEDICINE
Payer: MEDICAID

## 2019-08-12 ENCOUNTER — OFFICE VISIT (OUTPATIENT)
Dept: MEDICAL GROUP | Facility: MEDICAL CENTER | Age: 54
End: 2019-08-12
Attending: FAMILY MEDICINE
Payer: MEDICAID

## 2019-08-12 VITALS
OXYGEN SATURATION: 94 % | TEMPERATURE: 98.5 F | SYSTOLIC BLOOD PRESSURE: 112 MMHG | DIASTOLIC BLOOD PRESSURE: 78 MMHG | HEART RATE: 86 BPM | BODY MASS INDEX: 23.86 KG/M2 | RESPIRATION RATE: 16 BRPM | WEIGHT: 180 LBS | HEIGHT: 73 IN

## 2019-08-12 DIAGNOSIS — H61.23 CERUMEN DEBRIS ON TYMPANIC MEMBRANE OF BOTH EARS: ICD-10-CM

## 2019-08-12 DIAGNOSIS — H91.90 DECREASED HEARING, UNSPECIFIED LATERALITY: ICD-10-CM

## 2019-08-12 PROCEDURE — 99214 OFFICE O/P EST MOD 30 MIN: CPT | Mod: 25 | Performed by: FAMILY MEDICINE

## 2019-08-12 PROCEDURE — 69209 REMOVE IMPACTED EAR WAX UNI: CPT | Performed by: FAMILY MEDICINE

## 2019-08-12 PROCEDURE — 99214 OFFICE O/P EST MOD 30 MIN: CPT | Performed by: FAMILY MEDICINE

## 2019-08-12 RX ORDER — LISINOPRIL 5 MG/1
TABLET ORAL
Qty: 30 TAB | Refills: 1 | Status: SHIPPED | OUTPATIENT
Start: 2019-08-12 | End: 2019-10-09 | Stop reason: SDUPTHER

## 2019-08-12 RX ORDER — ATORVASTATIN CALCIUM 40 MG/1
TABLET, FILM COATED ORAL
Qty: 30 TAB | Refills: 1 | Status: SHIPPED | OUTPATIENT
Start: 2019-08-12 | End: 2019-10-09 | Stop reason: SDUPTHER

## 2019-08-12 ASSESSMENT — ENCOUNTER SYMPTOMS
SENSORY CHANGE: 0
FEVER: 0
FOCAL WEAKNESS: 1
SORE THROAT: 0
MYALGIAS: 0
FATIGUE: 0
PALPITATIONS: 0
SPUTUM PRODUCTION: 0
CHANGE IN BOWEL HABIT: 0
ARTHRALGIAS: 0
VISUAL CHANGE: 0
CHILLS: 0
NUMBNESS: 0
VERTIGO: 0
COUGH: 0
ABDOMINAL PAIN: 0
SPEECH CHANGE: 1
WEAKNESS: 0
TINGLING: 0
VOMITING: 0
TREMORS: 0
ANOREXIA: 0
JOINT SWELLING: 0
HEADACHES: 0
DIAPHORESIS: 0
SHORTNESS OF BREATH: 0
SWOLLEN GLANDS: 0
NAUSEA: 0
NECK PAIN: 0
PSYCHIATRIC NEGATIVE: 1

## 2019-08-12 NOTE — PROGRESS NOTES
"Subjective:      Gilles Cobb Case is a 54 y.o. male who presents with Ear Fullness (since saturday)            Cerumen Impaction   This is a new problem. The current episode started in the past 7 days. The problem occurs constantly. The problem has been unchanged. Pertinent negatives include no abdominal pain, anorexia, arthralgias, change in bowel habit, chest pain, chills, congestion, coughing, diaphoresis, fatigue, fever, headaches, joint swelling, myalgias, nausea, neck pain, numbness, rash, sore throat, swollen glands, urinary symptoms, vertigo, visual change, vomiting or weakness. Associated symptoms comments: Decreased hearing bilaterally. Exacerbated by: Patient uses Q-tips to clean ears daily. Treatments tried: Patient has been using Debrox to soften earwax, will do an ear lavage today.  An audiogram will also be ordered for patient today.       Review of Systems   Constitutional: Negative for chills, diaphoresis, fatigue and fever.   HENT: Positive for hearing loss. Negative for congestion, sore throat and tinnitus.         Patient has a dysarthria secondary to a stroke   Respiratory: Negative for cough, sputum production and shortness of breath.    Cardiovascular: Negative for chest pain and palpitations.   Gastrointestinal: Negative for abdominal pain, anorexia, change in bowel habit, nausea and vomiting.   Musculoskeletal: Negative for arthralgias, joint swelling, myalgias and neck pain.   Skin: Negative for rash.   Neurological: Positive for speech change and focal weakness. Negative for vertigo, tingling, tremors, sensory change, weakness, numbness and headaches.   Psychiatric/Behavioral: Negative.           Objective:     /78 (BP Location: Left arm, Patient Position: Sitting, BP Cuff Size: Adult)   Pulse 86   Temp 36.9 °C (98.5 °F) (Temporal)   Resp 16   Ht 1.854 m (6' 1\")   Wt 81.6 kg (180 lb)   SpO2 94%   BMI 23.75 kg/m²      Physical Exam   Constitutional: He is oriented to person, " place, and time.   BMI 23.75   HENT:   Head: Normocephalic and atraumatic.   Cerumen impaction bilaterally   Cardiovascular: Normal rate, regular rhythm and normal heart sounds. Exam reveals no friction rub.   No murmur heard.  Pulmonary/Chest: Effort normal and breath sounds normal. No stridor. No respiratory distress. He has no wheezes.   Abdominal: Soft. Bowel sounds are normal. He exhibits no distension. There is no tenderness.   Musculoskeletal: He exhibits no edema or tenderness.   Lymphadenopathy:     Cervical adenopathy: ref audio.   Neurological: He is alert and oriented to person, place, and time.   Right-sided weakness   Skin: Skin is warm and dry.   Psychiatric: He has a normal mood and affect. His behavior is normal.   Nursing note and vitals reviewed.              Assessment/Plan:     1. Decreased hearing, unspecified laterality  Will order an audiogram to further assess hearing.  Discussed ENT.  We will continue to follow.    2. Cerumen debris on tympanic membrane of both ears  Will do a cerumen irrigation today, will have patient continue using Debrox as needed.  We will continue to follow.

## 2019-08-16 ENCOUNTER — SPEECH THERAPY (OUTPATIENT)
Dept: SPEECH THERAPY | Facility: REHABILITATION | Age: 54
End: 2019-08-16
Attending: FAMILY MEDICINE
Payer: MEDICAID

## 2019-08-16 DIAGNOSIS — R47.01 EXPRESSIVE APHASIA: ICD-10-CM

## 2019-08-16 DIAGNOSIS — R48.2 APRAXIA: ICD-10-CM

## 2019-08-16 PROCEDURE — 92609 USE OF SPEECH DEVICE SERVICE: CPT

## 2019-08-16 PROCEDURE — 92507 TX SP LANG VOICE COMM INDIV: CPT | Mod: XU

## 2019-08-16 ASSESSMENT — SOCIAL DETERMINANTS OF HEALTH (SDOH): SOCIAL_SUPPORT_SYSTEM: PARENT

## 2019-08-16 NOTE — OP THERAPY DAILY TREATMENT
Outpatient Speech Therapy  DAILY TREATMENT     [facility] Speech [address].  [address]  [place] [address]  Phone:  [phone]  Fax:  [phone]    Date: 08/16/2019    Patient: Gilles Cobb Case  YOB: 1965  MRN: [id number]     Time Calculation  Start time: 1030  Stop time: 1130 Time Calculation (min): 60 minutes       Chief Complaint: Aphasia    Visit #: 4    Subjective:   Reason for Therapy:     Reason For Evaluation:  Aphasia  Social Support:     Accompanied By:  Parent (mother, present and supportive. Mother waited in lobby. )    Patient Mental Status:  Alert and Responsive      Objective:   Treatments/Interventions Performed:  Speech/Language treatment, Compensatory strategy training and Patient/Caregiver education  Other Treatment Interventions:  Speech Generating Device (SGD) therapy    Short Term Goals:  1.  Patient will demonstrate understanding and independence in completion of simple operations of SGD, including turning device on/ off, making changes to icons 80% accuracy, independent.  Current:  Patient remains independent in turning on/ off of device.  Patient continues to require direct assistance/ cues to make changes to icons.  PLAN:  Goal met for basic device functions.  Continue direct education/ training regarding making changes to icons.      2.  Patient will independently navigate SGD to participate in a simple greeting/ leave taking with familiar, progressing to unfamiliar listeners 4/5 obligatory contexts (80% accuracy).  Current: Patient required direct cues to initiate simple greeting and follow up question/ hi, how are you? With patient completing to independent level.   PLAN:  Continue to address independence in initiation of SGD.       3. Patient will independently navigate SGD to convey and independent need, thought or idea to familiar, progressing to unfamiliar listeners 4/5 obligatory context (80% accuracy).  Current:  Use of SGD focused on  modifications of icons to [employer] to allow patient to request a favorite (eating out at [employer]).  Patient provided with initial instruction, having to sequence 4 steps to obtain desired icon, cues were faded to modified independent with further trials.  PLAN:  Continue with modification of SGD to meet patient independent communication needs.        4.  Patient will independently navigate SGD to combine two words combining modifier + noun/ verb (for example, good game) given moderate verbal prompts/ instruction/ cues 80% accuracy.   Current:  Not addressed.     5.  Patient will increase expressive language skills to independent state personal information including first, last name, age 4/5 (80% accuracy), given moderate verbal prompts/ instruction/ cues.   Current:  Patient independent in navigating SGD to find first, last name and provide personal identifying information including phone number, address, etc.   PLAN:  Continue with modification of SGD.       6. Patient will increase expressive language skills completing confrontation naming tasks of nouns, verbs completing with 75% accuracy, given moderate verbal prompts/ instruction/ cues as needed.   Current:  Patient demonstrating improved independence in production of single words given direct prompts from the clinician.   PLAN:  Continue.         Speech Therapy Assessment:     Expressive Language Assessment:     Ability to exhibit appropriate naming: Severe.    Patient's ability to verbalize wants and needs is Severe.    Paraphasic is Present.    Perseveration is Present.    Expressive language comments: Patient independent speech productions remain characterized by presence of non-fluent Aphasia, severe word finding difficulties and presence of severe verbal apraxia requiring use of SGD to assist in accuracy in verbal expression/ communication.       Speech Therapy Plan :   Prognosis & Recommendations  Impression Summary:  Gilles Case, a 54 year old male,  continued participation in direct, outpatient speech therapy addressing speech-language deficits secondary to expressive Aphasia s/p CVA.    Patient continues to progress in accuracy in use of SGD with patient demonstrating motivation to use SGD with familiar and unfamiliar listeners.    Prognosis:  Excellent  Prognosis Details:  Expect continued progress in communication skills with use of SGD.   Therapy Recommendations  Recommendation:  Individual Speech Therapy,  Planned Therapy Interventions:  Home Program, Patient/Caregiver Education, Compensatory Strategy Training, Speech Gen Device Therapy and Speech/Language training,   Plan Details:  Continue with POC.

## 2019-08-19 ENCOUNTER — SPEECH THERAPY (OUTPATIENT)
Dept: SPEECH THERAPY | Facility: REHABILITATION | Age: 54
End: 2019-08-19
Attending: FAMILY MEDICINE
Payer: MEDICAID

## 2019-08-19 DIAGNOSIS — I69.320 APHASIA AS LATE EFFECT OF CEREBROVASCULAR ACCIDENT: ICD-10-CM

## 2019-08-19 DIAGNOSIS — R48.2 APRAXIA: ICD-10-CM

## 2019-08-19 PROCEDURE — 92609 USE OF SPEECH DEVICE SERVICE: CPT

## 2019-08-19 PROCEDURE — 92507 TX SP LANG VOICE COMM INDIV: CPT | Mod: XU

## 2019-08-19 NOTE — OP THERAPY DAILY TREATMENT
"  Outpatient Speech Therapy  DAILY TREATMENT     [facility] Speech [address].  [address]  [place] [address]  Phone:  [phone]  Fax:  [phone]    Date: 08/19/2019    Patient: Gilles Cobb Case  YOB: 1965  MRN: [id number]     Time Calculation  Start time: 1030  Stop time: 1130 Time Calculation (min): 60 minutes       Chief Complaint: Aphasia    Visit #: 5    Subjective:   Reason for Therapy:     Reason For Evaluation:  Aphasia and Speech/Language  Social Support:     Social support system: unaccompanied.    Patient Mental Status:  Alert and Responsive      Objective:   Treatments/Interventions Performed:  Home program, Patient/Caregiver education, Compensatory strategy training and Speech/Language treatment  Other Treatment Interventions:  Speech Generating Device (SGD) Therapy    Short Term Goals:  1.  Patient will demonstrate understanding and independence in completion of simple operations of SGD, including turning device on/ off, making changes to icons 80% accuracy, independent.  Current:  Patient independent in accessing speech box to state, use of clear button to clear previously stated utterances.   PLAN: Continue direct education/ training regarding making changes to icons.      2.  Patient will independently navigate SGD to participate in a simple greeting/ leave taking with familiar, progressing to unfamiliar listeners 4/5 obligatory contexts (80% accuracy).  Current: Patient remains independent in generating simple greeting.  Leave taking \"bye\" \"thank you\" \"see you later\" initiated with direct training/ education provided.   PLAN:  Continue to address independence in initiation of SGD.       3. Patient will independently navigate SGD to convey and independent need, thought or idea to familiar, progressing to unfamiliar listeners 4/5 obligatory context (80% accuracy).  Current:  Navigation of SGD to previously modified topics/ buttons completed with patient " independent in navigating 3-4 buttons with 3/5 = 60% accuracy to share information with regards to wants/ needs/ interests.  PLAN:  Continue with modification of SGD to meet patient independent communication needs.        4.  Patient will independently navigate SGD to combine two words combining modifier + noun/ verb (for example, good game) given moderate verbal prompts/ instruction/ cues 80% accuracy.   Current:  Not addressed.     5.  Patient will increase expressive language skills to independent state personal information including first, last name, age 4/5 (80% accuracy), given moderate verbal prompts/ instruction/ cues.   Current:  Patient independent in navigating SGD to state name, home address including city/ state, month, date and year.    PLAN:  Continue with modification of SGD.       6. Patient will increase expressive language skills completing confrontation naming tasks of nouns, verbs completing with 75% accuracy, given moderate verbal prompts/ instruction/ cues as needed.   Current:  Patient demonstrating improved independence in production of single words given direct prompts from the clinician.   PLAN:  Not addressed.         Speech Therapy Assessment:     Expressive Language Assessment:     Ability to exhibit appropriate naming: Severe.    Patient's ability to verbalize wants and needs is Minimal (with use of SGD).      Speech Therapy Plan :   Prognosis & Recommendations  Impression Summary:  Gilles Case, a 54 year old male, continued participation in direct, outpatient speech therapy services addressing severe expressive Aphasia following CVA requiring establishment of alternative means of communication to address expressive communication needs.    Patient continues to progress with outpatient speech therapy characterized by improved independence in use of SGD to communicate simple, personally relevant information.  Direct instruction provided related to icons to increase understanding and ease  of access of icons.    Patient noted to demonstrate frequent repetition of device to increase accuracy of verbal expression.   Prognosis:  Excellent  Prognosis Details:  Expect continued independence in use of SGD.   Therapy Recommendations  Recommendation:  Individual Speech Therapy,  Planned Therapy Interventions:  Home Program, Patient/Caregiver Education, Compensatory Strategy Training, Speech/Language training and Speech Gen Device Therapy,   Plan Details:  Continue with POC.

## 2019-08-23 ENCOUNTER — SPEECH THERAPY (OUTPATIENT)
Dept: SPEECH THERAPY | Facility: REHABILITATION | Age: 54
End: 2019-08-23
Attending: FAMILY MEDICINE
Payer: MEDICAID

## 2019-08-23 DIAGNOSIS — R47.01 APHASIA: ICD-10-CM

## 2019-08-23 DIAGNOSIS — R48.2 APRAXIA: ICD-10-CM

## 2019-08-23 PROCEDURE — 92507 TX SP LANG VOICE COMM INDIV: CPT

## 2019-08-23 ASSESSMENT — SOCIAL DETERMINANTS OF HEALTH (SDOH): SOCIAL_SUPPORT_SYSTEM: PARENT

## 2019-08-23 NOTE — OP THERAPY DAILY TREATMENT
"  Outpatient Speech Therapy  DAILY TREATMENT     [facility] Speech [address].  [address]  [place] [address]  Phone:  [phone]  Fax:  [phone]    Date: 08/23/2019    Patient: Gilles Cobb Case  YOB: 1965  MRN: [id number]     Time Calculation  Start time: 1031  Stop time: 1122 Time Calculation (min): 51 minutes       Chief Complaint: Aphasia    Visit #: 6    Subjective:   Reason for Therapy:     Reason For Evaluation:  Speech/Language  Social Support:     Accompanied By:  Parent    Patient arrived on time to speech therapy with mother who remained in waiting room.   Objective:   Treatments/Interventions Performed:  Home program, Patient/Caregiver education and Speech/Language treatment         Assessments   Patient remains independent in turning on/ off of device.  Patient continues to require direct assistance/ cues to make changes to icons.  Was able to independent;y locate [employer] icon in order to request a favorite location. Was able to independently locate icons for \"about me\" and f\"family\" in response to personal questions.   Completed picture naming tasks. Was able to name 80% of pictures when provided with carrier phrases.     Speech Therapy Plan :   Therapy Recommendations  Recommendation: Individual Speech Therapy,  Planned Therapy Interventions:  Speech Gen Device Therapy, Speech/Language training, Compensatory Strategy Training, Home Program and Patient/Caregiver Education,                "

## 2019-08-26 ENCOUNTER — SPEECH THERAPY (OUTPATIENT)
Dept: SPEECH THERAPY | Facility: REHABILITATION | Age: 54
End: 2019-08-26
Attending: FAMILY MEDICINE
Payer: MEDICAID

## 2019-08-26 DIAGNOSIS — I69.320 APHASIA AS LATE EFFECT OF CEREBROVASCULAR ACCIDENT: ICD-10-CM

## 2019-08-26 DIAGNOSIS — R47.01 EXPRESSIVE APHASIA: ICD-10-CM

## 2019-08-26 DIAGNOSIS — R47.01 APHASIA: ICD-10-CM

## 2019-08-26 PROCEDURE — 92507 TX SP LANG VOICE COMM INDIV: CPT

## 2019-08-26 ASSESSMENT — SOCIAL DETERMINANTS OF HEALTH (SDOH): SOCIAL_SUPPORT_SYSTEM: PARENT

## 2019-08-26 NOTE — OP THERAPY DAILY TREATMENT
Outpatient Speech Therapy  DAILY TREATMENT     [address]  [address]  [place] [address]  Phone:  [phone]  Fax:  [phone]    Date: 08/26/2019    Patient: Gilles Cobb Case  YOB: 1965  MRN: [id number]     Time Calculation  Start time: 1030  Stop time: 1130 Time Calculation (min): 60 minutes   Chief Complaint: Aphasia  Visit #: 7    Subjective:   Reason for Therapy:     Reason For Evaluation:  Aphasia    Onset Description:  Patient present for session, pleasant and cooperative throughout.  Dynavox used in session.  Patient does not endorse any changes since last visit.   Social Support:     Accompanied By:  Parent (Arina Sales, remains in waiting room during treatment session)    Objective:   Treatments/Interventions Performed:  Patient/Caregiver education, Compensatory strategy training and Speech/Language treatment    Speech Therapy Assessment:     Expressive Language Assessment:     Ability to exhibit appropriate naming: Severe.    Explains function of object Profound.    Repeats speech accurately Moderate.    Patient's ability to verbalize wants and needs is Severe.    Patient's ability to formulate complex/abstract language is Severe.    Expressive language comments: Independent AAC use for power on, power off.        Required min SLP cues to toggle between keyboard, topics, quickfire pages.      Required moderate assistance to locate pre-stored pages and icons to tell about self, family, general greetings, and express personal meal-related preferences.    Required mod-max assist keyboard/spelling to complete confrontation naming task with 100% acuracy.  Encouraged combination of orthographic spelling and transition to preditive text with scanning for desired item.      Speech Therapy Plan :   Prognosis & Recommendations  Impression Summary:   Mr. Gilles Gavin, a 54 year old male, continued participation in direct, 1:1 outpatient speech therapy  services addressing severe expressive Aphasia following CVA requiring establishment of alternative means of communication to address expressive communication needs.  He continues to demonstrate severe communication impairment impacting functional independence with communication activities of daily living.  Continue SLP established plan of care listed below.   Goals  Short Term Goals:      1.  Patient will demonstrate understanding and independence in completion of simple operations of SGD, including turning device on/ off, making changes to icons 80% accuracy, independent.     2.  Patient will independently navigate SGD to participate in a simple greeting/ leave taking with familiar, progressing to unfamiliar listeners 4/5 obligatory contexts (80% accuracy).     3. Patient will independently navigate SGD to convey and independent need, thought or idea to familiar, progressing to unfamiliar listeners 4/5 obligatory context (80% accuracy).    4.  Patient will independently navigate SGD to combine two words combining modifier + noun/ verb (for example, good game) given moderate verbal prompts/ instruction/ cues 80% accuracy.      5.  Patient will increase expressive language skills to independent state personal information including first, last name, age 4/5 (80% accuracy), given moderate verbal prompts/ instruction/ cues.     6. Patient will increase expressive language skills completing confrontation naming tasks of nouns, verbs completing with 75% accuracy, given moderate verbal prompts/ instruction/ cues as needed.

## 2019-08-30 ENCOUNTER — SPEECH THERAPY (OUTPATIENT)
Dept: SPEECH THERAPY | Facility: REHABILITATION | Age: 54
End: 2019-08-30
Attending: FAMILY MEDICINE
Payer: MEDICAID

## 2019-08-30 DIAGNOSIS — R48.2 APRAXIA: ICD-10-CM

## 2019-08-30 DIAGNOSIS — R47.01 EXPRESSIVE APHASIA: ICD-10-CM

## 2019-08-30 DIAGNOSIS — I69.320 APHASIA AS LATE EFFECT OF CEREBROVASCULAR ACCIDENT: ICD-10-CM

## 2019-08-30 PROCEDURE — 92507 TX SP LANG VOICE COMM INDIV: CPT

## 2019-08-30 ASSESSMENT — SOCIAL DETERMINANTS OF HEALTH (SDOH): SOCIAL_SUPPORT_SYSTEM: PARENT

## 2019-08-30 NOTE — OP THERAPY DAILY TREATMENT
Outpatient Speech Therapy  DAILY TREATMENT     [facility] Speech [address].  [address]  [place] [address]  Phone:  [phone]  Fax:  [phone]    Date: 08/30/2019    Patient: Gilles Cobb Case  YOB: 1965  MRN: [id number]     Time Calculation  Start time: 1030  Stop time: 1130 Time Calculation (min): 60 minutes       Chief Complaint: Aphasia    Visit #: 8    Subjective:   Reason for Therapy:     Reason For Evaluation:  Aphasia  Social Support:     Accompanied By:  Parent (mother, prenst and supportive, waited in lobby)    Patient Mental Status:  Alert and Responsive  Additional Subjective Comments:      Patient arrived in good spirits.  No recent changes to medical history noted or reported.       Objective:   Treatments/Interventions Performed:  Patient/Caregiver education and Speech/Language treatment  Other Treatment Interventions:  Speech Generating Device Therapy    Short Term Goals:  1.  Patient will demonstrate understanding and independence in completion of simple operations of SGD, including turning device on/ off, making changes to icons 80% accuracy, independent.  Current:  Patient required direct cues to initiate change to icons.    PLAN: Continue direct education/ training regarding making changes to icons.      2.  Patient will independently navigate SGD to participate in a simple greeting/ leave taking with familiar, progressing to unfamiliar listeners 4/5 obligatory contexts (80% accuracy).  Current: Patient required direct cues to initiate greeting/ leave taking using SGD; although patient accurate in accessing icons to independent level.   PLAN:  Continue to address independence in initiation of SGD.       3. Patient will independently navigate SGD to convey and independent need, thought or idea to familiar, progressing to unfamiliar listeners 4/5 obligatory context (80% accuracy).  Current:  Navigation of SGD to previously modified topics/ buttons  "completed with patient independent in navigating 3-4 buttons to navigate to complete orientation to person, date, place; state personally relevant information including about me/ family.  Modification of icons to food category modified to meet patient needs.   PLAN:  Continue with modification of SGD to meet patient independent communication needs.        4.  Patient will independently navigate SGD to combine two words combining modifier + noun/ verb (for example, good game) given moderate verbal prompts/ instruction/ cues 80% accuracy.   Current:  Not addressed.     5.  Patient will increase expressive language skills to independent state personal information including first, last name, age 4/5 (80% accuracy), given moderate verbal prompts/ instruction/ cues.   Current:  Patient independent in navigating SGD to state name, home address including city/ state, month, date and year.    PLAN:  GOAL MET/ Continue with modification of SGD.       6. Patient will increase expressive language skills completing confrontation naming tasks of nouns, verbs completing with 75% accuracy, given moderate verbal prompts/ instruction/ cues as needed.   Current:  Patient independent verbalizations remain consistent with use of stereotypic utterances/ consistent haulted speech patterns. Confrontation naming addressed with patient independent in naming \"glasses\" only 1/5 attempts (20%) accuracy.   PLAN:    Continue to address speech-language skills during structured speech therapy sessions. Patient appears very motivated to imitate speech to single word level following access from SGD.     Speech Therapy Assessment:     Expressive Language Assessment:     Ability to exhibit appropriate naming: Severe.    Repeats speech accurately Minimal (to single word 1-3 syllable level).    Patient's ability to verbalize wants and needs is Severe.    Expressive language comments: Verbalizations remain limited by severe expressive Aphasia and severe " verbal apraxia.       Speech Therapy Plan :   Prognosis & Recommendations  Impression Summary:  Gilles Case, a 54 year old male, continued participation in direct 1:1 outpatient speech therapy addressing patient communication needs using SGD.    Patient is progressing with SGD to demonstrate increased independence in communication of simple personally relevant information with familiar and unfamiliar listeners. Patient continues to require increased time to accurately access icons; however, as familiarity with the device increases, listener wait time is also decreasing.       Prognosis:  Good  Prognosis Details:  Expect continued improvement in communication using SGD as patient remains motivated in device education and use.   Therapy Recommendations  Recommendation:  Individual Speech Therapy,  Planned Therapy Interventions:  Home Program, Patient/Caregiver Education, Compensatory Strategy Training, Speech/Language training and Speech Gen Device Therapy,   Plan Details:  Continue with current POC.

## 2019-09-06 ENCOUNTER — SPEECH THERAPY (OUTPATIENT)
Dept: SPEECH THERAPY | Facility: REHABILITATION | Age: 54
End: 2019-09-06
Attending: FAMILY MEDICINE
Payer: MEDICAID

## 2019-09-06 DIAGNOSIS — I69.320 APHASIA AS LATE EFFECT OF CEREBROVASCULAR ACCIDENT: ICD-10-CM

## 2019-09-06 DIAGNOSIS — R47.01 EXPRESSIVE APHASIA: ICD-10-CM

## 2019-09-06 PROCEDURE — 92609 USE OF SPEECH DEVICE SERVICE: CPT

## 2019-09-06 PROCEDURE — 92507 TX SP LANG VOICE COMM INDIV: CPT | Mod: XU

## 2019-09-06 ASSESSMENT — SOCIAL DETERMINANTS OF HEALTH (SDOH): SOCIAL_SUPPORT_SYSTEM: PARENT

## 2019-09-06 NOTE — OP THERAPY DAILY TREATMENT
Outpatient Speech Therapy  DAILY TREATMENT     [facility] Speech [address]  [address]  [place] [address]  Phone:  [phone]  Fax:  [phone]    Date: 09/06/2019    Patient: Gilles Cobb Case  YOB: 1965  MRN: [id number]     Time Calculation  Start time: 1040  Stop time: 1135 Time Calculation (min): 55 minutes       Chief Complaint: Aphasia    Visit #: 9    Subjective:   Reason for Therapy:     Reason For Evaluation:  Aphasia (Expressive)  Social Support:     Accompanied By:  Parent (mother, present and supportive.  Mother waited in lobby during skilled therapy session per patient request.)    Patient Mental Status:  Alert and Responsive      Objective:   Treatments/Interventions Performed:  Speech/Language treatment, Compensatory strategy training and Patient/Caregiver education  Other Treatment Interventions:  Speech Generating Device (SGD) Therapy    Short Term Goals:  1.  Patient will demonstrate understanding and independence in completion of simple operations of SGD, including turning device on/ off, making changes to icons 80% accuracy, independent.  Current:  Patient educated on how to delete unwanted icons.  Patient required direct cues to sequence.      PLAN: Continue direct education/ training regarding making changes to icons.      2.  Patient will independently navigate SGD to participate in a simple greeting/ leave taking with familiar, progressing to unfamiliar listeners 4/5 obligatory contexts (80% accuracy).  Current: Greetings with unfamiliar listeners introduced, given patient to attend Aphasia support group later today.  Patient with need for initial, direct instruction to accurately locate icons for flow of simple conversation.  Use of cueing faded to min verbal only cues.   PLAN:  Continue to address independence in initiation of SGD.       3. Patient will independently navigate SGD to convey and independent need, thought or idea to familiar,  progressing to unfamiliar listeners 4/5 obligatory context (80% accuracy).  Current:  Navigation of SGD to previously modified topics/ buttons completed with patient independent in navigating 3-4 buttons to navigate to complete orientation to person, date, place; state personally relevant information including about me/ family.  Introduction of Topic icons to increase participation in simple conversation educated/ trained.  PLAN:  Continue with modification of SGD to meet patient independent communication needs.        4.  Patient will independently navigate SGD to combine two words combining modifier + noun/ verb (for example, good game) given moderate verbal prompts/ instruction/ cues 80% accuracy.   Current:  Not addressed.     5.  Patient will increase expressive language skills to independent state personal information including first, last name, age 4/5 (80% accuracy), given moderate verbal prompts/ instruction/ cues.   Current:  Patient independent in navigating SGD to state name, home address including city/ state, month, date and year.    PLAN:  GOAL MET/ Continue with modification of SGD.       6. Patient will increase expressive language skills completing confrontation naming tasks of nouns, verbs completing with 75% accuracy, given moderate verbal prompts/ instruction/ cues as needed.   Current:  Not addressed.  PLAN:    Continue to address speech-language skills during structured speech therapy sessions. Patient appears very motivated to imitate speech to single word level following access from SGD.        Speech Therapy Assessment:     Expressive Language Assessment:     Communicates using gestures: Severe.    Expressive language comments: Patient with perseverative pointing to window and increased frustration regarding inability to communicate. Patient encouraged to use SGD to find larger topic or icon related to what he was trying to communicate.  Using SGD, patient was independent in accessing  therapy icon to increase clinician understanding to the point necessary to communicate that he will be attending the Aphasia support group at [facility] later today.       Speech Therapy Plan :   Prognosis & Recommendations  Impression Summary:  Gilles Gavin, a 54 year old male, continued participation in direct, outpatient speech therapy addressing expressive language deficits following CVA.    Patient continues to rely in direct cues from the clinician to initiate and use SGD; although accuracy in use of device is improving allowing patient to provide the clinician with enough information during today's session to communicate a novel thought.       Prognosis:  Good  Prognosis Details:  Given patient motivation to communication and newly introduced SGD, expect continued improvements in accuracy of expressive language skill.   Therapy Recommendations  Recommendation:  Individual Speech Therapy,  Planned Therapy Interventions:  Home Program, Patient/Caregiver Education, Compensatory Strategy Training, Speech/Language training and Speech Gen Device Therapy,   Plan Details:  Continue with current POC.

## 2019-09-09 ENCOUNTER — SPEECH THERAPY (OUTPATIENT)
Dept: SPEECH THERAPY | Facility: REHABILITATION | Age: 54
End: 2019-09-09
Attending: FAMILY MEDICINE
Payer: MEDICAID

## 2019-09-09 DIAGNOSIS — I69.320 APHASIA AS LATE EFFECT OF CEREBROVASCULAR ACCIDENT: ICD-10-CM

## 2019-09-09 DIAGNOSIS — R47.01 EXPRESSIVE APHASIA: ICD-10-CM

## 2019-09-09 DIAGNOSIS — R48.2 APRAXIA: ICD-10-CM

## 2019-09-09 PROCEDURE — 92609 USE OF SPEECH DEVICE SERVICE: CPT

## 2019-09-09 PROCEDURE — 92507 TX SP LANG VOICE COMM INDIV: CPT | Mod: XU

## 2019-09-09 RX ORDER — ASPIRIN 81 MG/1
TABLET, CHEWABLE ORAL
Qty: 90 TAB | Refills: 1 | Status: SHIPPED | OUTPATIENT
Start: 2019-09-09 | End: 2019-12-09 | Stop reason: SDUPTHER

## 2019-09-09 ASSESSMENT — SOCIAL DETERMINANTS OF HEALTH (SDOH): SOCIAL_SUPPORT_SYSTEM: PARENT

## 2019-09-09 NOTE — OP THERAPY DAILY TREATMENT
Outpatient Speech Therapy  DAILY TREATMENT     [address]  [address]  [place] [address]  Phone:  [phone]  Fax:  [phone]    Date: 09/09/2019    Patient: Gilles Cobb Case  YOB: 1965  MRN: [id number]     Time Calculation  Start time: 0935  Stop time: 1030 Time Calculation (min): 55 minutes       Chief Complaint: Aphasia    Visit #: 10    Subjective:   Reason for Therapy:     Reason For Evaluation:  Aphasia  Social Support:     Accompanied By:  Parent (mother, present and supportive. Mother waited in lobby during skilled therapy session. )    Patient Mental Status:  Alert and Responsive  Progress Factors:     Progression:  Getting Better  Additional Subjective Comments:      Patient independent in accessing SGD to communicate orientation information and generate greeting/ leave taking with clinician.       Objective:   Treatments/Interventions Performed:  Home program, Patient/Caregiver education, Compensatory strategy training and Speech/Language treatment  Other Treatment Interventions:  Speech Generating Device (SGD) training    Structured speech therapy targeted improving communication skills incorporating use of SGD with focus on locating vocabulary on SGD specific to activities of daily living. Locating icons in SGD completed with 6/7 = 85.7% accuracy, given min verbal prompts/ instruction/ cues (28.5%).     Speech Therapy Assessment:     Expressive Language Assessment:     Ability to exhibit appropriate naming: Severe (Confrontation naming to single word level targeted object related to completion of activities of daily living (e.g. brush, pencil, etc). Patient completed with confrontation naming with 6/8 = 75% accuracy, given direct semantic/ phonemic cues).    Agrammatism is Present.    Paraphasic is Present.    Perseveration is Present.    Expressive language comments: Verbalizations remain limited to automatic speech productions of single  "words and simple phrase such as \"yep, ok, no\" \"I don't know\". Patient continues to necessitate direct verbal prompts/ instruction/ cues through semantic, phonemic cues to increase accuracy of verbalizations to confrontation naming. Presence of verbal apraxia continues to limit accuracy in speech production, even when direct prompts are provided.         Speech Therapy Plan :   Prognosis & Recommendations  Impression Summary:  Gilles Gavin, a 54 year old male, continued participation in direct, outpatient speech therapy addressing accuracy in expressive language skills production and development of alternative means of communication through speech generating device.    Patient appears to be more familiar with locations of specific vocabulary using SGD independent locating icons during today's session with 85.7% accuracy.  Patient continues to demonstrate increased accuracy in speech production with use of SGD, which provides patient with direct imitation prompts necessary given severity of patient expressive language skills, apraxia following CVA.      Prognosis:  Excellent  Prognosis Details:  Expect continued improvements in independence in communication and communicative competence with use of SGD given patient continues to demonstrate severe expressive language deficits secondary to Expressive Aphasia/ apraxia.   Goals  Short Term Goals:  1.  Patient will demonstrate understanding and independence in completion of simple operations of SGD, including turning device on/ off, making changes to icons 80% accuracy, independent.  2.  Patient will independently navigate SGD to participate in a simple greeting/ leave taking with familiar, progressing to unfamiliar listeners 4/5 obligatory contexts (80% accuracy).  3. Patient will independently navigate SGD to convey and independent need, thought or idea to familiar, progressing to unfamiliar listeners 4/5 obligatory context (80% accuracy).  4.  Patient will independently " navigate SGD to combine two words combining modifier + noun/ verb (for example, good game) given moderate verbal prompts/ instruction/ cues 80% accuracy.   5.  Patient will increase expressive language skills to independent state personal information including first, last name, age 4/5 (80% accuracy), given moderate verbal prompts/ instruction/ cues.   6. Patient will increase expressive language skills completing confrontation naming tasks of nouns, verbs completing with 75% accuracy, given moderate verbal prompts/ instruction/ cues as needed.   Therapy Recommendations  Recommendation:  Individual Speech Therapy,  Planned Therapy Interventions:  Home Program, Compensatory Strategy Training, Patient/Caregiver Education, Speech Gen Device Therapy and Speech/Language training,   Plan Details:  Continue with current POC.

## 2019-09-11 ENCOUNTER — SPEECH THERAPY (OUTPATIENT)
Dept: SPEECH THERAPY | Facility: REHABILITATION | Age: 54
End: 2019-09-11
Attending: FAMILY MEDICINE
Payer: MEDICAID

## 2019-09-11 DIAGNOSIS — I69.320 APHASIA AS LATE EFFECT OF CEREBROVASCULAR ACCIDENT: ICD-10-CM

## 2019-09-11 DIAGNOSIS — R48.2 APRAXIA: ICD-10-CM

## 2019-09-11 DIAGNOSIS — R47.01 EXPRESSIVE APHASIA: ICD-10-CM

## 2019-09-11 PROCEDURE — 92507 TX SP LANG VOICE COMM INDIV: CPT

## 2019-09-11 ASSESSMENT — SOCIAL DETERMINANTS OF HEALTH (SDOH): SOCIAL_SUPPORT_SYSTEM: PARENT

## 2019-09-11 NOTE — OP THERAPY DAILY TREATMENT
Outpatient Speech Therapy  DAILY TREATMENT     [address]  [address]  [place] [address]  Phone:  [phone]  Fax:  [phone]    Date: 09/11/2019    Patient: Gilles Cobb Case  YOB: 1965  MRN: [id number]     Time Calculation  Start time: 0930  Stop time: 1030 Time Calculation (min): 60 minutes       Chief Complaint: Aphasia    Visit #: 11    Subjective:   Reason for Therapy:     Reason For Evaluation:  Aphasia  Social Support:     Accompanied By:  Parent (mother, present and supportive. Mother waited in lobby during outpatient speech therapy session. )    Patient Mental Status:  Alert and Responsive  Additional Subjective Comments:      Patient arrived to speech therapy in good spirits. Patient participated well with all structured speech therapy activities.     Patient again arrived to speech therapy demonstrating independence in use of SGD to generate greeting/ state orientation information related to month, date, year.       Objective:   Treatments/Interventions Performed:  Patient/Caregiver education, Compensatory strategy training, Speech/Language treatment and Home program  Other Treatment Interventions:  Speech Generating Device SGD Therapy    Structured speech therapy targeted improving communication skills incorporating use of SGD with focus on locating vocabulary on SGD specific to activities of daily living. Locating icons in SGD completed with 7/9 = 77.8% accuracy, given min verbal prompts/ instruction/ cues (33.3%).  Patient demonstrating independence in navigation to a minimum of 2 pages to locate icons.     Modification of SGD continued with education related to deleting buttons/ icons not necessary to patient communication needs.  Follow initial, direct instruction, patient accurately deleted button to min verbal prompts/ instruction/ cues only.     Speech Therapy Assessment:     Expressive Language Assessment:     Ability to exhibit  appropriate naming: Severe.    Expressive language comments: Confrontation naming to direct picture cues targeted with patient completing with 8/8 = 100% accuracy, given direct semantic, phonemic cues through carrier phrase and first letter sound prompts 6/8 = 75% of the time.  Patient noted to demonstrate x2 independent verbalizations to naming providing cup and money independent.     Patient independent verbalizations remain characteristic of severe expressive aphasia resulting in severe-profound word finding difficulties, severely limiting independence in verbal expression.       Speech Therapy Plan :   Prognosis & Recommendations  Impression Summary:  Gilles Case, a 54 year old male, continued participation in direct, outpatient speech therapy addressing accuracy in expressive language skills production and development of alternative means of communication through speech generating device.    Patient continues to demonstrate progress with outpatient speech therapy as evidenced by increased independence in navigation of SGD to locate specific vocabulary, in addition to continued independence in use of SGD to participate in greeting and leave taking.    Use of SGD to state a novel, independent thought or idea proves to be area of increased difficulty for patient at this time.  Continued, direct outpatient speech therapy recommended.   Prognosis:  Good  Prognosis Details:  Expect continued improvement in outpatient speech therapy addressing communication.   Goals  Short Term Goals: 1.  Patient will demonstrate understanding and independence in completion of simple operations of SGD, including turning device on/ off, making changes to icons 80% accuracy, independent.  2.  Patient will independently navigate SGD to participate in a simple greeting/ leave taking with familiar, progressing to unfamiliar listeners 4/5 obligatory contexts (80% accuracy).  3. Patient will independently navigate SGD to convey and  independent need, thought or idea to familiar, progressing to unfamiliar listeners 4/5 obligatory context (80% accuracy).  4.  Patient will independently navigate SGD to combine two words combining modifier + noun/ verb (for example, good game) given moderate verbal prompts/ instruction/ cues 80% accuracy.   5.  Patient will increase expressive language skills to independent state personal information including first, last name, age 4/5 (80% accuracy), given moderate verbal prompts/ instruction/ cues.   6. Patient will increase expressive language skills completing confrontation naming tasks of nouns, verbs completing with 75% accuracy, given moderate verbal prompts/ instruction/ cues as needed.

## 2019-09-16 ENCOUNTER — SPEECH THERAPY (OUTPATIENT)
Dept: SPEECH THERAPY | Facility: REHABILITATION | Age: 54
End: 2019-09-16
Attending: FAMILY MEDICINE
Payer: MEDICAID

## 2019-09-16 DIAGNOSIS — R47.01 EXPRESSIVE APHASIA: ICD-10-CM

## 2019-09-16 DIAGNOSIS — R48.2 APRAXIA: ICD-10-CM

## 2019-09-16 DIAGNOSIS — I69.320 APHASIA AS LATE EFFECT OF CEREBROVASCULAR ACCIDENT: ICD-10-CM

## 2019-09-16 PROCEDURE — 92507 TX SP LANG VOICE COMM INDIV: CPT

## 2019-09-16 ASSESSMENT — SOCIAL DETERMINANTS OF HEALTH (SDOH): SOCIAL_SUPPORT_SYSTEM: PARENT

## 2019-09-16 NOTE — OP THERAPY DAILY TREATMENT
Outpatient Speech Therapy  DAILY TREATMENT     [address]  [address]  [place] [address]  Phone:  [phone]  Fax:  [phone]    Date: 09/16/2019    Patient: Gilles Cobb Case  YOB: 1965  MRN: [id number]     Time Calculation  Start time: 1000  Stop time: 1100 Time Calculation (min): 60 minutes       Chief Complaint: Aphasia    Visit #: 12    Subjective:   Reason for Therapy:     Reason For Evaluation:  Aphasia  Social Support:     Accompanied By:  Parent (mother, present and supportive. Mother continues to wait in lobby during outpatient speech therapy sessions at patient request. )    Patient Mental Status:  Alert and Responsive  Progress Factors:     Progression:  Getting Better  Additional Subjective Comments:      Patient arrived to speech therapy in good spirits. No recent changes to medical history noted or reported.       Objective:   Treatments/Interventions Performed:  Speech/Language treatment, Compensatory strategy training, Patient/Caregiver education and Home program  Other Treatment Interventions:  Speech Generating Device (SGD) Therapy    Speech therapy targeted improving communication through improving understanding and independence use of SGD.  Modification of SGD completed to improve patient independence in communication related to topics of interest, specifically football.  Accuracy in navigation of SGD to target specific vocabulary related to request or identifying objects necessary to complete activities of daily living completed to 6/7 = 85.7% accuracy, independent. Accuracy in verbal expression also targeted through completion of structured, speech production tasks with patient completing to 7/7 = 100% accuracy, with patient necessitating direct verbal prompts/ instruction/ cues through use of semantic, phonemic cues.     Speech Therapy Assessment:     Expressive Language Assessment:     Ability to exhibit appropriate naming: Severe  "(to independent level. Given direct prompts/ instruction/ cues, patient accuracy improves to over 90%.).    Paraphasic is Present.    Perseveration is Present.    Expressive language comments: Patient continues to attempt to use gestures to increase communicative competence when experiencing difficulty or breakdown in listener understanding; however, patient use of gesture is not adequate in meeting patient communication need. Patient continues to require direct cues from the clinician to initiate alternative means of communication through communication device.     Patient independent verbalizations remain limited to production of repetitive utterances \"ummm\" and \"I don't know.\"       Speech Therapy Plan :   Prognosis & Recommendations  Impression Summary:  Gilles Gavin, a 54 year old male, continued participation in direct, outpatient speech therapy addressing deficits in expressive language and speech production skills following CVA.     Patient continues to necessitate direct verbal prompts/ instruction/ cues to initiate use of SGD to improve communication with clinician in the structured clinic setting.  Consultation with patient parent revealed that patient is using SGD in the home setting; however, \"it still takes me a long time to figure out what he is trying to say.\"  Parent encouraged to make word list/ provide ST with examples of breakdown in conversation to allow ST to make modifications to SGD as necessary to improve access to language necessary in the home/ community settings to better meet patient communication needs.   Prognosis:  Good  Prognosis Details:  Patient demonstrating improved independence and is requiring less time to locate items on SGD for primary communication needs; although patient continues to require direct cues to access/ use SGD given obligatory opportunities.   Therapy Recommendations  Recommendation:  Individual Speech Therapy,  Planned Therapy Interventions:  Home Program, " Patient/Caregiver Education, Compensatory Strategy Training, Speech/Language training and Speech Gen Device Therapy,   Plan Details:  Continue with current POC.

## 2019-09-18 ENCOUNTER — SPEECH THERAPY (OUTPATIENT)
Dept: SPEECH THERAPY | Facility: REHABILITATION | Age: 54
End: 2019-09-18
Attending: FAMILY MEDICINE
Payer: MEDICAID

## 2019-09-18 DIAGNOSIS — R48.2 APRAXIA: ICD-10-CM

## 2019-09-18 DIAGNOSIS — R47.01 EXPRESSIVE APHASIA: ICD-10-CM

## 2019-09-18 DIAGNOSIS — I69.320 APHASIA AS LATE EFFECT OF CEREBROVASCULAR ACCIDENT: ICD-10-CM

## 2019-09-18 PROCEDURE — 92609 USE OF SPEECH DEVICE SERVICE: CPT

## 2019-09-18 PROCEDURE — 92507 TX SP LANG VOICE COMM INDIV: CPT

## 2019-09-18 ASSESSMENT — SOCIAL DETERMINANTS OF HEALTH (SDOH): SOCIAL_SUPPORT_SYSTEM: PARENT

## 2019-09-18 NOTE — OP THERAPY DAILY TREATMENT
Outpatient Speech Therapy  DAILY TREATMENT     [address]  [address]  [place] [address]  Phone:  [phone]  Fax:  [phone]    Date: 09/18/2019    Patient: Gilles Cobb Case  YOB: 1965  MRN: [id number]     Time Calculation  Start time: 1030  Stop time: 1135 Time Calculation (min): 65 minutes       Chief Complaint: Aphasia    Visit #: 13    Subjective:   Reason for Therapy:     Reason For Evaluation:  Aphasia  Social Support:     Accompanied By:  Parent (mother, present and supportive.  Mother waited in lobby during structured speech therapy session. )    Patient Mental Status:  Alert and Responsive  Additional Subjective Comments:      Patient arrived to outpatient speech therapy. Patient worked well with all structured therapy tasks.       Objective:   Treatments/Interventions Performed:  Patient/Caregiver education and Speech/Language treatment  Other Treatment Interventions:  Speech Generating Device (SGD) Therapy    Structured speech therapy targeted improving accuracy in expressive language skills through continued development of Speech Generating Device to meet patient daily communication needs.     Speech Therapy Assessment:     Expressive Language Assessment:     Expressive language comments: Speech generating device therapy targeted modification of SGD device to meet patient communication needs related to activities of daily living, specifically patient communication needs related to activities in the community-going to the [facility]. Once modified, patient was independent in navigation of device to toggle between 2-3 pages independent.       Speech Therapy Plan :   Prognosis & Recommendations  Impression Summary:  Gilles Gavin, a 54 year old male, continued participation in direct, outpatient speech therapy addressing communication skills through development of speech generating device.     Patient continues to demonstrate progress with use of  SGD, reporting he is using it more in the community.   Prognosis:  Good  Prognosis Details:  Expect continued improvement in communication skills using SGD.   Therapy Recommendations  Recommendation:  Individual Speech Therapy,  Planned Therapy Interventions:  Home Program, Patient/Caregiver Education, Compensatory Strategy Training and Speech Gen Device Therapy,   Plan Details:  Continue with current POC.

## 2019-09-23 ENCOUNTER — SPEECH THERAPY (OUTPATIENT)
Dept: SPEECH THERAPY | Facility: REHABILITATION | Age: 54
End: 2019-09-23
Attending: FAMILY MEDICINE
Payer: MEDICAID

## 2019-09-23 DIAGNOSIS — I69.320 APHASIA AS LATE EFFECT OF CEREBROVASCULAR ACCIDENT: ICD-10-CM

## 2019-09-23 DIAGNOSIS — R47.01 EXPRESSIVE APHASIA: ICD-10-CM

## 2019-09-23 DIAGNOSIS — R48.2 APRAXIA: ICD-10-CM

## 2019-09-23 PROCEDURE — 92609 USE OF SPEECH DEVICE SERVICE: CPT

## 2019-09-23 PROCEDURE — 92507 TX SP LANG VOICE COMM INDIV: CPT | Mod: XU

## 2019-09-23 ASSESSMENT — SOCIAL DETERMINANTS OF HEALTH (SDOH): SOCIAL_SUPPORT_SYSTEM: PARENT

## 2019-09-23 NOTE — OP THERAPY PROGRESS SUMMARY
Outpatient Speech Therapy  PROGRESS NOTE      [facility]  [address]  [address]  [place] [address]  Phone:  [phone]  Fax:  [phone]    Date of Visit: 09/23/2019    Patient: Gilles Cobb Case  YOB: 1965  MRN: [id number]     Referring Provider: Santos Ramirez M.D.  [address]  [place] [address]   Referring Diagnosis Aphasia following cerebral infarction [I69.320]      Visit #: 14    Speech Therapy Occurrence Codes    Date of Onset of Impairment:  1/9/18   Date speech therapy care plan established or reviewed:  7/16/19   Date speech therapy treatment started:  7/16/19          Chief Complaint: Aphasia    Visit Diagnoses     ICD-10-CM   1. Aphasia as late effect of cerebrovascular accident I69.320   2. Expressive aphasia R47.01   3. Apraxia R48.2       Subjective:   Reason for Therapy:     Onset Description:  Patient 54 year old male returned to outpatient speech therapy following acquisition of Speech Generating Device for Augmentative Alternative Communication means secondary to long standing, severe expressive Aphasia following CVA.       Objective:   Treatments/Interventions Performed:  Patient/Caregiver education, Speech/Language treatment, Compensatory strategy training and Home program  Other Treatment Interventions:  Speech Generating Device (SGD) Therapy  Treatment Intervention tool(s) used:  Structured speech therapy targeted improving communication skills incorporating use of SGD with focus on locating vocabulary on SGD specific to activities of daily living.      Speech Therapy Assessment:     Expressive Language Assessment:     Portions of the Other are used.    Communicates using gestures: Severe.    Ability to exhibit appropriate naming: Minimal (using SGD to confrontation naming tasks).    Paraphasic is Present.    Perseveration is Present.    Expressive language comments: Patient independent verbalizations remain characteristic of severe  expressive aphasia resulting in severe-profound word finding difficulties, severely limiting independence in verbal expression.       Speech Therapy Plan :   Prognosis & Recommendations  Impression Summary:  Gilles Gavin, a 54 year old male, has participated in direct, outpatient speech therapy addressing expressive language skills with increased focus on establishment and use of alternative communication means through Speech Generating Device.      Patient has demonstrated progress with SGD, characterized by increased independence in communication of personally relevant information with patient knowledgeable to toggle device between up to 3 pages to locate desired information during confrontation naming tasks, for example.     Despite progress, patient continues to necessitate verbal cues when experiencing a communication breakdown to access and initiate use of SGD versus attempting to communicate with gesture, which is often ineffective, when attempting to communicate a novel thought, idea, want/ need, or frustration.   Prognosis:  Good  Prognosis Details:  Continued participation in direct, outpatient speech therapy addressing deficits in expressive language skills through further training and development of SGD recommended to increase patient ability to independently communicate novel thoughts, ideas with familiar and unfamiliar listeners.   Goals  Short Term Goals:  1.  Patient will demonstrate understanding and independence in completion of operations of SGD, including making changes to icons 80% accuracy, independent.  2.  Patient will independently navigate SGD to convey and independent need, thought or idea to familiar, progressing to unfamiliar listeners 4/5 obligatory context (80% accuracy).  4.  Patient will independently navigate SGD to combine two words combining modifier + noun/ verb (for example, good game) given moderate verbal prompts/ instruction/ cues 80% accuracy.    5.  Patient will increase  expressive language skills completing confrontation naming tasks of nouns, verbs completing with 85% accuracy, independent using SGD.     Patient progression on Short Term Goals:  1.  Patient will demonstrate understanding and independence in completion of simple operations of SGD, including turning device on/ off, making changes to icons 80% accuracy, independent: GOAL PARTIALLY MET. Patient independent in turning device on/ off.  Patient continues to necessitate direct cues to make changes to icons.   2.  Patient will independently navigate SGD to participate in a simple greeting/ leave taking with familiar, progressing to unfamiliar listeners 4/5 obligatory contexts (80% accuracy):  GOAL MET.  3. Patient will independently navigate SGD to convey and independent need, thought or idea to familiar, progressing to unfamiliar listeners 4/5 obligatory context (80% accuracy).  4.  Patient will independently navigate SGD to combine two words combining modifier + noun/ verb (for example, good game) given moderate verbal prompts/ instruction/ cues 80% accuracy:  GOAL NOT ADDRESSED.    5.  Patient will increase expressive language skills to independent state personal information including first, last name, age 4/5 (80% accuracy), given moderate verbal prompts/ instruction/ cues:  GOAL MET using SGD.   6. Patient will increase expressive language skills completing confrontation naming tasks of nouns, verbs completing with 75% accuracy, given moderate verbal prompts/ instruction/ cues as needed:  GOAL MET using SGD.       Patient progression on Long Term Goals:  1.  Using SGD, patient will demonstrate independence in generating a need, thought or idea to increase independence in communication with familiar, unfamiliar listeners 4/5 (80%) of obligatory contexts: IN PROGRESS/ CONTINUE.   2.  Patient will increase expressive language skills to demonstrate independence in communication of simple, personally relevant information  to familiar, unfamiliar listeners 80% accuracy:  IN PROGRESS/ CONTINUE.   Therapy Recommendations  Recommendation:  Individual Speech Therapy,  Planned Therapy Interventions:  Home Program, Patient/Caregiver Education, Speech Gen Device Therapy and Speech/Language training,   Plan Details:  Plan of care to be updated to reflect patient progress, but continue with recommendations regarding patient communication skills need, specific to use of expressive language and further development/ modification of SGD.     20 units (70106)  Frequency:  2x week  Duration (in weeks):  10        Functional Assessment Used  Data collection/ skilled observation during structured outpatient speech therapy sessions      Referring provider co-signature:  I have reviewed this plan of care and my co-signature certifies the need for services.  Certification Dates:   From 09/23/19     To 12/02/19    Physician Signature: ________________________________ Date: ______________

## 2019-09-23 NOTE — OP THERAPY DAILY TREATMENT
"  Outpatient Speech Therapy  DAILY TREATMENT     [facility] [address]  [address]  [place] [address]  Phone:  [phone]  Fax:  [phone]    Date: 09/23/2019    Patient: Gilles Cobb Case  YOB: 1965  MRN: [id number]     Time Calculation  Start time: 1000  Stop time: 1100 Time Calculation (min): 60 minutes       Chief Complaint: Aphasia    Visit #: 14    Subjective:   Reason for Therapy:     Reason For Evaluation:  Aphasia  Social Support:     Accompanied By:  Parent (mother, present and supportive. Mother waited in lobby during speech therapy session. )    Patient Mental Status:  Alert and Responsive  Additional Subjective Comments:      Patient arrived to speech therapy with obvious frustration to clinician inquiries regarding attendance to local Aphasia group. Patient/ mother were consulted to assist in understanding patient frustration which was related to unable to attend secondary to transportation.      Patient/ mother provided with information related to RTC Access, to provide patient an opportunity for independence in the community. Patient/ parent to discuss option further and determine if this is something they wish to seek further information regarding.       Objective:   Treatments/Interventions Performed:  Patient/Caregiver education, Compensatory strategy training, Speech/Language treatment and Home program  Other Treatment Interventions:  Speech Generating Device (SGD)    Speech therapy targeting improving communication skills through expressive language therapy targeted in combination with use of SGD.  Accuracy in navigation to SGD to complete confrontation naming to pictures targeted with patient independent in naming 4/5 pictures, demonstrating 80% accuracy, independent.      Therapy continued with patient then asked to identify object-function using SGD. Patient requiring direct imitation cues initially to access newly introduction icon \"Actions\" " with patient then requiring scaffolded cues to modified independent level to access/ identifying icons, completing to 2/5 = 40% accuracy.     Speech Therapy Assessment:     Expressive Language Assessment:     Communicates using gestures: Severe.    Ability to exhibit appropriate naming: Minimal (to nouns; Using SGD).    Paraphasic is Present.    Perseveration is Present.    Expressive language comments: Patient expressive language remains severely-profound impacted by presence of expressive Aphasia. Patient was unable to independent generate speech to communicate his frustration at the beginning of the speech therapy session; with patient unable to effectively SGD to communicate frustration as well; inevitably requiring the clinician to seek clarification from patient mother.         Speech Therapy Plan :   Prognosis & Recommendations  Impression Summary:  Gilles Gavin, a 54 year old male, has participated in direct, outpatient speech therapy addressing expressive language skills with increased focus on establishment and use of alternative communication means through Speech Generating Device.     Patient has demonstrated progress with SGD, characterized by increased independence in communication of personally relevant information with patient knowledgeable to toggle device between up to 3 pages to locate desired information during confrontation naming tasks, for example.    Despite progress, patient continues to necessitate verbal cues when experiencing a communication breakdown to access and initiate use of SGD versus attempting to communicate with gesture, which is often ineffective, when attempting to communicate a novel thought, idea, want/ need, or frustration, as what happened at the start of today's therapy session.       Prognosis:  Good  Prognosis Details:  Continued participation in direct, outpatient speech therapy addressing deficits in expressive language skills through further training and development  of SGD recommended to increase patient ability to independently communicate novel thoughts, ideas with familiar and unfamiliar listeners.   Therapy Recommendations  Recommendation:  Individual Speech Therapy,  Planned Therapy Interventions:  Home Program, Patient/Caregiver Education, Speech/Language training and Speech Gen Device Therapy,   Plan Details:  Plan of care to be updated to reflect patient progress, but continue with recommendations regarding patient communication skills need, specific to use of expressive language and further development/ modification of SGD.

## 2019-09-24 ENCOUNTER — OFFICE VISIT (OUTPATIENT)
Dept: MEDICAL GROUP | Facility: MEDICAL CENTER | Age: 54
End: 2019-09-24
Attending: FAMILY MEDICINE
Payer: MEDICAID

## 2019-09-24 VITALS
HEART RATE: 84 BPM | OXYGEN SATURATION: 96 % | HEIGHT: 73 IN | RESPIRATION RATE: 16 BRPM | BODY MASS INDEX: 23.99 KG/M2 | DIASTOLIC BLOOD PRESSURE: 70 MMHG | SYSTOLIC BLOOD PRESSURE: 126 MMHG | WEIGHT: 181 LBS | TEMPERATURE: 98.6 F

## 2019-09-24 DIAGNOSIS — R47.1 DYSARTHRIA: ICD-10-CM

## 2019-09-24 DIAGNOSIS — Z86.73 HISTORY OF STROKE: ICD-10-CM

## 2019-09-24 DIAGNOSIS — I10 ESSENTIAL HYPERTENSION: ICD-10-CM

## 2019-09-24 DIAGNOSIS — I63.039 CEREBROVASCULAR ACCIDENT (CVA) DUE TO THROMBOSIS OF CAROTID ARTERY, UNSPECIFIED BLOOD VESSEL LATERALITY (HCC): ICD-10-CM

## 2019-09-24 DIAGNOSIS — Z12.11 SCREENING FOR MALIGNANT NEOPLASM OF COLON: ICD-10-CM

## 2019-09-24 PROCEDURE — 99213 OFFICE O/P EST LOW 20 MIN: CPT | Performed by: FAMILY MEDICINE

## 2019-09-24 PROCEDURE — 99214 OFFICE O/P EST MOD 30 MIN: CPT | Performed by: FAMILY MEDICINE

## 2019-09-24 PROCEDURE — 99203 OFFICE O/P NEW LOW 30 MIN: CPT | Performed by: FAMILY MEDICINE

## 2019-09-24 RX ORDER — CYCLOBENZAPRINE HCL 10 MG
10 TABLET ORAL 3 TIMES DAILY PRN
Qty: 60 TAB | Refills: 11 | Status: SHIPPED | OUTPATIENT
Start: 2019-10-09 | End: 2020-09-30 | Stop reason: SDUPTHER

## 2019-09-24 NOTE — ASSESSMENT & PLAN NOTE
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  He subsequently had extensive rehabilitation and is currently still participating in outpatient speech therapy.  He also has had physical therapy and OT in the past.  He is not currently interested in resuming occupational therapy.  He lives at home with his mom.  He does walk longer distances with a cane walks slowly without assistance while at home.  Also has an electric scooter.  He swallows without difficulty and no longer has a PEG tube.  He does have a upcoming initial evaluation with [facility].  Patient reports that he had gotten great benefit and also even some mental relaxation from using [employer] drill in the past.  He is been out for 3 or 4 months and would like the Flexeril refilled.

## 2019-09-24 NOTE — ASSESSMENT & PLAN NOTE
Patient is compliant taking 5 mg of lisinopril.  He is no longer on low-dose amlodipine.  Not reporting any chest pain, headaches, palpitations

## 2019-09-24 NOTE — ASSESSMENT & PLAN NOTE
Patient has moderately severe dysarthria but is able to in a slow rhythm communicate and respond to most questions.  He also has a [employer] communicating instrument at home that helps him if he gets stuck.  Patient is not reporting any choking episodes.  He is not reporting any recent falls.

## 2019-09-24 NOTE — PROGRESS NOTES
Chief Complaint   Patient presents with   • Establish Care       HISTORY OF PRESENT ILLNESS: Patient is a 54 y.o. 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  He subsequently had extensive rehabilitation and is currently still participating in outpatient speech therapy.  He also has had physical therapy and OT in the past.  He is not currently interested in resuming occupational therapy.  He lives at home with his mom.  He does walk longer distances with a cane walks slowly without assistance while at home.  Also has an electric scooter.  He swallows without difficulty and no longer has a PEG tube.  He does have a upcoming initial evaluation with [facility] neurology.  Patient reports that he had gotten great benefit and also even some mental relaxation from using [employer] drill in the past.  He is been out for 3 or 4 months and would like the Flexeril refilled.    Dysarthria  Patient has moderately severe dysarthria but is able to in a slow rhythm communicate and respond to most questions.  He also has a [employer] communicating instrument at home that helps him if he gets stuck.  Patient is not reporting any choking episodes.  He is not reporting any recent falls.    HTN (hypertension)  Patient is compliant taking 5 mg of lisinopril.  He is no longer on low-dose amlodipine.  Not reporting any chest pain, headaches, palpitations    Social history-single, disabled  Patient Active Problem List    Diagnosis Date Noted   • HTN (hypertension) 12/20/2017     Priority: High   • CVA (cerebral vascular accident) (HCC) 12/19/2017     Priority: High   • History of stroke 03/28/2019   • Dysphasia 01/29/2019   • Dysarthria 07/12/2018   • Right sided weakness 07/12/2018   •  "Advance care planning 12/21/2017       Allergies:Poison oak extract [extract of poison oak]    Current Outpatient Medications   Medication Sig Dispense Refill   • cyclobenzaprine (FLEXERIL) 10 MG Tab Take 1 Tab by mouth 3 times a day as needed. 60 Tab 11   • aspirin (ASA) 81 MG Chew Tab chewable tablet CHEW 1 TABLET ORALLY ONCE DAILY 90 Tab 1   • lisinopril (PRINIVIL) 5 MG Tab TAKE 1 TABLET ORALLY ONCE DAILY 30 Tab 1   • atorvastatin (LIPITOR) 40 MG Tab TAKE 1 TABLET ORALLY ONCE DAILY 30 Tab 1   • Misc. Devices Misc Speech machine to set up phrases that patient can choose  And machine can speak that info to another person  For communication help. 1 Each 0     No current facility-administered medications for this visit.        Social History     Tobacco Use   • Smoking status: Former Smoker     Years: 1.00     Types: Cigarettes   • Smokeless tobacco: Never Used   Substance Use Topics   • Alcohol use: Yes     Comment: occasional   • Drug use: No       Family History   Family history unknown: Yes       ROS:  Review of Systems   Constitutional: Negative for fever, chills, weight loss and malaise/fatigue.   Eyes: Negative for blurred vision.   Respiratory: Negative for cough, sputum production, shortness of breath and wheezing.    Cardiovascular: Negative for chest pain, palpitations, orthopnea and leg swelling.   Gastrointestinal: Negative for heartburn, nausea, vomiting and abdominal pain.   Musculoskeletal: Negative for myalgias, back pain and joint pain.   Endo/Heme/Allergies: Does not bruise/bleed easily.               Exam:  /70 (BP Location: Left arm, Patient Position: Sitting, BP Cuff Size: Adult)   Pulse 84   Temp 37 °C (98.6 °F) (Temporal)   Resp 16   Ht 1.854 m (6' 0.99\")   Wt 82.1 kg (181 lb)   SpO2 96%   General:  Well nourished, well developed male in NAD  Head is grossly normal.  Neck: Supple without JVD or bruit. Thyroid is not enlarged. Trachea is midline.  Pulmonary: Clear to ausculation .  " Normal effort. No rales, ronchi, or wheezing.  Cardiovascular: Regular rate and rhythm without murmur.  Abdomen-Abdomen is soft, normal bowel sounds, no masses, guarding, ororganomegaly, or tenderness.  Lower extremities- neg for pretibial edema, redness, tenderness.  AFO brace on the right calf and foot.  Minimal flexion at the right knee with persistent increased tone throughout the right lower extremity.  Right upper extremity has very clumsy and limited grasp with slow release.  Patient has a very halting and limited gait using a single-point cane      Please note that this dictation was created using voice recognition software. I have made every reasonable attempt to correct obvious errors, but I expect that there are errors of grammar and possibly content that I did not discover before finalizing the note.    Assessment/Plan:  1. Cerebrovascular accident (CVA) due to thrombosis of carotid artery, unspecified blood vessel laterality (HCC)     2. Essential hypertension     3. History of stroke     4. Dysarthria       Plan: 1.  Patient and his mother interested in a smaller volume right AFO brace that would fit more comfortably in his right shoe and not requiring 2 x 2 different size shoes.  They will check with Medicaid FFS and see who the contracted provider for bracing might be.  2.  Rx Flexeril at 10 mg up to twice daily as needed  3.  Patient was cautioned to try and reduce beer from 3 standard cans to 2 standard cans per day if possible  4.  Revisit with me in 3-month

## 2019-09-25 ENCOUNTER — SPEECH THERAPY (OUTPATIENT)
Dept: SPEECH THERAPY | Facility: REHABILITATION | Age: 54
End: 2019-09-25
Attending: FAMILY MEDICINE
Payer: MEDICAID

## 2019-09-25 ENCOUNTER — HOSPITAL ENCOUNTER (OUTPATIENT)
Facility: MEDICAL CENTER | Age: 54
End: 2019-09-25
Attending: FAMILY MEDICINE
Payer: MEDICAID

## 2019-09-25 DIAGNOSIS — R47.01 EXPRESSIVE APHASIA: ICD-10-CM

## 2019-09-25 DIAGNOSIS — R48.2 APRAXIA: ICD-10-CM

## 2019-09-25 DIAGNOSIS — I69.320 APHASIA AS LATE EFFECT OF CEREBROVASCULAR ACCIDENT: ICD-10-CM

## 2019-09-25 PROCEDURE — 82274 ASSAY TEST FOR BLOOD FECAL: CPT

## 2019-09-25 PROCEDURE — 92507 TX SP LANG VOICE COMM INDIV: CPT

## 2019-09-25 NOTE — OP THERAPY DAILY TREATMENT
"  Outpatient Speech Therapy  DAILY TREATMENT     [facility] [address]  [address]  [place] [address]  Phone:  [phone]  Fax:  [phone]    Date: 09/25/2019    Patient: Gilles Cobb Case  YOB: 1965  MRN: [id number]     Time Calculation  Start time: 1030  Stop time: 1130 Time Calculation (min): 60 minutes       Chief Complaint: Aphasia    Visit #: 15    Subjective:   Reason for Therapy:     Reason For Evaluation:  Aphasia  Social Support:     Social support system: unaccompanied.    Patient Mental Status:  Alert and Responsive  Additional Subjective Comments:      Patient returned to speech therapy with information completed for RTC Access. Patient/ mother advised to now contact number listed in information given to schedule patient interview for set up of transportation.       Objective:   Treatments/Interventions Performed:  Patient/Caregiver education, Compensatory strategy training, Home program and Speech/Language treatment  Other Treatment Interventions:  Speech Generating Device (SGD) Therapy    Speech therapy targeted improving communication skills through expressive language therapy targeted in combination with use of SGD.  Use of SGD focused on use of newly introduced \"Core\" icon with understanding regarding beginning simple sentence formation targeted. Patient required direct imitation with faded/ scaffolded cues moderate verbal/ visual to navigate device to generate simple sentence regarding to communication of feelings (I feel annoyed), physical states (my X hurts), and requests for objects/ actions (I want my cane).        Speech Therapy Assessment:     Expressive Language Assessment:     Expressive language comments: Patient progressing in use of SGD to increase expressive language skills with patient provided with education/ training in use of Core language icon related to set up of simple sentences.     Use of SGD remains necessary given patient " continues to demonstrate severe expressive aphasia, apraxia limiting independence in independent communication.       Speech Therapy Plan :   Prognosis & Recommendations  Impression Summary:  Gilles Case, a 54 year old male, continued participation in direct, outpatient speech therapy addressing continued development of SGD given patient presentation of severe Expressive Aphasia, apraxia following CVA.    Patient continues to progress in independent use of SGD, with patient provided with education/ training regarding initiation of simple sentences using SGD.  Patient understanding with regards to location of specific vocabulary has resulted in increased independence in generation of simple language to (I + want) level at this time.      Prognosis:  Excellent  Prognosis Details:  Expect continued improvement in expressive language skills with use of SGD.   Therapy Recommendations  Recommendation:  Individual Speech Therapy,  Planned Therapy Interventions:  Home Program, Patient/Caregiver Education, Compensatory Strategy Training, Speech/Language training and Speech Gen Device Therapy,   Plan Details:  Continue with current POC.

## 2019-09-30 ENCOUNTER — SPEECH THERAPY (OUTPATIENT)
Dept: SPEECH THERAPY | Facility: REHABILITATION | Age: 54
End: 2019-09-30
Attending: FAMILY MEDICINE
Payer: MEDICAID

## 2019-09-30 DIAGNOSIS — Z12.11 SCREENING FOR MALIGNANT NEOPLASM OF COLON: ICD-10-CM

## 2019-09-30 DIAGNOSIS — I69.320 APHASIA AS LATE EFFECT OF CEREBROVASCULAR ACCIDENT: ICD-10-CM

## 2019-09-30 DIAGNOSIS — R48.2 APRAXIA: ICD-10-CM

## 2019-09-30 DIAGNOSIS — R47.01 EXPRESSIVE APHASIA: ICD-10-CM

## 2019-09-30 PROCEDURE — 92507 TX SP LANG VOICE COMM INDIV: CPT

## 2019-09-30 ASSESSMENT — SOCIAL DETERMINANTS OF HEALTH (SDOH): SOCIAL_SUPPORT_SYSTEM: PARENT

## 2019-09-30 NOTE — OP THERAPY DAILY TREATMENT
"  Outpatient Speech Therapy  DAILY TREATMENT     [facility] Speech [address].  [address]  [place] [address]  Phone:  [phone]  Fax:  [phone]    Date: 09/30/2019    Patient: Gilles Cobb Case  YOB: 1965  MRN: [id number]     Time Calculation  Start time: 1000  Stop time: 1100 Time Calculation (min): 60 minutes       Chief Complaint: Aphasia    Visit #: 16    Subjective:   Reason for Therapy:     Reason For Evaluation:  Aphasia  Social Support:     Accompanied By:  Parent (mother, present and supportive, waited in lobby)    Patient Mental Status:  Alert and Responsive      Objective:   Treatments/Interventions Performed:  Patient/Caregiver education, Home program and Speech/Language treatment  Other Treatment Interventions:  Speech Generating Device Therapy (SGD)    Speech therapy targeted improving communication skills through expressive language therapy targeted through use of Speech Generating Device.  Use of SGD continued in focus on use of Core tab designed to assist patient in generation of simple sentences to communicate feelings and requests for objects/ actions.     Using SGD, and moderate verbal prompts from the clinician, patient provided emotional states to (she/he/they is/are/feels X) with 4/5 = 80% accuracy.  Generating requests to objects/ actions targeted to (I want/ need X) with patient completing with 4/4 initially to \"I want X\" 4/4 with scaffolded verbal, visual cues.  Need to change to vocabulary to 'need' required consistent, direct cues 2/2 trials.     Patient is becoming more independent in exploring SGD independent to toggle between 3-4 page generation to find specific vocabulary to complete structured expressive language tasks with patient independent in locating vocabulary 6/10 = 60% accuracy.  Additional verbal, visual cues resulted in increased accuracy 8/10 = 80% accuracy.     Of note, at start of session, patient used device to communicate a " "novel idea to inquires regarding recent RTC application with patient using SGD to independently communication \"Tuesday 9:00\" stating as scheduled time for RTC visit.         Speech Therapy Assessment:     Expressive Language Assessment:     Ability to exhibit appropriate naming: Moderate (to independent level; locating vocabulary using SGD).      Speech Therapy Plan :   Prognosis & Recommendations  Impression Summary:  Gilles Case, a 54 year old male, continued participation in direct, outpatient speech therapy addressing expressive language skills development using SGD following CVA resulting in severe expressive Aphasia.    Patient continues to progress in use of SGD, demonstrating independence in generation of a novel utterance to communicate meeting with RTC scheduled for tomorrow morning at 9am.      Patient continues to require cues when seeking specific vocabulary using SGD; however, patient with increased understanding regarding color coding and systematic way to access language in \"I + want/ need\" format to independently communicate simple wants/ needs/ ideas.      Prognosis:  Excellent  Prognosis Details:  Patient remains highly motivated to improve expressive language skills and independence in communication using SGD.   Therapy Recommendations  Recommendation:  Individual Speech Therapy,  Planned Therapy Interventions:  Home Program, Patient/Caregiver Education, Compensatory Strategy Training, Speech Gen Device Therapy and Speech/Language training,   Plan Details:  Continue with current POC.                "

## 2019-10-02 ENCOUNTER — SPEECH THERAPY (OUTPATIENT)
Dept: SPEECH THERAPY | Facility: REHABILITATION | Age: 54
End: 2019-10-02
Attending: FAMILY MEDICINE
Payer: MEDICAID

## 2019-10-02 DIAGNOSIS — R48.2 APRAXIA: ICD-10-CM

## 2019-10-02 DIAGNOSIS — R47.01 EXPRESSIVE APHASIA: ICD-10-CM

## 2019-10-02 DIAGNOSIS — I69.320 APHASIA AS LATE EFFECT OF CEREBROVASCULAR ACCIDENT: ICD-10-CM

## 2019-10-02 LAB — HEMOCCULT STL QL IA: NEGATIVE

## 2019-10-02 PROCEDURE — 92609 USE OF SPEECH DEVICE SERVICE: CPT

## 2019-10-02 ASSESSMENT — SOCIAL DETERMINANTS OF HEALTH (SDOH): SOCIAL_SUPPORT_SYSTEM: PARENT

## 2019-10-02 NOTE — OP THERAPY DAILY TREATMENT
"  Outpatient Speech Therapy  DAILY TREATMENT     [facility] Speech [address]  [address]  [place] [address]  Phone:  [phone]  Fax:  [phone]    Date: 10/02/2019    Patient: Gilles Cobb Case  YOB: 1965  MRN: [id number]     Time Calculation  Start time: 1030  Stop time: 1130 Time Calculation (min): 60 minutes       Chief Complaint: Aphasia    Visit #: 17    Subjective:   Reason for Therapy:     Reason For Evaluation:  Speech/Language and Aphasia  Social Support:     Accompanied By:  Parent (mother, present and supportive. Mother waited in lobby during outptaient speech therapy session. )    Patient Mental Status:  Alert and Responsive  Progress Factors:     Progression:  Getting Better  Additional Subjective Comments:      Patient arrived to speech therapy in good spirits with no changes to medical history noted or reported.  Patient was excited that he will now be using RTC for transportation to Aphasia group meetings in the community.       Speech Therapy Objective  Speech therapy targeted improving communication skills through expressive language therapy targeted through use of Speech Generating Device.      At start of session, patient used device to communicate a novel idea to inquires regarding recent RTC evaluation with patient using SGD to independently communicate RTC scheduled for \"Friday\" to take to aphasia group patient attends the first and third fridays of each month.      Set up of SGD continued to patient request of increasing vocabulary to topic of interests including sports, specific vocabulary to favorite basketball team the [employer].  Patient provided with direct education regarding how to modify/ change icons with patient unable to complete without direct assistance 5/5 trials.  Following addition/ completion of icons, patient was independent in movement between icons to independently access newly added language to SGD to clinician requests " independent.        Speech Therapy Assessment:     Expressive Language Assessment:     Ability to exhibit appropriate naming: Severe.    Paraphasic is Present.    Perseveration is Present.    Expressive language comments: Patient was noted to demonstrate increased independence in access of keyboard resulting in patient use of typed text to single words to independent communicate novel information to single word level (e.g. Stating son lived in [place]).       Speech Therapy Plan :   Prognosis & Recommendations  Impression Summary:  Gilles Case, a 54 year old male, continued participation in direct, outpatient speech therapy addressing expressive language through use of SGD.    Patient continues to demonstrate interest and engagement in use of SGD to communicate; however, patient continues to struggle with use of SGD to generate novel utterances.    Prognosis:  Excellent  Prognosis Details:  Patient remains motivated to improve expressive language skills using SGD.  Continuation of outpatient ST addressing device use, development of SGD to meet patient communication needs recommended and warranted.   Therapy Recommendations  Recommendation:  Individual Speech Therapy,  Planned Therapy Interventions:  Home Program, Patient/Caregiver Education, Compensatory Strategy Training, Speech Gen Device Therapy and Speech/Language training,   Plan Details:  Continue with current POC.

## 2019-10-10 ENCOUNTER — TELEPHONE (OUTPATIENT)
Dept: MEDICAL GROUP | Facility: MEDICAL CENTER | Age: 54
End: 2019-10-10

## 2019-10-10 DIAGNOSIS — I63.9 CEREBROVASCULAR ACCIDENT (CVA), UNSPECIFIED MECHANISM (HCC): ICD-10-CM

## 2019-10-28 ENCOUNTER — SPEECH THERAPY (OUTPATIENT)
Dept: SPEECH THERAPY | Facility: REHABILITATION | Age: 54
End: 2019-10-28
Attending: FAMILY MEDICINE
Payer: MEDICAID

## 2019-10-28 DIAGNOSIS — I69.320 APHASIA AS LATE EFFECT OF CEREBROVASCULAR ACCIDENT: ICD-10-CM

## 2019-10-28 DIAGNOSIS — R47.01 EXPRESSIVE APHASIA: ICD-10-CM

## 2019-10-28 DIAGNOSIS — R48.2 APRAXIA: ICD-10-CM

## 2019-10-28 PROCEDURE — 92609 USE OF SPEECH DEVICE SERVICE: CPT

## 2019-10-28 PROCEDURE — 92507 TX SP LANG VOICE COMM INDIV: CPT

## 2019-10-28 NOTE — OP THERAPY DAILY TREATMENT
Outpatient Speech Therapy  DAILY TREATMENT     [address]  [address]  [place] [address]  Phone:  [phone]  Fax:  [phone]    Date: 10/28/2019    Patient: Gilles Cobb Case  YOB: 1965  MRN: [id number]     Time Calculation  Start time: 0830  Stop time: 0930 Time Calculation (min): 60 minutes       Chief Complaint: Aphasia    Visit #: 18    Subjective:   Reason for Therapy:     Reason For Evaluation:  Aphasia  Social Support:     Social support system: unaccompanied.    Patient Mental Status:  Alert and Responsive  Additional Subjective Comments:      Patient arrived to speech therapy in good spirits. No recent changes to medical history noted or reported.       Objective:   Treatments/Interventions Performed:  Patient/Caregiver education  Other Treatment Interventions:  Speech Generating Device Therapy    Speech therapy targeted improving communication skills through expressive language therapy targeted through development and use of Speech Generating Device.     Use and operations of SGD targeted specifically related to patient understanding and independence regarding how to delete an icon. Patient provided with education on 4 step process with patient provided repetitive practice to 4 trials with direct assistance with continued trials resulting in reduction of cues to independent. Once independent, patient demonstrated accuracy in deletion of icons x 3.     Use of device targeted requesting specific items related to grocery items incorporating understanding in use of word forms icon to verbalize plural -s (for example asking for 3 apples). Patient provided with direct imitation prompts/ instruction/ cues with patient then demonstrating independence in navigation of 4 screens to locate specific vocabulary including use of keyboard to toggle independent to create message 4/5 (80%) of trials.     Speech therapy continued in device set up related to  topics of interest, mainly sports. Patient required direct cues to add specific icons related to vocabulary.     Speech Therapy Assessment:     Expressive Language Assessment:     Expressive language comments: Use of SGD is resulting in increased independence in communication related to variety of different conversational topics. Patient noted to require reduce verbal cues to access device and initiate use as compared to previous intervention period.      Speech Therapy Plan :   Prognosis & Recommendations  Impression Summary:  Gilles Cid, a 54 year old male, continued participation in direct, outpatient speech therapy addressing use of SGD to increase independence in communication.     Patient with increased independence in navigation of device, sometimes navigating 4-5 screens in order to locate desired icon to complete communication exchange. Patient with increase understanding regarding topic page and how to locate specific vocabulary to independently state a simple message or generate a simple request.   Prognosis:  Good  Goals  Short Term Goals: 1. Patient will demonstrate understanding and independence in completion of operations of SGD, including making changes to icons 80% accuracy, independent.  2.  Patient will independently navigate SGD to convey and independent need, thought or idea to familiar, progressing to unfamiliar listeners 4/5 obligatory context (80% accuracy).  4.  Patient will independently navigate SGD to combine two words combining modifier + noun/ verb (for example, good game) given moderate verbal prompts/ instruction/ cues 80% accuracy.    5.  Patient will increase expressive language skills completing confrontation naming tasks of nouns, verbs completing with 85% accuracy, independent using SGD.      Therapy Recommendations  Recommendation:  Individual Speech Therapy,  Planned Therapy Interventions:  Home Program, Patient/Caregiver Education, Compensatory Strategy Training and Speech  Gen Device Therapy,   Plan Details:  Continue with current POC.

## 2019-10-30 ENCOUNTER — SPEECH THERAPY (OUTPATIENT)
Dept: SPEECH THERAPY | Facility: REHABILITATION | Age: 54
End: 2019-10-30
Attending: FAMILY MEDICINE
Payer: MEDICAID

## 2019-10-30 DIAGNOSIS — R48.2 APRAXIA: ICD-10-CM

## 2019-10-30 DIAGNOSIS — R47.01 EXPRESSIVE APHASIA: ICD-10-CM

## 2019-10-30 DIAGNOSIS — I69.320 APHASIA AS LATE EFFECT OF CEREBROVASCULAR ACCIDENT: ICD-10-CM

## 2019-10-30 PROCEDURE — 92609 USE OF SPEECH DEVICE SERVICE: CPT

## 2019-10-30 ASSESSMENT — SOCIAL DETERMINANTS OF HEALTH (SDOH): SOCIAL_SUPPORT_SYSTEM: PARENT

## 2019-10-30 NOTE — OP THERAPY DAILY TREATMENT
Outpatient Speech Therapy  DAILY TREATMENT     [facility] Speech [address].  [address]  [place] [address]  Phone:  [phone]  Fax:  [phone]    Date: 10/30/2019    Patient: Gilles Cobb Case  YOB: 1965  MRN: [id number]     Time Calculation  Start time: 0930  Stop time: 1030 Time Calculation (min): 60 minutes       Chief Complaint: Aphasia    Visit #: 19    Subjective:   Reason for Therapy:     Reason For Evaluation:  Aphasia  Social Support:     Accompanied By:  Parent (mother, present in lobby during speech therapy session)    Patient Mental Status:  Alert and Responsive  Additional Subjective Comments:      Patient arrived to speech therapy in good spirits. Patient participated well and remains very motivated to use SGD to increase functional communication.       Objective:   Treatments/Interventions Performed:  Patient/Caregiver education  Other Treatment Interventions:  Speech Generating Device Therapy     Speech therapy targeted improving communication skills through expressive language therapy targeted through development and use of Speech Generating Device.      Use and operations of SGD targeted specifically related patient understanding and independence regarding how to delete an icon. Patient required direct instruction to accurate initiate and complete sequence to delete icon on initial trial; however, following education, patient was independent in deleting 2 remaining icons to independent level.    Further education in operations of SGD targeted moving icons when necessary following deletion of icons. Patient was provided with direct instruction on 3 step process, with patient completing to independent level to 10, repetitive practice trials.     Use of device targeted in use of language to attribute + object level, specific to requesting color + clothing item. Patient was independent in navigation to 5 screens to locate color/ clothing information and  initiate request for color + item (blue shoes) 4/4 trials to 100% accuracy.    Modifications of existing icons to device completed, specific to patient area of interest ([place] [employer]) through modification of topic icon of [place].  Following modification, patient was independent in identifying icons changes to meet communication needs 100% accuracy.     Assessments    Speech Therapy Plan :   Prognosis & Recommendations  Impression Summary:  Patient is progressing well in use of SGD to improve communication needs. Patient is demonstrating increased understanding and independence in how to modify device to meet communication needs, specifically related to deletion and moving of icons.  Further instruction is recommended regarding how to create icons to increase functional communication regarding patient communication needs.    Prognosis:  Good  Therapy Recommendations  Recommendation:  Individual Speech Therapy,  Planned Therapy Interventions:  Home Program, Patient/Caregiver Education and Speech Gen Device Therapy,   Plan Details:  Continue with current POC.

## 2019-11-05 ENCOUNTER — SPEECH THERAPY (OUTPATIENT)
Dept: SPEECH THERAPY | Facility: REHABILITATION | Age: 54
End: 2019-11-05
Attending: FAMILY MEDICINE
Payer: MEDICAID

## 2019-11-05 DIAGNOSIS — R47.01 EXPRESSIVE APHASIA: ICD-10-CM

## 2019-11-05 DIAGNOSIS — I69.320 APHASIA AS LATE EFFECT OF CEREBROVASCULAR ACCIDENT: ICD-10-CM

## 2019-11-05 PROCEDURE — 92609 USE OF SPEECH DEVICE SERVICE: CPT

## 2019-11-05 NOTE — OP THERAPY DAILY TREATMENT
"  Outpatient Speech Therapy  DAILY TREATMENT     [facility] Speech [address].  [address]  [place] [address]  Phone:  [phone]  Fax:  [phone]    Date: 11/05/2019    Patient: Gilles Cobb Case  YOB: 1965  MRN: [id number]     Time Calculation  Start time: 1045  Stop time: 1130 Time Calculation (min): 45 minutes       Chief Complaint: Aphasia    Visit #: 20    Subjective:   Reason for Therapy:     Reason For Evaluation:  Aphasia  Social Support:     Social support system: unaccompanied.    Patient Mental Status:  Alert and Responsive  Additional Subjective Comments:      Patient arrived to speech therapy in good spirits. No changes to medical history noted or reported.       Objective:   Treatments/Interventions Performed:  Patient/Caregiver education and Home program  Other Treatment Interventions:  Speech Generating Device Therapy     Speech therapy continued to address communication skills through further development of Augmentative, Alternative Communication means through Speech Generating Device.    Patient arrived to speech therapy demonstrating independence in operations of SGD to turn on device and initiate initial greeting to hi, how are you level with clinician. Patient was noted to also use SGD to provide appropriate response to inquires in response to clinician queries \"fine, I'm good.\"     Use of SGD continued with novel, simple conversation with patient asked if he did anything fun or exciting since his previous ST visit.  Patient was independent in navigation of SGD to toggle between multiple screens to type \"Monday 11:30.\" further use of SGD was used to access pronouns \"he, she\". Given context, clinician was able to discern patient communication regarding meeting of Aphasia Group this past Monday.  Patient was independent in navigation of device to access \"restaurant\" and provide clinician with first letter of name of restaurant to determine he went to " [employer] with group.      Given need to communicate novel information related to Aphasia Group, icon was added with language specific to communication of information about and within Aphasia Group completed.  Patient with need for direct assistance for modification of all icons given deficits in expressive language limiting ability to formulate single words through keyboard to independent level.  8 new icons were added in total, with patient independent in accessing icons to answer who, what, when regarding group.  Answering 'where' required multiple trials to increase accuracy.     Speech Therapy Assessment:     Expressive Language Assessment:     Expressive language comments: Patient is demonstrating improved independence in use of SGD to initiate simple conversation and communicate novel information with familiar listeners. Patient appears to be enjoying use of SGD to increase communication with familiar and unfamiliar listeners and has positively impacted patient participation in the community with patient attending a community based Aphasia Group bi-monthly.       Speech Therapy Plan :   Therapy Recommendations  Recommendation: Individual Speech Therapy,  Planned Therapy Interventions:  Home Program, Patient/Caregiver Education, Compensatory Strategy Training and Speech Gen Device Therapy,   Plan Details:  Continue with current POC.

## 2019-11-12 ENCOUNTER — SPEECH THERAPY (OUTPATIENT)
Dept: SPEECH THERAPY | Facility: REHABILITATION | Age: 54
End: 2019-11-12
Attending: FAMILY MEDICINE
Payer: MEDICAID

## 2019-11-12 DIAGNOSIS — I69.320 APHASIA AS LATE EFFECT OF CEREBROVASCULAR ACCIDENT: ICD-10-CM

## 2019-11-12 DIAGNOSIS — R47.01 EXPRESSIVE APHASIA: ICD-10-CM

## 2019-11-12 PROCEDURE — 92507 TX SP LANG VOICE COMM INDIV: CPT

## 2019-11-12 ASSESSMENT — SOCIAL DETERMINANTS OF HEALTH (SDOH): SOCIAL_SUPPORT_SYSTEM: PARENT

## 2019-11-12 NOTE — OP THERAPY DAILY TREATMENT
"  Outpatient Speech Therapy  DAILY TREATMENT     [facility] Speech [address].  [address]  [place] [address]  Phone:  [phone]  Fax:  [phone]    Date: 11/12/2019    Patient: Gilles Cobb Case  YOB: 1965  MRN: [id number]     Time Calculation  Start time: 0900  Stop time: 0930 Time Calculation (min): 30 minutes       Chief Complaint: Aphasia    Visit #: 21    Subjective:   Reason for Therapy:     Reason For Evaluation:  Aphasia  Social Support:     Accompanied By:  Parent (mother, present and supportive. Waited in lobby during speech therapy session)    Patient Mental Status:  Alert and Responsive      Objective:   Treatments/Interventions Performed:  Patient/Caregiver education and Home program  Other Treatment Interventions:  Speech Generating Device Therapy    Speech therapy continued to address communication skills through further development of Augmentative, Alternative Communication means through Speech Generating Device.    Patient now independent in turning on device, initiating greeting with clinician to independent level. Patient independent in providing novel response to clinician inquiry regarding Aphasia group providing \"Friday\" to communicate he will be going on Friday.    Use of previously established pages completed to clinician direct inquires.  Patient was independent in toggle of device to locate Aphasia group topic icon, answering questions corresponding to icon with 5/5 = 100% accuracy.  Location of other, previously established icons targeted area of interest related to [place] [place]. Patient required direct cues to access icons placed under Park topic; however, once located, patient was independent in answering 3/3 questions.     Use of topic related to football given patient interest in football demonstrated. Patient with direct cues to modify topic page to meet communication needs.  Further instruction on how to modify/ change icons recommended. "     Use of device targeted in use of language to attribute + object level, specific to requesting color + clothing/  item. Patient was independent in navigation to topic page and corresponding screen to locate color/ clothing information and initiate request for color + item (red socks) 3/3 trials to 100% accuracy. Min verbal cues were necessary to ensure patient demonstrated accurate recall of clinician prompts during structured activity.  An icon was added to quick fire page to address patient need for verbal repetitions to increase understanding.        Speech Therapy Assessment:     Expressive Language Assessment:     Expressive language comments: Patient is independent in use of SGD to initiate conversation to start therapy session and also communicate simple novel ideas to an independent level; although use of SGD to communicate a novel thought or idea remains largely limited to single icons at this time.        Speech Therapy Plan :   Prognosis: Excellent  Prognosis Details:  Continued need for direct instruction in use of SGD recommended given patient demonstrating improvements in use for communication of simple, novel thought/ ideas to an independent level; although limited to single icons at this time.   Therapy Recommendations  Recommendation:  Individual Speech Therapy,  Planned Therapy Interventions:  Home Program, Patient/Caregiver Education, Compensatory Strategy Training and Speech Gen Device Therapy,   Plan Details:  Continue with current POC.

## 2019-11-14 ENCOUNTER — SPEECH THERAPY (OUTPATIENT)
Dept: SPEECH THERAPY | Facility: REHABILITATION | Age: 54
End: 2019-11-14
Attending: FAMILY MEDICINE
Payer: MEDICAID

## 2019-11-14 DIAGNOSIS — R47.01 EXPRESSIVE APHASIA: ICD-10-CM

## 2019-11-14 DIAGNOSIS — R48.2 APRAXIA: ICD-10-CM

## 2019-11-14 DIAGNOSIS — I69.320 APHASIA AS LATE EFFECT OF CEREBROVASCULAR ACCIDENT: ICD-10-CM

## 2019-11-14 PROCEDURE — 92507 TX SP LANG VOICE COMM INDIV: CPT

## 2019-11-14 ASSESSMENT — SOCIAL DETERMINANTS OF HEALTH (SDOH): SOCIAL_SUPPORT_SYSTEM: PARENT

## 2019-11-14 NOTE — OP THERAPY DAILY TREATMENT
Outpatient Speech Therapy  DAILY TREATMENT     [address]  [address]  [place] [address]  Phone:  [phone]  Fax:  [phone]    Date: 11/14/2019    Patient: Gilles Cobb Case  YOB: 1965  MRN: [id number]     Time Calculation  Start time: 0800  Stop time: 0830 Time Calculation (min): 30 minutes       Chief Complaint: Aphasia    Visit #: 22    Subjective:   Reason for Therapy:     Reason For Evaluation:  Aphasia  Social Support:     Accompanied By:  Parent (mother, assisted with transportation to visit, waited in lobby during therapy session)    Patient Mental Status:  Alert and Responsive      Objective:   Treatments/Interventions Performed:  Home program and Patient/Caregiver education  Other Treatment Interventions:  Speech Generating Device Therapy    Patient arrived to outpatient speech therapy demonstrating independence in use of SGD to communicate date, including day, month, year to clinician at start.     Use of SGD related to education/ training in device specific to modification/ changing icons to meet communication needs completed. Focus on modification of icon related to patient area of interest ([email or web address]) record) initiated with focus on modifying icon to update current win/ loss record.      Patient was provided with direct instruction on 5 steps necessary to modify icon (access change icon + message + keyboard + numbers + done). Patient initially required direct prompts/ instruction/ cues with cues necessary faded to minimal to no cues. Following 5 trials, patient completed 5 step process to independent level.    As part of home program in use of SGD, patient was asked to modify [employer] win/ loss record prior to returning to outpatient speech therapy next week to promote carryover of skills learned during today's session.     Speech Therapy Assessment:     Expressive Language Assessment:     Expressive language  comments: Patient required direct cues, instruction/ training to modify icon related to area of interest.  Structured practice continued with modification of single icon to ensure patient understood steps and could repeat to independent level.       Speech Therapy Plan :   Prognosis: Good  Prognosis Details:  Expect continued improvement in understanding and use of SGD given structured practice provided by direct, outpatient speech therapy sessions.  ST to continue to focus on understanding and accurately following steps to modify/ change icons to assist patient in meeting communication needs using SGD.   Therapy Recommendations  Recommendation:  Individual Speech Therapy,  Planned Therapy Interventions:  Home Program, Compensatory Strategy Training, Patient/Caregiver Education and Speech Gen Device Therapy,   Plan Details:  Continue with current POC.

## 2019-11-18 ENCOUNTER — SPEECH THERAPY (OUTPATIENT)
Dept: SPEECH THERAPY | Facility: REHABILITATION | Age: 54
End: 2019-11-18
Attending: FAMILY MEDICINE
Payer: MEDICAID

## 2019-11-18 DIAGNOSIS — I69.320 APHASIA AS LATE EFFECT OF CEREBROVASCULAR ACCIDENT: ICD-10-CM

## 2019-11-18 DIAGNOSIS — R47.01 EXPRESSIVE APHASIA: ICD-10-CM

## 2019-11-18 PROCEDURE — 92507 TX SP LANG VOICE COMM INDIV: CPT

## 2019-11-18 ASSESSMENT — SOCIAL DETERMINANTS OF HEALTH (SDOH): SOCIAL_SUPPORT_SYSTEM: PARENT

## 2019-11-18 NOTE — OP THERAPY DAILY TREATMENT
Outpatient Speech Therapy  DAILY TREATMENT     [facility] Speech [address]  [address]  [place] [address]  Phone:  [phone]  Fax:  [phone]    Date: 11/18/2019    Patient: Gilles Cobb Case  YOB: 1965  MRN: [id number]     Time Calculation  Start time: 0900  Stop time: 0930 Time Calculation (min): 30 minutes       Chief Complaint: Aphasia    Visit #: 23    Subjective:   Reason for Therapy:     Reason For Evaluation:  Aphasia  Social Support:     Accompanied By:  Parent (mother, waited in lobby, present and supportive)    Patient Mental Status:  Alert and Responsive  Progress Factors:     Progression:  Getting Better      Objective:   Treatments/Interventions Performed:  Patient/Caregiver education and Compensatory strategy training  Other Treatment Interventions:  Speech Generating Device (SGD) Therapy    Per previous  recommendations, patient returned to speech therapy demonstrating independence in modification of previous educated/ trained icon related to area of interest ([address] current season record).    Modification/ changing of icons to increase accuracy in use of SGD for communication needs continued focus on changing/ modifying icons to meet patient communication needs.      Patient independent in identifying 2/2 icons that were not serving his communication needs and completed steps to delete icon independent.     Patient provided with direct instruction on how to modify/ change a new icon similar to that complete during previous session; however, how to use keyboard with predictive text educated/ trained.  Patient required direct instruction to accurately modify icon to independent level.     Speech Therapy Assessment:     Expressive Language Assessment:     Expressive language comments: Patient continues to require direct cues to accurately change icon given presence of expressive language deficits on receptive language, specific to accuracy in  written expression negatively impacting ability to formulate written message using SGD.  ST will continue to focus on education in use of predictive text given patient is accurate in first 1-3 letters of word before demonstrating breakdown in accuracy in written language.     Patient was noted to demonstrate reading aloud of single words up to simple phrases with use of direct visual cues from SGD.       Speech Therapy Plan :   Therapy Recommendations  Recommendation: Individual Speech Therapy,  Planned Therapy Interventions:  Home Program, Compensatory Strategy Training, Patient/Caregiver Education and Speech Gen Device Therapy,   Plan Details:  Continue with current POC.

## 2019-11-20 ENCOUNTER — SPEECH THERAPY (OUTPATIENT)
Dept: SPEECH THERAPY | Facility: REHABILITATION | Age: 54
End: 2019-11-20
Attending: FAMILY MEDICINE
Payer: MEDICAID

## 2019-11-20 DIAGNOSIS — R48.2 APRAXIA: ICD-10-CM

## 2019-11-20 DIAGNOSIS — R47.01 EXPRESSIVE APHASIA: ICD-10-CM

## 2019-11-20 DIAGNOSIS — I69.320 APHASIA AS LATE EFFECT OF CEREBROVASCULAR ACCIDENT: ICD-10-CM

## 2019-11-20 PROCEDURE — 92609 USE OF SPEECH DEVICE SERVICE: CPT

## 2019-11-20 ASSESSMENT — SOCIAL DETERMINANTS OF HEALTH (SDOH): SOCIAL_SUPPORT_SYSTEM: PARENT

## 2019-11-20 NOTE — OP THERAPY DAILY TREATMENT
"  Outpatient Speech Therapy  DAILY TREATMENT     [facility] Speech [address]  [address]  [place] [address]  Phone:  [phone]  Fax:  [phone]    Date: 11/20/2019    Patient: Gilles Cobb Case  YOB: 1965  MRN: [id number]     Time Calculation  Start time: 0930  Stop time: 1030 Time Calculation (min): 60 minutes       Chief Complaint: Aphasia    Visit #: 24    Subjective:   Reason for Therapy:     Reason For Evaluation:  Aphasia  Social Support:     Accompanied By:  Parent (mother, present and supportive. Waited in lobby during outpatient speech therapy session.)    Patient Mental Status:  Alert and Responsive  Progress Factors:     Progression:  Getting Better  Additional Subjective Comments:      Patient remains motivated to use SGD to improve communication. Patient receptive to all education provided during today's session.       Objective:   Treatments/Interventions Performed:  Home program, Patient/Caregiver education and Compensatory strategy training  Other Treatment Interventions:  Speech Generating Device (SGD) Therapy    Per previous  recommendations, patient returned to speech therapy demonstrating independence in change of icon to meet communication need, specific to changing icon to state \"I'm thirsty\" to meet communication of needs.       Modification/ changing of icons continued to increase accuracy in use of SGD for communication needs continued focus on changing/ modifying icons to meet patient communication needs.  Adding icons to meet communication needs specific to area of interest (favorite [employer] ) addressed. Patient required direct instruction/ cues to generate written language necessary to complete icon/ words associated with icon (favorite player/ my favorite player is). Patient asked to try at home as part of home program adding favorite football and baseball players to device.    Patient demonstrated independence in " moving of icons and deleting of unnecessary and unwanted icons to independent level 5/5 and 2/2, respectively.    Use of SGD continued specific to navigation to improve communication based on daily needs. Patient was provided with direct visual (picture cues) and asked to navigate SGD to find corresponding icon/ message. Patient completed with 2/4 (50%) to independent level, additional moderate-direct verbal instruction/ cues resulted in location of remaining icons.     Speech Therapy Assessment:     Expressive Language Assessment:     Expressive language comments: With creation of new icons on SGD, patient continues to require direct cues to address deficits in letter recognition and spelling. Patient is able to copy letters and words when provided with direct verbal cues, and is accurate in spelling single words to dictation with approximately 80% accuracy when provided with prompts as necessary if letter confusion is encountered.     When using device to search for specific vocabulary, patient is independent in navigating to topic page to search for icon corresponding to communication need; although patient with continued need for moderate to direct cues to locate and accurately access icons when specific vocabulary is necessary.       Speech Therapy Plan :   Prognosis & Recommendations  Impression Summary:  Patient continues to improve in understanding and use of SGD; although moderate to direct cues remain necessary to locate existing vocabulary in SGD to specific events/ categories specific to wants, needs.  Prognosis:  Good  Goals  Short Term Goals: 1. Patient will demonstrate understanding and independence in completion of operations of SGD, including making changes to icons 80% accuracy, independent.  2. Patient will independently navigate SGD to convey and independent need, thought or idea to familiar, progressing to unfamiliar listeners 4/5 obligatory context (80% accuracy).  4.  Patient will  independently navigate SGD to combine two words combining modifier + noun/ verb (for example, good game) given moderate verbal prompts/ instruction/ cues 80% accuracy.    5.  Patient will increase expressive language skills completing confrontation naming tasks of nouns, verbs completing with 85% accuracy, independent using SGD.      Therapy Recommendations  Recommendation:  Individual Speech Therapy,  Planned Therapy Interventions:  Home Program, Compensatory Strategy Training, Patient/Caregiver Education and Speech Gen Device Therapy,   Plan Details:  Continue with current POC.

## 2019-11-26 ENCOUNTER — SPEECH THERAPY (OUTPATIENT)
Dept: SPEECH THERAPY | Facility: REHABILITATION | Age: 54
End: 2019-11-26
Attending: FAMILY MEDICINE
Payer: MEDICAID

## 2019-11-26 DIAGNOSIS — R47.01 EXPRESSIVE APHASIA: ICD-10-CM

## 2019-11-26 DIAGNOSIS — I69.320 APHASIA AS LATE EFFECT OF CEREBROVASCULAR ACCIDENT: ICD-10-CM

## 2019-11-26 DIAGNOSIS — R48.2 APRAXIA: ICD-10-CM

## 2019-11-26 PROCEDURE — 92609 USE OF SPEECH DEVICE SERVICE: CPT

## 2019-11-26 ASSESSMENT — SOCIAL DETERMINANTS OF HEALTH (SDOH): SOCIAL_SUPPORT_SYSTEM: PARENT

## 2019-11-26 NOTE — OP THERAPY DAILY TREATMENT
Outpatient Speech Therapy  DAILY TREATMENT     [facility] Speech [address]  [address]  [place] [address]  Phone:  [phone]  Fax:  [phone]    Date: 11/26/2019    Patient: Gilles Cobb Case  YOB: 1965  MRN: [id number]     Time Calculation  Start time: 1100  Stop time: 1130 Time Calculation (min): 30 minutes       Chief Complaint: Aphasia    Visit #: 25    Subjective:   Reason for Therapy:     Reason For Evaluation:  Aphasia  Social Support:     Accompanied By:  Parent (mother continues to bring patient to scheduled therapy sessions)    Patient Mental Status:  Alert and Responsive  Progress Factors:     Progression:  Getting Better  Additional Subjective Comments:      Patient returned to therapy in good spirits. No changes to medical history noted or reported.       Objective:   Treatments/Interventions Performed:  Home program and Patient/Caregiver education  Other Treatment Interventions:  Speech Generating Device (SGD) Therapy    Per previous ST session, patient returned with two new icons added to area of interest including favorite football player and favorite . Patient was independent in creating of new icon, including label and message; however, patient was not aware of how to change picture icon with creation of new icon.    Direct education provided on how to change icon with patient necessitating direct verbal cues for dictation of letters necessary to spell out single word to locate different pictures on device. Direct imitation cues provided on how to access and select new picture.     Patient trial attempting to independent level following education resulted in noted frustration, requiring clinician to continue to provide direct cues in order to change icon on second new icon created.     Changing of icons related to personally relevant information introduced through about me topic page. Patient with need for direct cues to modify message due  to deficits in expressive language skills related to written expression/ identification of letters.     Speech Therapy Assessment:     Expressive Language Assessment:     Expressive language comments: Patient demonstrated less verbalizations during structured activities, even with direct visual support when modifying SGD. Patient frustration with new learning may have contributed to less verbalizations noted during today's session.       Speech Therapy Plan :   Prognosis & Recommendations  Impression Summary:  Patient continues to require direct cues when changing/ adding new icons for spelling accuracy necessary to generate new verbal messages on SGD.  Continued education/ training in this area remains recommended and warranted to promote functional use of SGD to an independent level.   Therapy Recommendations  Recommendation:  Individual Speech Therapy,  Planned Therapy Interventions:  Home Program, Patient/Caregiver Education, Compensatory Strategy Training and Speech Gen Device Therapy,

## 2019-12-03 ENCOUNTER — SPEECH THERAPY (OUTPATIENT)
Dept: SPEECH THERAPY | Facility: REHABILITATION | Age: 54
End: 2019-12-03
Attending: FAMILY MEDICINE
Payer: MEDICAID

## 2019-12-03 DIAGNOSIS — I69.320 APHASIA AS LATE EFFECT OF CEREBROVASCULAR ACCIDENT: ICD-10-CM

## 2019-12-03 DIAGNOSIS — R47.01 EXPRESSIVE APHASIA: ICD-10-CM

## 2019-12-03 DIAGNOSIS — R48.2 APRAXIA: ICD-10-CM

## 2019-12-03 PROCEDURE — 92609 USE OF SPEECH DEVICE SERVICE: CPT

## 2019-12-03 ASSESSMENT — SOCIAL DETERMINANTS OF HEALTH (SDOH): SOCIAL_SUPPORT_SYSTEM: PARENT

## 2019-12-03 NOTE — OP THERAPY PROGRESS SUMMARY
Outpatient Speech Therapy  PROGRESS NOTE      [facility] Speech [facility]  [address]  [address]  [place] [address]  Phone:  [phone]  Fax:  [phone]    Date of Visit: 12/03/2019    Patient: Gilles Cobb Case  YOB: 1965  MRN: [id number]     Referring Provider: Dimitri Palacio M.D.  [address] [facility]  [address]  [place] [address]   Referring Diagnosis Aphasia following cerebral infarction [I69.320]      Visit #: 26      Speech Therapy Occurrence Codes    Date of Onset of Impairment:  1/9/18   Date speech therapy care plan established or reviewed:  7/16/19   Date speech therapy treatment started:  7/16/19          Chief Complaint: Aphasia    Visit Diagnoses     ICD-10-CM   1. Aphasia as late effect of cerebrovascular accident I69.320   2. Expressive aphasia R47.01   3. Apraxia R48.2       Subjective:   Reason for Therapy:     Reason For Evaluation:  Aphasia    Onset Description:  Patient 54 year old male returned to outpatient speech therapy following acquisition of Speech Generating Device for Augmentative Alternative Communication means secondary to long standing, severe expressive Aphasia following CVA.          Objective:   Treatments/Interventions Performed:  Patient/Caregiver education and Home program  Other Treatment Interventions:  Speech Generating Device (SGD) Therapy      Speech Therapy Assessment:     Expressive Language Assessment:     Expressive language comments: Patient independent verbalizations remain characteristic of severe expressive aphasia resulting in severe-profound word finding difficulties, severely limiting independence in verbal expression.      With creation of new icons on SGD, patient continues to require direct cues to address deficits in letter recognition and spelling. Patient is able to copy letters and words when provided with direct verbal cues, and is accurate in spelling single words to dictation with approximately 80% accuracy when provided with  prompts as necessary if letter confusion is encountered.      When using device to search for specific vocabulary, patient is independent in navigating to topic page to search for icon corresponding to communication need; although patient with continued need for moderate to direct cues to locate and accurately access icons when specific vocabulary is necessary.      Patient often demonstrates verbalizations to single word, simple phrase level in direct imitation of SGD during structured speech therapy sessions.       Speech Therapy Plan :   Prognosis & Recommendations  Impression Summary:  Gilles Gavin, a 54 year old male, continued participation in direct, outpatient speech therapy addressing use of SGD for augmentative alternative communication means secondary to patient with severe-profound expressive Aphasia and Apraxia limiting patient ability to accurately and independently verbalize even to single word level.      During the intervention period, patient was provided with extensive education and training in operations and use of SGD, with focus on modifying (deleting, changing icons) icons as necessary to better meet communication needs.  Additionally, navigation of SGD focused on location of icons related to independence in communication of wants, needs and physical states with patient independence in use of SGD limited primarily to single icon level at this time.     It appears patient is most likely to use SGD to communicate wants and ideas related to going out to favorite places to eat, or places in the community, as these icons were accessed with minimal to no deficits representing good accuracy, independent.  Patient use of SGD to communicate needs is laborious with patient requiring increased time to locate icons to communicate need. Patient provided with education regarding strategies to work on this in the home setting, to increase reliability in use of SGD for communication of needs.      Results of  assessment of current functioning level using SGD suggested that patient has met or demonstrated progressed towards 3/4 (75%) of goals during the intervention period.  Patient would likely continue to demonstrate progress and independence in use of SGD with further education/ training in device, in addition to continued ST addressing expressive language development in hopes of increasing accuracy in letter recognition and formulation for single words, advancement in use of SGD to 2 icons level to demonstrate even more independence and accuracy in expressive language skills through use of SGD.   Prognosis:  Good  Prognosis Details:   While patient continues to demonstrate steady progress towards outpatient speech therapy addressing goals/ objectives, patient continues to require more prompting and opportunities to use SGD dynamically to increase independence, accuracy and efficiency in use of SGD to meet communication needs. Continued participation in direct, outpatient speech therapy recommended and warranted with plan of care modified to reflect progress but also address areas of continued need.   Goals  Short Term Goals:  NEW GOAL(S) 12/3/2019:   1. Patient will independently navigate SGD to convey and independent need, thought or idea to familiar, progressing to unfamiliar listeners 4/5 obligatory context (80% accuracy).    2.  Patient will independently navigate SGD to combine two words combining modifier + noun/ verb (for example, good game) given moderate verbal prompts/ instruction/ cues 80% accuracy .   3.  Patient will increase expressive language skills completing confrontation naming tasks of nouns, verbs completing with 85% accuracy, independent using SGD.   Patient progression on Short Term Goals:  1. Patient will demonstrate understanding and independence in completion of operations of SGD, including making changes to icons 80% accuracy, independent:  GOAL MET for shut down/ restart of device in  addition to deleting and adding new icons to SGD 12/3/2019.  Goal to continue to address how to modify/ change picture icons with change/ modification of icons.  2. Patient will independently navigate SGD to convey and independent need, thought or idea to familiar, progressing to unfamiliar listeners 4/5 obligatory context (80% accuracy): GOAL PARTIALLY MET 12/3/2019. Patient was independent in communicating needs (for example bathroom, hungry, thirsty, tired) with 2/5 = 40% accuracy to independent level.  Use of SGD to communicate ideas (for example stating want to go to Casino/ park, or play favorite game of faith golf) completed with 3/4 = 75% accuracy.  Use of SGD to communicate wants (for example going to favorite restaurant [employer] and ordering favorite burrito) completed  With 4/5 = 80% accuracy.    4.  Patient will independently navigate SGD to combine two words combining modifier + noun/ verb (for example, good game) given moderate verbal prompts/ instruction/ cues 80% accuracy: GOAL NOT MET. PROGRESS NOTED. Responses remains limited to single word responses with patient demonstrating marked difficulty in combination of two icons to increase expressive language using SGD to 2 word/ simple phrase level.   5.  Patient will increase expressive language skills completing confrontation naming tasks of nouns, verbs completing with 85% accuracy, independent using SGD.   Patient progression on Long Term Goals:   1.  Using SGD, patient will demonstrate independence in generating a need, thought or idea to increase independence in communication with familiar, unfamiliar listeners 4/5 (80%) of obligatory contexts: IN PROGRESS/ CONTINUE.   2.  Patient will increase expressive language skills to demonstrate independence in communication of simple, personally relevant information to familiar, unfamiliar listeners 80% accuracy:  IN PROGRESS/ CONTINUE.   Therapy Recommendations  Recommendation:  Individual Speech  Therapy,  Planned Therapy Interventions:  Speech/Language training, Speech Gen Device Therapy, Compensatory Strategy Training, Home Program and Patient/Caregiver Education,   Plan Details:  Plan of care has been modified to reflect patient progress with use of SGD with recommendation for POC to be modified to 1 time per week during the next treatment period recommended.    12 units (85548)/ 12 units (48061)  Frequency:  1x week  Duration (in weeks):  12        Functional Assessment Used  Data collection during outpatient speech therapy sessions      Referring provider co-signature:  I have reviewed this plan of care and my co-signature certifies the need for services.  Certification Dates:   From 12/03/19     To 02/25/20    Physician Signature: ________________________________ Date: ______________

## 2019-12-03 NOTE — OP THERAPY DAILY TREATMENT
Outpatient Speech Therapy  DAILY TREATMENT     [address]  [address]  [place] [address]  Phone:  [phone]  Fax:  [phone]    Date: 12/03/2019    Patient: Gilles Cobb Case  YOB: 1965  MRN: [id number]     Time Calculation  Start time: 0900  Stop time: 1000 Time Calculation (min): 60 minutes       Chief Complaint: Aphasia    Visit #: 26    Subjective:   Reason for Therapy:     Reason For Evaluation:  Aphasia  Social Support:     Accompanied By:  Parent (mother, accompanied patient to visit providing transportation, waited in lobby)    Patient Mental Status:  Alert and Responsive  Progress Factors:     Progression:  Getting Better  Additional Subjective Comments:      Patient arrived to speech therapy in good spirits.  No recent changes to medical history noted or reported.       Objective:   Treatments/Interventions Performed:  Patient/Caregiver education and Home program  Other Treatment Interventions:  Speech Generating Device Therapy    At start of therapy session, patient frustrated regarding SGD specific to poor response. Patient provided with education regarding need to shut down device occasionally to allow for reboot. Patient provided with direct instruction on how to shut down and turn on device, completing to independent level x 2 following education.     Per previous ST session, patient returned with three new icons added to topic page of family specific to adding icons related to mother, and two sons.  Patient was independent in creating of 3 new icons, including label and message.  Message was limited to first, last name of each person secondary to continued difficulty in language formulation.  Consistent with previous session, patient was not aware of how to change picture representation on icon with creation of new icon.     Direct education again completed on how to change icon with patient necessitating direct verbal cues for use of  camera to take picture.  Direct imitation cues provided on how to access, take picture and select picture taken for use on icon.     Progress towards current goals and objectives assessed related to independence in navigation of SGD to communicate wants, needs, physical states and ideas. Patient was independent in communicating needs (for example bathroom, hungry, thirsty, tired) with 2/5 = 40% accuracy to independent level.  Use of SGD to communicate ideas (for example stating want to go to Casino/ park, or play favorite game of faith golf) completed with 3/4 = 75% accuracy.  Use of SGD to communicate wants (for example going to favorite restaurant [employer] and ordering favorite burrito) completed  With 4/5 = 80% accuracy.  Responses remains limited to single word responses with patient demonstrating marked difficulty in combination of two icons to increase expressive language using SGD to 2 word/ simple phrase level.         Speech Therapy Assessment:     Expressive Language Assessment:     Expressive language comments: It appears patient is most likely to use SGD to communicate wants and ideas related to going out to favorite places to eat, or places in the community, as these icons were accessed with minimal to no deficits representing good accuracy, independent.  Patient use of SGD to communicate needs is laborious with patient requiring increased time to locate icons to communicate need. Patient provided with education regarding strategies to work on this in the home setting, to increase reliability in use of SGD for communication of needs.       Speech Therapy Plan :   Prognosis & Recommendations  Impression Summary:  Gilles Case, a 54 year old male, continued participation in direct, outpatient speech therapy addressing use of SGD for augmentative alternative communication means secondary to patient with severe-profound expressive Aphasia and Apraxia limiting patient ability to accurately and independently  verbalize even to single word level.     During the intervention period, patient was provided with extensive education and training in operations and use of SGD, with focus on modifying (deleting, changing icons) icons as necessary to better meet communication needs.  Additionally, navigation of SGD focused on location of icons related to independence in communication of wants, needs and physical states with patient independence in use of SGD limited primarily to single icon level at this time.     Results of assessment of current functioning level using SGD suggested that patient has met or demonstrated progressed towards 3/4 (75%) of goals during the intervention period.  Patient would likely continue to demonstrate progress and independence in use of SGD with further education/ training in device, in addition to continued ST addressing expressive language development in hopes of increasing accuracy in letter recognition and formulation for single words, advancement in use of SGD to 2 icons level to demonstrate even more independence and accuracy in expressive language skills through use of SGD.     Prognosis:  Good  Prognosis Details:  While patient continues to demonstrate steady progress towards outpatient speech therapy addressing goals/ objectives, patient continues to require more prompting and opportunities to use SGD dynamically to increase independence, accuracy and efficiency in use of SGD to meet communication needs. Continued participation in direct, outpatient speech therapy recommended and warranted with plan of care modified to reflect progress but also address areas of continued need.     Goals  Short Term Goals:  1. Patient will demonstrate understanding and independence in completion of operations of SGD, including making changes to icons 80% accuracy, independent:  GOAL MET for shut down/ restart of device in addition to deleting and adding new icons to SGD 12/3/2019.  Goal to continue to  address how to modify/ change picture icons with change/ modification of icons.  2. Patient will independently navigate SGD to convey and independent need, thought or idea to familiar, progressing to unfamiliar listeners 4/5 obligatory context (80% accuracy): GOAL PARTIALLY MET 12/3/2019. Patient was independent in communicating needs (for example bathroom, hungry, thirsty, tired) with 2/5 = 40% accuracy to independent level.  Use of SGD to communicate ideas (for example stating want to go to Casino/ park, or play favorite game of [employer] golf) completed with 3/4 = 75% accuracy.  Use of SGD to communicate wants (for example going to favorite restaurant [employer] and ordering favorite burrito) completed  With 4/5 = 80% accuracy.    4.  Patient will independently navigate SGD to combine two words combining modifier + noun/ verb (for example, good game) given moderate verbal prompts/ instruction/ cues 80% accuracy: GOAL NOT MET. PROGRESS NOTED. Responses remains limited to single word responses with patient demonstrating marked difficulty in combination of two icons to increase expressive language using SGD to 2 word/ simple phrase level.   5.  Patient will increase expressive language skills completing confrontation naming tasks of nouns, verbs completing with 85% accuracy, independent using SGD.   Therapy Recommendations  Recommendation:  Individual Speech Therapy,  Planned Therapy Interventions:  Home Program, Patient/Caregiver Education and Speech Gen Device Therapy,   Plan Details:  Plan of care has been modified to reflect patient progress with use of SGD with recommendation for POC to be modified to 1 time per week during the next treatment period recommended.    12 units (56192)  Frequency:  1x week  Duration (in weeks):  12

## 2019-12-04 ENCOUNTER — APPOINTMENT (OUTPATIENT)
Dept: NEUROLOGY | Facility: MEDICAL CENTER | Age: 54
End: 2019-12-04
Payer: MEDICAID

## 2019-12-09 RX ORDER — ASPIRIN 81 MG/1
TABLET, CHEWABLE ORAL
Qty: 90 TAB | Refills: 1 | Status: SHIPPED | OUTPATIENT
Start: 2019-12-09 | End: 2020-06-08

## 2020-01-07 ENCOUNTER — APPOINTMENT (OUTPATIENT)
Dept: SPEECH THERAPY | Facility: REHABILITATION | Age: 55
End: 2020-01-07
Attending: FAMILY MEDICINE
Payer: MEDICAID

## 2020-01-10 ENCOUNTER — SPEECH THERAPY (OUTPATIENT)
Dept: SPEECH THERAPY | Facility: REHABILITATION | Age: 55
End: 2020-01-10
Attending: FAMILY MEDICINE
Payer: MEDICAID

## 2020-01-10 DIAGNOSIS — R48.2 APRAXIA: ICD-10-CM

## 2020-01-10 DIAGNOSIS — I69.320 APHASIA AS LATE EFFECT OF CEREBROVASCULAR ACCIDENT: ICD-10-CM

## 2020-01-10 DIAGNOSIS — R47.01 EXPRESSIVE APHASIA: ICD-10-CM

## 2020-01-10 PROCEDURE — 92507 TX SP LANG VOICE COMM INDIV: CPT | Mod: XU

## 2020-01-10 PROCEDURE — 92609 USE OF SPEECH DEVICE SERVICE: CPT

## 2020-01-10 ASSESSMENT — SOCIAL DETERMINANTS OF HEALTH (SDOH): SOCIAL_SUPPORT_SYSTEM: PARENT

## 2020-01-10 NOTE — OP THERAPY DAILY TREATMENT
"  Outpatient Speech Therapy  DAILY TREATMENT     [place] [address] [facility]  [address]  [place] [address]  Phone:  [phone]  Fax:  [phone]    Date: 01/10/2020    Patient: Gilles Cobb Case  YOB: 1965  MRN: [id number]     Time Calculation  Start time: 0905  Stop time: 1000 Time Calculation (min): 55 minutes       Chief Complaint: Aphasia    Visit #: 27    Subjective:   Reason for Therapy:     Reason For Evaluation:  Aphasia, Speech/Language and CVA  Social Support:     Accompanied By:  Parent (mother, brought patient to outpatient therapy visit, waited in lobby)    Patient Mental Status:  Alert and Responsive  Additional Subjective Comments:      Patient arrived appearing excited to return to outpatient speech therapy. No changes to medical history noted or reported since previous ST session. Patient endorses continued participation in Aphasia group through [employer].       Objective:   Treatments/Interventions Performed:  Patient/Caregiver education, Compensatory strategy training, Speech/Language treatment and Home program    Speech therapy targeted use of SGD through the following:    Patient independent in using SGD to generate greeting and follow up question \"Hi, how are you?\" at start of therapy session appropriate to context.     Given simple conversation, patient was was accurate in use of SGD accessing single icons to independent communicate information related to topic of interest football 3/5 = 60% accuracy, to obligatory contexts, allowing patient to communicate his current favorite team record (49ers), who they will be playing in their next game (using free text to first 2 letters of team \"mi\" for [employer]), who he thinks will win the game and what day they are playing.     Navigation of SGD to find icons related to use of specific language targeted specific to stating objects related to hygiene (brush, glasses) and every day living tasks (cup, knife). " "Patient was accurate in navigation of SGD to 2-3 pages to locate icons with 9/12 = 75% accuracy, independent.      Accuracy in verbal expression targeted through confrontation naming to objects related to every day living tasks. Patient was independent in naming objects without cues 0/5. Patient was independent in seeking use of SGD to provide direct imitation support/ prompts/ cues to increase accuracy in verbal expression demonstrating accuracy in direct imitation of SGD for verbal expression of single words with 4/5 = 80% accuracy.       Speech Therapy Assessment:     Expressive Language Assessment:     Expressive language comments: When prompted with a question, patient initial response remains characterized by attempts at verbal expression, which are consistent to production of automatic and filler speech \"um, ah, yeah, no, I don't know.\" When provided with prompts, patient then will access device in an attempt to locate desired response.       Speech Therapy Plan :   Prognosis & Recommendations  Impression Summary:  Gilles Gavin, a 54 year old male, returned to outpatient speech therapy for further direct, skilled intervention addressing accuracy in verbal expression and continued development of SGD to increase independence in communication.    Patient demonstrating continued improvement in use of SGD, demonstrating increased efficiency in use of SGD, likely supported by patient increased knowledge of icons and locations on SGD.      Additionally, patient with increased accuracy in verbal expression to single words with use of SGD for direct prompting/ support.   Prognosis:  Good  Prognosis Details:  Patient continues to demonstrate increased independence in use of SGD to increase social and functional communication to improve communicative competence during communication with familiar and unfamiliar listeners.   Goals  Short Term Goals: 1. Patient will independently navigate SGD to convey and independent need, " thought or idea to familiar, progressing to unfamiliar listeners 4/5 obligatory context (80% accuracy).   2.  Patient will independently navigate SGD to combine two words combining modifier + noun/ verb (for example, good game) given moderate verbal prompts/ instruction/ cues 80% accuracy .   3.  Patient will increase expressive language skills completing confrontation naming tasks of nouns, verbs completing with 85% accuracy, independent using SGD.   Therapy Recommendations  Recommendation:  Individual Speech Therapy,  Planned Therapy Interventions:  Home Program, Compensatory Strategy Training, Patient/Caregiver Education, Speech/Language training and Speech Gen Device Therapy,   Plan Details:  Continue with current POC.

## 2020-01-14 ENCOUNTER — SPEECH THERAPY (OUTPATIENT)
Dept: SPEECH THERAPY | Facility: REHABILITATION | Age: 55
End: 2020-01-14
Attending: INTERNAL MEDICINE
Payer: MEDICAID

## 2020-01-14 DIAGNOSIS — R48.2 APRAXIA: ICD-10-CM

## 2020-01-14 DIAGNOSIS — R47.01 EXPRESSIVE APHASIA: ICD-10-CM

## 2020-01-14 DIAGNOSIS — I69.320 APHASIA AS LATE EFFECT OF CEREBROVASCULAR ACCIDENT: ICD-10-CM

## 2020-01-14 PROCEDURE — 92609 USE OF SPEECH DEVICE SERVICE: CPT

## 2020-01-14 PROCEDURE — 92507 TX SP LANG VOICE COMM INDIV: CPT

## 2020-01-14 ASSESSMENT — SOCIAL DETERMINANTS OF HEALTH (SDOH): SOCIAL_SUPPORT_SYSTEM: PARENT

## 2020-01-14 NOTE — OP THERAPY DAILY TREATMENT
Outpatient Speech Therapy  DAILY TREATMENT     [address]  [address]  [place] [address]  Phone:  [phone]  Fax:  [phone]    Date: 01/14/2020    Patient: Gilles Cobb Case  YOB: 1965  MRN: [id number]     Time Calculation  Start time: 0800  Stop time: 0900 Time Calculation (min): 60 minutes       Chief Complaint: Aphasia    Visit #: 28    Subjective:   Reason for Therapy:     Reason For Evaluation:  Speech/Language and Aphasia  Social Support:     Accompanied By:  Parent (mother, present and supportive. Waited in lobby during outpatient speech therapy session)    Patient Mental Status:  Alert and Responsive  Progress Factors:     Progression:  Getting Better  Additional Subjective Comments:      Patient reports most use of SGD for communication in the home setting, specifically to communicate wants, needs, physical states and ideas with his mother. Patient mother endorses use of device increasingly when patient is unable to accurately use verbal expression to relay a want or need.     Patient endorses use of SGD in the community for bi-weekly Aphasia group, but currently does not use SGD at favorite places in the community, such as at the [facility] [place].       Objective:   Treatments/Interventions Performed:  Patient/Caregiver education, Compensatory strategy training, Home program and Speech/Language treatment  Other Treatment Interventions:  Speech Generating Device (SGD) Therapy    Speech therapy targeted use of SGD through the following:     Navigation of SGD to find icons related to use of specific language targeted specific to stating objects related to hygiene (towel, toothbrush) and every day living tasks (cup, knife). Patient was accurate in navigation of SGD to 2-3 pages to locate icons with 8/10 = 80% accuracy, independent.       Accuracy in verbal expression targeted through confrontation naming to objects related to every day living  "tasks. Patient was independent in naming objects without cues 4/10 = 40% accuracy. Patient was independent in seeking use of SGD to provide direct imitation support/ prompts/ cues to increase accuracy in verbal expression demonstrating accuracy in direct imitation of SGD for verbal expression of single words with 10/10 = 100% accuracy.      Training in use of SGD specific to CORE WORDS. Patient provided with direct instruction on use of page to generate phrases/ simple sentences to generate a request (I want to go), feelings (I am happy) and requests for assistance (I need help).  Further exploration of core words recommended with patient provided with list of 5 phrases/ simple sentence to practice before returning to next scheduled outpatient speech therapy session.     Speech Therapy Assessment:     Expressive Language Assessment:     Ability to exhibit appropriate naming: Minimal (Use of SGD to locate specific items related to communicate wants/ needs specific to nouns for every day living tasks).    Patient's ability to use automatic language appropriately is WFL.    Agrammatism is Present.    Paraphasic is Present.    Perseveration is Present.    Expressive language comments: Patient with independent use of automatic speech during structured therapy tasks targeting SGD with \"oh boy\" \"peña\" stated to independent level when encountering difficulty.     Patient with increased accuracy in verbal expression to single word level with patient independently stating \"Budweiser\" as favorite beer during simple conversation.         Speech Therapy Plan :   Prognosis & Recommendations  Impression Summary:  Progress in use of SGD to phrase/ simple sentence level using guided cues through core words icon on SGD initiated given patient marked progress in accuracy and independence navigating SGD to locate nouns specific to every day life activities.   Prognosis:  Good  Prognosis Details:  Patient is demonstrating improvement in " independence in use of SGD. Patient is requesting cueing from clinician less when encountering a difficulty, demonstrating improved independence in SGD. Patient will now seek icons to independent level, with better independence in identification of specific categories through larger topics to find related icons specific to what patient tended message.   Goals  Short Term Goals: 1. Patient will independently navigate SGD to convey and independent need, thought or idea to familiar, progressing to unfamiliar listeners 4/5 obligatory context (80% accuracy).   2. Patient will independently navigate SGD to combine two words combining modifier + noun/ verb (for example, good game) given moderate verbal prompts/ instruction/ cues 80% accuracy .   3.  Patient will increase expressive language skills completing confrontation naming tasks of nouns, verbs completing with 85% accuracy, independent using SGD.   Therapy Recommendations  Recommendation:  Individual Speech Therapy,  Planned Therapy Interventions:  Home Program, Compensatory Strategy Training, Patient/Caregiver Education, Speech/Language training and Speech Gen Device Therapy,   Plan Details:  Continue with current POC.

## 2020-01-17 ENCOUNTER — APPOINTMENT (OUTPATIENT)
Dept: SPEECH THERAPY | Facility: REHABILITATION | Age: 55
End: 2020-01-17
Attending: FAMILY MEDICINE
Payer: MEDICAID

## 2020-01-22 ENCOUNTER — SPEECH THERAPY (OUTPATIENT)
Dept: SPEECH THERAPY | Facility: REHABILITATION | Age: 55
End: 2020-01-22
Attending: INTERNAL MEDICINE
Payer: MEDICAID

## 2020-01-22 DIAGNOSIS — R48.2 APRAXIA: ICD-10-CM

## 2020-01-22 DIAGNOSIS — I69.320 APHASIA AS LATE EFFECT OF CEREBROVASCULAR ACCIDENT: ICD-10-CM

## 2020-01-22 DIAGNOSIS — R47.01 EXPRESSIVE APHASIA: ICD-10-CM

## 2020-01-22 PROCEDURE — 92609 USE OF SPEECH DEVICE SERVICE: CPT

## 2020-01-22 ASSESSMENT — SOCIAL DETERMINANTS OF HEALTH (SDOH): SOCIAL_SUPPORT_SYSTEM: PARENT

## 2020-01-22 NOTE — OP THERAPY DAILY TREATMENT
Outpatient Speech Therapy  DAILY TREATMENT     [address]  [address]  [place] [address]  Phone:  [phone]  Fax:  [phone]    Date: 01/22/2020    Patient: Gilles Cobb Case  YOB: 1965  MRN: [id number]     Time Calculation  Start time: 0800  Stop time: 0830 Time Calculation (min): 30 minutes       Chief Complaint: Aphasia    Visit #: 29    Subjective:   Reason for Therapy:     Reason For Evaluation:  Aphasia  Social Support:     Accompanied By:  Parent (mother, present and supportive. Waited in lobby during outpatient speech therapy visit)    Patient Mental Status:  Alert and Responsive  Progress Factors:     Progression:  Getting Better  Additional Subjective Comments:      Patient arrived to speech therapy very excited to tell speech therapist using SGD that 49ers won last game to make it to the superbowl.       Objective:   Treatments/Interventions Performed:  Patient/Caregiver education, Compensatory strategy training and Home program  Other Treatment Interventions:  Speech Generating Device (SGD) Therapy     Speech therapy targeted use of SGD through the following:    Training in use of SGD specific to CORE WORDS. Patient provided with direct instruction on use of page to generate phrases/ simple sentences to generate a request (I want to go), feelings (I am happy) and requests for assistance (I need help).  Review of previously provided sentences resulted in independence in communication of personally relevant requests and stating feelings 5/5 - 100% accuracy, independent.     Use of CORE words vocabulary continued to target production of phrase/ simple sentences to communicate action related information (she is running).  This proved difficult for patient given picture only cues as support, requiring moderate-direct verbal cues to formulate phrase level speech. Continued direct instruction in use of SGD in this area recommended.     Further  exploration of core words recommended with patient again provided with list of 5 phrases/ simple sentence to practice before returning to next scheduled outpatient speech therapy session.     Speech Therapy Assessment:     Expressive Language Assessment:     Expressive language comments: Patient is demonstrating improved independence in use of SGD given repetitive practice to areas of personal interest (for example topic page of football, Casino). Patient with continued need for direct support to use SGD to formulate language at a phrase/ simple sentence level.  Repetitive practice provided to assist patient in further development in this area.       Speech Therapy Plan :   Prognosis & Recommendations  Impression Summary:  Patient very responsive to topic cues to assist in using SGD to locate new information or ideas, such as looking for the state to tell ST who the 49ers are playing the superbowl.   Therapy Recommendations  Recommendation:  Individual Speech Therapy,  Planned Therapy Interventions:  Home Program, Patient/Caregiver Education, Speech Gen Device Therapy and Speech/Language training,   Plan Details:  Continue with current POC.

## 2020-01-24 ENCOUNTER — SPEECH THERAPY (OUTPATIENT)
Dept: SPEECH THERAPY | Facility: REHABILITATION | Age: 55
End: 2020-01-24
Attending: FAMILY MEDICINE
Payer: MEDICAID

## 2020-01-24 DIAGNOSIS — R47.01 EXPRESSIVE APHASIA: ICD-10-CM

## 2020-01-24 DIAGNOSIS — R48.2 APRAXIA: ICD-10-CM

## 2020-01-24 DIAGNOSIS — I69.320 APHASIA AS LATE EFFECT OF CEREBROVASCULAR ACCIDENT: ICD-10-CM

## 2020-01-24 PROCEDURE — 92609 USE OF SPEECH DEVICE SERVICE: CPT

## 2020-01-24 PROCEDURE — 92507 TX SP LANG VOICE COMM INDIV: CPT | Mod: XU

## 2020-01-24 ASSESSMENT — SOCIAL DETERMINANTS OF HEALTH (SDOH): SOCIAL_SUPPORT_SYSTEM: PARENT

## 2020-01-24 NOTE — OP THERAPY DAILY TREATMENT
Outpatient Speech Therapy  DAILY TREATMENT     [address]  [address]  [place] [address]  Phone:  [phone]  Fax:  [phone]    Date: 01/24/2020    Patient: Gilles Cobb Case  YOB: 1965  MRN: [id number]     Time Calculation  Start time: 0900  Stop time: 0930 Time Calculation (min): 30 minutes       Chief Complaint: Aphasia    Visit #: 30    Subjective:   Reason for Therapy:     Reason For Evaluation:  Aphasia  Social Support:     Accompanied By:  Parent (mother, present and supportive. Waited in lobby during outpatient speech therapy visit)    Patient Mental Status:  Alert and Responsive  Progress Factors:     Progression:  Getting Better      Objective:   Treatments/Interventions Performed:  Patient/Caregiver education, Home program and Speech/Language treatment  Other Treatment Interventions:  Speech Generating Device (SGD) Therapy     Speech therapy targeted use of SGD through the following:     Training in use of SGD specific to CORE WORDS continued.  Patient returned to outpatient speech therapy demonstrating independence in navigation of SGD to communicate phrase/ simple sentence level information given direct visual support (written text) cues: completing 7/7 = 100% accuracy.    Use of CORE WORDS continued to address formulation of phrase/ simple sentence to 3 word level given picture only support/ cues. Patient completed with     Adding new icons to SGD targeted to add icons specific to patient interest (dancing). Patient required direct cues to complete sequence necessary to add new icon.  Additionally, patient required direct visual/ verbal support through use of written text cues to accurately type out word to label icon 2/2 trials.     Deletion of unnecessary/ unwanted icon targeted with patient completing 3 step sequence with minimal only verbal cues 1/1 (100%).       Speech Therapy Assessment:     Expressive Language Assessment:      "Patient's ability to use automatic language appropriately is Supervision Required.    Expressive language comments: Patient mother is reporting an improvement in patient independent verbalizations to the automatic speech level, providing examples of utterance such as \"I don't know\" \"let's go\" produced in home setting to independent level.        Speech Therapy Plan :   Prognosis & Recommendations  Impression Summary:  Education completed with patient parent regarding how to incorporate SGD into every day life activities to increase use of SGD, increasingly when experiencing a word finding difficulty.     Patient continues to necessitate direct cues to modify icons on SGD to add new icons or replace of modify existing icons.  Deletion of icons is completed independent to minimal verbal cues.   Prognosis:  Good  Prognosis Details:  Patient demonstrating steady progress in use of SGD.  Patient is most successful at single icon level at this time; requiring direct visual/ verbal cues to increase icon use beyond a single icon level.   Therapy Recommendations  Recommendation:  Individual Speech Therapy,  Planned Therapy Interventions:  Home Program, Patient/Caregiver Education, Speech/Language training and Speech Gen Device Therapy,   Plan Details:  Continue with current POC.  Continue in understanding, development of CORE WORDS in addition to continued education on how to add icons to increase communicative competence through use of SGD.                "

## 2020-01-29 ENCOUNTER — SPEECH THERAPY (OUTPATIENT)
Dept: SPEECH THERAPY | Facility: REHABILITATION | Age: 55
End: 2020-01-29
Attending: FAMILY MEDICINE
Payer: MEDICAID

## 2020-01-29 DIAGNOSIS — R47.01 EXPRESSIVE APHASIA: ICD-10-CM

## 2020-01-29 DIAGNOSIS — I69.320 APHASIA AS LATE EFFECT OF CEREBROVASCULAR ACCIDENT: ICD-10-CM

## 2020-01-29 DIAGNOSIS — R48.2 APRAXIA: ICD-10-CM

## 2020-01-29 PROCEDURE — 92507 TX SP LANG VOICE COMM INDIV: CPT

## 2020-01-29 ASSESSMENT — SOCIAL DETERMINANTS OF HEALTH (SDOH): SOCIAL_SUPPORT_SYSTEM: PARENT

## 2020-01-29 NOTE — OP THERAPY DAILY TREATMENT
Outpatient Speech Therapy  DAILY TREATMENT     [address]  [address]  [place] [address]  Phone:  [phone]  Fax:  [phone]    Date: 01/29/2020    Patient: Gilles Cobb Case  YOB: 1965  MRN: [id number]     Time Calculation  Start time: 0800  Stop time: 0900 Time Calculation (min): 60 minutes       Chief Complaint: Aphasia    Visit #: 31    Subjective:   Reason for Therapy:     Reason For Evaluation:  Aphasia  Social Support:     Accompanied By:  Parent (mother, present and supportive. Mother waited in lobby during outpatient speech therapy session.)    Patient Mental Status:  Alert and Responsive (Oriented x 4 independent.)  Progress Factors:     Progression:  Getting Better  Additional Subjective Comments:      No changes to medical history noted or reported.  Patient motivated and participated well with all structured therapy tasks.       Objective:   Treatments/Interventions Performed:  Patient/Caregiver education, Home program and Speech/Language treatment  Other Treatment Interventions:  Speech Generating Device (SGD) Therapy    Speech therapy targeted use of SGD through the following:    Use of SGD to communicate personal relevant information related to: name, age, date of birth, place of residence and names of family members targeted. Using SGD, patient was independent in stating personal information with 8/8 = 100% accuracy, with access of icons to independent level all trials.     Navigation of SGD to specific vocabulary related to every day living tasks targeted (for example, comb, toothbrush) with patient independent in navigating SGD to complete confrontation naming given direct picture cues: 12/12 = 100% accuracy.  Minimal verbal support was provided during structured task to provide support and reduce frustration during navigation of SGD.     Accuracy in verbal expression to confrontation naming items previously found on SGD targeted and  "completed with: 1/10 (10%) accuracy without use of cues. Patient was independent in accessing SGD when experiencing a breakdown in accuracy of verbal expression, using direct imitation cues from SGD to accurately formulate a single word on 8/9 (89%) of opportunities.     Activity continued to address use of SGD to simple open ended questions related to object-function. Patient was provided with object function and asked to use SGD to state item described. Patient completed structured task with 3/6 = 50% accuracy, given moderate-direct verbal prompts/ instruction/ cues.          Speech Therapy Assessment:     Expressive Language Assessment:     Ability to exhibit appropriate naming: Supervision Required (using SGD to confrontation naming tasks given minimal verbal support/ instruction/ cues).    Agrammatism is Present.    Paraphasic is Present.    Perseveration is Present.    Expressive language comments: Patient independent attempts at verbalizations remains characterized by presence of haulted speech patterns, limited in accuracy secondary to presence of paraphasias, perseverations and verbal apraxia to the single word level.     With support of SGD, patient demonstrating increased accuracy in single word verbalizations, such as \"superbowl\" stated to independent level to clinician inquiries.     To inquiries of personal questions, patient was independent in verbalizations only to state first name.  All other inquiries accurately answered using SGD.       Speech Mechanism Assessment:     Patient apraxia: Severe  Speech mechanism comments: Minimal sound errors present to verbalizations at single word level given direct imitation cues secondary to presence of verbal Apraxia.       Speech Therapy Plan :   Prognosis & Recommendations  Impression Summary:  Patient continues to progress in use of SGD demonstrating independence in use of augmentative alternative communication means to independent state personal " information.    Patient continues to grow in his understanding in device set up, specific to location of vocabulary related to activities of daily living as addressed during today's session.    Patient continues to necessitate moderate-direct cues to locate items when prompted using open ended questions.  Visual support through picture cues helps to assist patient in accurate icon selection.        Prognosis:  Good  Therapy Recommendations  Recommendation:  Individual Speech Therapy,  Planned Therapy Interventions:  Home Program, Compensatory Strategy Training, Patient/Caregiver Education, Speech/Language training and Speech Gen Device Therapy,   Plan Details:  Continue with current POC. Continue in understanding, development of TOPICS/ CORE WORDS in addition to further work in independence in using SGD to answer simple open ended questions.

## 2020-01-31 ENCOUNTER — SPEECH THERAPY (OUTPATIENT)
Dept: SPEECH THERAPY | Facility: REHABILITATION | Age: 55
End: 2020-01-31
Attending: FAMILY MEDICINE
Payer: MEDICAID

## 2020-01-31 DIAGNOSIS — R48.2 APRAXIA: ICD-10-CM

## 2020-01-31 DIAGNOSIS — R47.01 EXPRESSIVE APHASIA: ICD-10-CM

## 2020-01-31 DIAGNOSIS — I69.320 APHASIA AS LATE EFFECT OF CEREBROVASCULAR ACCIDENT: ICD-10-CM

## 2020-01-31 PROCEDURE — 92507 TX SP LANG VOICE COMM INDIV: CPT | Mod: XU

## 2020-01-31 PROCEDURE — 92609 USE OF SPEECH DEVICE SERVICE: CPT

## 2020-01-31 ASSESSMENT — SOCIAL DETERMINANTS OF HEALTH (SDOH): SOCIAL_SUPPORT_SYSTEM: PARENT

## 2020-01-31 NOTE — OP THERAPY DAILY TREATMENT
Outpatient Speech Therapy  DAILY TREATMENT     [address]  [address]  [place] [address]  Phone:  [phone]  Fax:  [phone]    Date: 01/31/2020    Patient: Gilles Cobb Case  YOB: 1965  MRN: [id number]     Time Calculation  Start time: 0830  Stop time: 0930 Time Calculation (min): 60 minutes       Chief Complaint: Aphasia    Visit #: 32    Subjective:   Reason for Therapy:     Reason For Evaluation:  CVA, Aphasia and Speech/Language  Social Support:     Accompanied By:  Parent (mother, present and supportive. Mother waited in lobby during outpatient speech therapy session)    Patient Mental Status:  Alert and Responsive  Progress Factors:     Progression:  Getting Better  Additional Subjective Comments:      Patient arrived to speech therapy in good spirits.  No changes to medical history noted or reported through chart review/ patient report.       Objective:   Treatments/Interventions Performed:  Patient/Caregiver education, Speech/Language treatment and Home program  Other Treatment Interventions:  Speech Generating Device (SGD) Therapy     Speech therapy targeted use of SGD through the following:     Navigation of SGD to specific vocabulary related to every day living tasks targeted addressing use of SGD to simple open ended questions related to object-function. Patient was provided with object function and asked to use SGD to state item described. Patient completed structured task with % accuracy, given moderate-no verbal prompts/ instruction/ cues. Patient required a minimum of 3 verbal prompts/ verbal repetition of phrase levels to ensure understanding and accuracy during structured task.      Use of CORE WORDS continued to address formulation of phrase/ simple sentence to 3 word level specific to communication of emotional states (for example I am happy) targeted. Patient initially required direct prompts to sequence 3 buttons (4 steps) to  "communicate a personal emotional state \"I am worried.\" Following education, use of direct cues was faded to minimal to no cues with patient identifying emotions and sequencing icons to communicate emotional states (she is happy) to direct picture cues with 4/4 = 100% accuracy, independent.       Speech Therapy Assessment:     Expressive Language Assessment:     Expressive language comments: Patient does well in use of SGD during structured tasks when provided with direct visual support.  Introduction of more novel tasks, such as those experienced with open ended questions, often results in reduced accuracy and need for direct verbal, visual reinforcement.       Speech Therapy Plan :   Prognosis & Recommendations  Impression Summary: Patient with need for increased processing time and additional prompting/ cues during structured, open ended question/ answer task using SGD.      Patient remains most accurate in use of SGD for communication of information to single word/ single icon level.  Patient required moderate-direct cues to complete structured therapy task addressing production of 3 word phrases to communicate emotional states using SGD.     Patient with occasional need to scan device to far right side in order to locate desired icon suggesting patient may still experience some right side neglect.     Patient demonstrated use of SGD to communicate emotional state of \"worried\" during today's session.  Further exploration and consideration for patient communication of this emotional state should be addressed by referring physician.   Goals  Short Term Goals:   1. Patient will independently navigate SGD to convey and independent need, thought or idea to familiar, progressing to unfamiliar listeners 4/5 obligatory context (80% accuracy).   2. Patient will independently navigate SGD to combine two words combining modifier + noun/ verb (for example, good game) given moderate verbal prompts/ instruction/ cues 80% " accuracy .   3.  Patient will increase expressive language skills completing confrontation naming tasks of nouns, verbs completing with 85% accuracy, independent using SGD.   Therapy Recommendations  Recommendation:  Individual Speech Therapy, D/C planning as patient has 1 remaining outpatient speech therapy visit scheduled.   Planned Therapy Interventions:  Home Program, Compensatory Strategy Training, Patient/Caregiver Education, Speech/Language training and Speech Gen Device Therapy,   Plan Details:  D/C planning.  Data collection towards short/ long term goals recommended at next scheduled ST session.

## 2020-02-05 ENCOUNTER — SPEECH THERAPY (OUTPATIENT)
Dept: SPEECH THERAPY | Facility: REHABILITATION | Age: 55
End: 2020-02-05
Attending: FAMILY MEDICINE
Payer: MEDICAID

## 2020-02-05 DIAGNOSIS — R47.01 EXPRESSIVE APHASIA: ICD-10-CM

## 2020-02-05 DIAGNOSIS — R48.2 APRAXIA: ICD-10-CM

## 2020-02-05 DIAGNOSIS — I69.320 APHASIA AS LATE EFFECT OF CEREBROVASCULAR ACCIDENT: ICD-10-CM

## 2020-02-05 PROCEDURE — 92507 TX SP LANG VOICE COMM INDIV: CPT | Mod: XU

## 2020-02-05 PROCEDURE — 92609 USE OF SPEECH DEVICE SERVICE: CPT

## 2020-02-05 ASSESSMENT — SOCIAL DETERMINANTS OF HEALTH (SDOH): SOCIAL_SUPPORT_SYSTEM: PARENT

## 2020-02-05 NOTE — OP THERAPY DISCHARGE SUMMARY
"Outpatient Speech Therapy  DISCHARGE SUMMARY NOTE      [address]  [address]  [place] [address]  Phone:  [phone]  Fax:  [phone]        Patient Name:  Gilles Gavin  :  1965  MR#:  [id number]    HICN:       Visits: 33        Cancel/No-Show: 1    Diagnosis/ICD-10:   1. Aphasia as late effect of cerebrovascular accident I69.320         Referring Provider: Santos Ramirez M.D.    SOC Date: 19   Onset Date: 18      Your patient is being discharged from Speech therapy with the following comments:        Comments:  Mr. Gilles Gavin, a 54 year old male, participated in direct, outpatient speech therapy addressing communication, specific to further development, accuracy and independence in use of augmentative alternative communication through use of SGD in addition to continued target of accuracy of expressive language skills at the single word level.      Patient ability to verbalize given direct imitation cues has demonstrated a notable increase during this current treatment plan; although patient independent attempts at verbalizations remains characterized by presence of haulted speech patterns, limited in accuracy secondary to presence of paraphasias, perseverations and verbal apraxia to the single word level.      With support of SGD, patient has demonstrated increased accuracy in single word verbalizations, such as \"superbowl\" and \"help\" stated to independent level to clinician inquiries.      To inquiries of personal questions, patient is independent in verbalizations only to state first name.  All other inquiries are now accurately answered using SGD suggesting that patient is becoming increasingly comfortable in use of SGD as a functional means of communication at this time.     Patient has progressed in use of SGD to convey a variety of different messages with the clinician and parent in the home setting, and outside in the community with patient " continuing to attend a local, Aphasia group through the [facility] that meets bi-monthly.       At this time, patient has likely reached his optimal level of function for speech production specific to use of language through expressive language skills and use of SGD.  Patient was provided with activities to work on areas of deficit to continue to advance in use of SGD outside of direct intervention, specific to increasing use of language through generation of phrase/ simple sentences using CORE WORDS and working with structured task to increase accuracy in written expression to advance to level necessary to be able to add new icons on SGD.     Goals  Short Term Goals:   1. Patient will independently navigate SGD to convey and independent need, thought or idea to familiar, progressing to unfamiliar listeners 4/5 obligatory context (80% accuracy): GOAL MET.   2. Patient will independently navigate SGD to combine two words combining modifier + noun/ verb (for example, good game) given moderate verbal prompts/ instruction/ cues 80% accuracy: GOAL NOT MET.   3.  Patient will increase expressive language skills completing confrontation naming tasks of nouns, verbs completing with 85% accuracy, independent using SGD: GOAL MET.     Long Term Goals:  1.  Using SGD, patient will demonstrate independence in generating a need, thought or idea to increase independence in communication with familiar, unfamiliar listeners 4/5 (80%) of obligatory contexts: GOAL MET.    2.  Patient will increase expressive language skills to demonstrate independence in communication of simple, personally relevant information to familiar, unfamiliar listeners 80% accuracy:  GOAL MET using SGD to communicate personal, functional information related to wants, needs, physical states at single icon level independent.       Limitations/Remaining:Patient continues to demonstrate severe expressive language deficits, in combination with verbal  apraxia requiring augmentative alternative means of communication through SGD to improve communication with familiar and unfamiliar listeners.         Recommendations:D/C outpatient speech therapy.  Consider return to outpatient speech therapy in 3-6 months for further education and training in use of SGD to further independence in communication.         Sonya Mak, SLP

## 2020-02-05 NOTE — OP THERAPY DAILY TREATMENT
Outpatient Speech Therapy  DAILY TREATMENT     [facility] Speech [address]  [address]  [place] [address]  Phone:  [phone]  Fax:  [phone]    Date: 02/05/2020    Patient: Gilles Cobb Case  YOB: 1965  MRN: [id number]     Time Calculation  Start time: 0930  Stop time: 1030 Time Calculation (min): 60 minutes       Chief Complaint: Aphasia    Visit #: 33    Subjective:   Reason for Therapy:     Reason For Evaluation:  Aphasia  Social Support:     Accompanied By:  Parent (mother, present and supportive. Waited in lobby during outpatient speech therapy session. )    Patient Mental Status:  Alert and Responsive (Oriented x 4 independent)  Progress Factors:     Progression:  Getting Better  Additional Subjective Comments:      No changes to current medical history noted or reported. Patient aware that today is his last scheduled outpatient speech therapy appointment for the current treatment period.       Objective:   Treatments/Interventions Performed:  Patient/Caregiver education, Speech/Language treatment and Compensatory strategy training  Other Treatment Interventions:  Speech Generating Device (SGD) Therapy     Patient demonstrated the following progress towards outpatient speech therapy short term goals/ objectives as measured through completion of the following:    Independent navigation of SGD to convey and independent need (for example, bathroom, hungry/ thirsty, I need help) completed with 5/5 = 100% accuracy and order preferred food/ beverages with 3/3 = 100% accuracy.   Independent navigation of SGD to convey independent thought/ idea (for example, wanting to go to the [facility] or favorite restaurant/ park) completed with 5/5 = 100% accuracy.  Independent navigation of SGD to convey emotional/ physical states using CORE WORDS for formulation of simple phrase level response (I feel happy) completed 3/3 = 100% accuracy, independent.      Navigation of SGD to  "specific vocabulary related to every day living tasks targeted addressing use of SGD to simple open ended questions related to object-function. Patient was provided with object function and asked to use SGD to state item described. Patient completed structured task with 4/5 = 80% accuracy, given moderate-no verbal prompts/ instruction/ cues. Patient required a minimum of 3 verbal prompts/ verbal repetition of phrase levels to ensure understanding and accuracy during structured task.     Speech Therapy Assessment:     Expressive Language Assessment:     Ability to exhibit appropriate naming: Minimal (given confrontation naming tasks to single icon level using SGD).    Patient's ability to verbalize wants and needs is Minimal (to single icon level using SGD).     Jargon is Present.    Agrammatism is Present.    Paraphasic is Present.    Perseveration is Present.    Expressive language comments: Patient ability to verbalize given direct imitation cues has demonstrated a notable increase during this current treatment plan.         Speech Therapy Plan :   Prognosis & Recommendations  Impression Summary:  Mr. Gilles Gavin, a 54 year old male, participated in direct, outpatient speech therapy addressing communication, specific to further development, accuracy and independence in use of augmentative alternative communication through use of SGD in addition to continued target of accuracy of expressive language skills at the single word level.     Patient ability to verbalize given direct imitation cues has demonstrated a notable increase during this current treatment plan; although patient independent attempts at verbalizations remains characterized by presence of haulted speech patterns, limited in accuracy secondary to presence of paraphasias, perseverations and verbal apraxia to the single word level.      With support of SGD, patient has demonstrated increased accuracy in single word verbalizations, such as \"superbowl\" " "and \"help\" stated to independent level to clinician inquiries.      To inquiries of personal questions, patient is independent in verbalizations only to state first name.  All other inquiries are now accurately answered using SGD suggesting that patient is becoming increasingly comfortable in use of SGD as a functional means of communication at this time.    Patient has progressed in use of SGD to convey a variety of different messages with the clinician and parent in the home setting, and outside in the community with patient continuing to attend a local, Aphasia group through the [facility] that meets bi-monthly.      At this time, patient has likely reached his optimal level of function for speech production specific to use of language through expressive language skills and use of SGD.  Patient was provided with activities to work on areas of deficit to continue to advance in use of SGD outside of direct intervention, specific to increasing use of language through generation of phrase/ simple sentences using CORE WORDS and working with structured task to increase accuracy in written expression to advance to level necessary to be able to add new icons on SGD.   Goals  Short Term Goals:   1. Patient will independently navigate SGD to convey and independent need, thought or idea to familiar, progressing to unfamiliar listeners 4/5 obligatory context (80% accuracy): GOAL MET.   2. Patient will independently navigate SGD to combine two words combining modifier + noun/ verb (for example, good game) given moderate verbal prompts/ instruction/ cues 80% accuracy: GOAL NOT MET.   3.  Patient will increase expressive language skills completing confrontation naming tasks of nouns, verbs completing with 85% accuracy, independent using SGD: GOAL MET.   Long Term Goals:  1.  Using SGD, patient will demonstrate independence in generating a need, thought or idea to increase independence in communication with " familiar, unfamiliar listeners 4/5 (80%) of obligatory contexts: GOAL MET.    2.  Patient will increase expressive language skills to demonstrate independence in communication of simple, personally relevant information to familiar, unfamiliar listeners 80% accuracy:  GOAL MET using SGD to communicate personal, functional information related to wants, needs, physical states at single icon level independent.   Therapy Recommendations  Recommendation:  No further speech therapy indicated at this time,  Plan Details:  D/C outpatient speech therapy

## 2020-08-05 PROCEDURE — RXMED WILLOW AMBULATORY MEDICATION CHARGE: Performed by: FAMILY MEDICINE

## 2020-08-06 ENCOUNTER — PHARMACY VISIT (OUTPATIENT)
Dept: PHARMACY | Facility: MEDICAL CENTER | Age: 55
End: 2020-08-06
Payer: COMMERCIAL

## 2020-08-13 ENCOUNTER — PHARMACY VISIT (OUTPATIENT)
Dept: PHARMACY | Facility: MEDICAL CENTER | Age: 55
End: 2020-08-13
Payer: COMMERCIAL

## 2020-08-13 PROCEDURE — RXMED WILLOW AMBULATORY MEDICATION CHARGE: Performed by: FAMILY MEDICINE

## 2020-08-31 PROCEDURE — RXMED WILLOW AMBULATORY MEDICATION CHARGE: Performed by: FAMILY MEDICINE

## 2020-09-02 ENCOUNTER — PHARMACY VISIT (OUTPATIENT)
Dept: PHARMACY | Facility: MEDICAL CENTER | Age: 55
End: 2020-09-02
Payer: COMMERCIAL

## 2020-09-30 DIAGNOSIS — Z86.73 HISTORY OF STROKE: ICD-10-CM

## 2020-09-30 DIAGNOSIS — R53.1 RIGHT SIDED WEAKNESS: ICD-10-CM

## 2020-10-01 PROCEDURE — RXMED WILLOW AMBULATORY MEDICATION CHARGE: Performed by: FAMILY MEDICINE

## 2020-10-01 RX ORDER — CYCLOBENZAPRINE HCL 10 MG
10 TABLET ORAL 3 TIMES DAILY PRN
Qty: 60 TAB | Refills: 11 | Status: SHIPPED | OUTPATIENT
Start: 2020-10-01 | End: 2021-02-09 | Stop reason: SDUPTHER

## 2020-10-01 RX ORDER — ATORVASTATIN CALCIUM 40 MG/1
40 TABLET, FILM COATED ORAL DAILY
Qty: 30 TAB | Refills: 11 | Status: SHIPPED | OUTPATIENT
Start: 2020-10-01 | End: 2021-02-09 | Stop reason: SDUPTHER

## 2020-10-02 ENCOUNTER — PHARMACY VISIT (OUTPATIENT)
Dept: PHARMACY | Facility: MEDICAL CENTER | Age: 55
End: 2020-10-02
Payer: COMMERCIAL

## 2020-10-02 PROCEDURE — RXMED WILLOW AMBULATORY MEDICATION CHARGE: Performed by: FAMILY MEDICINE

## 2020-10-30 PROCEDURE — RXMED WILLOW AMBULATORY MEDICATION CHARGE: Performed by: FAMILY MEDICINE

## 2020-11-02 ENCOUNTER — PHARMACY VISIT (OUTPATIENT)
Dept: PHARMACY | Facility: MEDICAL CENTER | Age: 55
End: 2020-11-02
Payer: COMMERCIAL

## 2020-11-17 DIAGNOSIS — I10 ESSENTIAL HYPERTENSION: ICD-10-CM

## 2020-11-17 RX ORDER — LISINOPRIL 5 MG/1
TABLET ORAL
Qty: 30 TAB | Refills: 0 | Status: SHIPPED | OUTPATIENT
Start: 2020-11-17 | End: 2021-02-01 | Stop reason: SDUPTHER

## 2020-11-30 DIAGNOSIS — I63.9 CEREBROVASCULAR ACCIDENT (CVA), UNSPECIFIED MECHANISM (HCC): ICD-10-CM

## 2020-11-30 PROCEDURE — RXMED WILLOW AMBULATORY MEDICATION CHARGE: Performed by: FAMILY MEDICINE

## 2020-11-30 RX ORDER — ASPIRIN 81 MG/1
TABLET, CHEWABLE ORAL
Qty: 90 TAB | Refills: 0 | Status: SHIPPED | OUTPATIENT
Start: 2020-11-30 | End: 2021-02-09 | Stop reason: SDUPTHER

## 2020-12-01 ENCOUNTER — PHARMACY VISIT (OUTPATIENT)
Dept: PHARMACY | Facility: MEDICAL CENTER | Age: 55
End: 2020-12-01
Payer: COMMERCIAL

## 2020-12-08 ENCOUNTER — PHARMACY VISIT (OUTPATIENT)
Dept: PHARMACY | Facility: MEDICAL CENTER | Age: 55
End: 2020-12-08
Payer: COMMERCIAL

## 2020-12-08 PROCEDURE — RXMED WILLOW AMBULATORY MEDICATION CHARGE: Performed by: FAMILY MEDICINE

## 2020-12-29 ENCOUNTER — PHARMACY VISIT (OUTPATIENT)
Dept: PHARMACY | Facility: MEDICAL CENTER | Age: 55
End: 2020-12-29
Payer: COMMERCIAL

## 2020-12-29 PROCEDURE — RXMED WILLOW AMBULATORY MEDICATION CHARGE: Performed by: FAMILY MEDICINE

## 2021-02-01 ENCOUNTER — PHARMACY VISIT (OUTPATIENT)
Dept: PHARMACY | Facility: MEDICAL CENTER | Age: 56
End: 2021-02-01
Payer: COMMERCIAL

## 2021-02-01 DIAGNOSIS — I10 ESSENTIAL HYPERTENSION: ICD-10-CM

## 2021-02-01 PROCEDURE — RXMED WILLOW AMBULATORY MEDICATION CHARGE: Performed by: FAMILY MEDICINE

## 2021-02-01 RX ORDER — LISINOPRIL 5 MG/1
TABLET ORAL
Qty: 30 TAB | Refills: 0 | Status: SHIPPED | OUTPATIENT
Start: 2021-02-01 | End: 2021-02-09 | Stop reason: SDUPTHER

## 2021-02-09 ENCOUNTER — OFFICE VISIT (OUTPATIENT)
Dept: MEDICAL GROUP | Facility: MEDICAL CENTER | Age: 56
End: 2021-02-09
Attending: FAMILY MEDICINE
Payer: MEDICAID

## 2021-02-09 VITALS
HEIGHT: 73 IN | SYSTOLIC BLOOD PRESSURE: 128 MMHG | HEART RATE: 92 BPM | WEIGHT: 168 LBS | BODY MASS INDEX: 22.26 KG/M2 | TEMPERATURE: 98.2 F | RESPIRATION RATE: 16 BRPM | DIASTOLIC BLOOD PRESSURE: 64 MMHG | OXYGEN SATURATION: 97 %

## 2021-02-09 DIAGNOSIS — Z86.73 HISTORY OF STROKE: ICD-10-CM

## 2021-02-09 DIAGNOSIS — I10 ESSENTIAL HYPERTENSION: ICD-10-CM

## 2021-02-09 DIAGNOSIS — Z12.11 SCREENING FOR MALIGNANT NEOPLASM OF COLON: ICD-10-CM

## 2021-02-09 DIAGNOSIS — I63.9 CEREBROVASCULAR ACCIDENT (CVA), UNSPECIFIED MECHANISM (HCC): ICD-10-CM

## 2021-02-09 DIAGNOSIS — R53.1 RIGHT SIDED WEAKNESS: ICD-10-CM

## 2021-02-09 PROCEDURE — 99214 OFFICE O/P EST MOD 30 MIN: CPT | Performed by: FAMILY MEDICINE

## 2021-02-09 PROCEDURE — 99213 OFFICE O/P EST LOW 20 MIN: CPT | Performed by: FAMILY MEDICINE

## 2021-02-09 RX ORDER — CYCLOBENZAPRINE HCL 10 MG
10 TABLET ORAL 3 TIMES DAILY PRN
Qty: 60 TAB | Refills: 11 | Status: SHIPPED | OUTPATIENT
Start: 2021-02-09 | End: 2022-02-28 | Stop reason: SDUPTHER

## 2021-02-09 RX ORDER — ASPIRIN 81 MG/1
TABLET, CHEWABLE ORAL
Qty: 90 TAB | Refills: 3 | Status: SHIPPED | OUTPATIENT
Start: 2021-02-09 | End: 2022-02-28 | Stop reason: SDUPTHER

## 2021-02-09 RX ORDER — LISINOPRIL 5 MG/1
TABLET ORAL
Qty: 30 TAB | Refills: 11 | Status: SHIPPED | OUTPATIENT
Start: 2021-02-09 | End: 2022-02-07 | Stop reason: SDUPTHER

## 2021-02-09 RX ORDER — ATORVASTATIN CALCIUM 40 MG/1
40 TABLET, FILM COATED ORAL DAILY
Qty: 30 TAB | Refills: 11 | Status: SHIPPED | OUTPATIENT
Start: 2021-02-09 | End: 2022-02-07 | Stop reason: SDUPTHER

## 2021-02-09 ASSESSMENT — FIBROSIS 4 INDEX: FIB4 SCORE: 0.93

## 2021-02-09 NOTE — PROGRESS NOTES
Subjective:      Gilles Cobb Case is a 55 y.o. male who presents with Follow-Up            HPI 1.  Essential hypertension-patient is compliant taking lisinopril 5 mg daily.  He is not checking home blood pressures.  Not reporting any chest pain, chest pressure, ankle edema.  2.  Status post left-sided CVA-patient sustained a severe left-sided CVA in 2017.  This left him with dysphasia which is moderate, along with weakness of his right arm and right leg.  Patient does wear an AFO brace and uses a cane but is able to walk with a stiff right leg.  He is interested in resuming speech therapy at this time.  He is not having any difficulty with swallowing and not experiencing choking.  3.  Dyslipidemia-patient is maintained on atorvastatin 40 mg.  Has not had any recent labs.  Not reporting myalgias or nausea.  ROS negative for current chest pain, chest pressure, emesis, dysuria, black stools, bloody stools, constipation, fecal incontinence, urinary incontinence  Social history-single, lives with his mom, disabled  Current medication-  Prior to Admission medications    Medication Sig Start Date End Date Taking? Authorizing Provider   lisinopril (PRINIVIL) 5 MG Tab Take 1 tablet by mouth daily. 2/9/21 3/11/21 Yes Dimitri Palacio M.D.   aspirin (ASPIRIN LOW DOSE) 81 MG Chew Tab chewable tablet Chew and swallow one tablet by mouth daily 2/9/21  Yes Dimitri Palacio M.D.   cyclobenzaprine (FLEXERIL) 10 mg Tab Take 1 Tablet by mouth 3 times a day as needed. 2/9/21  Yes Dimitri Palacio M.D.   atorvastatin (LIPITOR) 40 MG Tab Take 1 Tab by mouth every day. 2/9/21  Yes Dimitri Palacio M.D.   Misc. Devices Misc Speech machine to set up phrases that patient can choose  And machine can speak that info to another person  For communication help. 6/6/19   Santos Ramirez M.D.          Objective:     /64 (BP Location: Left arm, Patient Position: Sitting, BP Cuff Size: Adult)   Pulse 92   Temp 36.8 °C (98.2 °F) (Temporal)  "  Resp 16   Ht 1.854 m (6' 0.99\")   Wt 76.2 kg (168 lb)   SpO2 97%   BMI 22.17 kg/m²      Physical Exam  Gen.- alert, cooperative, in no acute distress  Neck- midline trachea, thyroid not enlarged or tender,supple, no cervical adenopathy  Chest-clear to auscultation and percussion with normal breath sounds. No retractions. Chest wall nontender  Cardiac- regular rhythm and rate. No murmur, thrill, or heave  Abdomen- normal bowel sounds, soft without guarding. Liver and spleen not enlarged, no palpable masses or tenderness.  Neurologic exam-alert and cooperative.  Normal grooming.  Marked stiffness and loss of smooth coordination in the right hand right arm and right leg.  Light touch is preserved bilaterally.  Mouth shows limited lip motion and mildly reduced tongue mobility    Psych-normal affect with good eye contact. Normal grooming. Oriented x4.       Assessment/Plan:        1. Essential hypertension    - lisinopril (PRINIVIL) 5 MG Tab; Take 1 tablet by mouth daily.  Dispense: 30 Tab; Refill: 11    2. Cerebrovascular accident (CVA), unspecified mechanism (HCC)    - aspirin (ASPIRIN LOW DOSE) 81 MG Chew Tab chewable tablet; Chew and swallow one tablet by mouth daily  Dispense: 90 Tab; Refill: 3  - REFERRAL TO SPEECH THERAPY  - Lipid Profile; Future  - Comp Metabolic Panel; Future    3. Right sided weakness  - cyclobenzaprine (FLEXERIL) 10 mg Tab; Take 1 Tablet by mouth 3 times a day as needed.  Dispense: 60 Tab; Refill: 11    4. History of stroke    - atorvastatin (LIPITOR) 40 MG Tab; Take 1 Tab by mouth every day.  Dispense: 30 Tab; Refill: 11    5. Screening for malignant neoplasm of colon    - OCCULT BLOOD FECES IMMUNOASSAY; Future  Plan: 1.  Patient agrees to FIT, declines full colonoscopy  2.  Renew 81 mg aspirin, atorvastatin 40 mg, lisinopril 5 mg, cyclobenzaprine 10 mg  3.  Revisit in 6 months sooner if needed  4.  Reorder speech therapy  "

## 2021-02-11 ENCOUNTER — SPEECH THERAPY (OUTPATIENT)
Dept: SPEECH THERAPY | Facility: REHABILITATION | Age: 56
End: 2021-02-11
Attending: FAMILY MEDICINE
Payer: MEDICAID

## 2021-02-11 DIAGNOSIS — I63.9 CEREBROVASCULAR ACCIDENT (CVA), UNSPECIFIED MECHANISM (HCC): ICD-10-CM

## 2021-02-11 DIAGNOSIS — R48.2 APRAXIA OF SPEECH: ICD-10-CM

## 2021-02-11 DIAGNOSIS — I69.390 APRAXIA AS LATE EFFECT OF CEREBROVASCULAR ACCIDENT (CVA): ICD-10-CM

## 2021-02-11 DIAGNOSIS — I69.320 APHASIA AS LATE EFFECT OF CEREBROVASCULAR ACCIDENT: ICD-10-CM

## 2021-02-11 DIAGNOSIS — R47.01 EXPRESSIVE APHASIA: ICD-10-CM

## 2021-02-11 PROCEDURE — 92523 SPEECH SOUND LANG COMPREHEN: CPT

## 2021-02-11 ASSESSMENT — ENCOUNTER SYMPTOMS: NO PATIENT REPORTED PAIN: 1

## 2021-02-11 ASSESSMENT — SOCIAL DETERMINANTS OF HEALTH (SDOH): SOCIAL_SUPPORT_SYSTEM: PARENT

## 2021-02-11 NOTE — OP THERAPY EVALUATION
Outpatient Speech Therapy  INITIAL EVALUATION    [place] [address]  [address]  [facility] [address]  Phone:  [phone]  Fax:  [phone]    Date of Evaluation: 02/11/2021    Patient: Gilles Cobb Case  YOB: 1965  MRN: [id number]     Referring Provider: Dimitri Palacio M.D.  [address] [facility]  [address]  [place] [address]   Referring Diagnosis Cerebrovascular accident (CVA), unspecified mechanism (HCC) [I63.9]     Time Calculation    Start time: 1100  Stop time: 1200 Time Calculation (min): 60 minutes           Chief Complaint: Aphasia and Other (use of Speech Generating Device)    Visit Diagnoses     ICD-10-CM   1. Cerebrovascular accident (CVA), unspecified mechanism (HCC)  I63.9   2. Aphasia as late effect of cerebrovascular accident  I69.320   3. Apraxia as late effect of cerebrovascular accident (CVA)  I69.390   4. Apraxia of speech  R48.2   5. Expressive aphasia  R47.01     Subjective:   Reason for Therapy:     Reason For Evaluation:  Aphasia    Onset Date:  1/9/2018    Onset Description:  Patient is a 55 year old male returned to outpatient speech therapy for continued direct intervention using Speech Generating Device for Augmentative Alternative Communication means secondary to long standing, severe expressive Aphasia following CVA 1/2018.   Social Support:     Accompanied By:  Parent (Mother)    Patient Mental Status:  Alert and Responsive  Pain:     no pain reported    Therapy History:     Previous Treatments and Dates:  Patient with previous participation in outpatient speech therapy services [id number].     Hearing:  No Deficits    Vision:  Eye Glasses    Handedness:  Right-handed  Additional Subjective Comments:      Patient notes that he has been using speech generating device to mainly participate in daily living tasks with others such as ordering food, participating in use of calendar. Patient returns to outpatient speech therapy with desire to improve use  of device to independently modify/ change and adapt communication device to meet his daily communication needs. Patient is right handed but secondary to hemiparesis uses left hand for direct access to SGD.     Past Medical History:   Diagnosis Date   • Dysphagia    • Hypertension    • Stroke (HCC)      Past Surgical History:   Procedure Laterality Date   • GASTROSCOPY-ENDO  12/26/2017    Procedure: GASTROSCOPY-ENDO;  Surgeon: Adam Aguirre M.D.;  Location: ENDOSCOPY [facility];  Service: Gastroenterology   • PEG PLACEMENT  12/26/2017    Procedure: PEG PLACEMENT;  Surgeon: Adam Aguirre M.D.;  Location: ENDOSCOPY [facility];  Service: Gastroenterology     Objective:   Treatments/Interventions Performed:  Patient/Caregiver education  Treatment Intervention tool(s) used:  The Western Aphasia Battery was administered to evaluate patient understanding and use of language to quantify use, understanding of language following CVA.   Objective Details:  WAB results:       Objective Details:  Aphasia Quotient (AQ)  Spontaneous Speech Score 1  Information Content 1  Fluency, Grammatical Competence and Paraphasias 0    Auditory Verbal Comprehension Score 8.1  Yes/ No questions 54  Auditory Word Recognition 50  Sequential Commands 58    Repetition Score 5.4  Repetition Total 54    Naming and Word Finding Score 0.2  Object Naming 0  Word Fluency 0  Sentence Completion 2  Responsive Speech 0     Aphasia Quotient 29.4, representing severe Aphasia. Patient scores were most consistent with severe, nonfluent Aphasia.          Speech Therapy Assessment:     Expressive Language Assessment:     Portions of the Western Aphasia Battery (WAB) are used.    Communicates using gestures: Severe.    Ability to exhibit appropriate naming: Profound.    Explains function of object Profound.    Repeats speech accurately Moderate (to single word, 2-3 syllable word levels).    Patient's ability to use automatic language appropriately is  Moderate.    Patient's ability to verbalize wants and needs is Severe.    Paraphasic is Present.    Perseveration is Present.    Expressive language comments: Given a novel communication situation, patient will access SGD; however, patient continues to rely on gesture (pointing) and attempts at verbalizations although limited due to nonfluent, expressive Aphasia, verbal Apraxia. Patient use of gesture (pointing) was ineffective in increasing listener understanding.     Patient was accurate in expressive language skills to independently state first, last name. Given structured naming tasks to real objects, patient was independent in naming key only, produced as /corona/. Patient was noted to frequently engage in perseverative sound /w/. Paraphasias and perseverations were also present represented such as production of toothbrush > spoon; /corona/ > paper clip.     Receptive Language Assessment:     Portions of the Western Aphasia Battery (WAB) are used.    Patient ability to identify body parts: Moderate    Patient ability to identify objects: WFL (real and picture objects)    Patient ability to discriminate right and left: WFL    Personal information yes/no questions: WFL    Simple yes/no questions: WFL    Complex yes/no questions: Moderate    Patient follows 1 step simple commands: WFL    Patient follows 2 step simple commands:WFL    Patient follows 1 step complex commands: WFL    Patient follows 2 step complex commands: Moderate    Receptive language comments: Patient with presence of receptive language deficits characterized by moderate deficits in direction following to a 2 step, complex level. Patient also with increased difficulty in accurately identifying body parts; although patient was noted to be consistently accurate in identifying the left versus right side of his body.     Speech Mechanism Assessment:     Patient apraxia: Severe  Speech mechanism comments: Independent speech productions were characterized by  severe-profound verbal apraxia resulting in perseverative sound production /w/ and resulting gross, groping oral motor movements resulting in patient production appearing 'stuck' to /w/. With difficulty, patient would then engage in automatic speech production with examples including: ummm, ok, peña it. Preservative words produced during today's evaluation included: walking.     Speech Therapy Plan :   Prognosis & Recommendations  Impression Summary:  Mr. Gilles Gavin, a 55 year old male, returned to outpatient speech therapy in the presence of severe, nonfluent Aphasia impacting patient accuracy and independent in verbalizations to communicate even at the single word level.     Patient returns with speech generating device, noting to use device, at times, effectively to communicate simple information. Patient communication using SGD was limited to primary use of ordering food from favorite restaurant, use of SGD to communicate wants/ needs at favorite local casino and use of device to communicate names of sons.     Results of formal assessment of expressive-receptive language skills through administration of the WAB suggest that patient continues to present with receptive language deficits, severe nonfluent expressive language deficits severely limiting independence in communication. Results did suggest that use of SGD has likely resulted in improvement in ability to complete direct imitation, as demonstrated during repetition subtest of the WAB.   Prognosis:  Good  Prognosis Details:  It was noted during the initial evaluation that patient continues to engage in gesture (pointing) that is ineffective versus use of communication device resulting in consistent breakdown in communication and increased frustration for Gilles. Direct, outpatient speech therapy to further patient development and use of SGD to increase communicative competence across conversational environments and speakers is recommended.      Goals  Short Term Goals:  1. Patient will independently navigate SGD to convey and independent need, thought or idea to familiar, progressing to unfamiliar listeners 4/5 obligatory context (80% accuracy).   2. Patient will independently navigate SGD to combine two words combining modifier + noun/ verb (for example, good game) given moderate verbal prompts/ instruction/ cues 80% accuracy.   3.  Patient will increase expressive language skills completing confrontation naming tasks of nouns, verbs completing with 85% accuracy, independent using SGD.   4. Patient will independently modify/ change icons on SGD to increase effectiveness in communication given moderate verbal prompts/ instruction/ cues 3/5 trials.   Short Term Goal Duration (Weeks):  6-8 weeks  Long Term Goals:  1. Patient will make ideas known across settings and to various conversational partners with 90% effectiveness with use of speech generating device given min verbal prompts/ instruction/ cues-independent.   2. Patient will develop functional communication skills to communicate wants and needs effectively to different conversational partners, maintain safety, and participate socially in a functional living environment using speech generating device to 85% accuracy, independent.    Long Term Goal Duration (Weeks):  2-4 months  Patient Stated Goal:  Use of SGD to improve communication  Therapy Recommendations  Recommendation:  Individual Speech Therapy,  Planned Therapy Interventions:  Home Program, Patient/Caregiver Education, Speech/Language training and Speech Gen Device Therapy,   Plan Details:  8 units (76292) 8 units ([id number])  Frequency:  1x week  Duration (in visits):  8  Duration (in weeks):  8      Functional Assessment Used  Western Aphasia Battery (WAB)  Informal observation and use of Speech Generating Device     Referring provider co-signature:  I have reviewed this plan of care and my co-signature certifies the need for  services.    Certification Period: 02/11/2021 to  04/22/21    Physician Signature: ________________________________ Date: ______________

## 2021-03-02 ENCOUNTER — HOSPITAL ENCOUNTER (OUTPATIENT)
Dept: LAB | Facility: MEDICAL CENTER | Age: 56
End: 2021-03-02
Attending: FAMILY MEDICINE
Payer: MEDICAID

## 2021-03-02 ENCOUNTER — PHARMACY VISIT (OUTPATIENT)
Dept: PHARMACY | Facility: MEDICAL CENTER | Age: 56
End: 2021-03-02
Payer: COMMERCIAL

## 2021-03-02 DIAGNOSIS — I63.9 CEREBROVASCULAR ACCIDENT (CVA), UNSPECIFIED MECHANISM (HCC): ICD-10-CM

## 2021-03-02 LAB
ALBUMIN SERPL BCP-MCNC: 4.2 G/DL (ref 3.2–4.9)
ALBUMIN/GLOB SERPL: 1.6 G/DL
ALP SERPL-CCNC: 65 U/L (ref 30–99)
ALT SERPL-CCNC: 24 U/L (ref 2–50)
ANION GAP SERPL CALC-SCNC: 9 MMOL/L (ref 7–16)
AST SERPL-CCNC: 20 U/L (ref 12–45)
BILIRUB SERPL-MCNC: 0.4 MG/DL (ref 0.1–1.5)
BUN SERPL-MCNC: 14 MG/DL (ref 8–22)
CALCIUM SERPL-MCNC: 9.5 MG/DL (ref 8.5–10.5)
CHLORIDE SERPL-SCNC: 100 MMOL/L (ref 96–112)
CHOLEST SERPL-MCNC: 135 MG/DL (ref 100–199)
CO2 SERPL-SCNC: 27 MMOL/L (ref 20–33)
CREAT SERPL-MCNC: 0.59 MG/DL (ref 0.5–1.4)
FASTING STATUS PATIENT QL REPORTED: NORMAL
GLOBULIN SER CALC-MCNC: 2.7 G/DL (ref 1.9–3.5)
GLUCOSE SERPL-MCNC: 92 MG/DL (ref 65–99)
HDLC SERPL-MCNC: 77 MG/DL
LDLC SERPL CALC-MCNC: 50 MG/DL
POTASSIUM SERPL-SCNC: 4.2 MMOL/L (ref 3.6–5.5)
PROT SERPL-MCNC: 6.9 G/DL (ref 6–8.2)
SODIUM SERPL-SCNC: 136 MMOL/L (ref 135–145)
TRIGL SERPL-MCNC: 40 MG/DL (ref 0–149)

## 2021-03-02 PROCEDURE — 80061 LIPID PANEL: CPT

## 2021-03-02 PROCEDURE — 36415 COLL VENOUS BLD VENIPUNCTURE: CPT

## 2021-03-02 PROCEDURE — RXMED WILLOW AMBULATORY MEDICATION CHARGE: Performed by: FAMILY MEDICINE

## 2021-03-02 PROCEDURE — 80053 COMPREHEN METABOLIC PANEL: CPT

## 2021-03-03 ENCOUNTER — SPEECH THERAPY (OUTPATIENT)
Dept: SPEECH THERAPY | Facility: REHABILITATION | Age: 56
End: 2021-03-03
Attending: FAMILY MEDICINE
Payer: MEDICAID

## 2021-03-03 ENCOUNTER — HOSPITAL ENCOUNTER (OUTPATIENT)
Facility: MEDICAL CENTER | Age: 56
End: 2021-03-03
Attending: FAMILY MEDICINE
Payer: MEDICAID

## 2021-03-03 DIAGNOSIS — R47.01 EXPRESSIVE APHASIA: ICD-10-CM

## 2021-03-03 DIAGNOSIS — I69.390 APRAXIA AS LATE EFFECT OF CEREBROVASCULAR ACCIDENT (CVA): ICD-10-CM

## 2021-03-03 DIAGNOSIS — I69.320 APHASIA AS LATE EFFECT OF CEREBROVASCULAR ACCIDENT: ICD-10-CM

## 2021-03-03 PROCEDURE — 82274 ASSAY TEST FOR BLOOD FECAL: CPT

## 2021-03-03 PROCEDURE — 92609 USE OF SPEECH DEVICE SERVICE: CPT

## 2021-03-03 NOTE — OP THERAPY DAILY TREATMENT
Outpatient Speech Therapy  DAILY TREATMENT     [address]  [address]  [place] [address]  Phone:  [phone]  Fax:  [phone]    Date: 03/03/2021    Patient: Gilles Cobb Case  YOB: 1965  MRN: [id number]     Time Calculation    Start time: 0900  Stop time: 0955 Time Calculation (min): 55 minutes         Chief Complaint: Aphasia (use of SGD to improve communication)    Visit #: 2    Subjective:   Additional Subjective Comments:      Patient returns using SGD to complete simple greeting independent.       Objective:   Treatments/Interventions Performed:  Patient/Caregiver education, Compensatory strategy training and Home program  Other Treatment Interventions:  Speech Generating Device (SGD) treatment  Objective Details:  Navigation of SGD to specific vocabulary related to every day living tasks targeted addressing use of SGD to simple open ended questions specific to answering personally relevant questions: 8/8 = 100% accuracy with use of keyboard and/or about me page independent. Orientation questions: Wedensday, March 3, 2021 stated independent through independent navigation of SGD to calendar page and use of keyboard.     Patient independently navigated SGD device to tell clinician that he preferred a double double from in and out with turkey burger. Patient was also independent in use of SGD to access and use keyboard to independently spell out his first and last name, age and date of birth.     Practice completed in modification of icons to meet communication needs: direct cues necessary to complete the 5 steps necessary to modify a device. Education provided on use of Core Words to begin to initiate expressive language at a subject + verb + direct object level and initiate a question. Repetitive practice completed to I + want, I + like. Use of question Do + you? To begin simple turn taking also practiced.              Speech Therapy Assessment:      Expressive Language Assessment:     Expressive language comments: Since previous participation in outpatient speech therapy, patient is demonstrating an overall marked improvement in use of SGD to independently communicate a variety of messages as evidenced by communication initiated during today's session. Patient was very responsive to education provided on how to increase expressive language skills using SGD through Core Word vocabulary. Continued education, practice in this area recommended.       Speech Therapy Plan :   Therapy Recommendations  Recommendation: Individual Speech Therapy,  Planned Therapy Interventions:  Patient/Caregiver Education, Home Program and Speech Gen Device Therapy,   Plan Details:  Continue with use of Core Word icon to increase length and complexity of expressive language skills.

## 2021-03-05 DIAGNOSIS — Z12.11 SCREENING FOR MALIGNANT NEOPLASM OF COLON: ICD-10-CM

## 2021-03-09 LAB — IMM ASSAY OCC BLD FITOB: NEGATIVE

## 2021-03-10 ENCOUNTER — SPEECH THERAPY (OUTPATIENT)
Dept: SPEECH THERAPY | Facility: REHABILITATION | Age: 56
End: 2021-03-10
Attending: FAMILY MEDICINE
Payer: MEDICAID

## 2021-03-10 DIAGNOSIS — R47.01 EXPRESSIVE APHASIA: ICD-10-CM

## 2021-03-10 DIAGNOSIS — I63.9 CEREBROVASCULAR ACCIDENT (CVA), UNSPECIFIED MECHANISM (HCC): ICD-10-CM

## 2021-03-10 DIAGNOSIS — I69.320 APHASIA AS LATE EFFECT OF CEREBROVASCULAR ACCIDENT: ICD-10-CM

## 2021-03-10 DIAGNOSIS — I69.390 APRAXIA AS LATE EFFECT OF CEREBROVASCULAR ACCIDENT (CVA): ICD-10-CM

## 2021-03-10 PROCEDURE — 92609 USE OF SPEECH DEVICE SERVICE: CPT

## 2021-03-10 NOTE — OP THERAPY DAILY TREATMENT
Outpatient Speech Therapy  DAILY TREATMENT     [facility] Speech [address]  [address]  [place] [address]  Phone:  [phone]  Fax:  [phone]    Date: 03/10/2021    Patient: Gilles Cobb Case  YOB: 1965  MRN: [id number]     Time Calculation    Start time: 1000  Stop time: 1055 Time Calculation (min): 55 minutes         Chief Complaint: Aphasia    Visit #: 3    Subjective:   Additional Subjective Comments:      Patient in good spirits with patient demonstrating motivation throughout skilled therapy session.       Objective:   Treatments/Interventions Performed:  Patient/Caregiver education, Home program and Speech/Language treatment  Other Treatment Interventions:  Speech Generating Device Treatment  Treatment Intervention tool(s) used:  Education provided on use of Core Words to begin to initiate expressive language at a subject + verb + direct object level and initiate a simple (do you?) question  Objective Details:  Repetitive practice completed to I + like + item preferred targeted to repetitive practice: patient was independent in navigation of device to denote like of sport (basketball, horse racing and football) and personal interests (courtroom TV, money) completed 7/7 100% accuracy; independent. Progression to state of like I + like and then generating a simple follow up question Do + you? Completed to 5/5 trials following initial instruction independent.     I + want completed to I + want + modifier + object level given moderate verbal cues (I want turkey burger).          Speech Therapy Assessment:     Expressive Language Assessment:     Expressive language comments: Patient demonstrated good response to continuation in use of SGD to increase accuracy in verbal expression to a simple phrase level including I + like/ I + want levels. Patient was very responsive to direct education and practice completed in simple turn taking through asking a question and  providing a follow up response using Quickfire application.       Speech Therapy Plan :   Therapy Recommendations  Recommendation: Individual Speech Therapy,  Planned Therapy Interventions:  Home Program, Patient/Caregiver Education, Compensatory Strategy Training and Speech/Language training,   Plan Details:  Continue with use of Core Word icon to increase length and complexity of expressive language skills.

## 2021-03-17 ENCOUNTER — SPEECH THERAPY (OUTPATIENT)
Dept: SPEECH THERAPY | Facility: REHABILITATION | Age: 56
End: 2021-03-17
Attending: FAMILY MEDICINE
Payer: MEDICAID

## 2021-03-17 DIAGNOSIS — R47.01 EXPRESSIVE APHASIA: ICD-10-CM

## 2021-03-17 DIAGNOSIS — I69.390 APRAXIA AS LATE EFFECT OF CEREBROVASCULAR ACCIDENT (CVA): ICD-10-CM

## 2021-03-17 DIAGNOSIS — I69.320 APHASIA AS LATE EFFECT OF CEREBROVASCULAR ACCIDENT: ICD-10-CM

## 2021-03-17 PROCEDURE — 92609 USE OF SPEECH DEVICE SERVICE: CPT

## 2021-03-17 NOTE — OP THERAPY DAILY TREATMENT
Outpatient Speech Therapy  DAILY TREATMENT     [facility] Speech [address]  [address]  [place] [address]  Phone:  [phone]  Fax:  [phone]    Date: 03/17/2021    Patient: Gilles Cobb Case  YOB: 1965  MRN: [id number]     Time Calculation    Start time: 0900  Stop time: 0955 Time Calculation (min): 55 minutes         Chief Complaint: Aphasia (severe expressive Aphasia following CVA requiring use of SGD for communication)    Visit #: 4    Subjective:   Additional Subjective Comments:      Patient in good spirits, participated well with all structured therapy tasks incorporating use of SGD.       Objective:   Treatments/Interventions Performed:  Patient/Caregiver education and Home program  Other Treatment Interventions:  Speech Generating Device Treatment  Treatment Intervention tool(s) used:  Education provided on use of Core Words to begin to initiate expressive language for phrases of increasing complexity to likes/ dislikes and wants and generation of simple questions to promote simple conversational turn taking, specific to (do you?) questions  Objective Details:  Repetitive practice completed to I + (verb) including: eat, want + item to direct visual (picture cues of items) completed with . Repetitive practice also completed to I + do + not + like + object. Direct imitation modeling initially provided with cues faded to moderate to complete sentences at I + like + item/ I + do + not + like + item levels.     Direct practice provided on initiating a question following description of action + object: Do you like + object? Completed to repetitive practice requiring direct-moderate verbal, visual (prompts to icon) cues.          Speech Therapy Assessment:     Expressive Language Assessment:     Expressive language comments: Patient required increased cueing using SGD with expansion of communication to simple phrase level using Core Word vocabulary icon. Patient  continues to initially engage in use of gestures, characterized by pointing or holding up fingers, during a time of increased desire to communicate, requiring verbal prompts to initiate use of SGD.       Speech Therapy Plan :   Prognosis & Recommendations  Impression Summary:  Patient necessitated moderate verbal, visual prompts/ instruction/ cues for use of SGD to communicate to the phrase level given repetitive speech generation tasks. Patient remains very motivated to increase communication skills using SGD.   Therapy Recommendations  Recommendation:  Individual Speech Therapy,  Planned Therapy Interventions:  Patient/Caregiver Education, Home Program and Speech Gen Device Therapy,   Plan Details:  Continue with use of Core Word icon to increase length and complexity of expressive language skills.

## 2021-03-30 ENCOUNTER — PHARMACY VISIT (OUTPATIENT)
Dept: PHARMACY | Facility: MEDICAL CENTER | Age: 56
End: 2021-03-30
Payer: COMMERCIAL

## 2021-03-30 PROCEDURE — RXMED WILLOW AMBULATORY MEDICATION CHARGE: Performed by: FAMILY MEDICINE

## 2021-03-31 ENCOUNTER — SPEECH THERAPY (OUTPATIENT)
Dept: SPEECH THERAPY | Facility: REHABILITATION | Age: 56
End: 2021-03-31
Attending: FAMILY MEDICINE
Payer: MEDICAID

## 2021-03-31 DIAGNOSIS — R47.01 EXPRESSIVE APHASIA: ICD-10-CM

## 2021-03-31 DIAGNOSIS — I69.320 APHASIA AS LATE EFFECT OF CEREBROVASCULAR ACCIDENT: ICD-10-CM

## 2021-03-31 DIAGNOSIS — I69.390 APRAXIA AS LATE EFFECT OF CEREBROVASCULAR ACCIDENT (CVA): ICD-10-CM

## 2021-03-31 PROCEDURE — 92609 USE OF SPEECH DEVICE SERVICE: CPT

## 2021-03-31 NOTE — OP THERAPY DAILY TREATMENT
"  Outpatient Speech Therapy  DAILY TREATMENT     [facility] Speech [address]  [address]  [place] [address]  Phone:  [phone]  Fax:  [phone]    Date: 03/31/2021    Patient: Gilles Cobb Case  YOB: 1965  MRN: [id number]     Time Calculation    Start time: 0900  Stop time: 0955 Time Calculation (min): 55 minutes         Chief Complaint: Aphasia (continued development of SGD)    Visit #: 5    Subjective:   Additional Subjective Comments:      No changes to medical history noted or reported. Patient in good spirits throughout speech therapy session.       Objective:   Treatments/Interventions Performed:  Patient/Caregiver education and Home program  Other Treatment Interventions:  Speech Generating Device Treatment  Treatment Intervention tool(s) used:  Education provided on use of Core Words to begin to initiate expressive language for phrases of increasing complexity to likes/ dislikes and wants and generation of simple questions to promote simple conversational turn taking to greetings (how are you?)  Objective Details:  At start of session, patient was eager to tell clinician something, independently accessing device to state 11:00; however, event was unable to be communicated. Icon for COVID shot was added and practiced to independent level both to communicate previously initiated message of 11:00 COVID shot and \"I have COVID shot\" using CORE WORDS minimal verbal only cues.     Repetitive practice completed to pronoun + action targeted to production of simple sentences (for example, he is drinking). Patient completed with Direct imitation modeling initially provided with cues faded to moderate verbal, visual cues to complete sentences at pronoun + action level using CORE WORDS vocabulary, completing with 4/6 = 66.7% accuracy.      Direct practice provided on initiating a question for greeting how + are + you? Completed to repetitive practice requiring direct verbal, " visual (prompts to icon) cues 3/3 trials. Patient was also provided with direct instruction how to access phrase using Quickfires icon.     Continued repetitive practice to complete sentences at I + like + item/ I + do + not + like + item levels: patient required minimal-moderate assist for I + like + item, direct cues for I + do + not + like + item. Increased episodes of imitative speech production demonstrated to repetitive speech production task, with patient demonstrating accuracy in imitation of single words identified: 3/6 (50%) and simple phrases 3/5 (60%) given multiple, direct cues as provided with speak using SGD.          Speech Therapy Assessment:     Expressive Language Assessment:     Expressive language comments: Use of SGD has resulted in patient increased independence in use of expressive language with improved repetition of speech demonstrated to word and simple phrase levels provided direct cues from SGD. Use of language in direct imitation of SGD reinforced throughout session with good results.       Speech Therapy Plan :   Prognosis & Recommendations  Impression Summary:  Patient arrived to speech therapy eager to communicate message with SLP. Patient with improved independent initiation of SGD given obligatory communication opportunities; although patient accuracy in use of SGD for novel communicative opportunities remains limited, as evidenced by need to get mother to assist in communication of what was happening today at 11:00 for example. Patient mother reports that patient is using the device frequently at home, and is beginning to generate speech to the word, simple phrase level in the home setting to communicate wants and needs, demonstrating continued progress in language development through use of SGD.   Therapy Recommendations  Recommendation:  Individual Speech Therapy,  Planned Therapy Interventions:  Home Program, Patient/Caregiver Education, Compensatory Strategy Training and  Speech Gen Device Therapy,   Plan Details:  Continue with use of Core Word icon to increase length and complexity of expressive language skills.

## 2021-04-06 ENCOUNTER — PHARMACY VISIT (OUTPATIENT)
Dept: PHARMACY | Facility: MEDICAL CENTER | Age: 56
End: 2021-04-06
Payer: COMMERCIAL

## 2021-04-06 PROCEDURE — RXMED WILLOW AMBULATORY MEDICATION CHARGE: Performed by: FAMILY MEDICINE

## 2021-04-07 ENCOUNTER — SPEECH THERAPY (OUTPATIENT)
Dept: SPEECH THERAPY | Facility: REHABILITATION | Age: 56
End: 2021-04-07
Attending: FAMILY MEDICINE
Payer: MEDICAID

## 2021-04-07 DIAGNOSIS — I69.320 APHASIA AS LATE EFFECT OF CEREBROVASCULAR ACCIDENT: ICD-10-CM

## 2021-04-07 DIAGNOSIS — I69.390 APRAXIA AS LATE EFFECT OF CEREBROVASCULAR ACCIDENT (CVA): ICD-10-CM

## 2021-04-07 DIAGNOSIS — R47.01 EXPRESSIVE APHASIA: ICD-10-CM

## 2021-04-07 PROCEDURE — 92609 USE OF SPEECH DEVICE SERVICE: CPT

## 2021-04-07 NOTE — OP THERAPY DAILY TREATMENT
Outpatient Speech Therapy  DAILY TREATMENT     [facility] Speech [address].  [address]  [place] [address]  Phone:  [phone]  Fax:  [phone]    Date: 04/07/2021    Patient: Gilles Cobb Case  YOB: 1965  MRN: [id number]     Time Calculation    Start time: 0900  Stop time: 0955 Time Calculation (min): 55 minutes         Chief Complaint: Aphasia (development of SGD)    Visit #: 6    Subjective:   Additional Subjective Comments:      Patient arrived in good spirits, motivated to continue to work on communication using SGD.       Objective:   Other Treatment Interventions:  Speech Generating Device Treatment  Treatment Intervention tool(s) used:  Education provided on use of Core Words to begin to initiate expressive language for phrases of increasing complexity to likes/ dislikes and wants and generation of simple questions to promote simple conversational turn taking to greetings (how are you?)  Objective Details:  Repetitive practice completed to pronoun + action targeted to production of sentences (for example, he is drinking a glass of water). Patient completed to 5 icon level demonstrating use of appropriate pronoun + is + verb + a + object using CORE WORDS vocabulary with 3/4 = 75% accuracy with scaffolded cues provided to independent. Verbal expression targeted to direct/ delayed imitation of SGD to provide verbal response to simple what doing? Questions: completed with 7/8 = 87.5% accuracy; minimal verbal prompts/ instruction/ cues provided as necessary.       Direct practice continued on initiating a question for greeting how + are + you? Completed to repetitive practice requiring direct verbal, visual (prompts to icon) cues 3/3 trials. Patient was also provided with direct instruction how to access phrase using Quickfires icon.      Continued repetitive practice to complete sentences at I + like + item/ I + do + not + like + item levels: patient required minimal  (verbal) moderate (visual) cues for I + like + item and I + do + not + like + item, completing 4/4 = 100% accuracy. Imitative speech responses to phrase/ simple sentence level of SGD completed with 4/4 = 100% accuracy.          Speech Therapy Assessment:     Expressive Language Assessment:     Expressive language comments: Patient was noted to demonstrate an increase in accuracy in automatic language production during today's session. Examples included: Oh my god, ok but umm, I don't know, there it is.     Patient is demonstrating marked improvement in speech repetition skills as evidenced by performance given direct/ delayed imitation cues using SGD to a single word, simple phrase level (1-4 words).       Speech Therapy Plan :   Prognosis & Recommendations  Impression Summary:  Use of SGD has resulted in an observed improvement in presence of automatic speech utterances during structured outpatient speech therapy sessions. Patient continues to demonstrate progress in use of SGD to communicate phrases, progressing to simple sentences of increasing complexity.   Therapy Recommendations  Recommendation:  Individual Speech Therapy,  Planned Therapy Interventions:  Home Program, Patient/Caregiver Education, Compensatory Strategy Training and Speech/Language training,   Plan Details:  Continue with use of Core Word icon to increase length and complexity of expressive language skills. Patient encouraged in practice to Quickfire link and Questions icons on SGD

## 2021-04-14 ENCOUNTER — APPOINTMENT (OUTPATIENT)
Dept: SPEECH THERAPY | Facility: REHABILITATION | Age: 56
End: 2021-04-14
Attending: FAMILY MEDICINE
Payer: MEDICAID

## 2021-05-04 ENCOUNTER — PHARMACY VISIT (OUTPATIENT)
Dept: PHARMACY | Facility: MEDICAL CENTER | Age: 56
End: 2021-05-04
Payer: COMMERCIAL

## 2021-05-04 PROCEDURE — RXMED WILLOW AMBULATORY MEDICATION CHARGE: Performed by: FAMILY MEDICINE

## 2021-06-01 ENCOUNTER — PHARMACY VISIT (OUTPATIENT)
Dept: PHARMACY | Facility: MEDICAL CENTER | Age: 56
End: 2021-06-01
Payer: COMMERCIAL

## 2021-06-01 PROCEDURE — RXMED WILLOW AMBULATORY MEDICATION CHARGE: Performed by: FAMILY MEDICINE

## 2021-07-06 ENCOUNTER — PHARMACY VISIT (OUTPATIENT)
Dept: PHARMACY | Facility: MEDICAL CENTER | Age: 56
End: 2021-07-06
Payer: COMMERCIAL

## 2021-07-06 PROCEDURE — RXMED WILLOW AMBULATORY MEDICATION CHARGE: Performed by: FAMILY MEDICINE

## 2021-07-30 ENCOUNTER — PHARMACY VISIT (OUTPATIENT)
Dept: PHARMACY | Facility: MEDICAL CENTER | Age: 56
End: 2021-07-30
Payer: COMMERCIAL

## 2021-07-30 PROCEDURE — RXMED WILLOW AMBULATORY MEDICATION CHARGE: Performed by: FAMILY MEDICINE

## 2021-08-02 ENCOUNTER — PHARMACY VISIT (OUTPATIENT)
Dept: PHARMACY | Facility: MEDICAL CENTER | Age: 56
End: 2021-08-02
Payer: COMMERCIAL

## 2021-08-02 PROCEDURE — RXMED WILLOW AMBULATORY MEDICATION CHARGE: Performed by: FAMILY MEDICINE

## 2021-08-30 ENCOUNTER — PHARMACY VISIT (OUTPATIENT)
Dept: PHARMACY | Facility: MEDICAL CENTER | Age: 56
End: 2021-08-30
Payer: COMMERCIAL

## 2021-08-30 PROCEDURE — RXMED WILLOW AMBULATORY MEDICATION CHARGE: Performed by: FAMILY MEDICINE

## 2021-10-04 ENCOUNTER — PHARMACY VISIT (OUTPATIENT)
Dept: PHARMACY | Facility: MEDICAL CENTER | Age: 56
End: 2021-10-04
Payer: COMMERCIAL

## 2021-10-04 PROCEDURE — RXMED WILLOW AMBULATORY MEDICATION CHARGE: Performed by: FAMILY MEDICINE

## 2021-11-01 ENCOUNTER — PHARMACY VISIT (OUTPATIENT)
Dept: PHARMACY | Facility: MEDICAL CENTER | Age: 56
End: 2021-11-01
Payer: COMMERCIAL

## 2021-11-01 PROCEDURE — RXMED WILLOW AMBULATORY MEDICATION CHARGE: Performed by: FAMILY MEDICINE

## 2021-11-08 ENCOUNTER — PHARMACY VISIT (OUTPATIENT)
Dept: PHARMACY | Facility: MEDICAL CENTER | Age: 56
End: 2021-11-08
Payer: COMMERCIAL

## 2021-11-08 PROCEDURE — RXMED WILLOW AMBULATORY MEDICATION CHARGE: Performed by: FAMILY MEDICINE

## 2021-11-15 ENCOUNTER — PHARMACY VISIT (OUTPATIENT)
Dept: PHARMACY | Facility: MEDICAL CENTER | Age: 56
End: 2021-11-15
Payer: COMMERCIAL

## 2021-11-15 PROCEDURE — RXMED WILLOW AMBULATORY MEDICATION CHARGE: Performed by: FAMILY MEDICINE

## 2021-11-29 ENCOUNTER — PHARMACY VISIT (OUTPATIENT)
Dept: PHARMACY | Facility: MEDICAL CENTER | Age: 56
End: 2021-11-29
Payer: COMMERCIAL

## 2021-11-29 PROCEDURE — RXMED WILLOW AMBULATORY MEDICATION CHARGE: Performed by: FAMILY MEDICINE

## 2021-12-28 ENCOUNTER — PHARMACY VISIT (OUTPATIENT)
Dept: PHARMACY | Facility: MEDICAL CENTER | Age: 56
End: 2021-12-28
Payer: COMMERCIAL

## 2021-12-28 PROCEDURE — RXMED WILLOW AMBULATORY MEDICATION CHARGE: Performed by: FAMILY MEDICINE

## 2022-01-18 ENCOUNTER — PHARMACY VISIT (OUTPATIENT)
Dept: PHARMACY | Facility: MEDICAL CENTER | Age: 57
End: 2022-01-18
Payer: COMMERCIAL

## 2022-01-18 PROCEDURE — RXMED WILLOW AMBULATORY MEDICATION CHARGE: Performed by: FAMILY MEDICINE

## 2022-01-24 ENCOUNTER — PHARMACY VISIT (OUTPATIENT)
Dept: PHARMACY | Facility: MEDICAL CENTER | Age: 57
End: 2022-01-24
Payer: COMMERCIAL

## 2022-01-24 PROCEDURE — RXMED WILLOW AMBULATORY MEDICATION CHARGE: Performed by: FAMILY MEDICINE

## 2022-02-07 ENCOUNTER — PHARMACY VISIT (OUTPATIENT)
Dept: PHARMACY | Facility: MEDICAL CENTER | Age: 57
End: 2022-02-07
Payer: COMMERCIAL

## 2022-02-07 ENCOUNTER — TELEPHONE (OUTPATIENT)
Dept: MEDICAL GROUP | Facility: MEDICAL CENTER | Age: 57
End: 2022-02-07

## 2022-02-07 DIAGNOSIS — Z86.73 HISTORY OF STROKE: ICD-10-CM

## 2022-02-07 DIAGNOSIS — I10 ESSENTIAL HYPERTENSION: ICD-10-CM

## 2022-02-07 PROCEDURE — RXMED WILLOW AMBULATORY MEDICATION CHARGE: Performed by: FAMILY MEDICINE

## 2022-02-07 RX ORDER — ATORVASTATIN CALCIUM 40 MG/1
40 TABLET, FILM COATED ORAL DAILY
Qty: 30 TABLET | Refills: 11 | Status: SHIPPED | OUTPATIENT
Start: 2022-02-07 | End: 2022-08-04 | Stop reason: SDUPTHER

## 2022-02-07 RX ORDER — LISINOPRIL 5 MG/1
TABLET ORAL
Qty: 30 TABLET | Refills: 11 | Status: SHIPPED | OUTPATIENT
Start: 2022-02-07 | End: 2022-08-04 | Stop reason: SDUPTHER

## 2022-02-28 ENCOUNTER — PHARMACY VISIT (OUTPATIENT)
Dept: PHARMACY | Facility: MEDICAL CENTER | Age: 57
End: 2022-02-28
Payer: COMMERCIAL

## 2022-02-28 ENCOUNTER — OFFICE VISIT (OUTPATIENT)
Dept: MEDICAL GROUP | Facility: MEDICAL CENTER | Age: 57
End: 2022-02-28
Attending: FAMILY MEDICINE
Payer: MEDICAID

## 2022-02-28 VITALS
HEART RATE: 92 BPM | HEIGHT: 73 IN | TEMPERATURE: 97.9 F | OXYGEN SATURATION: 98 % | RESPIRATION RATE: 16 BRPM | DIASTOLIC BLOOD PRESSURE: 76 MMHG | BODY MASS INDEX: 24.65 KG/M2 | WEIGHT: 186 LBS | SYSTOLIC BLOOD PRESSURE: 134 MMHG

## 2022-02-28 DIAGNOSIS — R53.1 RIGHT SIDED WEAKNESS: ICD-10-CM

## 2022-02-28 DIAGNOSIS — I63.9 CEREBROVASCULAR ACCIDENT (CVA), UNSPECIFIED MECHANISM (HCC): ICD-10-CM

## 2022-02-28 PROCEDURE — 99212 OFFICE O/P EST SF 10 MIN: CPT | Performed by: FAMILY MEDICINE

## 2022-02-28 PROCEDURE — 99213 OFFICE O/P EST LOW 20 MIN: CPT | Performed by: FAMILY MEDICINE

## 2022-02-28 PROCEDURE — RXMED WILLOW AMBULATORY MEDICATION CHARGE: Performed by: FAMILY MEDICINE

## 2022-02-28 RX ORDER — CYCLOBENZAPRINE HCL 10 MG
10 TABLET ORAL 3 TIMES DAILY PRN
Qty: 60 TABLET | Refills: 11 | Status: SHIPPED | OUTPATIENT
Start: 2022-02-28 | End: 2022-08-04 | Stop reason: SDUPTHER

## 2022-02-28 RX ORDER — ASPIRIN 81 MG/1
TABLET, CHEWABLE ORAL
Qty: 90 TABLET | Refills: 3 | Status: SHIPPED | OUTPATIENT
Start: 2022-02-28 | End: 2022-08-04 | Stop reason: SDUPTHER

## 2022-02-28 ASSESSMENT — PATIENT HEALTH QUESTIONNAIRE - PHQ9: CLINICAL INTERPRETATION OF PHQ2 SCORE: 0

## 2022-02-28 NOTE — PROGRESS NOTES
"Subjective     Gilles Cobb Case is a 56 y.o. male who presents with Hypertension            HPI 1.  Right-sided weakness-patient continues to use a cane typically in his left hand.  He does walk for up to 3 hours on most days.  He has significant residual stiffness in his right upper extremity and right lower extremity.  He has had his current AFO brace since 2017 at the onset of his stroke event.  It is starting to show signs of wear.  2.  Status post CVA-patient has not had active speech therapy since April 2021.  He does have a speech generation device which has been helpful at prompting him and extending more spontaneous appropriate speech.  He is interested in further speech therapy.  Insurance has switched to Medicare/Medicaid in the past year    ROS negative for altered vision, near syncope, chest pain, palpitations           Objective     /76   Pulse 92   Temp 36.6 °C (97.9 °F) (Temporal)   Resp 16   Ht 1.854 m (6' 0.99\")   Wt 84.4 kg (186 lb)   SpO2 98%   BMI 24.55 kg/m²      Physical Exam  Gen.- alert, cooperative, in no acute distress  Neck- midline trachea, thyroid not enlarged or tender,supple, no cervical adenopathy  Chest-clear to auscultation and percussion with normal breath sounds. No retractions. Chest wall nontender  Cardiac- regular rhythm and rate. No murmur, thrill, or heave  Neurologic-alert and cooperative.  Fluency of speech is moderately limited with about 50% of word choices being the opposite of what patient intends to say such as yes versus no.  Patient will often then self-correct himself.   Marked stiffness noted over the right leg and right upper extremity including the fingers of the right hand.                   Assessment & Plan        1. Right sided weakness      2. Cerebrovascular accident (CVA), unspecified mechanism (HCC)    Plan: 1.  Continue current medications-Flexeril and baby aspirin were refilled today  2.  Updated speech therapy referral  3.  Will have " patient check with orthotics provider regarding possible new more streamlined right AFO brace  4.  Revisit in 6 months

## 2022-03-02 ENCOUNTER — PHARMACY VISIT (OUTPATIENT)
Dept: PHARMACY | Facility: MEDICAL CENTER | Age: 57
End: 2022-03-02
Payer: COMMERCIAL

## 2022-03-02 PROCEDURE — RXMED WILLOW AMBULATORY MEDICATION CHARGE: Performed by: FAMILY MEDICINE

## 2022-04-07 ENCOUNTER — TELEPHONE (OUTPATIENT)
Dept: SPEECH THERAPY | Facility: OTHER | Age: 57
End: 2022-04-07
Payer: MEDICAID

## 2022-05-11 ENCOUNTER — PHARMACY VISIT (OUTPATIENT)
Dept: PHARMACY | Facility: MEDICAL CENTER | Age: 57
End: 2022-05-11
Payer: COMMERCIAL

## 2022-05-11 PROCEDURE — RXMED WILLOW AMBULATORY MEDICATION CHARGE: Performed by: FAMILY MEDICINE

## 2022-05-25 ENCOUNTER — PHARMACY VISIT (OUTPATIENT)
Dept: PHARMACY | Facility: MEDICAL CENTER | Age: 57
End: 2022-05-25
Payer: COMMERCIAL

## 2022-05-25 PROCEDURE — RXMED WILLOW AMBULATORY MEDICATION CHARGE: Performed by: FAMILY MEDICINE

## 2022-07-07 ENCOUNTER — PHARMACY VISIT (OUTPATIENT)
Dept: PHARMACY | Facility: MEDICAL CENTER | Age: 57
End: 2022-07-07
Payer: COMMERCIAL

## 2022-07-07 PROCEDURE — RXMED WILLOW AMBULATORY MEDICATION CHARGE: Performed by: FAMILY MEDICINE

## 2022-08-04 ENCOUNTER — OFFICE VISIT (OUTPATIENT)
Dept: MEDICAL GROUP | Facility: MEDICAL CENTER | Age: 57
End: 2022-08-04
Attending: FAMILY MEDICINE
Payer: MEDICAID

## 2022-08-04 VITALS
DIASTOLIC BLOOD PRESSURE: 78 MMHG | HEIGHT: 73 IN | OXYGEN SATURATION: 97 % | SYSTOLIC BLOOD PRESSURE: 150 MMHG | HEART RATE: 88 BPM | RESPIRATION RATE: 16 BRPM | BODY MASS INDEX: 24.55 KG/M2 | TEMPERATURE: 97.5 F

## 2022-08-04 DIAGNOSIS — I10 ESSENTIAL HYPERTENSION: ICD-10-CM

## 2022-08-04 DIAGNOSIS — Z12.11 SCREENING FOR MALIGNANT NEOPLASM OF COLON: ICD-10-CM

## 2022-08-04 DIAGNOSIS — R53.1 RIGHT SIDED WEAKNESS: ICD-10-CM

## 2022-08-04 DIAGNOSIS — R47.02 DYSPHASIA: ICD-10-CM

## 2022-08-04 DIAGNOSIS — I63.9 CEREBROVASCULAR ACCIDENT (CVA), UNSPECIFIED MECHANISM (HCC): ICD-10-CM

## 2022-08-04 DIAGNOSIS — Z86.73 HISTORY OF STROKE: ICD-10-CM

## 2022-08-04 PROCEDURE — 99213 OFFICE O/P EST LOW 20 MIN: CPT | Performed by: FAMILY MEDICINE

## 2022-08-04 PROCEDURE — RXMED WILLOW AMBULATORY MEDICATION CHARGE: Performed by: FAMILY MEDICINE

## 2022-08-04 RX ORDER — CYCLOBENZAPRINE HCL 10 MG
10 TABLET ORAL 3 TIMES DAILY PRN
Qty: 60 TABLET | Refills: 11 | Status: SHIPPED | OUTPATIENT
Start: 2022-08-04 | End: 2022-12-16 | Stop reason: SDUPTHER

## 2022-08-04 RX ORDER — ATORVASTATIN CALCIUM 40 MG/1
40 TABLET, FILM COATED ORAL DAILY
Qty: 30 TABLET | Refills: 11 | Status: SHIPPED | OUTPATIENT
Start: 2022-08-04 | End: 2022-12-16 | Stop reason: SDUPTHER

## 2022-08-04 RX ORDER — LISINOPRIL 5 MG/1
TABLET ORAL
Qty: 30 TABLET | Refills: 11 | Status: SHIPPED | OUTPATIENT
Start: 2022-08-04 | End: 2022-12-16 | Stop reason: SDUPTHER

## 2022-08-04 RX ORDER — ASPIRIN 81 MG/1
TABLET, CHEWABLE ORAL
Qty: 90 TABLET | Refills: 3 | Status: SHIPPED | OUTPATIENT
Start: 2022-08-04 | End: 2022-12-16 | Stop reason: SDUPTHER

## 2022-08-04 NOTE — PROGRESS NOTES
"Subjective     Gilles Cobb Case is a 57 y.o. male who presents with Weakness (Rt side weakness, need for new foot and ankle brace)            HPI 1.  History of stroke/dysarthria-patient ports over the past 5 days he has had a separation of the screen from the back of his electronic speech story board device.  He also notices that it does not fully charge overnight as well.  He has not recently been seen by speech therapy.  He has developed the ability to respond accurately using yes/no.  2.  History of stroke/right-sided weakness-patient reports he has not made any contact with any prosthetic company regarding getting his aging right AFO brace replaced.  Patient continues to walk with a cane.  Walks with a completely straight right leg at the knee.  AFO brace is critical for allowing him to continue to ambulate.  Patient does walk about 3 hours/day currently.  3.  Elevated blood pressure reading-patient is maintained on 5 mg of lisinopril.  He tends to experience stress coming down to our office.  Has not been checking home blood pressures recently.  ROS negative for dysphagia, chest pain, dyspnea           Objective     BP (!) 150/78   Pulse 88   Temp 36.4 °C (97.5 °F) (Temporal)   Resp 16   Ht 1.854 m (6' 0.99\")   SpO2 97%   BMI 24.55 kg/m²      Physical Exam  Gen.- alert, cooperative, in no acute distress  Neck- midline trachea, thyroid not enlarged or tender,supple, no cervical adenopathy  Chest-clear to auscultation and percussion with normal breath sounds. No retractions. Chest wall nontender  Cardiac- regular rhythm and rate. No murmur, thrill, or heave  Neurologic-persistent dysphasia, normal articulation.  Rigid fixed position of the right upper extremity and moderate rigidity of the right lower extremity.  AFO brace in place.  Patient walks with a locked straight knee using a cane in the opposite hand                      Assessment & Plan        1. Right sided weakness      2. Essential " hypertension    - CBC WITH DIFFERENTIAL; Future  - Comp Metabolic Panel; Future    3. History of stroke    - Lipid Profile; Future    4. Dysphasia    - Referral to Speech Therapy    Plan: 1.  Speech therapy referral to have his dialogue device evaluated (speech machine)  2.  Will send patient with an order to  prosthetics for evaluation of his right AFO brace  3.  Patient's family will help him check blood pressure for 2 to 3 days and see whether there is any sustained elevation  4.  Follow-up within 6 months sooner if needed  5.  Update lipids, CMP, CBC, FIT entheses packet sent)

## 2022-08-05 PROCEDURE — RXMED WILLOW AMBULATORY MEDICATION CHARGE: Performed by: FAMILY MEDICINE

## 2022-08-10 ENCOUNTER — HOSPITAL ENCOUNTER (OUTPATIENT)
Facility: MEDICAL CENTER | Age: 57
End: 2022-08-10
Attending: FAMILY MEDICINE
Payer: MEDICAID

## 2022-08-10 PROCEDURE — 82274 ASSAY TEST FOR BLOOD FECAL: CPT

## 2022-08-11 ENCOUNTER — HOSPITAL ENCOUNTER (OUTPATIENT)
Dept: LAB | Facility: MEDICAL CENTER | Age: 57
End: 2022-08-11
Attending: FAMILY MEDICINE
Payer: MEDICAID

## 2022-08-11 ENCOUNTER — TELEPHONE (OUTPATIENT)
Dept: MEDICAL GROUP | Facility: MEDICAL CENTER | Age: 57
End: 2022-08-11
Payer: MEDICAID

## 2022-08-11 DIAGNOSIS — Z86.73 HISTORY OF STROKE: ICD-10-CM

## 2022-08-11 DIAGNOSIS — I10 ESSENTIAL HYPERTENSION: ICD-10-CM

## 2022-08-11 LAB
ALBUMIN SERPL BCP-MCNC: 4.7 G/DL (ref 3.2–4.9)
ALBUMIN/GLOB SERPL: 1.9 G/DL
ALP SERPL-CCNC: 51 U/L (ref 30–99)
ALT SERPL-CCNC: 24 U/L (ref 2–50)
ANION GAP SERPL CALC-SCNC: 11 MMOL/L (ref 7–16)
AST SERPL-CCNC: 19 U/L (ref 12–45)
BASOPHILS # BLD AUTO: 1.3 % (ref 0–1.8)
BASOPHILS # BLD: 0.07 K/UL (ref 0–0.12)
BILIRUB SERPL-MCNC: 0.5 MG/DL (ref 0.1–1.5)
BUN SERPL-MCNC: 13 MG/DL (ref 8–22)
CALCIUM SERPL-MCNC: 9.4 MG/DL (ref 8.5–10.5)
CHLORIDE SERPL-SCNC: 103 MMOL/L (ref 96–112)
CHOLEST SERPL-MCNC: 125 MG/DL (ref 100–199)
CO2 SERPL-SCNC: 26 MMOL/L (ref 20–33)
CREAT SERPL-MCNC: 0.69 MG/DL (ref 0.5–1.4)
EOSINOPHIL # BLD AUTO: 0.15 K/UL (ref 0–0.51)
EOSINOPHIL NFR BLD: 2.9 % (ref 0–6.9)
ERYTHROCYTE [DISTWIDTH] IN BLOOD BY AUTOMATED COUNT: 46.9 FL (ref 35.9–50)
FASTING STATUS PATIENT QL REPORTED: NORMAL
GFR SERPLBLD CREATININE-BSD FMLA CKD-EPI: 106 ML/MIN/1.73 M 2
GLOBULIN SER CALC-MCNC: 2.5 G/DL (ref 1.9–3.5)
GLUCOSE SERPL-MCNC: 98 MG/DL (ref 65–99)
HCT VFR BLD AUTO: 44.1 % (ref 42–52)
HDLC SERPL-MCNC: 64 MG/DL
HGB BLD-MCNC: 14.7 G/DL (ref 14–18)
IMM GRANULOCYTES # BLD AUTO: 0.01 K/UL (ref 0–0.11)
IMM GRANULOCYTES NFR BLD AUTO: 0.2 % (ref 0–0.9)
LDLC SERPL CALC-MCNC: 50 MG/DL
LYMPHOCYTES # BLD AUTO: 1.3 K/UL (ref 1–4.8)
LYMPHOCYTES NFR BLD: 25 % (ref 22–41)
MCH RBC QN AUTO: 32.2 PG (ref 27–33)
MCHC RBC AUTO-ENTMCNC: 33.3 G/DL (ref 33.7–35.3)
MCV RBC AUTO: 96.5 FL (ref 81.4–97.8)
MONOCYTES # BLD AUTO: 0.73 K/UL (ref 0–0.85)
MONOCYTES NFR BLD AUTO: 14 % (ref 0–13.4)
NEUTROPHILS # BLD AUTO: 2.94 K/UL (ref 1.82–7.42)
NEUTROPHILS NFR BLD: 56.6 % (ref 44–72)
NRBC # BLD AUTO: 0 K/UL
NRBC BLD-RTO: 0 /100 WBC
PLATELET # BLD AUTO: 224 K/UL (ref 164–446)
PMV BLD AUTO: 12.6 FL (ref 9–12.9)
POTASSIUM SERPL-SCNC: 4.2 MMOL/L (ref 3.6–5.5)
PROT SERPL-MCNC: 7.2 G/DL (ref 6–8.2)
RBC # BLD AUTO: 4.57 M/UL (ref 4.7–6.1)
SODIUM SERPL-SCNC: 140 MMOL/L (ref 135–145)
TRIGL SERPL-MCNC: 54 MG/DL (ref 0–149)
WBC # BLD AUTO: 5.2 K/UL (ref 4.8–10.8)

## 2022-08-11 PROCEDURE — 80053 COMPREHEN METABOLIC PANEL: CPT

## 2022-08-11 PROCEDURE — 36415 COLL VENOUS BLD VENIPUNCTURE: CPT

## 2022-08-11 PROCEDURE — 80061 LIPID PANEL: CPT

## 2022-08-11 PROCEDURE — 85025 COMPLETE CBC W/AUTO DIFF WBC: CPT

## 2022-08-11 NOTE — TELEPHONE ENCOUNTER
Phone Number Called: [phone] (home)       Call outcome: Left detailed message for patient. Informed to call back with any additional questions.    Message: Left detailed message regarding results. Asked that he c/b with any questions

## 2022-08-11 NOTE — TELEPHONE ENCOUNTER
----- Message from Dimitri Palacio M.D. sent at 8/11/2022 12:42 PM PDT -----  Recent labs show excellent reduction of cholesterol at 125.  CHEM panel shows normal electrolytes, kidney function, liver function.  CBC shows normal white blood cells red blood cells and platelet counts.  Overall labs are reassuring

## 2022-08-15 DIAGNOSIS — Z12.11 SCREENING FOR MALIGNANT NEOPLASM OF COLON: ICD-10-CM

## 2022-08-17 LAB — IMM ASSAY OCC BLD FITOB: NEGATIVE

## 2022-08-22 ENCOUNTER — PHARMACY VISIT (OUTPATIENT)
Dept: PHARMACY | Facility: MEDICAL CENTER | Age: 57
End: 2022-08-22
Payer: COMMERCIAL

## 2022-09-23 ENCOUNTER — PHARMACY VISIT (OUTPATIENT)
Dept: PHARMACY | Facility: MEDICAL CENTER | Age: 57
End: 2022-09-23
Payer: COMMERCIAL

## 2022-09-23 PROCEDURE — RXMED WILLOW AMBULATORY MEDICATION CHARGE: Performed by: FAMILY MEDICINE

## 2022-10-19 ENCOUNTER — PHARMACY VISIT (OUTPATIENT)
Dept: PHARMACY | Facility: MEDICAL CENTER | Age: 57
End: 2022-10-19
Payer: COMMERCIAL

## 2022-10-19 PROCEDURE — RXMED WILLOW AMBULATORY MEDICATION CHARGE: Performed by: FAMILY MEDICINE

## 2022-10-26 ENCOUNTER — OFFICE VISIT (OUTPATIENT)
Dept: MEDICAL GROUP | Facility: MEDICAL CENTER | Age: 57
End: 2022-10-26
Attending: NURSE PRACTITIONER
Payer: MEDICAID

## 2022-10-26 VITALS
RESPIRATION RATE: 17 BRPM | SYSTOLIC BLOOD PRESSURE: 136 MMHG | HEIGHT: 73 IN | OXYGEN SATURATION: 84 % | TEMPERATURE: 98.5 F | HEART RATE: 84 BPM | WEIGHT: 174.5 LBS | DIASTOLIC BLOOD PRESSURE: 82 MMHG | BODY MASS INDEX: 23.13 KG/M2

## 2022-10-26 DIAGNOSIS — Z76.89 ENCOUNTER TO ESTABLISH CARE: ICD-10-CM

## 2022-10-26 PROCEDURE — 99212 OFFICE O/P EST SF 10 MIN: CPT | Performed by: NURSE PRACTITIONER

## 2022-10-26 PROCEDURE — 99202 OFFICE O/P NEW SF 15 MIN: CPT | Performed by: NURSE PRACTITIONER

## 2022-10-26 ASSESSMENT — FIBROSIS 4 INDEX: FIB4 SCORE: 1.02

## 2022-10-26 NOTE — PROGRESS NOTES
No chief complaint on file.      Subjective:     HPI:   Gilles Cobb Case is a 57 y.o. male here to discuss the evaluation and management of:        Problem   Encounter to Establish Care    Patient here to establish care.  Patient is accompanied with his mom who is helping to try and help with communication.  Patient with known history of CVA with right-sided weakness, and dysphagia/dysarthria.  Patient has hard time communicating.  Patient states he has no acute complaints right now.         ROS  See HPI       Allergies   Allergen Reactions    Poison Oak Extract [Extract Of Poison [place]]        Current medicines (including changes today)  Current Outpatient Medications   Medication Sig Dispense Refill    cyclobenzaprine (FLEXERIL) 10 mg Tab Take 1 Tablet by mouth 3 times a day as needed for Mild Pain. 60 Tablet 11    aspirin (ASPIRIN LOW DOSE) 81 MG Chew Tab chewable tablet Chew and swallow one tablet by mouth daily 90 Tablet 3    lisinopril (PRINIVIL) 5 MG Tab Take 1 tablet by mouth daily. 30 Tablet 11    atorvastatin (LIPITOR) 40 MG Tab Take 1 Tablet by mouth every day. 30 Tablet 11    Misc. Devices Misc Speech machine to set up phrases that patient can choose  And machine can speak that info to another person  For communication help. 1 Each 0     No current facility-administered medications for this visit.       Social History     Tobacco Use    Smoking status: Former     Years: 1.00     Types: Cigarettes    Smokeless tobacco: Never   Vaping Use    Vaping Use: Never used   Substance Use Topics    Alcohol use: Yes     Alcohol/week: 1.8 oz     Types: 3 Cans of beer per week     Comment: occasional    Drug use: No       Patient Active Problem List    Diagnosis Date Noted    Encounter to establish care 10/26/2022    History of stroke 03/28/2019    Dysphasia 01/29/2019    Dysarthria 07/12/2018    Right sided weakness 07/12/2018    Advance care planning 12/21/2017    HTN (hypertension) 12/20/2017    CVA (cerebral  "vascular accident) (HCC) 12/19/2017       Family History   Family history unknown: Yes          Objective:     /82 (BP Location: Left arm, Patient Position: Sitting, BP Cuff Size: Adult)   Pulse 84   Temp 36.9 °C (98.5 °F) (Skin)   Resp 17   Ht 1.854 m (6' 1\")   Wt 79.2 kg (174 lb 8 oz)   SpO2 (!) 84%  Body mass index is 23.02 kg/m².    Physical Exam:  Physical Exam  Vitals reviewed.   Constitutional:       General: He is awake.      Appearance: Normal appearance. He is well-developed.   Eyes:      Conjunctiva/sclera: Conjunctivae normal.   Cardiovascular:      Rate and Rhythm: Normal rate.   Pulmonary:      Effort: Pulmonary effort is normal. No respiratory distress.   Musculoskeletal:      Cervical back: Neck supple.   Skin:     General: Skin is warm and dry.   Neurological:      Mental Status: He is alert and oriented to person, place, and time.      Cranial Nerves: Cranial nerve deficit and dysarthria present.      Motor: Weakness and abnormal muscle tone present.      Coordination: Coordination abnormal.      Gait: Gait abnormal.      Comments: Right sided deficits from previous CVA   Psychiatric:         Mood and Affect: Mood normal.         Behavior: Behavior normal. Behavior is cooperative.       Assessment and Plan:     The following treatment plan was discussed:    Problem List Items Addressed This Visit       Encounter to establish care     Discussed health history and maintenance   Flu vaccine - Declined   Colon Ca screening - Not applicable   Preventative screening labs completed 2 months ago we will plan to repeat in 6 months to a year.  Discussed with patient about speech therapy given his continued dysarthria and dysphagia, patient states that he would like to hold off until January to get through the holidays before restarting any kind of therapies.              Any change or worsening of signs or symptoms, patient encouraged to follow-up or report to emergency room for further " evaluation. Patient verbalizes understanding and agrees.    Follow-Up: Return in about 6 weeks (around 12/7/2022) for referral to speech therapy .      PLEASE NOTE: This dictation was created using voice recognition software. I have made every reasonable attempt to correct obvious errors, but I expect that there are errors of grammar and possibly content that I did not discover before finalizing the note.

## 2022-10-26 NOTE — ASSESSMENT & PLAN NOTE
Discussed health history and maintenance   Flu vaccine - Declined   Colon Ca screening - Not applicable   Preventative screening labs completed 2 months ago we will plan to repeat in 6 months to a year.  Discussed with patient about speech therapy given his continued dysarthria and dysphagia, patient states that he would like to hold off until January to get through the holidays before restarting any kind of therapies.

## 2022-11-14 ENCOUNTER — PHARMACY VISIT (OUTPATIENT)
Dept: PHARMACY | Facility: MEDICAL CENTER | Age: 57
End: 2022-11-14
Payer: COMMERCIAL

## 2022-11-14 PROCEDURE — RXMED WILLOW AMBULATORY MEDICATION CHARGE: Performed by: FAMILY MEDICINE

## 2022-11-17 ENCOUNTER — PHARMACY VISIT (OUTPATIENT)
Dept: PHARMACY | Facility: MEDICAL CENTER | Age: 57
End: 2022-11-17
Payer: COMMERCIAL

## 2022-11-17 PROCEDURE — RXMED WILLOW AMBULATORY MEDICATION CHARGE: Performed by: FAMILY MEDICINE

## 2022-11-30 ENCOUNTER — PHARMACY VISIT (OUTPATIENT)
Dept: PHARMACY | Facility: MEDICAL CENTER | Age: 57
End: 2022-11-30
Payer: COMMERCIAL

## 2022-11-30 PROCEDURE — RXMED WILLOW AMBULATORY MEDICATION CHARGE: Performed by: FAMILY MEDICINE

## 2022-12-16 ENCOUNTER — PHARMACY VISIT (OUTPATIENT)
Dept: PHARMACY | Facility: MEDICAL CENTER | Age: 57
End: 2022-12-16
Payer: COMMERCIAL

## 2022-12-16 ENCOUNTER — OFFICE VISIT (OUTPATIENT)
Dept: MEDICAL GROUP | Facility: MEDICAL CENTER | Age: 57
End: 2022-12-16
Attending: NURSE PRACTITIONER
Payer: MEDICAID

## 2022-12-16 VITALS
BODY MASS INDEX: 23.1 KG/M2 | RESPIRATION RATE: 18 BRPM | HEART RATE: 82 BPM | TEMPERATURE: 98.4 F | DIASTOLIC BLOOD PRESSURE: 78 MMHG | WEIGHT: 174.3 LBS | HEIGHT: 73 IN | OXYGEN SATURATION: 98 % | SYSTOLIC BLOOD PRESSURE: 130 MMHG

## 2022-12-16 DIAGNOSIS — I10 ESSENTIAL HYPERTENSION: ICD-10-CM

## 2022-12-16 DIAGNOSIS — Z23 NEED FOR VACCINATION: ICD-10-CM

## 2022-12-16 DIAGNOSIS — Z86.73 HISTORY OF STROKE: ICD-10-CM

## 2022-12-16 DIAGNOSIS — I63.9 CEREBROVASCULAR ACCIDENT (CVA), UNSPECIFIED MECHANISM (HCC): ICD-10-CM

## 2022-12-16 DIAGNOSIS — R53.1 RIGHT SIDED WEAKNESS: ICD-10-CM

## 2022-12-16 PROCEDURE — RXMED WILLOW AMBULATORY MEDICATION CHARGE: Performed by: NURSE PRACTITIONER

## 2022-12-16 PROCEDURE — 90471 IMMUNIZATION ADMIN: CPT

## 2022-12-16 PROCEDURE — 99213 OFFICE O/P EST LOW 20 MIN: CPT | Mod: 25 | Performed by: NURSE PRACTITIONER

## 2022-12-16 PROCEDURE — 99214 OFFICE O/P EST MOD 30 MIN: CPT | Performed by: NURSE PRACTITIONER

## 2022-12-16 PROCEDURE — 90715 TDAP VACCINE 7 YRS/> IM: CPT

## 2022-12-16 RX ORDER — CYCLOBENZAPRINE HCL 10 MG
10 TABLET ORAL 3 TIMES DAILY PRN
Qty: 60 TABLET | Refills: 4 | Status: SHIPPED | OUTPATIENT
Start: 2022-12-16 | End: 2023-09-08 | Stop reason: SDUPTHER

## 2022-12-16 RX ORDER — LISINOPRIL 5 MG/1
TABLET ORAL
Qty: 30 TABLET | Refills: 11 | Status: SHIPPED | OUTPATIENT
Start: 2022-12-16 | End: 2024-02-01

## 2022-12-16 RX ORDER — ASPIRIN 81 MG/1
TABLET, CHEWABLE ORAL
Qty: 90 TABLET | Refills: 3 | Status: SHIPPED | OUTPATIENT
Start: 2022-12-16

## 2022-12-16 RX ORDER — ATORVASTATIN CALCIUM 40 MG/1
40 TABLET, FILM COATED ORAL DAILY
Qty: 30 TABLET | Refills: 11 | Status: SHIPPED | OUTPATIENT
Start: 2022-12-16 | End: 2023-12-14 | Stop reason: SDUPTHER

## 2022-12-16 ASSESSMENT — FIBROSIS 4 INDEX: FIB4 SCORE: 1.02

## 2022-12-16 NOTE — ASSESSMENT & PLAN NOTE
Ongoing-   Patient continues to need Therapies and have resent referrals for OT, SLP, and PT   Patient would benefit from continued therapies secondary to previous stroke.   Patient to continue on current medications.   Patient agreed to TDAP and Flu vaccine in clinic today      Render Post-Care Instructions In Note?: no Duration Of Freeze Thaw-Cycle (Seconds): 0 Post-Care Instructions: I reviewed with the patient in detail post-care instructions. Patient is to wear sunprotection, and avoid picking at any of the treated lesions. Pt may apply Vaseline to crusted or scabbing areas. Detail Level: Detailed Consent: The patient's consent was obtained including but not limited to risks of crusting, scabbing, blistering, scarring, darker or lighter pigmentary change, recurrence, incomplete removal and infection.

## 2022-12-16 NOTE — PROGRESS NOTES
No chief complaint on file.      Subjective:     HPI:   Gilles Cobb Case is a 57 y.o. male here to discuss the evaluation and management of:      Problem   Cva (Cerebral Vascular Accident) (Hcc)    Patient here for follow up in clinic. Continues to have severe expressive aphasia, as well as right sided extremity weakness. Mother is here with him and states that he has not been able to get back in to therapy. Otherwise she states he is doing well and taking his medications as instructed.            ROS  See HPI     Allergies   Allergen Reactions    Poison Oak Extract [Extract Of Poison [place]]        Current medicines (including changes today)  Current Outpatient Medications   Medication Sig Dispense Refill    aspirin (ASPIRIN LOW DOSE) 81 MG Chew Tab chewable tablet Chew and swallow one tablet by mouth daily 90 Tablet 3    atorvastatin (LIPITOR) 40 MG Tab Take 1 Tablet by mouth every day. 30 Tablet 11    cyclobenzaprine (FLEXERIL) 10 mg Tab Take 1 Tablet by mouth 3 times a day as needed for Mild Pain. 60 Tablet 4    lisinopril (PRINIVIL) 5 MG Tab Take 1 tablet by mouth daily. 30 Tablet 11    Misc. Devices Misc Speech machine to set up phrases that patient can choose  And machine can speak that info to another person  For communication help. 1 Each 0     No current facility-administered medications for this visit.       Social History     Tobacco Use    Smoking status: Former     Years: 1.00     Types: Cigarettes    Smokeless tobacco: Never   Vaping Use    Vaping Use: Never used   Substance Use Topics    Alcohol use: Yes     Alcohol/week: 1.8 oz     Types: 3 Cans of beer per week     Comment: occasional    Drug use: No       Patient Active Problem List    Diagnosis Date Noted    Encounter to establish care 10/26/2022    History of stroke 03/28/2019    Dysphasia 01/29/2019    Dysarthria 07/12/2018    Right sided weakness 07/12/2018    Advance care planning 12/21/2017    HTN (hypertension) 12/20/2017    CVA (cerebral  "vascular accident) ([facility]) 12/19/2017       Family History   Family history unknown: Yes          Objective:     /78 (BP Location: Left arm, Patient Position: Sitting, BP Cuff Size: Adult)   Pulse 82   Temp 36.9 °C (98.4 °F) (Temporal)   Resp 18   Ht 1.854 m (6' 1\")   Wt 79.1 kg (174 lb 4.8 oz)   SpO2 98%  Body mass index is 23 kg/m².    Physical Exam:  Physical Exam  Vitals reviewed.   Constitutional:       General: He is awake.      Appearance: He is well-developed.   HENT:      Head: Normocephalic.      Nose: Nose normal.   Eyes:      Conjunctiva/sclera: Conjunctivae normal.   Cardiovascular:      Rate and Rhythm: Normal rate.   Pulmonary:      Effort: Pulmonary effort is normal. No respiratory distress.   Musculoskeletal:      Cervical back: Neck supple.      Comments: Right sided extremity weakness in both upper and lower    Skin:     General: Skin is warm and dry.   Neurological:      Mental Status: He is alert.      Cranial Nerves: Cranial nerve deficit and dysarthria present.      Motor: Weakness present.      Coordination: Coordination abnormal.      Gait: Gait abnormal.   Psychiatric:         Mood and Affect: Mood normal.         Behavior: Behavior normal. Behavior is cooperative.            Assessment and Plan:     The following treatment plan was discussed:    Problem List Items Addressed This Visit       CVA (cerebral vascular accident) ([facility])     Ongoing-   Patient continues to need Therapies and have resent referrals for OT, SLP, and PT   Patient would benefit from continued therapies secondary to previous stroke.   Patient to continue on current medications.   Patient agreed to TDAP and Flu vaccine in clinic today            Relevant Medications    aspirin (ASPIRIN LOW DOSE) 81 MG Chew Tab chewable tablet    atorvastatin (LIPITOR) 40 MG Tab    lisinopril (PRINIVIL) 5 MG Tab    Other Relevant Orders    Referral to Physical Therapy    Referral to Occupational Therapy    Referral to Speech " Therapy    Right sided weakness    Relevant Medications    cyclobenzaprine (FLEXERIL) 10 mg Tab    History of stroke    Relevant Medications    atorvastatin (LIPITOR) 40 MG Tab     Other Visit Diagnoses       Essential hypertension        Relevant Medications    atorvastatin (LIPITOR) 40 MG Tab    lisinopril (PRINIVIL) 5 MG Tab    Need for vaccination        Relevant Orders    Influenza Vaccine Quad Injection (PF)    Tdap =>6yo IM            Any change or worsening of signs or symptoms, patient encouraged to follow-up or report to emergency room for further evaluation. Patient verbalizes understanding and agrees.    Follow-Up: Return in about 3 months (around 3/16/2023) for Follow up Labs.      PLEASE NOTE: This dictation was created using voice recognition software. I have made every reasonable attempt to correct obvious errors, but I expect that there are errors of grammar and possibly content that I did not discover before finalizing the note.

## 2023-01-16 NOTE — PROGRESS NOTES
Peggy Kelsey called in today he would like someone to call him and explain his results and he also has more questions     Peggy Kelsey can be reached at [phone] Saumya Rios Fall Risk Assessment:     Last Known Fall: Within the last month  Mobility: Hemiplegic, paraplegia, or quadriplegia  Medications: Cardiovascular or central nervous system meds  Mental Status/LOC/Awareness: Unresponsive/noncompliance with instruction  Toileting Needs: Incontinence, Use of catheters or diversion devices  Volume/Electrolyte Status: Use of IV fluids/tube feeds, NPO greater than 24 hours  Communication/Sensory: Non-English patient/unable to speak/slurred speech  Behavior: Behavioral noncompliance with instruction  Saumya Rios Fall Risk Total: 25  Fall Risk Level: HIGH RISK    Universal Fall Precautions:  call light/belongings in reach, bed in low position and locked, wheelchairs and assistive devices out of sight, use non-slip footwear, siderails up x 2, adequate lighting, clutter free and spill free environment, educate on level of risk, educate to call for assistance    Fall Risk Level Interventions:    TRIAL (TELE 8, NEURO, MED MICHAEL 5) Moderate Fall Risk Interventions  Place yellow fall risk ID band on patient: verified  Provide patient/family education based on risk assessment : completed  Educate patient/family to call staff for assistance when getting out of bed: completed  Place fall precaution signage outside patient door: verified  Utilize bed/chair fall alarm: verifiedTRIAL (TELE 8, NEURO, MED MICHAEL 5) High Fall Risk Interventions  Place yellow fall risk ID band on patient: verified  Provide patient/family education based on risk assessment: completed  Educate patient/family to call staff for assistance when getting out of bed: completed  Place fall precaution signage outside patient door: verified  Place patient in room close to nursing station: completed  Utilize bed/chair fall alarm: completed  Notify charge of high risk for huddle: completed    Patient Specific Interventions:     Medication: review medications with patient and family and limit combination of prn  medications  Mental Status/LOC/Awareness: reorient patient, check on patient hourly, utilize bed/chair fall alarm, reinforce the use of call light and provide activity  Toileting: monitor intake and output/use of appropriate interventions  Volume/Electrolyte Status: ensure patient remains hydrated, monitor abnormal lab values and ensure IV fluids are appropriate  Communication/Sensory: ensure proper positioning when transferrng/ambulating  Behavioral: encourage patient to voice feelings, administer medication as ordered and instruct/reinforce fall program rationale  Mobility: utilize bed/chair fall alarm, ensure bed is locked and in lowest position and provide appropriate assistive device

## 2023-01-27 ENCOUNTER — PHARMACY VISIT (OUTPATIENT)
Dept: PHARMACY | Facility: MEDICAL CENTER | Age: 58
End: 2023-01-27
Payer: COMMERCIAL

## 2023-01-27 PROCEDURE — RXMED WILLOW AMBULATORY MEDICATION CHARGE: Performed by: NURSE PRACTITIONER

## 2023-02-23 ENCOUNTER — PHARMACY VISIT (OUTPATIENT)
Dept: PHARMACY | Facility: MEDICAL CENTER | Age: 58
End: 2023-02-23
Payer: COMMERCIAL

## 2023-02-23 PROCEDURE — RXMED WILLOW AMBULATORY MEDICATION CHARGE: Performed by: NURSE PRACTITIONER

## 2023-03-10 ENCOUNTER — PHARMACY VISIT (OUTPATIENT)
Dept: PHARMACY | Facility: MEDICAL CENTER | Age: 58
End: 2023-03-10
Payer: COMMERCIAL

## 2023-03-10 PROCEDURE — RXMED WILLOW AMBULATORY MEDICATION CHARGE: Performed by: NURSE PRACTITIONER

## 2023-03-30 ENCOUNTER — PHARMACY VISIT (OUTPATIENT)
Dept: PHARMACY | Facility: MEDICAL CENTER | Age: 58
End: 2023-03-30
Payer: COMMERCIAL

## 2023-03-30 PROCEDURE — RXMED WILLOW AMBULATORY MEDICATION CHARGE: Performed by: NURSE PRACTITIONER

## 2023-04-27 ENCOUNTER — PHARMACY VISIT (OUTPATIENT)
Dept: PHARMACY | Facility: MEDICAL CENTER | Age: 58
End: 2023-04-27
Payer: COMMERCIAL

## 2023-04-27 PROCEDURE — RXMED WILLOW AMBULATORY MEDICATION CHARGE: Performed by: NURSE PRACTITIONER

## 2023-05-10 ENCOUNTER — PHARMACY VISIT (OUTPATIENT)
Dept: PHARMACY | Facility: MEDICAL CENTER | Age: 58
End: 2023-05-10
Payer: COMMERCIAL

## 2023-05-10 PROCEDURE — RXMED WILLOW AMBULATORY MEDICATION CHARGE: Performed by: NURSE PRACTITIONER

## 2023-05-25 ENCOUNTER — PHARMACY VISIT (OUTPATIENT)
Dept: PHARMACY | Facility: MEDICAL CENTER | Age: 58
End: 2023-05-25
Payer: COMMERCIAL

## 2023-05-25 PROCEDURE — RXMED WILLOW AMBULATORY MEDICATION CHARGE: Performed by: NURSE PRACTITIONER

## 2023-06-22 PROCEDURE — RXMED WILLOW AMBULATORY MEDICATION CHARGE: Performed by: NURSE PRACTITIONER

## 2023-06-23 ENCOUNTER — PHARMACY VISIT (OUTPATIENT)
Dept: PHARMACY | Facility: MEDICAL CENTER | Age: 58
End: 2023-06-23
Payer: COMMERCIAL

## 2023-07-11 ENCOUNTER — PHARMACY VISIT (OUTPATIENT)
Dept: PHARMACY | Facility: MEDICAL CENTER | Age: 58
End: 2023-07-11
Payer: COMMERCIAL

## 2023-07-11 PROCEDURE — RXMED WILLOW AMBULATORY MEDICATION CHARGE: Performed by: NURSE PRACTITIONER

## 2023-07-27 ENCOUNTER — PHARMACY VISIT (OUTPATIENT)
Dept: PHARMACY | Facility: MEDICAL CENTER | Age: 58
End: 2023-07-27
Payer: COMMERCIAL

## 2023-07-27 PROCEDURE — RXMED WILLOW AMBULATORY MEDICATION CHARGE: Performed by: NURSE PRACTITIONER

## 2023-08-14 ENCOUNTER — PHARMACY VISIT (OUTPATIENT)
Dept: PHARMACY | Facility: MEDICAL CENTER | Age: 58
End: 2023-08-14
Payer: COMMERCIAL

## 2023-08-14 PROCEDURE — RXMED WILLOW AMBULATORY MEDICATION CHARGE: Performed by: NURSE PRACTITIONER

## 2023-08-24 ENCOUNTER — PHARMACY VISIT (OUTPATIENT)
Dept: PHARMACY | Facility: MEDICAL CENTER | Age: 58
End: 2023-08-24
Payer: COMMERCIAL

## 2023-08-24 PROCEDURE — RXMED WILLOW AMBULATORY MEDICATION CHARGE: Performed by: NURSE PRACTITIONER

## 2023-09-08 ENCOUNTER — PHARMACY VISIT (OUTPATIENT)
Dept: PHARMACY | Facility: MEDICAL CENTER | Age: 58
End: 2023-09-08
Payer: COMMERCIAL

## 2023-09-08 DIAGNOSIS — R53.1 RIGHT SIDED WEAKNESS: ICD-10-CM

## 2023-09-08 PROCEDURE — RXMED WILLOW AMBULATORY MEDICATION CHARGE: Performed by: NURSE PRACTITIONER

## 2023-09-13 PROCEDURE — RXMED WILLOW AMBULATORY MEDICATION CHARGE

## 2023-09-13 RX ORDER — CYCLOBENZAPRINE HCL 10 MG
10 TABLET ORAL 3 TIMES DAILY PRN
Qty: 60 TABLET | Refills: 4 | Status: SHIPPED | OUTPATIENT
Start: 2023-09-13

## 2023-09-25 PROCEDURE — RXMED WILLOW AMBULATORY MEDICATION CHARGE: Performed by: NURSE PRACTITIONER

## 2023-09-26 ENCOUNTER — PHARMACY VISIT (OUTPATIENT)
Dept: PHARMACY | Facility: MEDICAL CENTER | Age: 58
End: 2023-09-26
Payer: COMMERCIAL

## 2023-10-19 ENCOUNTER — PHARMACY VISIT (OUTPATIENT)
Dept: PHARMACY | Facility: MEDICAL CENTER | Age: 58
End: 2023-10-19
Payer: COMMERCIAL

## 2023-10-19 PROCEDURE — RXMED WILLOW AMBULATORY MEDICATION CHARGE: Performed by: NURSE PRACTITIONER

## 2023-11-07 ENCOUNTER — PHARMACY VISIT (OUTPATIENT)
Dept: PHARMACY | Facility: MEDICAL CENTER | Age: 58
End: 2023-11-07
Payer: COMMERCIAL

## 2023-11-07 PROCEDURE — RXMED WILLOW AMBULATORY MEDICATION CHARGE

## 2023-11-09 ENCOUNTER — PHARMACY VISIT (OUTPATIENT)
Dept: PHARMACY | Facility: MEDICAL CENTER | Age: 58
End: 2023-11-09
Payer: COMMERCIAL

## 2023-11-09 PROCEDURE — RXMED WILLOW AMBULATORY MEDICATION CHARGE: Performed by: NURSE PRACTITIONER

## 2023-11-16 ENCOUNTER — PHARMACY VISIT (OUTPATIENT)
Dept: PHARMACY | Facility: MEDICAL CENTER | Age: 58
End: 2023-11-16
Payer: COMMERCIAL

## 2023-11-16 PROCEDURE — RXMED WILLOW AMBULATORY MEDICATION CHARGE: Performed by: NURSE PRACTITIONER

## 2023-12-14 DIAGNOSIS — Z86.73 HISTORY OF STROKE: ICD-10-CM

## 2023-12-14 NOTE — TELEPHONE ENCOUNTER
Received request via: Patient    Was the patient seen in the last year in this department? No    Does the patient have an active prescription (recently filled or refills available) for medication(s) requested? No    Does the patient have custodial Plus and need 100 day supply (blood pressure, diabetes and cholesterol meds only)? Patient does not have SCP

## 2023-12-14 NOTE — TELEPHONE ENCOUNTER
"Patients mom came into ask for a 'urgent\" refill please send to this pharmacy instead as she states its closer to her.   [id number] - [place], [place] - [address]  [address] [address]  Phone: [phone] Fax: [phone]  "

## 2023-12-15 RX ORDER — ATORVASTATIN CALCIUM 40 MG/1
40 TABLET, FILM COATED ORAL DAILY
Qty: 30 TABLET | Refills: 11 | Status: SHIPPED | OUTPATIENT
Start: 2023-12-15

## 2024-01-13 NOTE — PROGRESS NOTES
[facility] Hospitalist Progress Note    Date of Service: 2018    Chief Complaint  52 y.o. male admitted 2017 with AMS/aphasia/R HP/dysphagia found to have acutec multifocal CVA and a carotid dissection that is nonoperative. S/p peg and awaiting placement but having difficulty with medicaid insurance.     Interval Problem Update  No speech.   Constipated and digging at rectal area      Consultants/Specialty  Neurology/neurosurgery/GI/Acute Rehab    Disposition  snf vs acute rehab    MRA head:  1.  Minimal flow signal intensity in the left internal carotid artery and left middle cerebral artery consistent with high-grade carotid dissection.  2.  Absence of flow signal intensity in the left posterior cerebral artery suspicious for occlusion.  3.  Absence of flow signal intensity in the right V4 segment consistent with hypoplasia.  MRI brain:  1.  Multifocal areas of small infarcts in the left frontal, parietal and posterior temporal lobe in the watershed distribution. MR or CT angiogram of the head and neck is recommended for further evaluation.  2.  Mild cerebral atrophy.    MRA neck:  1.  Absence of flow in the left internal carotid artery consistent with carotid dissection and/or occlusion.  2.  Absence of flow in the right V4 segment of the vertebral artery consistent with hypoplasia.      Review of Systems   Unable to perform ROS: Mental acuity      Physical Exam  Laboratory/Imaging   Hemodynamics  Temp (24hrs), Av.5 °C (97.7 °F), Min:36.3 °C (97.3 °F), Max:36.6 °C (97.9 °F)   Temperature: 36.6 °C (97.8 °F)  Pulse  Av.5  Min: 67  Max: 118    Blood Pressure: 132/68      Respiratory      Respiration: 18, Pulse Oximetry: 93 %        RLL Breath Sounds: Diminished, LLL Breath Sounds: Diminished    Fluids    Intake/Output Summary (Last 24 hours) at 18 0841  Last data filed at 18 0600   Gross per 24 hour   Intake             2955 ml   Output              800 ml   Net             2155 ml        Nutrition  Orders Placed This Encounter   Procedures   • DIET NPO     Standing Status:   Standing     Number of Occurrences:   22     Order Specific Question:   Restrict to:     Answer:   Strict [1]     Comments:   Hold tube feeds     Physical Exam   Constitutional: He appears well-developed and well-nourished. No distress.   HENT:   Head: Normocephalic.   Right Ear: External ear normal.   Left Ear: External ear normal.   Nose: Nose normal.   Eyes: EOM are normal. Right eye exhibits no discharge. Left eye exhibits no discharge. No scleral icterus.   Neck: Neck supple. No JVD present. No tracheal deviation present.   Cardiovascular: Normal rate, regular rhythm, normal heart sounds and intact distal pulses.    No murmur heard.  Pulmonary/Chest: Effort normal and breath sounds normal. No respiratory distress. He has no wheezes. He has no rales.   Abdominal: Soft. Bowel sounds are normal. He exhibits no distension. There is no tenderness. There is no guarding.   PEG site ok   Musculoskeletal: He exhibits no edema or tenderness.   Neurological: He is alert.   Cannot move RUE/RLE to commands; good strength on L side; severe expressive aphasia and some receptive aphasia;   Skin: Skin is warm and dry. He is not diaphoretic. No erythema.   Nursing note and vitals reviewed.      Recent Labs      01/09/18   0312   WBC  9.9   RBC  5.11   HEMOGLOBIN  16.8   HEMATOCRIT  50.9   MCV  99.6*   MCH  32.9   MCHC  33.0*   RDW  43.1   PLATELETCT  203   MPV  14.3*     Recent Labs      01/09/18   0312  01/10/18   0351   SODIUM  148*  145   POTASSIUM  3.7  3.8   CHLORIDE  114*  112   CO2  26  23   GLUCOSE  116*  104*   BUN  37*  36*   CREATININE  0.84  0.71   CALCIUM  9.7  9.7                      Assessment/Plan     * CVA (cerebral vascular accident) (CMS-[facility])- (present on admission)   Assessment & Plan    Expressive/receptive aphasia/dysphagia/ right sided weakness. MRI showed left frontal/parietal/temporal infarct.   Had  progression of aphasia and right hemiparesis 12/21. Stat CTA showed L ICA occlusion with possible dissection, which was confirmed on MRA.  Neurology and vascular surgery and recommended against anticoagulation or surgery.    - tolerating tube feeds, s/p PEG 12/26  - ST/PT/OT following, son wants long term placement near him in [place], [place]  - continue asa and statin  - heparin dvt ppx  Needs snf not candidate for rehab as per rehab team previously.           HTN (hypertension)- (present on admission)   Assessment & Plan    Patient not on any medications as outpatient  Controlled bp's on current rx. Cont to monitor.          Hypernatremia   Assessment & Plan    free water per PEG with improved sodium level; stable neuro status;         Advance care planning   Assessment & Plan    son to be primary contact per family  Full code wanted per family  S/p Peg placement on 12/26               Quality-Core Measures       5

## 2024-08-09 ENCOUNTER — APPOINTMENT (OUTPATIENT)
Dept: RADIOLOGY | Facility: MEDICAL CENTER | Age: 59
End: 2024-08-09
Attending: EMERGENCY MEDICINE
Payer: MEDICAID

## 2024-08-09 ENCOUNTER — HOSPITAL ENCOUNTER (EMERGENCY)
Facility: MEDICAL CENTER | Age: 59
End: 2024-08-12
Attending: EMERGENCY MEDICINE
Payer: MEDICAID

## 2024-08-09 DIAGNOSIS — D64.9 ANEMIA, UNSPECIFIED TYPE: ICD-10-CM

## 2024-08-09 DIAGNOSIS — R53.1 WEAKNESS: ICD-10-CM

## 2024-08-09 DIAGNOSIS — S82.891A CLOSED AVULSION FRACTURE OF RIGHT ANKLE, INITIAL ENCOUNTER: ICD-10-CM

## 2024-08-09 LAB
ALBUMIN SERPL BCP-MCNC: 4.1 G/DL (ref 3.2–4.9)
ALBUMIN/GLOB SERPL: 1.5 G/DL
ALP SERPL-CCNC: 57 U/L (ref 30–99)
ALT SERPL-CCNC: 23 U/L (ref 2–50)
ANION GAP SERPL CALC-SCNC: 14 MMOL/L (ref 7–16)
APPEARANCE UR: CLEAR
AST SERPL-CCNC: 18 U/L (ref 12–45)
BASOPHILS # BLD AUTO: 0.6 % (ref 0–1.8)
BASOPHILS # BLD: 0.04 K/UL (ref 0–0.12)
BILIRUB SERPL-MCNC: 0.2 MG/DL (ref 0.1–1.5)
BILIRUB UR QL STRIP.AUTO: NEGATIVE
BUN SERPL-MCNC: 11 MG/DL (ref 8–22)
CALCIUM ALBUM COR SERPL-MCNC: 8.6 MG/DL (ref 8.5–10.5)
CALCIUM SERPL-MCNC: 8.7 MG/DL (ref 8.5–10.5)
CHLORIDE SERPL-SCNC: 106 MMOL/L (ref 96–112)
CO2 SERPL-SCNC: 19 MMOL/L (ref 20–33)
COLOR UR: YELLOW
CREAT SERPL-MCNC: 0.6 MG/DL (ref 0.5–1.4)
EOSINOPHIL # BLD AUTO: 0.12 K/UL (ref 0–0.51)
EOSINOPHIL NFR BLD: 1.7 % (ref 0–6.9)
ERYTHROCYTE [DISTWIDTH] IN BLOOD BY AUTOMATED COUNT: 46.7 FL (ref 35.9–50)
GFR SERPLBLD CREATININE-BSD FMLA CKD-EPI: 110 ML/MIN/1.73 M 2
GLOBULIN SER CALC-MCNC: 2.7 G/DL (ref 1.9–3.5)
GLUCOSE SERPL-MCNC: 112 MG/DL (ref 65–99)
GLUCOSE UR STRIP.AUTO-MCNC: NEGATIVE MG/DL
HCT VFR BLD AUTO: 40.8 % (ref 42–52)
HGB BLD-MCNC: 13.6 G/DL (ref 14–18)
IMM GRANULOCYTES # BLD AUTO: 0.14 K/UL (ref 0–0.11)
IMM GRANULOCYTES NFR BLD AUTO: 2 % (ref 0–0.9)
KETONES UR STRIP.AUTO-MCNC: NEGATIVE MG/DL
LEUKOCYTE ESTERASE UR QL STRIP.AUTO: NEGATIVE
LYMPHOCYTES # BLD AUTO: 1.63 K/UL (ref 1–4.8)
LYMPHOCYTES NFR BLD: 23.3 % (ref 22–41)
MCH RBC QN AUTO: 32.6 PG (ref 27–33)
MCHC RBC AUTO-ENTMCNC: 33.3 G/DL (ref 32.3–36.5)
MCV RBC AUTO: 97.8 FL (ref 81.4–97.8)
MICRO URNS: NORMAL
MONOCYTES # BLD AUTO: 0.92 K/UL (ref 0–0.85)
MONOCYTES NFR BLD AUTO: 13.1 % (ref 0–13.4)
NEUTROPHILS # BLD AUTO: 4.16 K/UL (ref 1.82–7.42)
NEUTROPHILS NFR BLD: 59.3 % (ref 44–72)
NITRITE UR QL STRIP.AUTO: NEGATIVE
NRBC # BLD AUTO: 0 K/UL
NRBC BLD-RTO: 0 /100 WBC (ref 0–0.2)
PH UR STRIP.AUTO: 6 [PH] (ref 5–8)
PLATELET # BLD AUTO: 205 K/UL (ref 164–446)
PMV BLD AUTO: 12 FL (ref 9–12.9)
POTASSIUM SERPL-SCNC: 4.2 MMOL/L (ref 3.6–5.5)
PROT SERPL-MCNC: 6.8 G/DL (ref 6–8.2)
PROT UR QL STRIP: NEGATIVE MG/DL
RBC # BLD AUTO: 4.17 M/UL (ref 4.7–6.1)
RBC UR QL AUTO: NEGATIVE
SODIUM SERPL-SCNC: 139 MMOL/L (ref 135–145)
SP GR UR STRIP.AUTO: 1.01
TROPONIN T SERPL-MCNC: 15 NG/L (ref 6–19)
TROPONIN T SERPL-MCNC: 15 NG/L (ref 6–19)
UROBILINOGEN UR STRIP.AUTO-MCNC: 0.2 MG/DL
WBC # BLD AUTO: 7 K/UL (ref 4.8–10.8)

## 2024-08-09 PROCEDURE — 73562 X-RAY EXAM OF KNEE 3: CPT | Mod: RT

## 2024-08-09 PROCEDURE — 85025 COMPLETE CBC W/AUTO DIFF WBC: CPT

## 2024-08-09 PROCEDURE — 84443 ASSAY THYROID STIM HORMONE: CPT

## 2024-08-09 PROCEDURE — 73501 X-RAY EXAM HIP UNI 1 VIEW: CPT | Mod: RT

## 2024-08-09 PROCEDURE — 99285 EMERGENCY DEPT VISIT HI MDM: CPT

## 2024-08-09 PROCEDURE — 302875 HCHG BANDAGE ACE 4 OR 6""

## 2024-08-09 PROCEDURE — 70450 CT HEAD/BRAIN W/O DYE: CPT

## 2024-08-09 PROCEDURE — 80053 COMPREHEN METABOLIC PANEL: CPT

## 2024-08-09 PROCEDURE — 84484 ASSAY OF TROPONIN QUANT: CPT

## 2024-08-09 PROCEDURE — 82607 VITAMIN B-12: CPT

## 2024-08-09 PROCEDURE — 36415 COLL VENOUS BLD VENIPUNCTURE: CPT

## 2024-08-09 PROCEDURE — 29505 APPLICATION LONG LEG SPLINT: CPT

## 2024-08-09 PROCEDURE — 93005 ELECTROCARDIOGRAM TRACING: CPT | Performed by: EMERGENCY MEDICINE

## 2024-08-09 PROCEDURE — 80061 LIPID PANEL: CPT

## 2024-08-09 PROCEDURE — 83036 HEMOGLOBIN GLYCOSYLATED A1C: CPT

## 2024-08-09 PROCEDURE — 302874 HCHG BANDAGE ACE 2 OR 3""

## 2024-08-09 PROCEDURE — 73610 X-RAY EXAM OF ANKLE: CPT | Mod: RT

## 2024-08-09 PROCEDURE — 71045 X-RAY EXAM CHEST 1 VIEW: CPT

## 2024-08-09 PROCEDURE — 81003 URINALYSIS AUTO W/O SCOPE: CPT

## 2024-08-10 PROBLEM — R53.1 GENERALIZED WEAKNESS: Status: ACTIVE | Noted: 2024-08-10

## 2024-08-10 PROBLEM — I69.30 HISTORY OF CVA WITH RESIDUAL DEFICIT: Status: ACTIVE | Noted: 2017-12-19

## 2024-08-10 LAB
CHOLEST SERPL-MCNC: 139 MG/DL (ref 100–199)
EKG IMPRESSION: NORMAL
EST. AVERAGE GLUCOSE BLD GHB EST-MCNC: 103 MG/DL
HBA1C MFR BLD: 5.2 % (ref 4–5.6)
HDLC SERPL-MCNC: 69 MG/DL
LDLC SERPL CALC-MCNC: 46 MG/DL
TRIGL SERPL-MCNC: 119 MG/DL (ref 0–149)
TSH SERPL DL<=0.005 MIU/L-ACNC: 0.95 UIU/ML (ref 0.38–5.33)
VIT B12 SERPL-MCNC: 491 PG/ML (ref 211–911)

## 2024-08-10 PROCEDURE — 36415 COLL VENOUS BLD VENIPUNCTURE: CPT

## 2024-08-10 PROCEDURE — 99243 OFF/OP CNSLTJ NEW/EST LOW 30: CPT

## 2024-08-10 PROCEDURE — A9270 NON-COVERED ITEM OR SERVICE: HCPCS | Mod: UD

## 2024-08-10 PROCEDURE — 96372 THER/PROPH/DIAG INJ SC/IM: CPT

## 2024-08-10 PROCEDURE — 97166 OT EVAL MOD COMPLEX 45 MIN: CPT

## 2024-08-10 PROCEDURE — 97163 PT EVAL HIGH COMPLEX 45 MIN: CPT

## 2024-08-10 PROCEDURE — 700111 HCHG RX REV CODE 636 W/ 250 OVERRIDE (IP): Mod: JZ,UD

## 2024-08-10 PROCEDURE — 700102 HCHG RX REV CODE 250 W/ 637 OVERRIDE(OP): Mod: UD

## 2024-08-10 PROCEDURE — 92610 EVALUATE SWALLOWING FUNCTION: CPT

## 2024-08-10 RX ORDER — ASPIRIN 81 MG/1
81 TABLET ORAL DAILY
Status: DISCONTINUED | OUTPATIENT
Start: 2024-08-10 | End: 2024-08-12 | Stop reason: HOSPADM

## 2024-08-10 RX ORDER — LISINOPRIL 10 MG/1
10 TABLET ORAL DAILY
Status: DISCONTINUED | OUTPATIENT
Start: 2024-08-10 | End: 2024-08-12 | Stop reason: HOSPADM

## 2024-08-10 RX ORDER — ATORVASTATIN CALCIUM 40 MG/1
40 TABLET, FILM COATED ORAL DAILY
Status: DISCONTINUED | OUTPATIENT
Start: 2024-08-10 | End: 2024-08-12 | Stop reason: HOSPADM

## 2024-08-10 RX ORDER — CYCLOBENZAPRINE HCL 10 MG
10 TABLET ORAL 3 TIMES DAILY PRN
Status: DISCONTINUED | OUTPATIENT
Start: 2024-08-10 | End: 2024-08-12 | Stop reason: HOSPADM

## 2024-08-10 RX ORDER — LISINOPRIL 10 MG/1
10 TABLET ORAL DAILY
COMMUNITY
Start: 2024-02-13

## 2024-08-10 RX ORDER — ENOXAPARIN SODIUM 100 MG/ML
40 INJECTION SUBCUTANEOUS DAILY
Status: DISCONTINUED | OUTPATIENT
Start: 2024-08-10 | End: 2024-08-12 | Stop reason: HOSPADM

## 2024-08-10 RX ADMIN — LISINOPRIL 10 MG: 10 TABLET ORAL at 06:35

## 2024-08-10 RX ADMIN — ASPIRIN 81 MG: 81 TABLET, COATED ORAL at 06:35

## 2024-08-10 RX ADMIN — ENOXAPARIN SODIUM 40 MG: 100 INJECTION SUBCUTANEOUS at 18:21

## 2024-08-10 RX ADMIN — CYCLOBENZAPRINE 10 MG: 10 TABLET, FILM COATED ORAL at 15:27

## 2024-08-10 RX ADMIN — ENOXAPARIN SODIUM 40 MG: 100 INJECTION SUBCUTANEOUS at 03:01

## 2024-08-10 RX ADMIN — CYCLOBENZAPRINE 10 MG: 10 TABLET, FILM COATED ORAL at 03:01

## 2024-08-10 RX ADMIN — ATORVASTATIN CALCIUM 40 MG: 40 TABLET, FILM COATED ORAL at 06:35

## 2024-08-10 ASSESSMENT — GAIT ASSESSMENTS: GAIT LEVEL OF ASSIST: UNABLE TO PARTICIPATE

## 2024-08-10 ASSESSMENT — COGNITIVE AND FUNCTIONAL STATUS - GENERAL
DRESSING REGULAR UPPER BODY CLOTHING: A LITTLE
DRESSING REGULAR LOWER BODY CLOTHING: A LITTLE
HELP NEEDED FOR BATHING: A LITTLE
MOVING TO AND FROM BED TO CHAIR: A LOT
SUGGESTED CMS G CODE MODIFIER DAILY ACTIVITY: CJ
STANDING UP FROM CHAIR USING ARMS: A LOT
WALKING IN HOSPITAL ROOM: A LOT
TOILETING: A LITTLE
CLIMB 3 TO 5 STEPS WITH RAILING: TOTAL
MOVING FROM LYING ON BACK TO SITTING ON SIDE OF FLAT BED: A LITTLE
SUGGESTED CMS G CODE MODIFIER MOBILITY: CL
DAILY ACTIVITIY SCORE: 20
MOBILITY SCORE: 14

## 2024-08-10 ASSESSMENT — ENCOUNTER SYMPTOMS
COUGH: 0
FOCAL WEAKNESS: 1
DIARRHEA: 0
PALPITATIONS: 0
DOUBLE VISION: 0
ABDOMINAL PAIN: 0
SHORTNESS OF BREATH: 0
HEADACHES: 0
VOMITING: 0
LOSS OF CONSCIOUSNESS: 0
CONSTIPATION: 0
WHEEZING: 0
FEVER: 0
DIZZINESS: 0
CHILLS: 0
MYALGIAS: 0
NAUSEA: 0

## 2024-08-10 ASSESSMENT — ACTIVITIES OF DAILY LIVING (ADL): TOILETING: INDEPENDENT

## 2024-08-10 ASSESSMENT — FIBROSIS 4 INDEX: FIB4 SCORE: 1.12

## 2024-08-10 NOTE — ASSESSMENT & PLAN NOTE
Patient with chronic right-sided deficit fall with avulsion fracture indeterminate age of the right malleoli without loss of consciousness  - PT/OT  - Added TSH/B12 for other focal etiology workup

## 2024-08-10 NOTE — ED NOTES
Patient resting on stretcher in no acute distress. Equal chest rise and fall noted.  No needs at this time, will continue to monitor

## 2024-08-10 NOTE — THERAPY
Physical Therapy   Initial Evaluation     Patient Name: Gilles Cobb Case  Age:  59 y.o., Sex:  male  Medical Record #: [id number]  Today's Date: 8/10/2024     Precautions  Precautions: Fall Risk  Comments: unclear weighbearing,in long leg splint on right, clarifying with provider update: provider clarified with ortho, WBAT no CAM boot needs, likely old fractures;     Assessment  Pt presents with impaired activity tolerance, dynamic balance, strength and ROM associated with chief complaint of 'probable' GLF and weakness in setting of prior right sided deficits, wears right ankle AFO and has expressive aphasia with seemingly correct yes/nos. Pt very pleasant and cooperative; functional mobility limited by acute on chronic right sided deficits, at time of eval in full leg cast for questionable fractures, could not don AFO; moderate assist to stand and bed mobility which is below his independent baseline; he lives with his mother who helps with medications but otherwise is an independent ambulatory with AFO; recommend SNF at this time, pt agreeable and has a history with advanced per his report; will follow to progress, likely quickly especially if WBAT.     Plan    Physical Therapy Initial Treatment Plan   Treatment Plan : Equipment, Bed Mobility, Family / Caregiver Training, Gait Training, Manual Therapy, Neuro Re-Education / Balance, Self Care / Home Evaluation, Stair Training, Therapeutic Activities, Therapeutic Exercise  Treatment Frequency: 4 Times per Week  Duration: Until Therapy Goals Met    DC Equipment Recommendations: Unable to determine at this time  Discharge Recommendations: SNF        Abridged Subjective/Objective     08/10/24 1047   Prior Living Situation   Prior Services Home-Independent   Housing / Facility 1 Story Apartment / Condo   Steps Into Home 0   Steps In Home 0   Equipment Owned   (AFO;)   Lives with - Patient's Self Care Capacity Parents   Comments lives with 88y.o. mother unclear how much  assist he gives her as states yes/no in different situations, reporting mom helps him with meds otherwise independent;   Prior Level of Functional Mobility   Bed Mobility Independent   Transfer Status Independent   Ambulation Independent   Ambulation Distance to tolerance   Assistive Devices Used None   Cognition    Cognition / Consciousness X   Speech/ Communication Expressive Aphasia   Ability To Follow Commands 1 Step   Safety Awareness Impaired   Attention Impaired   Initiation Impaired   Comments pleasant and cooperative; appears to have accurate yes/no's, occasional one word sentences   Passive ROM Lower Body   Passive ROM Lower Body X   Comments in long leg splint on right LE from thigh to toes otherwise WNL   Strength Lower Body   Lower Body Strength  X   Comments right LE unable to assess appears grossly 2-/5, left LE 5/5   Sensation Lower Body   Lower Extremity Sensation   X   Comments unclear reporting 'different' on right side unable to assess due to splint and toes covered   Balance Assessment   Sitting Balance (Static) Fair +   Sitting Balance (Dynamic) Fair   Standing Balance (Static) Poor   Standing Balance (Dynamic) Poor -   Weight Shift Sitting Fair   Weight Shift Standing Poor   Comments left UE support in sitting/standing, deferred weight bearing through right due to splint (wears AFO on right)   Bed Mobility    Supine to Sit Minimal Assist   Sit to Supine Minimal Assist   Gait Analysis   Gait Level Of Assist Unable to Participate   Functional Mobility   Sit to Stand Moderate Assist  (of 2)   Bed, Chair, Wheelchair Transfer   (declined, eating breakfast)   Short Term Goals    Short Term Goal # 1 Pt will perform supine<>sit from flat HOB/no railing with supervision within 6 visits to ensure independnent mobility at home.   Short Term Goal # 2 Pt will perform sit<>stand with FWW and supervision within 6 visits to ensure independent mobility at home.   Short Term Goal # 3 Pt will ambulate x 150ft  with FWW and superivsion lonnie 6 visits to ensure household independence.   Education Group   Role of Physical Therapist Patient Response Patient;Eager;Explanation;Demonstration;Verbal Demonstration;Action Demonstration

## 2024-08-10 NOTE — ASSESSMENT & PLAN NOTE
History of CVA with residual right-sided deficits and expressive aphasia  - Continue home atorvastatin  - Baby aspirin 81 mg  - Will order lipid panel and A1c, goal LDL less than 70, hemoglobin goal less than 7%

## 2024-08-10 NOTE — PROGRESS NOTES
"ED Observation Progress Note    Date of Service: 08/10/24    Interval History and Interventions  This is a 59-year-old male with prior CVA with residual right-sided deficits who wears a brace on his right leg.  Patient came in for ground-level fall and weakness.    I consulted orthopedics after physical therapy and social work asked for a different immobilization plan other than the long-leg splint that was placed here in the ER for possible tibial spine and lateral malleolus avulsion fracture seen on x-ray.  Orthopedics/Caty reviewed x-rays done here and does not feel the patient requires immobilization.  I have alerted social work and physical therapy regarding this assessment with recommendation for weightbearing as tolerated so that transfer to SNF can happen.    Labs were obtained which showed immature granulocytosis, CO2 of 19.  Patient without vomiting or fever here.    Patient awaiting SNF transfer.    2:30 PM  Patient signed out to oncoming ERP.    Physical Exam  BP (!) 152/81   Pulse 92   Temp 37.3 °C (99.2 °F) (Temporal)   Resp (!) 22   Ht 1.854 m (6' 1\")   Wt 83.9 kg (185 lb)   SpO2 94%   BMI 24.41 kg/m² .    Constitutional: Awake and alert. Nontoxic  HENT:  Grossly normal  Eyes: Grossly normal  Neck: Normal range of motion  Cardiovascular: Normal heart rate   Thorax & Lungs: No respiratory distress  Abdomen: Nontender  Skin:  No pathologic rash.   Extremities: Well perfused  Psychiatric: Affect normal    Labs  Results for orders placed or performed during the hospital encounter of 08/09/24   CBC w/ Differential   Result Value Ref Range    WBC 7.0 4.8 - 10.8 K/uL    RBC 4.17 (L) 4.70 - 6.10 M/uL    Hemoglobin 13.6 (L) 14.0 - 18.0 g/dL    Hematocrit 40.8 (L) 42.0 - 52.0 %    MCV 97.8 81.4 - 97.8 fL    MCH 32.6 27.0 - 33.0 pg    MCHC 33.3 32.3 - 36.5 g/dL    RDW 46.7 35.9 - 50.0 fL    Platelet Count 205 164 - 446 K/uL    MPV 12.0 9.0 - 12.9 fL    Neutrophils-Polys 59.30 44.00 - 72.00 %    " Lymphocytes 23.30 22.00 - 41.00 %    Monocytes 13.10 0.00 - 13.40 %    Eosinophils 1.70 0.00 - 6.90 %    Basophils 0.60 0.00 - 1.80 %    Immature Granulocytes 2.00 (H) 0.00 - 0.90 %    Nucleated RBC 0.00 0.00 - 0.20 /100 WBC    Neutrophils (Absolute) 4.16 1.82 - 7.42 K/uL    Lymphs (Absolute) 1.63 1.00 - 4.80 K/uL    Monos (Absolute) 0.92 (H) 0.00 - 0.85 K/uL    Eos (Absolute) 0.12 0.00 - 0.51 K/uL    Baso (Absolute) 0.04 0.00 - 0.12 K/uL    Immature Granulocytes (abs) 0.14 (H) 0.00 - 0.11 K/uL    NRBC (Absolute) 0.00 K/uL   Complete Metabolic Panel (CMP)   Result Value Ref Range    Sodium 139 135 - 145 mmol/L    Potassium 4.2 3.6 - 5.5 mmol/L    Chloride 106 96 - 112 mmol/L    Co2 19 (L) 20 - 33 mmol/L    Anion Gap 14.0 7.0 - 16.0    Glucose 112 (H) 65 - 99 mg/dL    Bun 11 8 - 22 mg/dL    Creatinine 0.60 0.50 - 1.40 mg/dL    Calcium 8.7 8.5 - 10.5 mg/dL    Correct Calcium 8.6 8.5 - 10.5 mg/dL    AST(SGOT) 18 12 - 45 U/L    ALT(SGPT) 23 2 - 50 U/L    Alkaline Phosphatase 57 30 - 99 U/L    Total Bilirubin 0.2 0.1 - 1.5 mg/dL    Albumin 4.1 3.2 - 4.9 g/dL    Total Protein 6.8 6.0 - 8.2 g/dL    Globulin 2.7 1.9 - 3.5 g/dL    A-G Ratio 1.5 g/dL   Troponin - STAT Once   Result Value Ref Range    Troponin T 15 6 - 19 ng/L   URINALYSIS,CULTURE IF INDICATED    Specimen: Urine, Clean Catch   Result Value Ref Range    Color Yellow     Character Clear     Specific Gravity 1.009 <1.035    Ph 6.0 5.0 - 8.0    Glucose Negative Negative mg/dL    Ketones Negative Negative mg/dL    Protein Negative Negative mg/dL    Bilirubin Negative Negative    Urobilinogen, Urine 0.2 Negative    Nitrite Negative Negative    Leukocyte Esterase Negative Negative    Occult Blood Negative Negative    Micro Urine Req see below    ESTIMATED GFR   Result Value Ref Range    GFR (CKD-EPI) 110 >60 mL/min/1.73 m 2   TROPONIN   Result Value Ref Range    Troponin T 15 6 - 19 ng/L   Lipid Profile   Result Value Ref Range    Cholesterol,Tot 139 100 - 199 mg/dL     Triglycerides 119 0 - 149 mg/dL    HDL 69 >=40 mg/dL    LDL 46 <100 mg/dL   HEMOGLOBIN A1C   Result Value Ref Range    Glycohemoglobin 5.2 4.0 - 5.6 %    Est Avg Glucose 103 mg/dL   TSH WITH REFLEX TO FT4   Result Value Ref Range    TSH 0.952 0.380 - 5.330 uIU/mL   VITAMIN B12   Result Value Ref Range    Vitamin B12 -True Cobalamin 491 211 - 911 pg/mL   EKG   Result Value Ref Range    Report       [facility] Emergency Dept.    Test Date:  2024  Pt Name:    DIOGENES CASE                  Department: ER  MRN:        [id number]                      Room:        21  Gender:     Male                         Technician: [id number]  :        1965                   Requested By:JT LÓPEZ  Order #:    [id number]                    Reading MD:    Measurements  Intervals                                Axis  Rate:       93                           P:          56  TX:         156                          QRS:        33  QRSD:       96                           T:          17  QT:         350  QTc:        436    Interpretive Statements  Sinus rhythm  Abnormal inferior Q waves  Compared to ECG 2017 17:29:45  Inferior Q waves now present  Q waves now present         Radiology  DX-KNEE 3 VIEWS RIGHT   Final Result      1.  Tiny osseous fragment projects at the tibial spine, possibly representing an  age-indeterminate avulsion fracture.   2.  Small knee joint effusion.      DX-ANKLE 3+ VIEWS RIGHT   Final Result      Questionable avulsion fracture of the lateral malleolus. Correlate with point tenderness for acute injury.      DX-HIP-UNILATERAL-WITH PELVIS-1 VIEW RIGHT   Final Result      1.  No radiographic evidence of acute traumatic injury.   2.  Moderate bilateral hip joint degenerative changes.      CT-HEAD W/O   Final Result      1.  Extensive areas of encephalomalacia in the left cerebral hemisphere consistent with old cerebral infarction associated with ex vacuo ventricular  dilatation.   2.  No area suspicious for new acute infarction and no acute intracranial hemorrhage.               DX-CHEST-LIMITED (1 VIEW)   Final Result      1.  Cardiomegaly.   2.  Hypoinflation accentuates the bronchovascular markings which are considered within normal limits for the degree of pulmonary excursion.          Problem List  1. Weakness        2. Closed avulsion fracture of right ankle, initial encounter              Electronically signed by: Jaye Franco M.D., 8/10/2024 11:42 AM

## 2024-08-10 NOTE — DISCHARGE PLANNING
MSW received update from PT on eval. SNF recommendation. MSW met with pt, pt's mother Arina Ortiz and Son Tobi. Pt has hx of stroke. Pt is usually ambulatory with a walker at home but has been weaker and falling at home more. Pt has been independent with most ADLs. Pt's lives with his mother who helps with medication management primarily. Pt makes around $1,390 a month with SSI disability. Pt and family are open to SNF for rehab to get stronger. Plan is to return home with pt's mother after SNF stay. Will request [facility] send area SNF referrals. Pt's son and mother would like to be updated if pt is transferred.     PASRR [id number]   LOC [id number]-done in 2018-Resubmitted new LOC as its over 5 years old. Manual review.        The writer called the patient to confirm that the new date of 07/06/2021 with  is ok for the patient.  The patient confirmed that the date was fine.    The patient will have the instructions that will reflect the new data and time and also the new MD to the patient my hunter account.

## 2024-08-10 NOTE — ED NOTES
Splint removed per ERP/Ortho. Patient tolerated well, family at the bedside. Updated social work Bri.

## 2024-08-10 NOTE — ED PROVIDER NOTES
"ED Provider Note    CHIEF COMPLAINT  Chief Complaint   Patient presents with    GLF    Weakness       EXTERNAL RECORDS REVIEWED  Outpatient Notes none    HPI/ROS  LIMITATION TO HISTORY   Select: : None  OUTSIDE HISTORIAN(S):  None    Gilles Cobb Case is a 59 y.o. male who presents here for evaluation of probable fall and weakness.  Patient has history of stroke left him with right-sided deficits.  The patient has been reported to have fallen twice, but had no loss of conscious, and did not strike his head.  Patient not on blood thinners.  Patient able to say yes and no, but mostly \"I do not know.\"  This is baseline for the patient.  Patient has no vomiting, and no other medical concerns have been voiced per caregiver.   Pt has some tenderness to the knee and ankle.  He usually wears a brace to the left leg for ambulation.       PAST MEDICAL HISTORY   has a past medical history of Dysphagia, Hypertension, and Stroke (HCC).    SURGICAL HISTORY   has a past surgical history that includes gastroscopy-endo (12/26/2017) and peg placement (12/26/2017).    FAMILY HISTORY  Family History   Family history unknown: Yes       SOCIAL HISTORY  Social History     Tobacco Use    Smoking status: Former     Types: Cigarettes    Smokeless tobacco: Never   Vaping Use    Vaping status: Never Used   Substance and Sexual Activity    Alcohol use: Yes     Alcohol/week: 1.8 oz     Types: 3 Cans of beer per week     Comment: occasional    Drug use: No    Sexual activity: Not on file       CURRENT MEDICATIONS  Home Medications       Reviewed by Sri Hood R.N. (Registered Nurse) on 08/09/24 at 1835  Med List Status: Not Addressed     Medication Last Dose Status   aspirin (ASPIRIN LOW DOSE) 81 MG Chew Tab chewable tablet  Active   atorvastatin (LIPITOR) 40 MG Tab  Active   cyclobenzaprine (FLEXERIL) 10 mg Tab  Active   Misc. Devices Misc  Active                  Audit from Redirected Encounters    **Home medications have not yet been " "reviewed for this encounter**         ALLERGIES  Allergies   Allergen Reactions    Poison Oak Extract [Extract Of Poison [place]]        PHYSICAL EXAM  VITAL SIGNS: BP (!) 148/94   Pulse 92   Temp 37.3 °C (99.2 °F) (Temporal)   Resp 20   Ht 1.854 m (6' 1\")   SpO2 90%   BMI 23.00 kg/m²    Constitutional: Well developed, well nourished. No acute distress.  HEENT: Normocephalic, atraumatic. Posterior pharynx clear and moist.  Eyes:  EOMI. Normal sclera.  Neck: Supple, Full range of motion, nontender.  Chest/Pulmonary: clear to ausculation. Symmetrical expansion.   Cardio: Regular rate and rhythm with no murmur.   Abdomen: Soft, nontender. No peritoneal signs. No guarding. No palpable masses.  Back: No CVA tenderness, nontender midline, no step offs.  Musculoskeletal: No deformity, no edema, neurovascular intact.  Right lower ext with tenderness to the palpation, to the lateral malleolus, lateral knee.  Non tender hip. N/v intact distally.   Neuro: Clear speech, appropriate, cooperative, cranial nerves II-XII grossly intact.  Psych: Normal mood and affect      EKG/LABS  Results for orders placed or performed during the hospital encounter of 08/09/24   CBC w/ Differential   Result Value Ref Range    WBC 7.0 4.8 - 10.8 K/uL    RBC 4.17 (L) 4.70 - 6.10 M/uL    Hemoglobin 13.6 (L) 14.0 - 18.0 g/dL    Hematocrit 40.8 (L) 42.0 - 52.0 %    MCV 97.8 81.4 - 97.8 fL    MCH 32.6 27.0 - 33.0 pg    MCHC 33.3 32.3 - 36.5 g/dL    RDW 46.7 35.9 - 50.0 fL    Platelet Count 205 164 - 446 K/uL    MPV 12.0 9.0 - 12.9 fL    Neutrophils-Polys 59.30 44.00 - 72.00 %    Lymphocytes 23.30 22.00 - 41.00 %    Monocytes 13.10 0.00 - 13.40 %    Eosinophils 1.70 0.00 - 6.90 %    Basophils 0.60 0.00 - 1.80 %    Immature Granulocytes 2.00 (H) 0.00 - 0.90 %    Nucleated RBC 0.00 0.00 - 0.20 /100 WBC    Neutrophils (Absolute) 4.16 1.82 - 7.42 K/uL    Lymphs (Absolute) 1.63 1.00 - 4.80 K/uL    Monos (Absolute) 0.92 (H) 0.00 - 0.85 K/uL    Eos (Absolute) " 0.12 0.00 - 0.51 K/uL    Baso (Absolute) 0.04 0.00 - 0.12 K/uL    Immature Granulocytes (abs) 0.14 (H) 0.00 - 0.11 K/uL    NRBC (Absolute) 0.00 K/uL   Complete Metabolic Panel (CMP)   Result Value Ref Range    Sodium 139 135 - 145 mmol/L    Potassium 4.2 3.6 - 5.5 mmol/L    Chloride 106 96 - 112 mmol/L    Co2 19 (L) 20 - 33 mmol/L    Anion Gap 14.0 7.0 - 16.0    Glucose 112 (H) 65 - 99 mg/dL    Bun 11 8 - 22 mg/dL    Creatinine 0.60 0.50 - 1.40 mg/dL    Calcium 8.7 8.5 - 10.5 mg/dL    Correct Calcium 8.6 8.5 - 10.5 mg/dL    AST(SGOT) 18 12 - 45 U/L    ALT(SGPT) 23 2 - 50 U/L    Alkaline Phosphatase 57 30 - 99 U/L    Total Bilirubin 0.2 0.1 - 1.5 mg/dL    Albumin 4.1 3.2 - 4.9 g/dL    Total Protein 6.8 6.0 - 8.2 g/dL    Globulin 2.7 1.9 - 3.5 g/dL    A-G Ratio 1.5 g/dL   Troponin - STAT Once   Result Value Ref Range    Troponin T 15 6 - 19 ng/L   ESTIMATED GFR   Result Value Ref Range    GFR (CKD-EPI) 110 >60 mL/min/1.73 m 2   TROPONIN   Result Value Ref Range    Troponin T 15 6 - 19 ng/L   EKG   Result Value Ref Range    Report       [facility]    Test Date:  2024  Pt Name:    DIOGENES CASE                  Department: ER  MRN:        [id number]                      Room:        21  Gender:     Male                         Technician: [id number]  :        1965                   Requested By:JT LÓPEZ  Order #:    [id number]                    Reading MD:    Measurements  Intervals                                Axis  Rate:       93                           P:          56  VT:         156                          QRS:        33  QRSD:       96                           T:          17  QT:         350  QTc:        436    Interpretive Statements  Sinus rhythm  Abnormal inferior Q waves  Compared to ECG 2017 17:29:45  Inferior Q waves now present  Q waves now present       EKG;  nsr 93. No st elevation, no st depression, qtc 436, comapred to EKG from  12/19/17    RADIOLOGY/PROCEDURES   I have independently interpreted the diagnostic imaging associated with this visit and am waiting the final reading from the radiologist.   My preliminary interpretation is as follows: below     Radiologist interpretation:  DX-KNEE 3 VIEWS RIGHT   Final Result      1.  Tiny osseous fragment projects at the tibial spine, possibly representing an  age-indeterminate avulsion fracture.   2.  Small knee joint effusion.      DX-ANKLE 3+ VIEWS RIGHT   Final Result      Questionable avulsion fracture of the lateral malleolus. Correlate with point tenderness for acute injury.      DX-HIP-UNILATERAL-WITH PELVIS-1 VIEW RIGHT   Final Result      1.  No radiographic evidence of acute traumatic injury.   2.  Moderate bilateral hip joint degenerative changes.      CT-HEAD W/O   Final Result      1.  Extensive areas of encephalomalacia in the left cerebral hemisphere consistent with old cerebral infarction associated with ex vacuo ventricular dilatation.   2.  No area suspicious for new acute infarction and no acute intracranial hemorrhage.               DX-CHEST-LIMITED (1 VIEW)   Final Result      1.  Cardiomegaly.   2.  Hypoinflation accentuates the bronchovascular markings which are considered within normal limits for the degree of pulmonary excursion.          COURSE & MEDICAL DECISION MAKING    ASSESSMENT, COURSE AND PLAN  Care Narrative: This is a 59-year-old male here for evaluation of fall.  Patient has a questionable avulsion fracture to the ankle and tibia.  Patient will be placed in a long-leg splint and will follow-up with orthopedics.  He is at his baseline for stroke, and we will repeat troponin on him as well.  9:14 PM  Patient has had splint placed, and is pending urinalysis.  I will sign him out to my partner.  If positive, abx and home.  If negative, then home with ortho follow up.     Pt is in observation status on 8/9/24  at 9:41 pm        DISPOSITION AND DISCUSSIONS  I have  discussed management of the patient with the following physicians and MIRIAM's:  none    Discussion of management with other QHP or appropriate source(s): None     Escalation of care considered, and ultimately not performed:none    Barriers to care at this time, including but not limited to: Patient does not have established PCP.     Decision tools and prescription drugs considered including, but not limited to: none.    FINAL DIAGNOSIS  Avulsion fracture of ankle   History of stroke with deficits.     Electronically signed by: Serjio Teresa D.O., 8/9/2024 7:22 PM

## 2024-08-10 NOTE — TREATMENT PLAN
Imaging reviewed.  R knee w/ trace knee effusion and a possible osteophyte fx vs no injury at the tibial spine.  Ankle mortise congruent.  No hip fx.  Recommend activity as tolerated and ortho follow up if unable to progress activity.

## 2024-08-10 NOTE — DISCHARGE PLANNING
Received choice form @: No choice received  Agency/Facility name: [facility]  Sent referral per choice form @: [id number]

## 2024-08-10 NOTE — THERAPY
"Speech Language Pathology   Clinical Swallow Evaluation     Patient Name: Gilles Cobb Case  AGE:  59 y.o., SEX:  male  Medical Record #: [id number]  Date of Service: 8/10/2024      History of Present Illness  59 y.o. male who presented on 8/9 d/t generalized weakness and GLF.     PMHx: CVA with chronic right-sided deficits as well as expressive aphasia, dysphagia, HTN, PEG 2017    SLP Hx:  Last seen OP ST 04/072021: \"Use of SGD has resulted in an observed improvement in presence of automatic speech utterances during structured outpatient speech therapy sessions. Patient continues to demonstrate progress in use of SGD to communicate phrases, progressing to simple sentences of increasing complexity. \"  Outpatient speech therapy from [id number] for expressive aphasia    CT-Head:  1.  Extensive areas of encephalomalacia in the left cerebral hemisphere consistent with old cerebral infarction associated with ex vacuo ventricular dilatation.  2.  No area suspicious for new acute infarction and no acute intracranial hemorrhage.     CXR:  1.  Cardiomegaly.  2.  Hypoinflation accentuates the bronchovascular markings which are considered within normal limits for the degree of pulmonary excursion.      General Information:  Vitals  O2 Delivery Device: None - Room Air  Level of Consciousness: Awake, Alert  Orientation: Self, General place  Follows Directives: Yes      Prior Living Situation & Level of Function:  Lives with - Patient's Self Care Capacity: Parents  Communication: Hx of expressive aphasia  Swallowing: Hx of dysphagia, PEG in 2017       Oral Mechanism Evaluation:  Dentition: Natural dentition, Some missing dentition   Facial Symmetry: Equal  Facial Sensation: Equal     Labial Observations: WFL   Lingual Observations: Midline         Laryngeal Function:  Secretion Management: Adequate  Voice Quality: WFL  Cough: Perceptually WNL       Subjective  Patient received awake, sitting upright in bed. Pt stated name; " communicating mostly via yes/no in setting of expressive aphasia. Pt unable to provide thorough history regarding dysphagia; however, denied still using PEG for nutrition.       Assessment  Current Method of Nutrition: NPO until cleared by speech pathology  Positioning: Jc's (60-90 degrees)  Bolus Administration: Patient  O2 Delivery Device: None - Room Air  Factor(s) Affecting Performance: None  Tracheostomy : No       Swallowing Trials:  Swallowing Trials  Thin Liquid (TN0): WFL  Pureed (PU4): WFL  Regular (RG7): WFL      Comments: Patient able to self-feed with LUE. Demo'd appropriate oral bolus acceptance and containment. Complete AP transfer without notable oral bolus residue upon oral inspection. Adequate bite with functional mastication of solids. No cough or throat clear appreciated across all trialed PO. Vocal quality and WOB remained stable throughout oral intake. Single swallow completed per bolus. No signs of esophageal dysfunction.       Clinical Impressions  Patient presents without clinical indicators of oropharyngeal dysphagia this date. Recommend oral diet of regular solids and thin liquids. Pt would benefit from assistance with meal tray set up. Given history of dysphagia, SLP to follow likely 1-2x to ensure diet tolerance and monitor for changes.       Recommendations  Diet Consistency: Regular solids/thin liquids  Instrumentation: None indicated at this time  Medication: As tolerated  Supervision: Assist with meal tray set up  Positioning: Fully upright and midline during oral intake  Risk Management : Small bites/sips, Slow rate of intake  Oral Care: BID         SLP Treatment Plan  Treatment Plan: Dysphagia Treatment  SLP Frequency: 3x Per Week  Estimated Duration: Until Therapy Goals Met      Anticipated Discharge Needs  Discharge Recommendations: Anticipate that the patient will have no further speech therapy needs after discharge from the hospital   Therapy Recommendations Upon DC: Not  "Indicated        Patient / Family Goals  Patient / Family Goal #1: \"Yes\" to PO  Short Term Goals  Short Term Goal # 1: Pt will consume a diet of regular solids and thin liquids without overt s/sx of aspiration or decline in respiratory status      Doni Solitario, SLP   "

## 2024-08-10 NOTE — ED NOTES
Medication history reviewed with (Kala Selby[address] [place] [phone]) via central search.   Med rec is complete  Allergies reviewed.     Patient has not had any outpatient antibiotics in the last 30 days.   Anticoagulants: No    Patient was unable to specify names/ strengths of medications. Patient did state that he takes his rx's on a daily basis.       Daniel Flannery PhT

## 2024-08-10 NOTE — THERAPY
Occupational Therapy   Initial Evaluation     Patient Name: Gilles Cobb Case  Age:  59 y.o., Sex:  male  Medical Record #: [id number]  Today's Date: 8/10/2024     Precautions  Precautions: Fall Risk  Comments: unclear WB to R LE; leg splinted; awaiting clarification    Assessment  Patient is 59 y.o. male with a diagnosis of GLF with right malleoli avulsion fx; hx of CVA with right deficits and aphasia.  Additional factors influencing patient status / progress: Pt lives with elderly mother and provides her with some assistance. Pt is normally able to do self care and IADLs independently but at current level is not able to care for self safely. Pt will need post acute placement to increase independence in ADLs prior to dc home.      Plan    Occupational Therapy Initial Treatment Plan   Treatment Interventions: Therapeutic Activity, Therapeutic Exercises, Neuro Re-Education / Balance, Self Care / Activities of Daily Living, Adaptive Equipment  Treatment Frequency: 3 Times per Week  Duration: Until Therapy Goals Met    DC Equipment Recommendations: Front-Wheel Walker, Tub Transfer Bench  Discharge Recommendations: Recommend post-acute placement for additional occupational therapy services prior to discharge home (if able to stand on leg then may be able to return home after some therapy if safe for xfers and self care; at current level needs post acute placement)        08/10/24 1048   Prior Living Situation   Prior Services Home-Independent   Housing / Facility 1 Story Apartment / Condo   Steps Into Home 0   Steps In Home 0   Bathroom Set up Bathtub / Shower Combination   Equipment Owned   (AFO)   Lives with - Patient's Self Care Capacity Parents   Comments Lives with elderly mother and provides her some support although exact nature is unclear due to pt's aphasia.   Prior Level of ADL Function   Self Feeding Independent   Grooming / Hygiene Independent   Bathing Independent   Dressing Independent   Toileting Independent    Prior Level of IADL Function   Medication Management Independent   Laundry Independent   Kitchen Mobility Independent   Finances Independent   Home Management Independent   Shopping Independent   Prior Level Of Mobility Independent Without Device in Community   Driving / Transportation Utilizes Public Transportation   History of Falls   History of Falls Yes   Date of Last Fall   (ADm with fall resulting in fx to ankle)   Precautions   Precautions Fall Risk   Comments unclear WB to R LE; leg splinted; awaiting clarification   Pain   Pain Scales 0 to 10 Scale    Intervention Ambulation / Increased Activity   Pain 0 - 10 Group   Therapist Pain Assessment Post Activity Pain Same as Prior to Activity;Nurse Notified;0   Non Verbal Descriptors   Non Verbal Scale  Calm   Cognition    Cognition / Consciousness X   Speech/ Communication Expressive Aphasia   Level of Consciousness Alert   Ability To Follow Commands 1 Step   Safety Awareness Impaired;Impulsive   Attention Impaired   Initiation Impaired   Comments Pleasant and cooperative   Passive ROM Upper Body   Passive ROM Upper Body WDL   Active ROM Upper Body   Active ROM Upper Body  X   Dominant Hand Right   Comments R weakness from old CVA; able to open right hand fully but tend to keep it fisted. Uses left for most ADLs now due to old stroke   Strength Upper Body   Upper Body Strength  X   Comments right 3+/5; left WDL   Sensation Upper Body   Upper Extremity Sensation  WDL   Upper Body Muscle Tone   Upper Body Muscle Tone  WDL   Neurological Concerns   Neurological Concerns Yes  (Hx of old CVA impairing right side)   Coordination Upper Body   Coordination X   Fine Motor Coordination impaired right   Gross Motor Coordination impaired right   Balance Assessment   Sitting Balance (Static) Fair +   Sitting Balance (Dynamic) Fair   Standing Balance (Static) Poor   Standing Balance (Dynamic) Poor -   Weight Shift Sitting Fair   Weight Shift Standing Absent   Comments wears  right AFO, stand pivot xfer with left LE only   Bed Mobility    Supine to Sit Minimal Assist   Sit to Supine Minimal Assist   Scooting Supervised   ADL Assessment   Eating Independent   Grooming Seated;Independent   Upper Body Dressing Minimal Assist   Lower Body Dressing Moderate Assist   How much help from another person does the patient currently need...   Putting on and taking off regular lower body clothing? 3   Bathing (including washing, rinsing, and drying)? 3   Toileting, which includes using a toilet, bedpan, or urinal? 3   Putting on and taking off regular upper body clothing? 3   Taking care of personal grooming such as brushing teeth? 4   Eating meals? 4   6 Clicks Daily Activity Score 20   mRS Prior to admission   Prior to admission mRS 2   Modified Med (mRS)   Modified Med Score 2   Functional Mobility   Sit to Stand Moderate Assist   Bed, Chair, Wheelchair Transfer Moderate Assist  (sit to stand x 2 with side step attempt along bed unsuccessful; L LE only)   Activity Tolerance   Sitting Edge of Bed 7 min   Standing 1 min   Patient / Family Goals   Patient / Family Goal #1 Unable to state   Short Term Goals   Short Term Goal # 1 Xfer to chair with supervision   Short Term Goal # 2 toileting with supervision   Short Term Goal # 3 LB dressing with supervision   Education Group   Education Provided Role of Occupational Therapist   Role of Occupational Therapist Patient Response Patient;Acceptance;Explanation;Verbal Demonstration   Occupational Therapy Initial Treatment Plan    Treatment Interventions Therapeutic Activity;Therapeutic Exercises;Neuro Re-Education / Balance;Self Care / Activities of Daily Living;Adaptive Equipment   Treatment Frequency 3 Times per Week   Duration Until Therapy Goals Met   Problem List   Problem List Decreased Active Daily Living Skills;Decreased Functional Mobility;Decreased Activity Tolerance;Impaired Postural Control / Balance   Anticipated Discharge Equipment and  Recommendations   DC Equipment Recommendations Front-Wheel Walker;Tub Transfer Bench   Discharge Recommendations Recommend post-acute placement for additional occupational therapy services prior to discharge home  (if able to stand on leg then may be able to return home after some therapy if safe for xfers and self care; at current level needs post acute placement)

## 2024-08-10 NOTE — CONSULTS
Hospital Medicine Consultation    Date of Service  8/10/2024    Referring Physician  Magalys Wilson D.O.    Consulting Physician  Kvng Monroy D.O.    Reason for Consultation  Generalized weakness    History of Presenting Illness  59 y.o. male with past medical history of CVA complicated by chronic right-sided deficits as well as expressive aphasia who presented 8/9/2024 due to generalized weakness and mechanical ground-level fall.  History was somewhat difficult to obtain, however patient states he tripped over something resulting in a fall on his right side without head trauma.  Does confirm he had a prior stroke with chronic right-sided deficits.  Communication mainly with yes or no questions and contacts close the patient points at.  States his primary caregiver is his mother.  Patient denies any other symptoms other than the fall and pain in his right ankle that is controlled currently.    Review of Systems  Review of Systems   Constitutional:  Negative for chills and fever.   Eyes:  Negative for double vision.   Respiratory:  Negative for cough, shortness of breath and wheezing.    Cardiovascular:  Negative for chest pain, palpitations and leg swelling.   Gastrointestinal:  Negative for abdominal pain, constipation, diarrhea, nausea and vomiting.   Genitourinary:  Negative for dysuria.   Musculoskeletal:  Negative for myalgias.   Neurological:  Positive for focal weakness (chronic on the right UE and LE). Negative for dizziness, loss of consciousness and headaches.       Past Medical History   has a past medical history of Dysphagia, Hypertension, and Stroke ([facility]).    Surgical History   has a past surgical history that includes gastroscopy-endo (12/26/2017) and peg placement (12/26/2017).    Family History  Family history is unknown by patient.    Social History   reports that he has quit smoking. His smoking use included cigarettes. He has never used smokeless tobacco. He reports current alcohol  [FreeTextEntry1] : Pt is a 65 y/o F PMH COPD on O2, pHTN, ambulates with wheelchair.  \par \par COPD/pHTN:\par repeat TTE\par follows with pulm\par on home O2\par \par palpitations/sinus tach:\par multi factorial and overall stable\par \par HLD:\par high HDL\par Advised lifestyle modifications \par \par Pt will return in 6 mnths or sooner as needed but I encouraged communication via phone or portal if necessary. \par The described plan was discussed with the pt.  All questions and concerns were addressed to the best of my knowledge.  use of about 1.8 oz of alcohol per week. He reports that he does not use drugs.    Medications  Prior to Admission Medications   Prescriptions Last Dose Informant Patient Reported? Taking?   atorvastatin (LIPITOR) 40 MG Tab unk at [facility] Patient's Home Pharmacy No No   Sig: Take 1 Tablet by mouth every day.   cyclobenzaprine (FLEXERIL) 10 mg Tab unk at [facility] Patient's Home Pharmacy No No   Sig: Take 1 tablet by mouth 3 times a day as needed for Mild Pain.   lisinopril (PRINIVIL) 10 MG Tab unk at [facility] Patient's Home Pharmacy Yes Yes   Sig: Take 10 mg by mouth every day.      Facility-Administered Medications: None       Allergies  Allergies   Allergen Reactions    Poison Oak Extract [Extract Of Poison [place]]        Physical Exam  Temp:  [37.3 °C (99.2 °F)] 37.3 °C (99.2 °F)  Pulse:  [87-94] 92  Resp:  [17-23] 18  BP: (129-173)/(75-94) 159/84  SpO2:  [88 %-95 %] 92 %    Physical Exam  Vitals and nursing note reviewed.   Constitutional:       General: He is not in acute distress.  HENT:      Head: Normocephalic and atraumatic.      Mouth/Throat:      Mouth: Mucous membranes are moist.   Eyes:      General: No scleral icterus.     Extraocular Movements: Extraocular movements intact.   Cardiovascular:      Rate and Rhythm: Normal rate and regular rhythm.      Heart sounds: No murmur heard.     No gallop.   Pulmonary:      Effort: Pulmonary effort is normal. No respiratory distress.      Breath sounds: No wheezing, rhonchi or rales.   Abdominal:      General: Abdomen is flat. Bowel sounds are normal. There is no distension.      Palpations: Abdomen is soft.      Tenderness: There is no abdominal tenderness.   Musculoskeletal:         General: No swelling or tenderness.      Cervical back: Neck supple.   Skin:     General: Skin is warm.   Neurological:      Mental Status: He is alert.      Cranial Nerves: Cranial nerve deficit (anisocoria of right eye, however reflexive) present.      Motor: Weakness, atrophy and abnormal muscle  tone present.      Comments: Chronic deficits on right upper and lower extremity with contraction of right upper extremity   Psychiatric:         Mood and Affect: Mood normal.         Fluids      Laboratory  Recent Labs     08/09/24  1837   WBC 7.0   RBC 4.17*   HEMOGLOBIN 13.6*   HEMATOCRIT 40.8*   MCV 97.8   MCH 32.6   MCHC 33.3   RDW 46.7   PLATELETCT 205   MPV 12.0     Recent Labs     08/09/24  1837   SODIUM 139   POTASSIUM 4.2   CHLORIDE 106   CO2 19*   GLUCOSE 112*   BUN 11   CREATININE 0.60   CALCIUM 8.7                     Imaging  DX-KNEE 3 VIEWS RIGHT   Final Result      1.  Tiny osseous fragment projects at the tibial spine, possibly representing an  age-indeterminate avulsion fracture.   2.  Small knee joint effusion.      DX-ANKLE 3+ VIEWS RIGHT   Final Result      Questionable avulsion fracture of the lateral malleolus. Correlate with point tenderness for acute injury.      DX-HIP-UNILATERAL-WITH PELVIS-1 VIEW RIGHT   Final Result      1.  No radiographic evidence of acute traumatic injury.   2.  Moderate bilateral hip joint degenerative changes.      CT-HEAD W/O   Final Result      1.  Extensive areas of encephalomalacia in the left cerebral hemisphere consistent with old cerebral infarction associated with ex vacuo ventricular dilatation.   2.  No area suspicious for new acute infarction and no acute intracranial hemorrhage.               DX-CHEST-LIMITED (1 VIEW)   Final Result      1.  Cardiomegaly.   2.  Hypoinflation accentuates the bronchovascular markings which are considered within normal limits for the degree of pulmonary excursion.          Assessment/Plan  Generalized weakness  Assessment & Plan  Patient with chronic right-sided deficit fall with avulsion fracture indeterminate age of the right malleoli without loss of consciousness  - PT/OT  - Added TSH/B12 for other focal etiology workup    Right sided weakness- (present on admission)  Assessment & Plan  Chronic and upper and lower  extremity    History of CVA with residual deficit- (present on admission)  Assessment & Plan  History of CVA with residual right-sided deficits and expressive aphasia  - Continue home atorvastatin  - Baby aspirin 81 mg  - Will order lipid panel and A1c, goal LDL less than 70, hemoglobin goal less than 7%        Total time spent: 32 minutes

## 2024-08-10 NOTE — ED TRIAGE NOTES
Pt BIB [facility] for generalized weakness that started approx 1400. Pt has also had 2 GLF. -LOC,-hit head, -thinners. Pt had prior CVA in 2022 and has rt sided and speech deficits from that stroke. Per caregiver no new deficits. Pt connected to BS monitor, call light in reach, and NAD noted.  per EMS. Awaiting ERP to see.

## 2024-08-10 NOTE — ED NOTES
Patient repositioned in bed using turning system, urinal emptied of 600cc's of sudarshan urine, given flexeril for comfort.

## 2024-08-10 NOTE — ED NOTES
Spoke with pts Son Tobi on the phone. They are driving to the ED right now, and will be here soon. Per son, pt lives in an apartment with his 88 year old mother who helps him at home. He is normally able to walk with the use of a leg brace but has been falling frequently at home recently.

## 2024-08-10 NOTE — DISCHARGE PLANNING
MSW spoke to [facility] at [facility]. Pt has been accepted to both. MSW met with pt and he wants to d/c to [facility]. Unable to sign choice due to stroke. MSW updated [facility] on choice. Pending LOC completed-hopefully can move pt tomorrow or Monday when that is completed. MSW called and updated pt's son Tobi on choice as well. Will update AM SW to f/u. Choice scanned into Epic.

## 2024-08-11 PROCEDURE — 700102 HCHG RX REV CODE 250 W/ 637 OVERRIDE(OP): Mod: UD

## 2024-08-11 PROCEDURE — 96372 THER/PROPH/DIAG INJ SC/IM: CPT

## 2024-08-11 PROCEDURE — 700111 HCHG RX REV CODE 636 W/ 250 OVERRIDE (IP): Mod: JZ,UD

## 2024-08-11 PROCEDURE — A9270 NON-COVERED ITEM OR SERVICE: HCPCS | Mod: UD

## 2024-08-11 RX ADMIN — LISINOPRIL 10 MG: 10 TABLET ORAL at 06:35

## 2024-08-11 RX ADMIN — ENOXAPARIN SODIUM 40 MG: 100 INJECTION SUBCUTANEOUS at 18:28

## 2024-08-11 RX ADMIN — ATORVASTATIN CALCIUM 40 MG: 40 TABLET, FILM COATED ORAL at 06:34

## 2024-08-11 RX ADMIN — CYCLOBENZAPRINE 10 MG: 10 TABLET, FILM COATED ORAL at 06:35

## 2024-08-11 RX ADMIN — ASPIRIN 81 MG: 81 TABLET, COATED ORAL at 06:35

## 2024-08-11 NOTE — ED NOTES
Patient's home medications have been reviewed by the pharmacy team.     Patient's Medications   New Prescriptions    No medications on file   Previous Medications    ATORVASTATIN (LIPITOR) 40 MG TAB    Take 1 Tablet by mouth every day.    CYCLOBENZAPRINE (FLEXERIL) 10 MG TAB    Take 1 tablet by mouth 3 times a day as needed for Mild Pain.    LISINOPRIL (PRINIVIL) 10 MG TAB    Take 10 mg by mouth every day.   Modified Medications    No medications on file   Discontinued Medications    ASPIRIN (ASPIRIN LOW DOSE) 81 MG CHEW TAB CHEWABLE TABLET    Chew and swallow one tablet by mouth daily    MISC. DEVICES MISC    Speech machine to set up phrases that patient can choose  And machine can speak that info to another person  For communication help.         A:  Medications do not appear to be contributing to current complaints.       P:    No recommendations at this time. Home medications have been reordered as appropriate.      Jose Eduardo Hi, PharmD

## 2024-08-11 NOTE — ED NOTES
Comfort check. Pt repositioned in bed onto left side. Urinal emptied and placed back within reach. Family then to bedside. Pt denies needs

## 2024-08-11 NOTE — ED NOTES
Pt wanted to lay back in bed so assisted back to bed. Pt asked to lay on right side wedge placed under left side. Call light w/I reach

## 2024-08-11 NOTE — ED NOTES
Patient resting on bed in no acute distress. Equal chest rise and fall noted. No needs at this time, will continue to monitor

## 2024-08-11 NOTE — ED PROVIDER NOTES
"ED Observation Progress Note    Date of Service: 08/11/24    Interval History and Interventions  This is a 59-year-old gentleman who is been placed in ED observation status after he was brought in for confusion inability to ambulate and debility.  There was questionable avulsion fracture to the right ankle and tibia.  He was placed in a long-leg splint.  He has a history of stroke with deficits.  At the time of this dictation PT OT is evaluating the patient and they are trying to get him placed    Physical Exam  /56   Pulse 93   Temp 37.3 °C (99.2 °F) (Temporal)   Resp 15   Ht 1.854 m (6' 1\")   Wt 83.9 kg (185 lb)   SpO2 94%   BMI 24.41 kg/m² .    Constitutional: Awake and alert. Nontoxic  HENT:  Grossly normal  Eyes: Grossly normal  Neck: Normal range of motion  Cardiovascular: Normal heart rate   Thorax & Lungs: No respiratory distress  Abdomen: Nontender  Skin:  No pathologic rash.   Extremities: Well perfused  Psychiatric: Affect normal    Labs  Results for orders placed or performed during the hospital encounter of 08/09/24   CBC w/ Differential   Result Value Ref Range    WBC 7.0 4.8 - 10.8 K/uL    RBC 4.17 (L) 4.70 - 6.10 M/uL    Hemoglobin 13.6 (L) 14.0 - 18.0 g/dL    Hematocrit 40.8 (L) 42.0 - 52.0 %    MCV 97.8 81.4 - 97.8 fL    MCH 32.6 27.0 - 33.0 pg    MCHC 33.3 32.3 - 36.5 g/dL    RDW 46.7 35.9 - 50.0 fL    Platelet Count 205 164 - 446 K/uL    MPV 12.0 9.0 - 12.9 fL    Neutrophils-Polys 59.30 44.00 - 72.00 %    Lymphocytes 23.30 22.00 - 41.00 %    Monocytes 13.10 0.00 - 13.40 %    Eosinophils 1.70 0.00 - 6.90 %    Basophils 0.60 0.00 - 1.80 %    Immature Granulocytes 2.00 (H) 0.00 - 0.90 %    Nucleated RBC 0.00 0.00 - 0.20 /100 WBC    Neutrophils (Absolute) 4.16 1.82 - 7.42 K/uL    Lymphs (Absolute) 1.63 1.00 - 4.80 K/uL    Monos (Absolute) 0.92 (H) 0.00 - 0.85 K/uL    Eos (Absolute) 0.12 0.00 - 0.51 K/uL    Baso (Absolute) 0.04 0.00 - 0.12 K/uL    Immature Granulocytes (abs) 0.14 (H) 0.00 " - 0.11 K/uL    NRBC (Absolute) 0.00 K/uL   Complete Metabolic Panel (CMP)   Result Value Ref Range    Sodium 139 135 - 145 mmol/L    Potassium 4.2 3.6 - 5.5 mmol/L    Chloride 106 96 - 112 mmol/L    Co2 19 (L) 20 - 33 mmol/L    Anion Gap 14.0 7.0 - 16.0    Glucose 112 (H) 65 - 99 mg/dL    Bun 11 8 - 22 mg/dL    Creatinine 0.60 0.50 - 1.40 mg/dL    Calcium 8.7 8.5 - 10.5 mg/dL    Correct Calcium 8.6 8.5 - 10.5 mg/dL    AST(SGOT) 18 12 - 45 U/L    ALT(SGPT) 23 2 - 50 U/L    Alkaline Phosphatase 57 30 - 99 U/L    Total Bilirubin 0.2 0.1 - 1.5 mg/dL    Albumin 4.1 3.2 - 4.9 g/dL    Total Protein 6.8 6.0 - 8.2 g/dL    Globulin 2.7 1.9 - 3.5 g/dL    A-G Ratio 1.5 g/dL   Troponin - STAT Once   Result Value Ref Range    Troponin T 15 6 - 19 ng/L   URINALYSIS,CULTURE IF INDICATED    Specimen: Urine, Clean Catch   Result Value Ref Range    Color Yellow     Character Clear     Specific Gravity 1.009 <1.035    Ph 6.0 5.0 - 8.0    Glucose Negative Negative mg/dL    Ketones Negative Negative mg/dL    Protein Negative Negative mg/dL    Bilirubin Negative Negative    Urobilinogen, Urine 0.2 Negative    Nitrite Negative Negative    Leukocyte Esterase Negative Negative    Occult Blood Negative Negative    Micro Urine Req see below    ESTIMATED GFR   Result Value Ref Range    GFR (CKD-EPI) 110 >60 mL/min/1.73 m 2   TROPONIN   Result Value Ref Range    Troponin T 15 6 - 19 ng/L   Lipid Profile   Result Value Ref Range    Cholesterol,Tot 139 100 - 199 mg/dL    Triglycerides 119 0 - 149 mg/dL    HDL 69 >=40 mg/dL    LDL 46 <100 mg/dL   HEMOGLOBIN A1C   Result Value Ref Range    Glycohemoglobin 5.2 4.0 - 5.6 %    Est Avg Glucose 103 mg/dL   TSH WITH REFLEX TO FT4   Result Value Ref Range    TSH 0.952 0.380 - 5.330 uIU/mL   VITAMIN B12   Result Value Ref Range    Vitamin B12 -True Cobalamin 491 211 - 911 pg/mL   EKG   Result Value Ref Range    Report       [facility] Emergency Dept.    Test Date:  2024-08-09  Pt Name:     DIOGENES CASE                  Department: ER  MRN:        [id number]                      Room:        21  Gender:     Male                         Technician: [id number]  :        1965                   Requested By:JT ABBIE LÓPEZ  Order #:    [id number]                    Reading MD: MARVIN ZALDIVAR MD    Measurements  Intervals                                Axis  Rate:       93                           P:          56  AR:         156                          QRS:        33  QRSD:       96                           T:          17  QT:         350  QTc:        436    Interpretive Statements  Sinus rhythm  Abnormal inferior Q waves in III  Compared to ECG 2017 17:29:45  Inferior Q waves now present  Q waves now present  Electronically Signed On 08- 12:42:23 PDT by MARVIN ZALDIVAR MD         Radiology  DX-KNEE 3 VIEWS RIGHT   Final Result      1.  Tiny osseous fragment projects at the tibial spine, possibly representing an  age-indeterminate avulsion fracture.   2.  Small knee joint effusion.      DX-ANKLE 3+ VIEWS RIGHT   Final Result      Questionable avulsion fracture of the lateral malleolus. Correlate with point tenderness for acute injury.      DX-HIP-UNILATERAL-WITH PELVIS-1 VIEW RIGHT   Final Result      1.  No radiographic evidence of acute traumatic injury.   2.  Moderate bilateral hip joint degenerative changes.      CT-HEAD W/O   Final Result      1.  Extensive areas of encephalomalacia in the left cerebral hemisphere consistent with old cerebral infarction associated with ex vacuo ventricular dilatation.   2.  No area suspicious for new acute infarction and no acute intracranial hemorrhage.               DX-CHEST-LIMITED (1 VIEW)   Final Result      1.  Cardiomegaly.   2.  Hypoinflation accentuates the bronchovascular markings which are considered within normal limits for the degree of pulmonary excursion.          Problem List  1.   1. Weakness        2. Closed avulsion fracture  of right ankle, initial encounter        3. Anemia, unspecified type              Electronically signed by: Pop Lang M.D., 8/11/2024 1:18 PM

## 2024-08-11 NOTE — ED NOTES
Pt sitting up to side of bed and pt brace applied to leg. Pt wanted to get up and sit in chair but was unable to stand. Pt has call light w/I reach and verbalized understanding to call for assistance

## 2024-08-11 NOTE — ED NOTES
Brother reported he will be out of town, requested to add his sister Marie and brother in law Rik to contacts. [phone]. Patient repositioned in bed. Patient ate 100% of dinner tray.

## 2024-08-12 VITALS
HEIGHT: 73 IN | TEMPERATURE: 98.9 F | BODY MASS INDEX: 24.52 KG/M2 | SYSTOLIC BLOOD PRESSURE: 120 MMHG | DIASTOLIC BLOOD PRESSURE: 71 MMHG | OXYGEN SATURATION: 95 % | HEART RATE: 88 BPM | WEIGHT: 185 LBS | RESPIRATION RATE: 16 BRPM

## 2024-08-12 PROCEDURE — 700102 HCHG RX REV CODE 250 W/ 637 OVERRIDE(OP): Mod: UD

## 2024-08-12 PROCEDURE — A9270 NON-COVERED ITEM OR SERVICE: HCPCS | Mod: UD

## 2024-08-12 RX ADMIN — LISINOPRIL 10 MG: 10 TABLET ORAL at 05:11

## 2024-08-12 RX ADMIN — ATORVASTATIN CALCIUM 40 MG: 40 TABLET, FILM COATED ORAL at 05:11

## 2024-08-12 RX ADMIN — ASPIRIN 81 MG: 81 TABLET, COATED ORAL at 05:11

## 2024-08-12 ASSESSMENT — COGNITIVE AND FUNCTIONAL STATUS - GENERAL
PERSONAL GROOMING: A LOT
MOVING TO AND FROM BED TO CHAIR: A LOT
DAILY ACTIVITIY SCORE: 12
CLIMB 3 TO 5 STEPS WITH RAILING: A LOT
MOBILITY SCORE: 12
SUGGESTED CMS G CODE MODIFIER MOBILITY: CL
TOILETING: A LOT
TURNING FROM BACK TO SIDE WHILE IN FLAT BAD: A LOT
WALKING IN HOSPITAL ROOM: A LOT
HELP NEEDED FOR BATHING: A LOT
DRESSING REGULAR LOWER BODY CLOTHING: A LOT
MOVING FROM LYING ON BACK TO SITTING ON SIDE OF FLAT BED: A LOT
STANDING UP FROM CHAIR USING ARMS: A LOT
SUGGESTED CMS G CODE MODIFIER DAILY ACTIVITY: CL
EATING MEALS: A LOT
DRESSING REGULAR UPPER BODY CLOTHING: A LOT

## 2024-08-12 NOTE — ED NOTES
FERNANDO at bedside to transport pt. Report given. Pt paperwork and belongings transported with pt.

## 2024-08-12 NOTE — PROGRESS NOTES
"ED Observation Progress Note    Date of Service: 08/12/24    Interval History and Interventions  Patient's care was transferred to me at change of shift.  Patient presented to the ER on August 9 for evaluation of a possible fall and generalized weakness.  Patient has history of stroke which left him with expressive aphasia and right-sided deficits.  Patient usually wears a brace for ambulation.  He was found to have an age-indeterminate avulsion fracture of the knee and a questionable avulsion fracture of the lateral malleolus.  Patient was evaluated by PT OT and determined he needs placement.  The patient will be going to [place] later this afternoon.  At this time patient has no complaints other than some discomfort to his right ankle.  He denies numbness or tingling to his ankle.  He says he has been eating well.  His last bowel movement was 3 days ago.  He has been urinating.  He denies chest pain, headache, abdominal pain and back pain.  He says overall he feels fine.  His vital signs are normal and stable.  This time he is awaiting placement.    Physical Exam  /68   Pulse 76   Temp 37.5 °C (99.5 °F)   Resp 19   Ht 1.854 m (6' 1\")   Wt 83.9 kg (185 lb)   SpO2 93%   BMI 24.41 kg/m² .    Constitutional: Awake and alert. Nontoxic.  Patient has expressive aphasia.  He can communicate via using his fingers and nodding affirmative or negative when I ask him questions.  HENT:  Grossly normal.  Slightly dry mucous membranes.  Eyes: Grossly normal  Neck: Normal range of motion  Cardiovascular: Normal heart rate.  Heart is regular rate and rhythm without murmurs rubs or gallops.  Thorax & Lungs: No respiratory distress.  Chest is clear to auscultation bilaterally without wheezes rales or rhonchi.  Abdomen: Nontender, soft  Skin:  No pathologic rash.   Extremities: Well perfused.  2+ pedal pulses equal bilaterally.  There is no with pretibial edema and no calf tenderness.  There is some mild swelling over the " right ankle.  Full range of motion to digits on bilateral feet.  Psychiatric: Affect normal    Labs  Results for orders placed or performed during the hospital encounter of 08/09/24   CBC w/ Differential   Result Value Ref Range    WBC 7.0 4.8 - 10.8 K/uL    RBC 4.17 (L) 4.70 - 6.10 M/uL    Hemoglobin 13.6 (L) 14.0 - 18.0 g/dL    Hematocrit 40.8 (L) 42.0 - 52.0 %    MCV 97.8 81.4 - 97.8 fL    MCH 32.6 27.0 - 33.0 pg    MCHC 33.3 32.3 - 36.5 g/dL    RDW 46.7 35.9 - 50.0 fL    Platelet Count 205 164 - 446 K/uL    MPV 12.0 9.0 - 12.9 fL    Neutrophils-Polys 59.30 44.00 - 72.00 %    Lymphocytes 23.30 22.00 - 41.00 %    Monocytes 13.10 0.00 - 13.40 %    Eosinophils 1.70 0.00 - 6.90 %    Basophils 0.60 0.00 - 1.80 %    Immature Granulocytes 2.00 (H) 0.00 - 0.90 %    Nucleated RBC 0.00 0.00 - 0.20 /100 WBC    Neutrophils (Absolute) 4.16 1.82 - 7.42 K/uL    Lymphs (Absolute) 1.63 1.00 - 4.80 K/uL    Monos (Absolute) 0.92 (H) 0.00 - 0.85 K/uL    Eos (Absolute) 0.12 0.00 - 0.51 K/uL    Baso (Absolute) 0.04 0.00 - 0.12 K/uL    Immature Granulocytes (abs) 0.14 (H) 0.00 - 0.11 K/uL    NRBC (Absolute) 0.00 K/uL   Complete Metabolic Panel (CMP)   Result Value Ref Range    Sodium 139 135 - 145 mmol/L    Potassium 4.2 3.6 - 5.5 mmol/L    Chloride 106 96 - 112 mmol/L    Co2 19 (L) 20 - 33 mmol/L    Anion Gap 14.0 7.0 - 16.0    Glucose 112 (H) 65 - 99 mg/dL    Bun 11 8 - 22 mg/dL    Creatinine 0.60 0.50 - 1.40 mg/dL    Calcium 8.7 8.5 - 10.5 mg/dL    Correct Calcium 8.6 8.5 - 10.5 mg/dL    AST(SGOT) 18 12 - 45 U/L    ALT(SGPT) 23 2 - 50 U/L    Alkaline Phosphatase 57 30 - 99 U/L    Total Bilirubin 0.2 0.1 - 1.5 mg/dL    Albumin 4.1 3.2 - 4.9 g/dL    Total Protein 6.8 6.0 - 8.2 g/dL    Globulin 2.7 1.9 - 3.5 g/dL    A-G Ratio 1.5 g/dL   Troponin - STAT Once   Result Value Ref Range    Troponin T 15 6 - 19 ng/L   URINALYSIS,CULTURE IF INDICATED    Specimen: Urine, Clean Catch   Result Value Ref Range    Color Yellow     Character Clear      Specific Gravity 1.009 <1.035    Ph 6.0 5.0 - 8.0    Glucose Negative Negative mg/dL    Ketones Negative Negative mg/dL    Protein Negative Negative mg/dL    Bilirubin Negative Negative    Urobilinogen, Urine 0.2 Negative    Nitrite Negative Negative    Leukocyte Esterase Negative Negative    Occult Blood Negative Negative    Micro Urine Req see below    ESTIMATED GFR   Result Value Ref Range    GFR (CKD-EPI) 110 >60 mL/min/1.73 m 2   TROPONIN   Result Value Ref Range    Troponin T 15 6 - 19 ng/L   Lipid Profile   Result Value Ref Range    Cholesterol,Tot 139 100 - 199 mg/dL    Triglycerides 119 0 - 149 mg/dL    HDL 69 >=40 mg/dL    LDL 46 <100 mg/dL   HEMOGLOBIN A1C   Result Value Ref Range    Glycohemoglobin 5.2 4.0 - 5.6 %    Est Avg Glucose 103 mg/dL   TSH WITH REFLEX TO FT4   Result Value Ref Range    TSH 0.952 0.380 - 5.330 uIU/mL   VITAMIN B12   Result Value Ref Range    Vitamin B12 -True Cobalamin 491 211 - 911 pg/mL   EKG   Result Value Ref Range    Report       [facility] Emergency Dept.    Test Date:  2024  Pt Name:    DIOGENES CASE                  Department: ER  MRN:        [id number]                      Room:        21  Gender:     Male                         Technician: [id number]  :        1965                   Requested By:JT LÓPEZ  Order #:    [id number]                    Reading MD: MARVIN ZALDIVAR MD    Measurements  Intervals                                Axis  Rate:       93                           P:          56  IN:         156                          QRS:        33  QRSD:       96                           T:          17  QT:         350  QTc:        436    Interpretive Statements  Sinus rhythm  Abnormal inferior Q waves in III  Compared to ECG 2017 17:29:45  Inferior Q waves now present  Q waves now present  Electronically Signed On 08- 12:42:23 PDT by MARVIN ZALDIVAR MD         Radiology  DX-KNEE 3 VIEWS RIGHT   Final Result       1.  Tiny osseous fragment projects at the tibial spine, possibly representing an  age-indeterminate avulsion fracture.   2.  Small knee joint effusion.      DX-ANKLE 3+ VIEWS RIGHT   Final Result      Questionable avulsion fracture of the lateral malleolus. Correlate with point tenderness for acute injury.      DX-HIP-UNILATERAL-WITH PELVIS-1 VIEW RIGHT   Final Result      1.  No radiographic evidence of acute traumatic injury.   2.  Moderate bilateral hip joint degenerative changes.      CT-HEAD W/O   Final Result      1.  Extensive areas of encephalomalacia in the left cerebral hemisphere consistent with old cerebral infarction associated with ex vacuo ventricular dilatation.   2.  No area suspicious for new acute infarction and no acute intracranial hemorrhage.               DX-CHEST-LIMITED (1 VIEW)   Final Result      1.  Cardiomegaly.   2.  Hypoinflation accentuates the bronchovascular markings which are considered within normal limits for the degree of pulmonary excursion.          Problem List  1.   1. Weakness        2. Closed avulsion fracture of right ankle, initial encounter        3. Anemia, unspecified type           This dictation has been created using voice recognition software. The accuracy of the dictation is limited by the abilities of the software. I expect there may be some errors of grammar and possibly content. I made every attempt to manually correct the errors within my dictation. However, errors related to voice recognition software may still exist and should be interpreted within the appropriate context.       Electronically signed by: Meryl Sanchez M.D., 8/12/2024 6:21 AM

## 2024-08-12 NOTE — ED NOTES
Pt given dinner tray. Denies any other needs call light in reach. Pt repositioned onto back sitting up

## 2024-08-12 NOTE — DISCHARGE SUMMARY
"  ED Observation Discharge Summary    Patient:Gilles Cobb Case  Patient : 1965  Patient MRN: [id number]  Patient PCP: SANTY Heller    Admit Date: 2024  Discharge Date and Time: 24 12:09 PM  Discharge Diagnosis:   1. Weakness        2. Closed avulsion fracture of right ankle, initial encounter        3. Anemia, unspecified type           Discharge Attending: Meryl Sanchez M.D.  Discharge Service: ED Observation    ED Course  Gilles is a 59 y.o. male who was evaluated at [facility] on  after a probable fall.  Patient was found to have an avulsion fracture of his lateral malleolus.  Patient underwent evaluate for PT/OT.  Patient will be going to [facility] skilled nursing.  Patient has been doing well throughout his ED course.  He is been eating and drinking.  No complaints other than his right ankle and a long toenail on the left foot which needs to be clipped.  Otherwise he is eating and drinking.  He is urinating.  He says his last bowel meant was 3 days ago.  He does not feel constipated.  No abdominal pain.  No vomiting.  No chest pain or headache.  He has history of stroke in the past and has difficulty communicating due to expressive aphasia but does use his hands to gesture and is very expressive in terms of his facial expressions when he nods affirmative and negative.  He is interactive with MD.  His vital signs are normal and stable.  He has no complaints other than his ankle.  At this time I think he is safe and stable for transfer to skilled nursing.  The med rec form has been filled out and signed by myself.    Discharge Exam:  BP (P) 120/71   Pulse (P) 88   Temp (P) 37.2 °C (98.9 °F)   Resp (P) 16   Ht 1.854 m (6' 1\")   Wt 83.9 kg (185 lb)   SpO2 94%   BMI 24.41 kg/m² .    Constitutional: Awake and alert. Nontoxic.  Expressive aphasia.  Patient able to communicate using hand signals and nodding affirmative  HENT:  Grossly normal  Eyes: Grossly normal  Neck: Normal range of " motion  Cardiovascular: Normal heart rate.  Heart is regular rate and rhythm without murmur rubs or gallops.  Thorax & Lungs: No respiratory distress.  Lungs are clear to auscultation bilaterally without wheezes rales or rhonchi.  Abdomen: Nontender, soft.  Skin:  No pathologic rash.   Extremities: Well perfused.  2+ pedal pulses.  No pretibial edema.  Calves are nontender.  Mild swelling over the right ankle.  Full range of motion to digits.  Psychiatric: Affect normal    Labs  Results for orders placed or performed during the hospital encounter of 08/09/24   CBC w/ Differential   Result Value Ref Range    WBC 7.0 4.8 - 10.8 K/uL    RBC 4.17 (L) 4.70 - 6.10 M/uL    Hemoglobin 13.6 (L) 14.0 - 18.0 g/dL    Hematocrit 40.8 (L) 42.0 - 52.0 %    MCV 97.8 81.4 - 97.8 fL    MCH 32.6 27.0 - 33.0 pg    MCHC 33.3 32.3 - 36.5 g/dL    RDW 46.7 35.9 - 50.0 fL    Platelet Count 205 164 - 446 K/uL    MPV 12.0 9.0 - 12.9 fL    Neutrophils-Polys 59.30 44.00 - 72.00 %    Lymphocytes 23.30 22.00 - 41.00 %    Monocytes 13.10 0.00 - 13.40 %    Eosinophils 1.70 0.00 - 6.90 %    Basophils 0.60 0.00 - 1.80 %    Immature Granulocytes 2.00 (H) 0.00 - 0.90 %    Nucleated RBC 0.00 0.00 - 0.20 /100 WBC    Neutrophils (Absolute) 4.16 1.82 - 7.42 K/uL    Lymphs (Absolute) 1.63 1.00 - 4.80 K/uL    Monos (Absolute) 0.92 (H) 0.00 - 0.85 K/uL    Eos (Absolute) 0.12 0.00 - 0.51 K/uL    Baso (Absolute) 0.04 0.00 - 0.12 K/uL    Immature Granulocytes (abs) 0.14 (H) 0.00 - 0.11 K/uL    NRBC (Absolute) 0.00 K/uL   Complete Metabolic Panel (CMP)   Result Value Ref Range    Sodium 139 135 - 145 mmol/L    Potassium 4.2 3.6 - 5.5 mmol/L    Chloride 106 96 - 112 mmol/L    Co2 19 (L) 20 - 33 mmol/L    Anion Gap 14.0 7.0 - 16.0    Glucose 112 (H) 65 - 99 mg/dL    Bun 11 8 - 22 mg/dL    Creatinine 0.60 0.50 - 1.40 mg/dL    Calcium 8.7 8.5 - 10.5 mg/dL    Correct Calcium 8.6 8.5 - 10.5 mg/dL    AST(SGOT) 18 12 - 45 U/L    ALT(SGPT) 23 2 - 50 U/L    Alkaline  Phosphatase 57 30 - 99 U/L    Total Bilirubin 0.2 0.1 - 1.5 mg/dL    Albumin 4.1 3.2 - 4.9 g/dL    Total Protein 6.8 6.0 - 8.2 g/dL    Globulin 2.7 1.9 - 3.5 g/dL    A-G Ratio 1.5 g/dL   Troponin - STAT Once   Result Value Ref Range    Troponin T 15 6 - 19 ng/L   URINALYSIS,CULTURE IF INDICATED    Specimen: Urine, Clean Catch   Result Value Ref Range    Color Yellow     Character Clear     Specific Gravity 1.009 <1.035    Ph 6.0 5.0 - 8.0    Glucose Negative Negative mg/dL    Ketones Negative Negative mg/dL    Protein Negative Negative mg/dL    Bilirubin Negative Negative    Urobilinogen, Urine 0.2 Negative    Nitrite Negative Negative    Leukocyte Esterase Negative Negative    Occult Blood Negative Negative    Micro Urine Req see below    ESTIMATED GFR   Result Value Ref Range    GFR (CKD-EPI) 110 >60 mL/min/1.73 m 2   TROPONIN   Result Value Ref Range    Troponin T 15 6 - 19 ng/L   Lipid Profile   Result Value Ref Range    Cholesterol,Tot 139 100 - 199 mg/dL    Triglycerides 119 0 - 149 mg/dL    HDL 69 >=40 mg/dL    LDL 46 <100 mg/dL   HEMOGLOBIN A1C   Result Value Ref Range    Glycohemoglobin 5.2 4.0 - 5.6 %    Est Avg Glucose 103 mg/dL   TSH WITH REFLEX TO FT4   Result Value Ref Range    TSH 0.952 0.380 - 5.330 uIU/mL   VITAMIN B12   Result Value Ref Range    Vitamin B12 -True Cobalamin 491 211 - 911 pg/mL   EKG   Result Value Ref Range    Report       [facility]    Test Date:  2024  Pt Name:    DIOGENES CASE                  Department: ER  MRN:        [id number]                      Room:        21  Gender:     Male                         Technician: [id number]  :        1965                   Requested By:JT LÓPEZ  Order #:    [id number]                    Reading MD: MARVIN ZALDIVAR MD    Measurements  Intervals                                Axis  Rate:       93                           P:          56  LA:         156                          QRS:         33  QRSD:       96                           T:          17  QT:         350  QTc:        436    Interpretive Statements  Sinus rhythm  Abnormal inferior Q waves in III  Compared to ECG 12/19/2017 17:29:45  Inferior Q waves now present  Q waves now present  Electronically Signed On 08- 12:42:23 PDT by MARVIN ZALDIVAR MD         Radiology  DX-KNEE 3 VIEWS RIGHT   Final Result      1.  Tiny osseous fragment projects at the tibial spine, possibly representing an  age-indeterminate avulsion fracture.   2.  Small knee joint effusion.      DX-ANKLE 3+ VIEWS RIGHT   Final Result      Questionable avulsion fracture of the lateral malleolus. Correlate with point tenderness for acute injury.      DX-HIP-UNILATERAL-WITH PELVIS-1 VIEW RIGHT   Final Result      1.  No radiographic evidence of acute traumatic injury.   2.  Moderate bilateral hip joint degenerative changes.      CT-HEAD W/O   Final Result      1.  Extensive areas of encephalomalacia in the left cerebral hemisphere consistent with old cerebral infarction associated with ex vacuo ventricular dilatation.   2.  No area suspicious for new acute infarction and no acute intracranial hemorrhage.               DX-CHEST-LIMITED (1 VIEW)   Final Result      1.  Cardiomegaly.   2.  Hypoinflation accentuates the bronchovascular markings which are considered within normal limits for the degree of pulmonary excursion.          Medications:   New Prescriptions    No medications on file       My final assessment includes that patient is stable for transfer to skilled nursing facility.    Upon Reevaluation, the patient's condition has: not improved; and will be escalated to hospitalization.    Patient discharged from ED Observation status at 1320 (Time) August 12, 2024 (Date).     Total time spent on this ED Observation discharge encounter is > 30 Minutes    Electronically signed by: Meryl Sanchez M.D., 8/12/2024 12:09 PM

## 2024-08-12 NOTE — ED NOTES
Checked on bed, with unlabored respirations. No safety risk noted  Awake on bed  Continued safety precaution  Gurney in low position, side rail up for pt safety.   No needs identified at the moment

## 2024-08-12 NOTE — ED NOTES
Provided pt more water offered to reposition pt in bed however he declined. Pt denies any needs at this time

## 2024-08-12 NOTE — DISCHARGE PLANNING
SW received information that LOC has been received, SW called Lucinda and confirmed pt can be transported at 2pm.  SARIKA met with RN to assess appropriate transportation measures, informed ERP pt would be leaving at 130pm.    SW completed, submitted, and scanned [facility] into pt chart, set up [facility] transportation for 1:30PM.    SARIKA will deliver COBRA to pts hard chart once it is complete.

## 2024-08-12 NOTE — ED NOTES
Patient is a & o x 4 communicating via handsignals to dictate year. Patient given bed bath and new sheets. Healed wound documented on the anterior portion of the right foot. Patient is awake in bed, 3x rails up for patient safety, belongings at bedside, call light within reach.

## 2024-08-12 NOTE — DISCHARGE PLANNING
Reviewed PAS system for screening update. LOC is complete as of this morning. PASRR # is  [id number]. Updated LOC # is [id number] .

## 2024-08-12 NOTE — ED NOTES
Pt resting in hospital bed, with even rise and fall of chest noted. No obvious signs of pain or distress, pt on supine position.

## 2024-08-12 NOTE — ED NOTES
Pt resting in hospital bed, with even rise and fall of chest noted. No obvious signs of pain or distress, pt on supine position. No need at this time. Call light within reach.

## 2024-08-12 NOTE — ED NOTES
Bedside report received from previous shift.   Assumed patient care. Verified patient identification.  Checked on bed, connected to monitor,  with unlabored respirations. Discussed plan of care.   Vital signs is stable. Denied any new complaints.   Gurney in low position, side rail up for pt safety. Call light within reach.   No needs identified at the moment. Instructed to use call light when needed.    Contraptions: IV gauge 18 Lt FA  Alert and Oriented: Alert, but has expressive aphasia  Ambulatory: No  Oxygen: No  Pending: Transfer to SNF    + chronic right-sided deficits as well as expressive aphasia who presented 8/9/2024 due to generalized weakness and mechanical ground-level fall.

## 2024-08-12 NOTE — ED NOTES
Bedside report from MICHAEL Do. Pt on hospital bed in lowest, locked position, on RA, and continuous cardiac monitoring. Fall precautions in place, including fall risk sign. Pt updated on plan of care. No needs at this time. Call light within reach.

## 2024-08-12 NOTE — ED NOTES
Assisted pt to re-adjust in bed. Emptied 100ml from pt's urinal. Pt provided fresh ice water. No need at this time. Call light within reach.
